# Patient Record
Sex: MALE | Race: WHITE | NOT HISPANIC OR LATINO | Employment: OTHER | URBAN - METROPOLITAN AREA
[De-identification: names, ages, dates, MRNs, and addresses within clinical notes are randomized per-mention and may not be internally consistent; named-entity substitution may affect disease eponyms.]

---

## 2017-01-09 ENCOUNTER — GENERIC CONVERSION - ENCOUNTER (OUTPATIENT)
Dept: OTHER | Facility: OTHER | Age: 77
End: 2017-01-09

## 2017-01-27 ENCOUNTER — APPOINTMENT (OUTPATIENT)
Dept: LAB | Facility: CLINIC | Age: 77
End: 2017-01-27
Payer: MEDICARE

## 2017-01-27 DIAGNOSIS — R73.09 OTHER ABNORMAL GLUCOSE: ICD-10-CM

## 2017-01-27 DIAGNOSIS — E78.5 HYPERLIPIDEMIA: ICD-10-CM

## 2017-01-27 DIAGNOSIS — I10 ESSENTIAL (PRIMARY) HYPERTENSION: ICD-10-CM

## 2017-01-27 DIAGNOSIS — N40.0 ENLARGED PROSTATE WITHOUT LOWER URINARY TRACT SYMPTOMS (LUTS): ICD-10-CM

## 2017-01-27 LAB
ALBUMIN SERPL BCP-MCNC: 3.9 G/DL (ref 3.5–5)
ALP SERPL-CCNC: 47 U/L (ref 46–116)
ALT SERPL W P-5'-P-CCNC: 35 U/L (ref 12–78)
ANION GAP SERPL CALCULATED.3IONS-SCNC: 6 MMOL/L (ref 4–13)
AST SERPL W P-5'-P-CCNC: 22 U/L (ref 5–45)
BILIRUB SERPL-MCNC: 0.57 MG/DL (ref 0.2–1)
BUN SERPL-MCNC: 20 MG/DL (ref 5–25)
CALCIUM SERPL-MCNC: 9.2 MG/DL (ref 8.3–10.1)
CHLORIDE SERPL-SCNC: 104 MMOL/L (ref 100–108)
CHOLEST SERPL-MCNC: 166 MG/DL (ref 50–200)
CO2 SERPL-SCNC: 31 MMOL/L (ref 21–32)
CREAT SERPL-MCNC: 0.88 MG/DL (ref 0.6–1.3)
EST. AVERAGE GLUCOSE BLD GHB EST-MCNC: 114 MG/DL
GFR SERPL CREATININE-BSD FRML MDRD: >60 ML/MIN/1.73SQ M
GLUCOSE SERPL-MCNC: 114 MG/DL (ref 65–140)
HBA1C MFR BLD: 5.6 % (ref 4.2–6.3)
HDLC SERPL-MCNC: 51 MG/DL (ref 40–60)
LDLC SERPL DIRECT ASSAY-MCNC: 100 MG/DL (ref 0–100)
MAGNESIUM SERPL-MCNC: 1.7 MG/DL (ref 1.6–2.6)
POTASSIUM SERPL-SCNC: 4.2 MMOL/L (ref 3.5–5.3)
PROT SERPL-MCNC: 7.3 G/DL (ref 6.4–8.2)
SODIUM SERPL-SCNC: 141 MMOL/L (ref 136–145)
TRIGL SERPL-MCNC: 156 MG/DL
VIT B12 SERPL-MCNC: 406 PG/ML (ref 100–900)

## 2017-01-27 PROCEDURE — 83721 ASSAY OF BLOOD LIPOPROTEIN: CPT

## 2017-01-27 PROCEDURE — 36415 COLL VENOUS BLD VENIPUNCTURE: CPT

## 2017-01-27 PROCEDURE — 80053 COMPREHEN METABOLIC PANEL: CPT

## 2017-01-27 PROCEDURE — 80061 LIPID PANEL: CPT

## 2017-01-27 PROCEDURE — 83735 ASSAY OF MAGNESIUM: CPT

## 2017-01-27 PROCEDURE — 83918 ORGANIC ACIDS TOTAL QUANT: CPT

## 2017-01-27 PROCEDURE — 83036 HEMOGLOBIN GLYCOSYLATED A1C: CPT

## 2017-01-27 PROCEDURE — 82607 VITAMIN B-12: CPT

## 2017-01-31 ENCOUNTER — ALLSCRIPTS OFFICE VISIT (OUTPATIENT)
Dept: OTHER | Facility: OTHER | Age: 77
End: 2017-01-31

## 2017-02-02 LAB — METHYLMALONATE SERPL-SCNC: 203 NMOL/L (ref 0–378)

## 2017-02-08 ENCOUNTER — ALLSCRIPTS OFFICE VISIT (OUTPATIENT)
Dept: OTHER | Facility: OTHER | Age: 77
End: 2017-02-08

## 2017-02-15 ENCOUNTER — HOSPITAL ENCOUNTER (OUTPATIENT)
Facility: HOSPITAL | Age: 77
Setting detail: OUTPATIENT SURGERY
Discharge: HOME | End: 2017-02-15
Attending: INTERNAL MEDICINE | Admitting: INTERNAL MEDICINE
Payer: MEDICARE

## 2017-02-15 ENCOUNTER — ANESTHESIA EVENT (OUTPATIENT)
Dept: GASTROENTEROLOGY | Facility: HOSPITAL | Age: 77
End: 2017-02-15
Payer: MEDICARE

## 2017-02-15 ENCOUNTER — ANESTHESIA (OUTPATIENT)
Dept: GASTROENTEROLOGY | Facility: HOSPITAL | Age: 77
End: 2017-02-15
Payer: MEDICARE

## 2017-02-15 VITALS
HEIGHT: 69 IN | DIASTOLIC BLOOD PRESSURE: 77 MMHG | HEART RATE: 77 BPM | BODY MASS INDEX: 32.44 KG/M2 | RESPIRATION RATE: 16 BRPM | OXYGEN SATURATION: 95 % | SYSTOLIC BLOOD PRESSURE: 122 MMHG | WEIGHT: 219 LBS | TEMPERATURE: 96.5 F

## 2017-02-15 RX ORDER — FAMOTIDINE 20 MG
TABLET ORAL
COMMUNITY

## 2017-02-15 RX ORDER — PROPOFOL 10 MG/ML
INJECTION, EMULSION INTRAVENOUS AS NEEDED
Status: DISCONTINUED | OUTPATIENT
Start: 2017-02-15 | End: 2017-02-15 | Stop reason: SURG

## 2017-02-15 RX ORDER — SODIUM CHLORIDE 9 MG/ML
125 INJECTION, SOLUTION INTRAVENOUS CONTINUOUS
Status: DISCONTINUED | OUTPATIENT
Start: 2017-02-15 | End: 2017-02-15 | Stop reason: HOSPADM

## 2017-02-15 RX ORDER — PROPOFOL 10 MG/ML
INJECTION, EMULSION INTRAVENOUS CONTINUOUS PRN
Status: DISCONTINUED | OUTPATIENT
Start: 2017-02-15 | End: 2017-02-15 | Stop reason: SURG

## 2017-02-15 RX ORDER — ONDANSETRON 2 MG/ML
4 INJECTION INTRAMUSCULAR; INTRAVENOUS ONCE
Status: DISCONTINUED | OUTPATIENT
Start: 2017-02-15 | End: 2017-02-15 | Stop reason: HOSPADM

## 2017-02-15 RX ADMIN — PROPOFOL 30 MG: 10 INJECTION, EMULSION INTRAVENOUS at 09:03

## 2017-02-15 RX ADMIN — PROPOFOL 110 MG: 10 INJECTION, EMULSION INTRAVENOUS at 08:59

## 2017-02-15 RX ADMIN — PROPOFOL 75 MCG/KG/MIN: 10 INJECTION, EMULSION INTRAVENOUS at 08:59

## 2017-02-15 RX ADMIN — SODIUM CHLORIDE 125 ML/HR: 0.9 INJECTION, SOLUTION INTRAVENOUS at 08:51

## 2017-02-27 ENCOUNTER — GENERIC CONVERSION - ENCOUNTER (OUTPATIENT)
Dept: OTHER | Facility: OTHER | Age: 77
End: 2017-02-27

## 2017-03-31 ENCOUNTER — GENERIC CONVERSION - ENCOUNTER (OUTPATIENT)
Dept: OTHER | Facility: OTHER | Age: 77
End: 2017-03-31

## 2017-04-21 ENCOUNTER — APPOINTMENT (OUTPATIENT)
Dept: LAB | Facility: CLINIC | Age: 77
End: 2017-04-21
Payer: MEDICARE

## 2017-04-21 ENCOUNTER — TRANSCRIBE ORDERS (OUTPATIENT)
Dept: LAB | Facility: CLINIC | Age: 77
End: 2017-04-21

## 2017-04-21 DIAGNOSIS — J31.0 CHRONIC RHINITIS: ICD-10-CM

## 2017-04-21 DIAGNOSIS — J31.0 CHRONIC RHINITIS: Primary | ICD-10-CM

## 2017-04-21 PROCEDURE — 82785 ASSAY OF IGE: CPT

## 2017-04-21 PROCEDURE — 36415 COLL VENOUS BLD VENIPUNCTURE: CPT

## 2017-04-21 PROCEDURE — 86003 ALLG SPEC IGE CRUDE XTRC EA: CPT

## 2017-04-25 LAB
A ALTERNATA IGE QN: <0.1 KUA/I
A FUMIGATUS IGE QN: <0.1 KUA/I
ALLERGEN COMMENT: ABNORMAL
BERMUDA GRASS IGE QN: <0.1 KUA/I
BOXELDER IGE QN: <0.1 KUA/I
C HERBARUM IGE QN: <0.1 KUA/I
CAT DANDER IGE QN: <0.1 KUA/I
CMN PIGWEED IGE QN: <0.1 KUA/I
COMMON RAGWEED IGE QN: <0.1 KUA/I
COTTONWOOD IGE QN: <0.1 KUA/I
D FARINAE IGE QN: <0.1 KUA/I
D PTERONYSS IGE QN: <0.1 KUA/I
DOG DANDER IGE QN: <0.1 KUA/I
LONDON PLANE IGE QN: <0.1 KUA/I
MOUSE URINE PROT IGE QN: <0.1 KUA/I
MT JUNIPER IGE QN: <0.1 KUA/I
MUGWORT IGE QN: <0.1 KUA/I
P NOTATUM IGE QN: <0.1 KUA/I
ROACH IGE QN: <0.1 KUA/I
SHEEP SORREL IGE QN: <0.1 KUA/I
SILVER BIRCH IGE QN: 1.71 KUA/I
TIMOTHY IGE QN: 0.14 KUA/I
TOTAL IGE SMQN RAST: 5.06 KU/L (ref 0–113)
WALNUT IGE QN: <0.1 KUA/I
WHITE ASH IGE QN: <0.1 KUA/I
WHITE ELM IGE QN: <0.1 KUA/I
WHITE MULBERRY IGE QN: <0.1 KUA/I
WHITE OAK IGE QN: 0.74 KUA/I

## 2017-05-17 ENCOUNTER — GENERIC CONVERSION - ENCOUNTER (OUTPATIENT)
Dept: OTHER | Facility: OTHER | Age: 77
End: 2017-05-17

## 2017-06-01 DIAGNOSIS — I48.91 ATRIAL FIBRILLATION (HCC): ICD-10-CM

## 2017-06-01 DIAGNOSIS — E78.5 HYPERLIPIDEMIA: ICD-10-CM

## 2017-06-01 DIAGNOSIS — I10 ESSENTIAL (PRIMARY) HYPERTENSION: ICD-10-CM

## 2017-06-01 DIAGNOSIS — R06.89 OTHER ABNORMALITIES OF BREATHING: ICD-10-CM

## 2017-07-07 ENCOUNTER — APPOINTMENT (OUTPATIENT)
Dept: LAB | Facility: CLINIC | Age: 77
End: 2017-07-07
Payer: MEDICARE

## 2017-07-07 DIAGNOSIS — R06.89 OTHER ABNORMALITIES OF BREATHING: ICD-10-CM

## 2017-07-07 DIAGNOSIS — I48.91 ATRIAL FIBRILLATION (HCC): ICD-10-CM

## 2017-07-07 DIAGNOSIS — I10 ESSENTIAL (PRIMARY) HYPERTENSION: ICD-10-CM

## 2017-07-07 DIAGNOSIS — E78.5 HYPERLIPIDEMIA: ICD-10-CM

## 2017-07-07 LAB
ALBUMIN SERPL BCP-MCNC: 3.7 G/DL (ref 3.5–5)
ALP SERPL-CCNC: 44 U/L (ref 46–116)
ALT SERPL W P-5'-P-CCNC: 44 U/L (ref 12–78)
ANION GAP SERPL CALCULATED.3IONS-SCNC: 5 MMOL/L (ref 4–13)
AST SERPL W P-5'-P-CCNC: 31 U/L (ref 5–45)
BASOPHILS # BLD AUTO: 0.01 THOUSANDS/ΜL (ref 0–0.1)
BASOPHILS NFR BLD AUTO: 0 % (ref 0–1)
BILIRUB SERPL-MCNC: 0.45 MG/DL (ref 0.2–1)
BUN SERPL-MCNC: 18 MG/DL (ref 5–25)
CALCIUM SERPL-MCNC: 9.5 MG/DL (ref 8.3–10.1)
CHLORIDE SERPL-SCNC: 102 MMOL/L (ref 100–108)
CHOLEST SERPL-MCNC: 232 MG/DL (ref 50–200)
CO2 SERPL-SCNC: 31 MMOL/L (ref 21–32)
CREAT SERPL-MCNC: 0.88 MG/DL (ref 0.6–1.3)
EOSINOPHIL # BLD AUTO: 0.07 THOUSAND/ΜL (ref 0–0.61)
EOSINOPHIL NFR BLD AUTO: 1 % (ref 0–6)
ERYTHROCYTE [DISTWIDTH] IN BLOOD BY AUTOMATED COUNT: 12.9 % (ref 11.6–15.1)
EST. AVERAGE GLUCOSE BLD GHB EST-MCNC: 134 MG/DL
GFR SERPL CREATININE-BSD FRML MDRD: >60 ML/MIN/1.73SQ M
GLUCOSE P FAST SERPL-MCNC: 105 MG/DL (ref 65–99)
HBA1C MFR BLD: 6.3 % (ref 4.2–6.3)
HCT VFR BLD AUTO: 41.9 % (ref 36.5–49.3)
HDLC SERPL-MCNC: 46 MG/DL (ref 40–60)
HGB BLD-MCNC: 14.6 G/DL (ref 12–17)
LDLC SERPL DIRECT ASSAY-MCNC: 152 MG/DL (ref 0–100)
LYMPHOCYTES # BLD AUTO: 1.35 THOUSANDS/ΜL (ref 0.6–4.47)
LYMPHOCYTES NFR BLD AUTO: 26 % (ref 14–44)
MCH RBC QN AUTO: 33.2 PG (ref 26.8–34.3)
MCHC RBC AUTO-ENTMCNC: 34.8 G/DL (ref 31.4–37.4)
MCV RBC AUTO: 95 FL (ref 82–98)
MONOCYTES # BLD AUTO: 0.67 THOUSAND/ΜL (ref 0.17–1.22)
MONOCYTES NFR BLD AUTO: 13 % (ref 4–12)
NEUTROPHILS # BLD AUTO: 3.04 THOUSANDS/ΜL (ref 1.85–7.62)
NEUTS SEG NFR BLD AUTO: 60 % (ref 43–75)
NRBC BLD AUTO-RTO: 0 /100 WBCS
PLATELET # BLD AUTO: 178 THOUSANDS/UL (ref 149–390)
PMV BLD AUTO: 11.5 FL (ref 8.9–12.7)
POTASSIUM SERPL-SCNC: 4 MMOL/L (ref 3.5–5.3)
PROT SERPL-MCNC: 7.2 G/DL (ref 6.4–8.2)
RBC # BLD AUTO: 4.4 MILLION/UL (ref 3.88–5.62)
SODIUM SERPL-SCNC: 138 MMOL/L (ref 136–145)
TRIGL SERPL-MCNC: 147 MG/DL
WBC # BLD AUTO: 5.17 THOUSAND/UL (ref 4.31–10.16)

## 2017-07-07 PROCEDURE — 80061 LIPID PANEL: CPT

## 2017-07-07 PROCEDURE — 80053 COMPREHEN METABOLIC PANEL: CPT

## 2017-07-07 PROCEDURE — 36415 COLL VENOUS BLD VENIPUNCTURE: CPT

## 2017-07-07 PROCEDURE — 83721 ASSAY OF BLOOD LIPOPROTEIN: CPT

## 2017-07-07 PROCEDURE — 85025 COMPLETE CBC W/AUTO DIFF WBC: CPT

## 2017-07-07 PROCEDURE — 83036 HEMOGLOBIN GLYCOSYLATED A1C: CPT

## 2017-07-11 ENCOUNTER — ALLSCRIPTS OFFICE VISIT (OUTPATIENT)
Dept: OTHER | Facility: OTHER | Age: 77
End: 2017-07-11

## 2017-07-17 ENCOUNTER — GENERIC CONVERSION - ENCOUNTER (OUTPATIENT)
Dept: OTHER | Facility: OTHER | Age: 77
End: 2017-07-17

## 2017-07-28 ENCOUNTER — GENERIC CONVERSION - ENCOUNTER (OUTPATIENT)
Dept: OTHER | Facility: OTHER | Age: 77
End: 2017-07-28

## 2017-08-04 ENCOUNTER — GENERIC CONVERSION - ENCOUNTER (OUTPATIENT)
Dept: OTHER | Facility: OTHER | Age: 77
End: 2017-08-04

## 2017-08-11 ENCOUNTER — GENERIC CONVERSION - ENCOUNTER (OUTPATIENT)
Dept: OTHER | Facility: OTHER | Age: 77
End: 2017-08-11

## 2017-08-14 ENCOUNTER — ALLSCRIPTS OFFICE VISIT (OUTPATIENT)
Dept: OTHER | Facility: OTHER | Age: 77
End: 2017-08-14

## 2017-08-18 ENCOUNTER — GENERIC CONVERSION - ENCOUNTER (OUTPATIENT)
Dept: OTHER | Facility: OTHER | Age: 77
End: 2017-08-18

## 2017-09-01 ENCOUNTER — GENERIC CONVERSION - ENCOUNTER (OUTPATIENT)
Dept: OTHER | Facility: OTHER | Age: 77
End: 2017-09-01

## 2017-09-15 ENCOUNTER — GENERIC CONVERSION - ENCOUNTER (OUTPATIENT)
Dept: OTHER | Facility: OTHER | Age: 77
End: 2017-09-15

## 2017-09-20 ENCOUNTER — GENERIC CONVERSION - ENCOUNTER (OUTPATIENT)
Dept: OTHER | Facility: OTHER | Age: 77
End: 2017-09-20

## 2017-09-20 DIAGNOSIS — Z79.01 LONG TERM CURRENT USE OF ANTICOAGULANT: ICD-10-CM

## 2017-10-06 ENCOUNTER — GENERIC CONVERSION - ENCOUNTER (OUTPATIENT)
Dept: OTHER | Facility: OTHER | Age: 77
End: 2017-10-06

## 2017-11-01 DIAGNOSIS — I48.91 ATRIAL FIBRILLATION (HCC): ICD-10-CM

## 2017-11-01 DIAGNOSIS — E78.5 HYPERLIPIDEMIA: ICD-10-CM

## 2017-11-01 DIAGNOSIS — I10 ESSENTIAL (PRIMARY) HYPERTENSION: ICD-10-CM

## 2017-11-01 DIAGNOSIS — R73.03 PREDIABETES: ICD-10-CM

## 2017-12-15 ENCOUNTER — APPOINTMENT (OUTPATIENT)
Dept: LAB | Facility: CLINIC | Age: 77
End: 2017-12-15
Payer: MEDICARE

## 2017-12-15 DIAGNOSIS — E78.5 HYPERLIPIDEMIA: ICD-10-CM

## 2017-12-15 DIAGNOSIS — I48.91 ATRIAL FIBRILLATION (HCC): ICD-10-CM

## 2017-12-15 DIAGNOSIS — R73.03 PREDIABETES: ICD-10-CM

## 2017-12-15 DIAGNOSIS — I10 ESSENTIAL (PRIMARY) HYPERTENSION: ICD-10-CM

## 2017-12-15 LAB
ALBUMIN SERPL BCP-MCNC: 3.9 G/DL (ref 3.5–5)
ALP SERPL-CCNC: 46 U/L (ref 46–116)
ALT SERPL W P-5'-P-CCNC: 54 U/L (ref 12–78)
ANION GAP SERPL CALCULATED.3IONS-SCNC: 5 MMOL/L (ref 4–13)
AST SERPL W P-5'-P-CCNC: 42 U/L (ref 5–45)
BILIRUB SERPL-MCNC: 0.73 MG/DL (ref 0.2–1)
BUN SERPL-MCNC: 15 MG/DL (ref 5–25)
CALCIUM SERPL-MCNC: 9.2 MG/DL (ref 8.3–10.1)
CHLORIDE SERPL-SCNC: 103 MMOL/L (ref 100–108)
CHOLEST SERPL-MCNC: 166 MG/DL (ref 50–200)
CO2 SERPL-SCNC: 31 MMOL/L (ref 21–32)
CREAT SERPL-MCNC: 0.72 MG/DL (ref 0.6–1.3)
EST. AVERAGE GLUCOSE BLD GHB EST-MCNC: 117 MG/DL
GFR SERPL CREATININE-BSD FRML MDRD: 91 ML/MIN/1.73SQ M
GLUCOSE P FAST SERPL-MCNC: 109 MG/DL (ref 65–99)
HBA1C MFR BLD: 5.7 % (ref 4.2–6.3)
HDLC SERPL-MCNC: 53 MG/DL (ref 40–60)
LDLC SERPL DIRECT ASSAY-MCNC: 107 MG/DL (ref 0–100)
POTASSIUM SERPL-SCNC: 4.1 MMOL/L (ref 3.5–5.3)
PROT SERPL-MCNC: 7.4 G/DL (ref 6.4–8.2)
SODIUM SERPL-SCNC: 139 MMOL/L (ref 136–145)
TRIGL SERPL-MCNC: 92 MG/DL

## 2017-12-15 PROCEDURE — 80053 COMPREHEN METABOLIC PANEL: CPT

## 2017-12-15 PROCEDURE — 36415 COLL VENOUS BLD VENIPUNCTURE: CPT

## 2017-12-15 PROCEDURE — 83721 ASSAY OF BLOOD LIPOPROTEIN: CPT

## 2017-12-15 PROCEDURE — 83036 HEMOGLOBIN GLYCOSYLATED A1C: CPT

## 2017-12-15 PROCEDURE — 80061 LIPID PANEL: CPT

## 2017-12-18 ENCOUNTER — GENERIC CONVERSION - ENCOUNTER (OUTPATIENT)
Dept: OTHER | Facility: OTHER | Age: 77
End: 2017-12-18

## 2018-01-08 ENCOUNTER — GENERIC CONVERSION - ENCOUNTER (OUTPATIENT)
Dept: INTERNAL MEDICINE CLINIC | Facility: CLINIC | Age: 78
End: 2018-01-08

## 2018-01-11 ENCOUNTER — GENERIC CONVERSION - ENCOUNTER (OUTPATIENT)
Dept: INTERNAL MEDICINE CLINIC | Facility: CLINIC | Age: 78
End: 2018-01-11

## 2018-01-12 VITALS
HEIGHT: 69 IN | BODY MASS INDEX: 33.56 KG/M2 | WEIGHT: 226.56 LBS | SYSTOLIC BLOOD PRESSURE: 132 MMHG | DIASTOLIC BLOOD PRESSURE: 76 MMHG | HEART RATE: 77 BPM

## 2018-01-14 VITALS — SYSTOLIC BLOOD PRESSURE: 130 MMHG | HEART RATE: 78 BPM | RESPIRATION RATE: 14 BRPM | DIASTOLIC BLOOD PRESSURE: 78 MMHG

## 2018-01-15 VITALS
BODY MASS INDEX: 33.49 KG/M2 | WEIGHT: 226.13 LBS | HEIGHT: 69 IN | HEART RATE: 78 BPM | DIASTOLIC BLOOD PRESSURE: 80 MMHG | RESPIRATION RATE: 14 BRPM | SYSTOLIC BLOOD PRESSURE: 130 MMHG

## 2018-01-15 VITALS — RESPIRATION RATE: 14 BRPM | HEART RATE: 78 BPM

## 2018-01-17 ENCOUNTER — ALLSCRIPTS OFFICE VISIT (OUTPATIENT)
Dept: OTHER | Facility: OTHER | Age: 78
End: 2018-01-17

## 2018-01-17 NOTE — PROGRESS NOTES
Assessment    1  Anticoagulant long-term use (V58 61) (Z79 01)   2  Esophageal reflux (530 81) (K21 9)   3  Hypertension (401 9) (I10)   4  Hyperlipidemia (272 4) (E78 5)   5  Prediabetes (790 29) (R73 09)   6  Syncope (780 2) (R55)   7  Atrial fibrillation (427 31) (I48 91)     #1 recent episode of syncope this might be in the basis of orthostasis-combination of meds used to treat his cardiac condition aggravated by excess alcohol use  Stable since she was admitted without any further issues  Arrhythmia monitoring in the hospital benign  CAT scan of the brain unenhanced benign  Again recommended he minimize any use of alcohol  #2 alcohol use-as noted he was felt this contributed to his cardiomyopathy  #3 hypertension-adequate control current regimen  #4 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 atrial fibrillation plus hypertension plus alcohol use  Rule out contribution of sleep apnea  Most recent echo shows an EF of 55% with mild dilatation of right and left atrium and some thickening of the muscle  #5 atrial fibrillation-cardiology feels we should continue with rate control and anticoagulation-as noted he recently switched anticoagulants  #6 possible CAD-nuclear stress test showed reduced motion of septal wall  Cardiology felt there was a mild defect consistent with artifact  #7 polyps of the colon-colonoscopy just done shows only mild diverticular disease with repeat recommended in 5 years which would be January 2021  #8 prediabetes-again recommended weight loss and conservative therapy  #9 moderate obstructive sleep apnea-unable tolerate CPAP    He knows he needs to lose weight  #10 previously noted right leg pain-L3 lumbar radiculopathy-Doppler negative for DVT-has not recurred    All other problems as per note of April 2014, August 2012    MEDICAL REGIMEN: Cozaar 50 mg daily, atenolol 50 mg twice a day, amlodipine 2 5 mg daily using one half of a 5 mg tablet, omeprazole 20 mg daily, amitriptyline 10 MGs-half tablet before bed, simvastatin 40 mg daily, fish oil 1200 mg a day, multivitamin,Eliquis-5 mg twice a day    Appointment several months with prior chemistry profile, cholesterol profile, hemoglobin A1c     Plan   Continue current medical regimen  Go for blood work prior to next visit  Amlodipine doses one half of a 5 mg tablet  Call office if noting any chest pain or pressure with activity  Call office if noting any weakness of one arm or leg compared to the other  Call office if noting any numbness of one arm or leg compared to the other  Call office if noting any blurred or double vision  Go for blood work prior to next visit     History of Present Illness  HPI: He has not had any recurrent syncope or near-syncope  Adjust his meds as noted  His most recent echo is clearly improved with an EF of 55%, no regional wall motion abnormalities, wall thickness mild to moderately increased, mild concentric hypertrophy  We again recommended he minimize any alcohol use because of potential effect on cardiomyopathy  Laboratory testing done prior to this visit shows a normal CBC, chemistry profile normal other than a sugar 112, triglycerides 94, LDL 95, HDL over 40, hemoglobin A1c of 5 6  Notes from cardiology reviewed  He agreed with decreasing dose of amlodipine and is doing well in that regard  He has not had any chest pain or pressure  His baseline shortness of breath and has not worsened  He denies orthopnea or paroxysmal nocturnal dyspnea  He states he had a colonoscopy over the last week the was benign he was told does not need another one for 5 years  He had mild diverticular disease  He denies any frequent abdominal pain  He denies any bloody stool    This patient denies any systemic symptoms  Specifically there has been no evidence of fever, night sweats, significant weight loss or significant decrease in appetite   Reviewed his medical regimen is taking it correctly      Review of Systems  Complete-Male:   Constitutional: No fever or chills, feels well, no tiredness, no recent weight gain or weight loss  Eyes: No complaints of eye pain, no red eyes, no discharge from eyes, no itchy eyes  ENT: no complaints of earache, no hearing loss, no nosebleeds, no nasal discharge, no sore throat, no hoarseness  Cardiovascular: No complaints of slow heart rate, no fast heart rate, no chest pain, no palpitations, no leg claudication, no lower extremity  Respiratory: No complaints of shortness of breath, no wheezing, no cough, no SOB on exertion, no orthopnea or PND  Gastrointestinal: No complaints of abdominal pain, no constipation, no nausea or vomiting, no diarrhea or bloody stools  Genitourinary: No complaints of dysuria, no incontinence, no hesitancy, no nocturia, no genital lesion, no testicular pain  Musculoskeletal: limb pain, but no arthralgias, no joint swelling and no limb swelling  Integumentary: No complaints of skin rash or skin lesions, no itching, no skin wound, no dry skin  Neurological: No compliants of headache, no confusion, no convulsions, no numbness or tingling, no dizziness or fainting, no limb weakness, no difficulty walking  Psychiatric: Is not suicidal, no sleep disturbances, no anxiety or depression, no change in personality, no emotional problems  Endocrine: No complaints of proptosis, no hot flashes, no muscle weakness, no erectile dysfunction, no deepening of the voice, no feelings of weakness  Hematologic/Lymphatic: No complaints of swollen glands, no swollen glands in the neck, does not bleed easily, no easy bruising  Active Problems    1  Anticoagulant long-term use (V58 61) (Z79 01)   2  Arthritis (716 90) (M19 90)   3  Atrial fibrillation (427 31) (I48 91)   4  Benign prostatic hypertrophy (600 00) (N40 0)   5  Carotid artery stenosis, asymptomatic (433 10) (I65 29)   6  Cough (786 2) (R05)   7  Difficulty breathing (786 09) (R06 89)   8  Diverticulosis (562 10) (K57 90)   9  Eczema (692 9) (L30 9)   10  Esophageal reflux (530 81) (K21 9)   11  Headache (784 0) (R51)   12  History of colon polyps (V12 72) (Z86 010)   13  Hyperlipidemia (272 4) (E78 5)   14  Hypertension (401 9) (I10)   15  Insomnia (780 52) (G47 00)   16  Obstructive sleep apnea (327 23) (G47 33)   17  Pain in lower limb (729 5) (M79 606)   18  Prediabetes (790 29) (R73 09)   19  Rosacea (695 3) (L71 9)   20  Syncope (780 2) (R55)    Past Medical History    1  History of cardiomyopathy (V12 59) (Z86 79)   2  History of cataract (V12 49) (Z86 69)   3  History of rosacea (V13 3) (Z87 2)   4  History of Long term use of drug (V58 69) (Z79 899)  Active Problems And Past Medical History Reviewed: The active problems and past medical history were reviewed and updated today  Surgical History    1  Denied: History Of Prior Surgery  Surgical History Reviewed: The surgical history was reviewed and updated today  Family History    1  Family history of Coronary Artery Disease (V17 49)    2  Family history of Coronary Artery Disease (V17 49)   3  Family history of Hyperlipidemia   4  Family history of Hypertension (V17 49)   5  Family history of Sudden/Instantaneous Cardiac Death (V17 41)  Family History Reviewed: The family history was reviewed and updated today  Social History    · Being A Social Drinker   · Denied: History of Drug Use   · Former smoker (V15 82) (Y50 929)   · Marital History - Currently   Social History Reviewed: The social history was reviewed and updated today  The social history was reviewed and is unchanged  Current Meds   1  Amitriptyline HCl - 10 MG Oral Tablet; TAKE 1/2 TABLET TO 1 TABLET BY MOUTH AT BEDTIME    Requested for: 28Dec2015; Last Rx:55Toe0997 Ordered   2  AmLODIPine Besylate 2 5 MG Oral Tablet; TAKE 1 TABLET DAILY; Last Rx:16Ehj2936 Ordered   3  Aspirin 81 MG Oral Tablet; TAKE 1 TABLET DAILY;    Therapy: (Recorded:17Mar2014) to Recorded   4  Atenolol 50 MG Oral Tablet; Take 1 tablet by mouth twice daily; Therapy: 96GEI0291 to (Evaluate:13Xju7622); Last Rx:40Nej1421 Ordered   5  Centrum Silver Oral Tablet; TAKE 1 TABLET DAILY; Therapy: (Recorded:16Nov2012) to Recorded   6  Eliquis 5 MG Oral Tablet; take one tablet by mouth twice daily; Therapy: 30Apr2015 to (Evaluate:32Bty8575); Last Rx:70Wdv5456 Ordered   7  Fluocinonide 0 05 % External Cream; APPLY SPARINGLY TO AFFECTED AREA(S) TWICE DAILY; Therapy: 38TOF1937 to (Last Rx:43Nyd4049) Ordered   8  Losartan Potassium 50 MG Oral Tablet; TAKE 1 TABLET DAILY; Last Rx:09Ekc9711 Ordered   9  Multiple Vitamins TABS; Therapy: (Recorded:17Mar2014) to Recorded   10  Omeprazole 20 MG Oral Capsule Delayed Release; TAKE 1 CAPSULE Daily; Therapy: 27BZC3700 to (Last Rx:52Buf7504) Ordered   11  Simvastatin 40 MG Oral Tablet; TAKE 1 TABLET DAILY AS DIRECTED  Requested for: 28Dec2015; Last Rx:28Dec2015 Ordered  Medication List Reviewed: The medication list was reviewed and updated today  Allergies    1  Lisinopril TABS    Vitals  Vital Signs [Data Includes: Current Encounter]    Recorded: [id number] 02:32PM Recorded: 29ZPG0702 01:58PM   Heart Rate 68    Respiration 14    Systolic 933, RUE, Sitting    Diastolic 80, RUE, Sitting    Height  5 ft 9 in   Weight  222 lb    BMI Calculated  32 78   BSA Calculated  2 16     Physical Exam    Constitutional   General appearance: No acute distress, well appearing and well nourished  Head and Face   Head and face: Normal     Palpation of the face and sinuses: No sinus tenderness  Eyes   Conjunctiva and lids: No erythema, swelling or discharge  Pupils and irises: Equal, round, reactive to light  Ears, Nose, Mouth, and Throat   External inspection of ears and nose: Normal     Otoscopic examination: Tympanic membranes translucent with normal light reflex  Canals patent without erythema      Hearing: Normal     Nasal mucosa, septum, and turbinates: Normal without edema or erythema  Lips, teeth, and gums: Normal, good dentition  Oropharynx: Normal with no erythema, edema, exudate or lesions  Neck   Neck: Supple, symmetric, trachea midline, no masses  Thyroid: Normal, no thyromegaly  Pulmonary   Respiratory effort: No increased work of breathing or signs of respiratory distress  Percussion of chest: Normal     Palpation of chest: Normal     Auscultation of lungs: Clear to auscultation  Cardiovascular   Palpation of heart: Abnormal   Cardiomegaly to percussion  Auscultation of heart: Abnormal   Irregular irregular rhythm  Decreased carotid upstroke  Carotid pulses: 2+ bilaterally  Abdominal aorta: Normal     Femoral pulses: 2+ bilaterally  Pedal pulses: 2+ bilaterally  Peripheral vascular exam: Normal     Examination of extremities for edema and/or varicosities: Normal   No edema  Chest   Breasts: Normal, no dimpling or skin changes appreciated  Palpation of breasts and axillae: Normal, no masses palpated  Chest: Normal     Abdomen   Abdomen: Non-tender, no masses  Liver and spleen: No hepatomegaly or splenomegaly  Examination for hernias: No hernias appreciated  Anus, perineum, and rectum: Normal sphincter tone, no masses, no prolapse  Genitourinary   Scrotal contents: Normal testes, no masses  Penis: Normal, no lesions  Lymphatic   Palpation of lymph nodes in neck: No lymphadenopathy  Palpation of lymph nodes in axillae: No lymphadenopathy  Palpation of lymph nodes in groin: No lymphadenopathy  Palpation of lymph nodes in other areas: No lymphadenopathy  Musculoskeletal   Gait and station: Normal     Inspection/palpation of digits and nails: Normal without clubbing or cyanosis  Inspection/palpation of joints, bones, and muscles: Abnormal   Crepitance on range of motion of both knees     Range of motion: Normal     Stability: Normal     Muscle strength/tone: Normal     Skin Skin and subcutaneous tissue: Normal without rashes or lesions  Examination of the skin for lesions: Abnormal   Benign seborrheic keratoses  Palpation of skin and subcutaneous tissue: Normal turgor  Neurologic   Cranial nerves: Cranial nerves 2-12 intact  Cortical function: Normal mental status  Reflexes: 2+ and symmetric  Sensation: No sensory loss  Coordination: Normal finger to nose and heel to shin  Psychiatric   Judgment and insight: Normal     Orientation to person, place and time: Normal     Recent and remote memory: Intact      Mood and affect: Normal        Signatures   Electronically signed by : ROSMERY Fishman ; Jan 19 2016  2:39PM EST                       (Author)

## 2018-01-22 VITALS
BODY MASS INDEX: 32.36 KG/M2 | HEART RATE: 86 BPM | WEIGHT: 218.5 LBS | HEIGHT: 69 IN | SYSTOLIC BLOOD PRESSURE: 142 MMHG | DIASTOLIC BLOOD PRESSURE: 82 MMHG

## 2018-01-23 ENCOUNTER — APPOINTMENT (OUTPATIENT)
Dept: RADIOLOGY | Facility: CLINIC | Age: 78
End: 2018-01-23
Payer: MEDICARE

## 2018-01-23 ENCOUNTER — TRANSCRIBE ORDERS (OUTPATIENT)
Dept: LAB | Facility: CLINIC | Age: 78
End: 2018-01-23

## 2018-01-23 ENCOUNTER — TRANSCRIBE ORDERS (OUTPATIENT)
Dept: RADIOLOGY | Facility: CLINIC | Age: 78
End: 2018-01-23

## 2018-01-23 DIAGNOSIS — N47.1 PHIMOSIS: ICD-10-CM

## 2018-01-23 DIAGNOSIS — N47.1 PHIMOSIS: Primary | ICD-10-CM

## 2018-01-23 PROCEDURE — 71046 X-RAY EXAM CHEST 2 VIEWS: CPT

## 2018-01-23 NOTE — RESULT NOTES
Verified Results  (1) COMPREHENSIVE METABOLIC PANEL 16XMV1488 67:60OM [facility] Order Number: [id number]     Test Name Result Flag Reference   SODIUM 139 mmol/L  136-145   POTASSIUM 4 1 mmol/L  3 5-5 3   CHLORIDE 103 mmol/L  100-108   CARBON DIOXIDE 31 mmol/L  21-32   ANION GAP (CALC) 5 mmol/L  4-13   BLOOD UREA NITROGEN 15 mg/dL  5-25   CREATININE 0 72 mg/dL  0 60-1 30   Standardized to IDMS reference method   CALCIUM 9 2 mg/dL  8 3-10 1   BILI, TOTAL 0 73 mg/dL  0 20-1 00   ALK PHOSPHATAS 46 U/L     ALT (SGPT) 54 U/L  12-78   Specimen collection should occur prior to Sulfasalazine and/or Sulfapyridine administration due to the potential for falsely depressed results  AST(SGOT) 42 U/L  5-45   Specimen collection should occur prior to Sulfasalazine administration due to the potential for falsely depressed results  ALBUMIN 3 9 g/dL  3 5-5 0   TOTAL PROTEIN 7 4 g/dL  6 4-8 2   eGFR 91 ml/min/1 73sq m     National Kidney Disease Education Program recommendations are as follows:  GFR calculation is accurate only with a steady state creatinine  Chronic Kidney disease less than 60 ml/min/1 73 sq  meters  Kidney failure less than 15 ml/min/1 73 sq  meters  GLUCOSE FASTING 109 mg/dL H 65-99   Specimen collection should occur prior to Sulfasalazine administration due to the potential for falsely depressed results  Specimen collection should occur prior to Sulfapyridine administration due to the potential for falsely elevated results  (1) HEMOGLOBIN A1C 76Yjs8144 07:52AM [facility] Order Number: [id number]     Test Name Result Flag Reference   HEMOGLOBIN A1C 5 7 %  4 2-6 3   EST  AVG   GLUCOSE 117 mg/dl       (1) CHOLESTEROL, TOTAL 89Gln8820 07:52AM [facility] Order Number: [id number]     Test Name Result Flag Reference   CHOLESTEROL 166 mg/dL     Cholesterol:         Desirable        <200 mg/dl      Borderline High  200-239 mg/dl High             >239 mg/dl     (1) TRIGLYCERIDE 73ZKK0308 07:52AM [facility]    Order Number: [id number]     Test Name Result Flag Reference   TRIGLYCERIDES 92 mg/dL  <=150   Specimen collection should occur prior to N-Acetylcysteine or Metamizole administration due to the potential for falsely depressed results        Triglyceride:        Normal <150 mg/dl   Borderline High 150-199 mg/dl   High 200-499 mg/dl   Very High >499 mg/dl     (1) LDL,DIRECT 01JOW7048 07:52AM [facility]    Order Number: [id number]     Test Name Result Flag Reference   LDL, DIRECT 107 mg/dl H 0-100   LDL Cholesterol:        Optimal          <100 mg/dl        Near Optimal     100-129 mg/dl        Above Optimal          Borderline High   130-159 mg/dl          High              160-189 mg/dl          Very High        >189 mg/dl

## 2018-01-24 VITALS — DIASTOLIC BLOOD PRESSURE: 78 MMHG | SYSTOLIC BLOOD PRESSURE: 130 MMHG | HEART RATE: 72 BPM | RESPIRATION RATE: 14 BRPM

## 2018-01-24 VITALS — HEIGHT: 69 IN | BODY MASS INDEX: 30.83 KG/M2 | WEIGHT: 208.13 LBS

## 2018-01-25 ENCOUNTER — DOCUMENTATION (OUTPATIENT)
Dept: CARDIOLOGY CLINIC | Facility: CLINIC | Age: 78
End: 2018-01-25

## 2018-01-25 RX ORDER — AMLODIPINE BESYLATE 5 MG/1
5 TABLET ORAL
COMMUNITY
End: 2019-01-04 | Stop reason: SDUPTHER

## 2018-01-25 NOTE — PROGRESS NOTES
[address] [facility]    [phone]    Today's date  Patient    MRN  Surgery  Surgeon  Tel  Fax    Date last seen    Date of surgery    Contraindications    Anticoagulation    Approved for surgery: Y/N    Cardiologist

## 2018-01-25 NOTE — PRE-PROCEDURE INSTRUCTIONS
Pre-Surgery Instructions:   Medication Instructions    amitriptyline (ELAVIL) 10 mg tablet Instructed patient per Anesthesia Guidelines   amLODIPine (NORVASC) 5 mg tablet Instructed patient per Anesthesia Guidelines   apixaban (ELIQUIS) 5 mg Patient was instructed by Physician and understands   aspirin 81 MG tablet Patient was instructed to contact Physician for medication instruction   atenolol (TENORMIN) 50 mg tablet Instructed patient per Anesthesia Guidelines   calcium carbonate-vitamin D (OSCAL-D) 500 mg-200 units per tablet Instructed patient per Anesthesia Guidelines   losartan (COZAAR) 50 mg tablet Patient was instructed by Physician and understands   Multiple Vitamin (MULTIVITAMIN) tablet Instructed patient per Anesthesia Guidelines   omeprazole (PriLOSEC) 20 mg delayed release capsule Instructed patient per Anesthesia Guidelines   simvastatin (ZOCOR) 40 mg tablet Instructed patient per Anesthesia Guidelines   Vitamin D, Cholecalciferol, 1000 UNITS CAPS Instructed patient per Anesthesia Guidelines  Spoke to patient  Medication list reviewed & instructed  As of 1 25 18 pt stopping MV, vit D, calcium  Instructed on tylenol only  Per pt, instructed by MD to hold eliquis 3 days prior to sx  Per pt, instructed by MD to hold losartan day prior and day of sx  On am DOS pt to take omeprazole, atenolol  Pt takes amlodipine at hs  Will confirm with MD if pt ok to continue aspirin  Showering instructions given by office, reviewed at time of call  All instructions verbally understood by patient  No further questions  Callback number given

## 2018-01-25 NOTE — PROGRESS NOTES
[address] [facility]    [phone]    Today's date  Patient    MRN  Surgery  Surgeon  Tel  Fax    Date last seen    Date of surgery    Contraindications    Anticoagulation    Approved for surgery: Y/N    Cardiologist

## 2018-01-29 ENCOUNTER — ANESTHESIA (OUTPATIENT)
Dept: PERIOP | Facility: HOSPITAL | Age: 78
End: 2018-01-29
Payer: MEDICARE

## 2018-01-29 ENCOUNTER — ANESTHESIA EVENT (OUTPATIENT)
Dept: PERIOP | Facility: HOSPITAL | Age: 78
End: 2018-01-29
Payer: MEDICARE

## 2018-01-29 ENCOUNTER — HOSPITAL ENCOUNTER (OUTPATIENT)
Facility: HOSPITAL | Age: 78
Setting detail: OUTPATIENT SURGERY
Discharge: HOME | End: 2018-01-29
Attending: UROLOGY | Admitting: UROLOGY
Payer: MEDICARE

## 2018-01-29 VITALS
TEMPERATURE: 99.2 F | WEIGHT: 214 LBS | RESPIRATION RATE: 16 BRPM | HEIGHT: 69 IN | OXYGEN SATURATION: 95 % | HEART RATE: 72 BPM | DIASTOLIC BLOOD PRESSURE: 75 MMHG | SYSTOLIC BLOOD PRESSURE: 140 MMHG | BODY MASS INDEX: 31.7 KG/M2

## 2018-01-29 DIAGNOSIS — N47.7 OTHER INFLAMMATORY DISEASES OF PREPUCE: ICD-10-CM

## 2018-01-29 DIAGNOSIS — N47.1 PHIMOSIS: ICD-10-CM

## 2018-01-29 PROCEDURE — 88304 TISSUE EXAM BY PATHOLOGIST: CPT | Performed by: PATHOLOGY

## 2018-01-29 PROCEDURE — 88304 TISSUE EXAM BY PATHOLOGIST: CPT | Performed by: UROLOGY

## 2018-01-29 RX ORDER — HYDROCODONE BITARTRATE AND ACETAMINOPHEN 5; 325 MG/1; MG/1
1 TABLET ORAL EVERY 6 HOURS PRN
Status: DISCONTINUED | OUTPATIENT
Start: 2018-01-29 | End: 2018-01-29 | Stop reason: HOSPADM

## 2018-01-29 RX ORDER — METOCLOPRAMIDE HYDROCHLORIDE 5 MG/ML
INJECTION INTRAMUSCULAR; INTRAVENOUS AS NEEDED
Status: DISCONTINUED | OUTPATIENT
Start: 2018-01-29 | End: 2018-01-29 | Stop reason: SURG

## 2018-01-29 RX ORDER — FENTANYL CITRATE/PF 50 MCG/ML
25 SYRINGE (ML) INJECTION
Status: DISCONTINUED | OUTPATIENT
Start: 2018-01-29 | End: 2018-01-29 | Stop reason: HOSPADM

## 2018-01-29 RX ORDER — ONDANSETRON 2 MG/ML
4 INJECTION INTRAMUSCULAR; INTRAVENOUS ONCE AS NEEDED
Status: DISCONTINUED | OUTPATIENT
Start: 2018-01-29 | End: 2018-01-29 | Stop reason: HOSPADM

## 2018-01-29 RX ORDER — KETOROLAC TROMETHAMINE 30 MG/ML
INJECTION, SOLUTION INTRAMUSCULAR; INTRAVENOUS AS NEEDED
Status: DISCONTINUED | OUTPATIENT
Start: 2018-01-29 | End: 2018-01-29 | Stop reason: SURG

## 2018-01-29 RX ORDER — PROPOFOL 10 MG/ML
INJECTION, EMULSION INTRAVENOUS AS NEEDED
Status: DISCONTINUED | OUTPATIENT
Start: 2018-01-29 | End: 2018-01-29 | Stop reason: SURG

## 2018-01-29 RX ORDER — SODIUM CHLORIDE, SODIUM LACTATE, POTASSIUM CHLORIDE, CALCIUM CHLORIDE 600; 310; 30; 20 MG/100ML; MG/100ML; MG/100ML; MG/100ML
20 INJECTION, SOLUTION INTRAVENOUS CONTINUOUS
Status: DISCONTINUED | OUTPATIENT
Start: 2018-01-29 | End: 2018-01-29 | Stop reason: HOSPADM

## 2018-01-29 RX ORDER — BACITRACIN, NEOMYCIN, POLYMYXIN B 400; 3.5; 5 [USP'U]/G; MG/G; [USP'U]/G
OINTMENT TOPICAL AS NEEDED
Status: DISCONTINUED | OUTPATIENT
Start: 2018-01-29 | End: 2018-01-29 | Stop reason: HOSPADM

## 2018-01-29 RX ORDER — LIDOCAINE HYDROCHLORIDE 10 MG/ML
INJECTION, SOLUTION INFILTRATION; PERINEURAL AS NEEDED
Status: DISCONTINUED | OUTPATIENT
Start: 2018-01-29 | End: 2018-01-29 | Stop reason: SURG

## 2018-01-29 RX ORDER — DEXTROSE, SODIUM CHLORIDE, SODIUM LACTATE, POTASSIUM CHLORIDE, AND CALCIUM CHLORIDE 5; .6; .31; .03; .02 G/100ML; G/100ML; G/100ML; G/100ML; G/100ML
125 INJECTION, SOLUTION INTRAVENOUS CONTINUOUS
Status: DISCONTINUED | OUTPATIENT
Start: 2018-01-29 | End: 2018-01-29 | Stop reason: HOSPADM

## 2018-01-29 RX ORDER — SUCCINYLCHOLINE CHLORIDE 20 MG/ML
INJECTION INTRAMUSCULAR; INTRAVENOUS AS NEEDED
Status: DISCONTINUED | OUTPATIENT
Start: 2018-01-29 | End: 2018-01-29 | Stop reason: SURG

## 2018-01-29 RX ORDER — ONDANSETRON 2 MG/ML
INJECTION INTRAMUSCULAR; INTRAVENOUS AS NEEDED
Status: DISCONTINUED | OUTPATIENT
Start: 2018-01-29 | End: 2018-01-29 | Stop reason: SURG

## 2018-01-29 RX ORDER — ONDANSETRON 2 MG/ML
4 INJECTION INTRAMUSCULAR; INTRAVENOUS EVERY 6 HOURS PRN
Status: DISCONTINUED | OUTPATIENT
Start: 2018-01-29 | End: 2018-01-29 | Stop reason: HOSPADM

## 2018-01-29 RX ORDER — MAGNESIUM HYDROXIDE 1200 MG/15ML
LIQUID ORAL AS NEEDED
Status: DISCONTINUED | OUTPATIENT
Start: 2018-01-29 | End: 2018-01-29 | Stop reason: HOSPADM

## 2018-01-29 RX ORDER — FENTANYL CITRATE 50 UG/ML
INJECTION, SOLUTION INTRAMUSCULAR; INTRAVENOUS AS NEEDED
Status: DISCONTINUED | OUTPATIENT
Start: 2018-01-29 | End: 2018-01-29 | Stop reason: SURG

## 2018-01-29 RX ADMIN — LIDOCAINE HYDROCHLORIDE 50 MG: 10 INJECTION, SOLUTION INFILTRATION; PERINEURAL at 12:39

## 2018-01-29 RX ADMIN — FENTANYL CITRATE 25 MCG: 50 INJECTION, SOLUTION INTRAMUSCULAR; INTRAVENOUS at 12:43

## 2018-01-29 RX ADMIN — CEFAZOLIN SODIUM 2000 MG: 2 SOLUTION INTRAVENOUS at 12:40

## 2018-01-29 RX ADMIN — ONDANSETRON 4 MG: 2 INJECTION INTRAMUSCULAR; INTRAVENOUS at 12:56

## 2018-01-29 RX ADMIN — FENTANYL CITRATE 25 MCG: 50 INJECTION, SOLUTION INTRAMUSCULAR; INTRAVENOUS at 12:49

## 2018-01-29 RX ADMIN — SODIUM CHLORIDE, SODIUM LACTATE, POTASSIUM CHLORIDE, AND CALCIUM CHLORIDE 20 ML/HR: .6; .31; .03; .02 INJECTION, SOLUTION INTRAVENOUS at 12:05

## 2018-01-29 RX ADMIN — FENTANYL CITRATE 50 MCG: 50 INJECTION, SOLUTION INTRAMUSCULAR; INTRAVENOUS at 12:39

## 2018-01-29 RX ADMIN — KETOROLAC TROMETHAMINE 15 MG: 30 INJECTION, SOLUTION INTRAMUSCULAR at 13:08

## 2018-01-29 RX ADMIN — SUCCINYLCHOLINE CHLORIDE 100 MG: 20 INJECTION, SOLUTION INTRAMUSCULAR; INTRAVENOUS at 12:40

## 2018-01-29 RX ADMIN — METOCLOPRAMIDE 10 MG: 5 INJECTION, SOLUTION INTRAMUSCULAR; INTRAVENOUS at 12:52

## 2018-01-29 RX ADMIN — PROPOFOL 200 MG: 10 INJECTION, EMULSION INTRAVENOUS at 12:39

## 2018-01-29 NOTE — ANESTHESIA POSTPROCEDURE EVALUATION
Post-Op Assessment Note      CV Status:  Stable    Mental Status:  Alert and awake    Hydration Status:  Euvolemic    PONV Controlled:  Controlled    Airway Patency:  Patent  Airway: intubated    Post Op Vitals Reviewed: Yes          Staff: CRNA           BP      Temp 98 3 °F (36 8 °C) (01/29/18 1325)    Pulse 80 (01/29/18 1325)   Resp 12 (01/29/18 1325)    SpO2 98 % (01/29/18 1325)

## 2018-01-29 NOTE — DISCHARGE INSTRUCTIONS
Adult Male Circumcision   WHAT YOU NEED TO KNOW:   Circumcision is surgery to remove the foreskin of the penis  The foreskin is the fold of skin that covers the tip of the penis  DISCHARGE INSTRUCTIONS:   Medicines:   · Pain medicine: You may need medicine to take away or decrease pain  ¨ Learn how to take your medicine  Ask what medicine and how much you should take  Be sure you know how, when, and how often to take it  ¨ Do not wait until the pain is severe before you take your medicine  Tell caregivers if your pain does not decrease  ¨ Pain medicine can make you dizzy or sleepy  Prevent falls by calling someone when you get out of bed or if you need help  · Antibiotics: This medicine is given to fight or prevent an infection caused by bacteria  Always take your antibiotics exactly as ordered by your healthcare provider  Do not stop taking your medicine unless directed by your healthcare provider  Never save antibiotics or take leftover antibiotics that were given to you for another illness  · Take your medicine as directed  Contact your healthcare provider if you think your medicine is not helping or if you have side effects  Tell him or her if you are allergic to any medicine  Keep a list of the medicines, vitamins, and herbs you take  Include the amounts, and when and why you take them  Bring the list or the pill bottles to follow-up visits  Carry your medicine list with you in case of an emergency  Follow up with your healthcare provider as directed: You may need to return to have your stitches removed  Write down your questions so you remember to ask them during your visits  Sexual activity:  You may need to wait 4 to 6 weeks after the procedure before sexual activity  Ask your healthcare provider before you engage in sexual activity  Rest when you need to while you heal after surgery  Slowly start to do more each day  Return to your daily activities as directed     Self-care: · Bathing and wound care: The bandages may be removed 24 to 48 hours after the procedure  When you are allowed to shower, carefully wash the incision with soap and water every day  Do not take a tub bath or get in a hot tub until your healthcare provider says it is okay  · Clothing:  Do not wear tight-fitting briefs, shorts, or pants  Contact your healthcare provider if:   · You have a fever  · You have pain when you urinate  · You have chills, a cough, or feel weak and achy  · You have questions or concerns about your procedure, condition, or care  Seek care immediately or call 911 if:   · Your urine has blood in it, becomes very cloudy, and smells bad  · You cannot urinate  · You have pain or swelling of the penis that does not go away, even with medicine  · Your incision is red, swollen, painful, and leaking pus  © 2017 [facility] Information is for End User's use only and may not be sold, redistributed or otherwise used for commercial purposes  All illustrations and images included in CareNotes® are the copyrighted property of A D A M , Inc  or Andres Sloan  The above information is an  only  It is not intended as medical advice for individual conditions or treatments  Talk to your doctor, nurse or pharmacist before following any medical regimen to see if it is safe and effective for you

## 2018-01-29 NOTE — OP NOTE
OPERATIVE REPORT  PATIENT NAME: Rao Charles    :  1940  MRN: [id number]  [facility]    SURGERY DATE: 2018    Surgeon(s) and Role:     * Jose Sykes MD - Primary    Preop Diagnosis:  Phimosis [N47 1]  Other inflammatory diseases of prepuce [N47 7]    Post-Op Diagnosis Codes:     * Phimosis [N47 1]     * Other inflammatory diseases of prepuce [N47 7]    Procedure(s) (LRB):  CIRCUMCISION ADULT (N/A)    Specimen(s):  ID Type Source Tests Collected by Time Destination   1 : Foreskin Tissue Foreskin TISSUE EXAM Jose Sykes MD 2018 1257        Estimated Blood Loss:   Minimal    Drains:       Anesthesia Type:   General    Operative Indications:  Phimosis [N47 1]  Other inflammatory diseases of prepuce [N47 7]      Operative Findings:  Phimosis N47 1  Other inflammatory disease of prepuce X76 7     Complications:   None    Procedure and Technique:  The patient was properly identified verbally and visually by myself and anesthesia  Time outs were done appropriately Patient given general endotracheal anesthesia  He was prepped and draped in the supine position  Very tight phimosis was found  Lateral hemostats were applied at 3 and 9 o'clock on the foreskin  Crushing clamps were placed at 12 and 6 o'clock on the foreskin  Incisions were made at 12 and 6  Opening up the foreskin  Reprepping was done at this point  Foreskin was removed subcircumferencially with Metzenbaum scissors  Coagulation of subcutaneous bleeders was accomplished  After hemostasis was used 12 and 6 o'clock figure 8 sutures were placed between the penile skin  The sides were run with 4 0 Vicryl sutures  Blood loss approximately  15cc's  The Vaseline gauze was placed and clean dressing    Patient tolerated procedure    I was present for the entire procedure    Patient Disposition:  PACU     SIGNATURE: Nika Tovar MD  DATE: 2018  TIME: 1:19 PM

## 2018-01-29 NOTE — ANESTHESIA PREPROCEDURE EVALUATION
Review of Systems/Medical History  Patient summary reviewed  Chart reviewed      Cardiovascular  EKG reviewed, Hyperlipidemia, Hypertension , Dysrhythmias (on xarelto, stopped 3 days ago), ,    Pulmonary  Smoker ex-smoker  , Sleep apnea (does not tolerate it) ,        GI/Hepatic  Dysphagia,   GERD (schatzki's ring) ,          Comment: phimosis     Endo/Other  Diabetes ("pre-diabetic",  today) Diet controlled,      GYN  Negative gynecology ROS          Hematology  Negative hematology ROS      Musculoskeletal  Back pain ,        Neurology  Negative neurology ROS      Psychology   Negative psychology ROS              Physical Exam    Airway    Mallampati score: II  TM Distance: >3 FB  Neck ROM: full     Dental   No notable dental hx     Cardiovascular      Pulmonary      Other Findings        Anesthesia Plan  ASA Score- 3     Anesthesia Type- general with ASA Monitors  Additional Monitors:   Airway Plan: LMA  Plan Factors- Instructed to abstain from smoking on day of procedure: exsmoker    Patient smoked on day of surgery: exsmoker       Induction- intravenous  Postoperative Plan- Plan for postoperative opioid use  Planned trial extubation    Informed Consent- Anesthetic plan and risks discussed with patient  I personally reviewed this patient with the CRNA  Discussed and agreed on the Anesthesia Plan with the CRNA  Nazanin Dill

## 2018-04-24 ENCOUNTER — HOSPITAL ENCOUNTER (OUTPATIENT)
Dept: NON INVASIVE DIAGNOSTICS | Facility: CLINIC | Age: 78
Discharge: HOME | End: 2018-04-24
Payer: MEDICARE

## 2018-04-24 DIAGNOSIS — Z79.01 LONG TERM CURRENT USE OF ANTICOAGULANT: ICD-10-CM

## 2018-04-24 PROCEDURE — 93306 TTE W/DOPPLER COMPLETE: CPT | Performed by: INTERNAL MEDICINE

## 2018-04-24 PROCEDURE — 93306 TTE W/DOPPLER COMPLETE: CPT

## 2018-05-01 DIAGNOSIS — R73.03 PREDIABETES: ICD-10-CM

## 2018-05-01 DIAGNOSIS — E78.5 HYPERLIPIDEMIA: ICD-10-CM

## 2018-05-01 DIAGNOSIS — I10 ESSENTIAL (PRIMARY) HYPERTENSION: ICD-10-CM

## 2018-05-03 RX ORDER — HYDROCODONE BITARTRATE AND ACETAMINOPHEN 5; 300 MG/1; MG/1
TABLET ORAL
Refills: 0 | COMMUNITY
Start: 2018-01-29 | End: 2018-05-09 | Stop reason: CLARIF

## 2018-05-09 ENCOUNTER — OFFICE VISIT (OUTPATIENT)
Dept: CARDIOLOGY CLINIC | Facility: CLINIC | Age: 78
End: 2018-05-09
Payer: MEDICARE

## 2018-05-09 VITALS
WEIGHT: 216.2 LBS | BODY MASS INDEX: 32.02 KG/M2 | HEART RATE: 87 BPM | DIASTOLIC BLOOD PRESSURE: 82 MMHG | HEIGHT: 69 IN | SYSTOLIC BLOOD PRESSURE: 152 MMHG

## 2018-05-09 DIAGNOSIS — G47.33 OBSTRUCTIVE SLEEP APNEA: ICD-10-CM

## 2018-05-09 DIAGNOSIS — E78.49 OTHER HYPERLIPIDEMIA: ICD-10-CM

## 2018-05-09 DIAGNOSIS — I10 BENIGN ESSENTIAL HYPERTENSION: ICD-10-CM

## 2018-05-09 DIAGNOSIS — I48.91 ATRIAL FIBRILLATION, UNSPECIFIED TYPE (HCC): Primary | ICD-10-CM

## 2018-05-09 PROCEDURE — 93000 ELECTROCARDIOGRAM COMPLETE: CPT | Performed by: INTERNAL MEDICINE

## 2018-05-09 PROCEDURE — 99214 OFFICE O/P EST MOD 30 MIN: CPT | Performed by: INTERNAL MEDICINE

## 2018-05-09 NOTE — PROGRESS NOTES
Follow-up - Cardiology   Anju Guy 78 y o  male MRN: [id number]        Problems    1  Atrial fibrillation, unspecified type ([address] )  POCT ECG   2  Other hyperlipidemia     3  Obstructive sleep apnea     4  Benign essential hypertension         Impression:     atrial fibrillation -permanent, well rate controlled, continues Eliquis stroke prophylaxis with good tolerance  Creatinine check 12/17 and was 0 72  Hypertension -blood pressure seems to be up in my office always, normal per other recordings  Hyperlipidemia -well controlled on simvastatin  Obstructive sleep apnea -  Intolerant to CPAP    Plan:     No specific testing required from my standpoint  Discuss with your PCP at her next visit whether or not he also feels her blood pressure is elevated and if so consider increasing losartan  6 month follow-up recommended  HPI:   Jad Osman is a 78y o  year old male  With a history of significant alcohol use but abstains, low normal LV function which has been stable, chronic  Rate controlled atrial fibrillation who continues to follow up with me at the [place] office  His hypertension is not ideal, blood pressure 152 today  He takes his losartan at night  His blood pressures have been mostly in the 130s  He is not convinced his blood pressure is uncontrolled would like to discuss it with WAN Carey  He takes apixaban for stroke prophylaxis for atrial fibrillation, tolerates it well  He was happy to hear that the FDA approved the reversal agent  His heart rates are well controlled  He has no symptoms related to atrial fibrillation  He has hyperlipidemia, takes simvastatin, cholesterol is well controlled  He continues to be relatively active, goes to the gym many days of the week, enjoys playing golf couple times a year  Review of Systems   All other systems reviewed and are negative          Past Medical History:   Diagnosis Date    Atrial fibrillation ([facility] [address] )  Cardiomyopathy ([facility] [address] )     LAST ASSESSED [id number]    Cataract     Dysphagia     GERD (gastroesophageal reflux disease)     Hyperlipidemia     Hypertension     Irregular heart beat     afib    Rosacea     Schatzki's ring     Sleep apnea     no cpap     History   Alcohol Use    Yes     Comment: 3-4 x / week  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[employer]      History   Drug Use No     History   Smoking Status    Former Smoker   Smokeless Tobacco    Never Used       Allergies: Allergies   Allergen Reactions    Lisinopril Other (See Comments)     Persistent cough       Medications:     Current Outpatient Prescriptions:     amitriptyline (ELAVIL) 10 mg tablet, Take 5 mg by mouth daily at bedtime  , Disp: , Rfl:     amLODIPine (NORVASC) 5 mg tablet, Take 5 mg by mouth daily at bedtime, Disp: , Rfl:     apixaban (ELIQUIS) 5 mg, Take 5 mg by mouth 2 (two) times a day , Disp: , Rfl:     aspirin 81 MG tablet, Take 81 mg by mouth daily  , Disp: , Rfl:     atenolol (TENORMIN) 50 mg tablet, Take 50 mg by mouth 2 (two) times a day , Disp: , Rfl:     losartan (COZAAR) 50 mg tablet, Take 50 mg by mouth daily  , Disp: , Rfl:     Multiple Vitamin (MULTIVITAMIN) tablet, Take 1 tablet by mouth daily  , Disp: , Rfl:     omeprazole (PriLOSEC) 20 mg delayed release capsule, Take 20 mg by mouth daily  , Disp: , Rfl:     simvastatin (ZOCOR) 40 mg tablet, Take 40 mg by mouth daily at bedtime  , Disp: , Rfl:     Vitamin D, Cholecalciferol, 1000 UNITS CAPS, Take by mouth, Disp: , Rfl:       Vitals:    05/09/18 1448   BP: 152/82   Pulse: 87     Weight (last 2 days)     Date/Time   Weight    05/09/18 1448  98 1 (216 2)            Physical Exam   Constitutional: He is oriented to person, place, and time  No distress  HENT:   Head: Normocephalic and atraumatic  Eyes: Conjunctivae are normal  No scleral icterus  Neck: Normal range of motion  No JVD present  Cardiovascular: Normal rate, normal heart sounds and intact distal pulses  No murmur heard  Irregular   Pulmonary/Chest: Effort normal and breath sounds normal  He has no wheezes  He has no rales  Musculoskeletal: He exhibits no edema  Neurological: He is alert and oriented to person, place, and time  Skin: Skin is warm and dry  He is not diaphoretic  Laboratory Studies:  CMP:    NT-proBNP: No results found for: NTBNP   Coags:    Lipid Profile:   Lab Results   Component Value Date    CHOL 166 12/15/2017     Lab Results   Component Value Date    HDL 53 12/15/2017     Lab Results   Component Value Date    LDLCALC 61 2015     Lab Results   Component Value Date    TRIG 92 12/15/2017       Cardiac testing:   EKG reviewed personally:   Atrial fibrillation, occasional PVCs  Results for orders placed during the hospital encounter of 18   Echo complete with contrast if indicated    [facility] [address]  [address]  [phone]    Transthoracic Echocardiogram  2D, M-mode, Doppler, and Color Doppler    Study date:  2018    Patient: Jacquelyn Gonzalez  MR number: [id number]  Account number: [de-identified]  : 1940  Age: 78 years  Gender: Male  Status: Outpatient  Location: [address] [place] [address] [facility] [place]  Height: 69 in  Weight: 208 lb  BP: 130/ 78 mmHg    Indications: Atrial fibrillation    Diagnoses: I48 0 - Atrial fibrillation    Sonographer:  JOJO Prabhakar  Primary Physician:  Betty Bell MD  Referring Physician:  Moe Barnett MD  [facility]  [facility]  Interpreting Physician:  Shelvy Meckel, MD    SUMMARY    LEFT VENTRICLE:  Systolic function was at the lower limits of normal  Ejection fraction was estimated to be 50 %  There were no regional wall motion abnormalities  Wall thickness was at the upper limits of normal     RIGHT VENTRICLE:  The ventricle was mildly dilated  Systolic function was normal     LEFT ATRIUM:  The atrium was mildly dilated      RIGHT ATRIUM:  The atrium was mildly dilated  MITRAL VALVE:  There was mild annular calcification  There was mild to moderate regurgitation  TRICUSPID VALVE:  There was mild regurgitation  Estimated peak PA pressure was 40 mmHg  HISTORY: PRIOR HISTORY: Hypertension, hyperlipidemia, atrial fibrillation, cardiomyopathy, obstructive sleep apnea, former smoker, prediabetes    PROCEDURE: The study was performed in the [address] [facility] [place]  This was a routine study  The transthoracic approach was used  The study included complete 2D imaging, M-mode, complete spectral Doppler, and color Doppler  Image  quality was adequate  LEFT VENTRICLE: Size was normal  Systolic function was at the lower limits of normal  Ejection fraction was estimated to be 50 %  There were no regional wall motion abnormalities  Wall thickness was at the upper limits of normal  DOPPLER:  Transmitral flow pattern: atrial fibrillation  RIGHT VENTRICLE: The ventricle was mildly dilated  Systolic function was normal     LEFT ATRIUM: The atrium was mildly dilated  RIGHT ATRIUM: The atrium was mildly dilated  MITRAL VALVE: There was mild annular calcification  Valve structure was normal  There was normal leaflet separation  DOPPLER: The transmitral velocity was within the normal range  There was no evidence for stenosis  There was mild to  moderate regurgitation  AORTIC VALVE: The valve was trileaflet  Leaflets exhibited mildly increased thickness and normal cuspal separation  DOPPLER: Transaortic velocity was within the normal range  There was no evidence for stenosis  There was no regurgitation  TRICUSPID VALVE: The valve structure was normal  There was normal leaflet separation  DOPPLER: The transtricuspid velocity was within the normal range  There was no evidence for stenosis  There was mild regurgitation  Pulmonary artery  systolic pressure was mildly increased  Estimated peak PA pressure was 40 mmHg      PULMONIC VALVE: Leaflets exhibited normal thickness, no calcification, and normal cuspal separation  DOPPLER: The transpulmonic velocity was within the normal range  There was mild regurgitation  PERICARDIUM: There was no pericardial effusion  AORTA: The root exhibited normal size  SYSTEMIC VEINS: IVC: The inferior vena cava was normal in size  SYSTEM MEASUREMENT TABLES    2D  %FS: 27 43 %  AV Diam: 3 63 cm  EDV(Teich): 103 95 ml  EF(Cube): 61 78 %  EF(Teich): 53 27 %  ESV(Cube): 40 48 ml  ESV(Teich): 48 57 ml  IVSd: 1 05 cm  LA Area: 22 88 cm2  LA Diam: 3 99 cm  LVEDV MOD A4C: 132 92 ml  LVEF MOD A4C: 44 61 %  LVESV MOD A4C: 73 63 ml  LVIDd: 4 73 cm  LVIDs: 3 43 cm  LVLd A4C: 7 57 cm  LVLs A4C: 6 65 cm  LVPWd: 1 09 cm  RA Area: 27 19 cm2  RV Diam : 4 56 cm  SV MOD A4C: 59 29 ml  SV(Cube): 65 42 ml  SV(Teich): 55 38 ml    CW  TR Vmax: 2 95 m/s  TR maxP 72 mmHg    MM  TAPSE: 1 6 cm    [facility] Accredited [facility]    Prepared and electronically signed by    Effie Duane, MD  Signed 2018 16:49:48               Mitch Estrada MD    Portions of the record may have been created with voice recognition software   Occasional wrong word or "sound a like" substitutions may have occurred due to the inherent limitations of voice recognition software   Read the chart carefully and recognize, using context, where substitutions have occurred

## 2018-05-24 ENCOUNTER — APPOINTMENT (OUTPATIENT)
Dept: LAB | Facility: CLINIC | Age: 78
End: 2018-05-24
Payer: MEDICARE

## 2018-05-24 DIAGNOSIS — E78.5 HYPERLIPIDEMIA: ICD-10-CM

## 2018-05-24 DIAGNOSIS — R73.03 PREDIABETES: ICD-10-CM

## 2018-05-24 DIAGNOSIS — I10 ESSENTIAL (PRIMARY) HYPERTENSION: ICD-10-CM

## 2018-05-24 LAB
ALBUMIN SERPL BCP-MCNC: 3.6 G/DL (ref 3.5–5)
ALP SERPL-CCNC: 52 U/L (ref 46–116)
ALT SERPL W P-5'-P-CCNC: 44 U/L (ref 12–78)
ANION GAP SERPL CALCULATED.3IONS-SCNC: 5 MMOL/L (ref 4–13)
AST SERPL W P-5'-P-CCNC: 38 U/L (ref 5–45)
BILIRUB SERPL-MCNC: 0.58 MG/DL (ref 0.2–1)
BUN SERPL-MCNC: 25 MG/DL (ref 5–25)
CALCIUM SERPL-MCNC: 9.3 MG/DL (ref 8.3–10.1)
CHLORIDE SERPL-SCNC: 105 MMOL/L (ref 100–108)
CHOLEST SERPL-MCNC: 152 MG/DL (ref 50–200)
CO2 SERPL-SCNC: 31 MMOL/L (ref 21–32)
CREAT SERPL-MCNC: 0.95 MG/DL (ref 0.6–1.3)
CREAT UR-MCNC: 129 MG/DL
EST. AVERAGE GLUCOSE BLD GHB EST-MCNC: 120 MG/DL
GFR SERPL CREATININE-BSD FRML MDRD: 78 ML/MIN/1.73SQ M
GLUCOSE P FAST SERPL-MCNC: 109 MG/DL (ref 65–99)
HBA1C MFR BLD: 5.8 % (ref 4.2–6.3)
HDLC SERPL-MCNC: 53 MG/DL (ref 40–60)
LDLC SERPL DIRECT ASSAY-MCNC: 93 MG/DL (ref 0–100)
MICROALBUMIN UR-MCNC: 20.6 MG/L (ref 0–20)
MICROALBUMIN/CREAT 24H UR: 16 MG/G CREATININE (ref 0–30)
POTASSIUM SERPL-SCNC: 4.2 MMOL/L (ref 3.5–5.3)
PROT SERPL-MCNC: 6.9 G/DL (ref 6.4–8.2)
SODIUM SERPL-SCNC: 141 MMOL/L (ref 136–145)
TRIGL SERPL-MCNC: 108 MG/DL

## 2018-05-24 PROCEDURE — 83036 HEMOGLOBIN GLYCOSYLATED A1C: CPT

## 2018-05-24 PROCEDURE — 36415 COLL VENOUS BLD VENIPUNCTURE: CPT

## 2018-05-24 PROCEDURE — 82043 UR ALBUMIN QUANTITATIVE: CPT

## 2018-05-24 PROCEDURE — 80061 LIPID PANEL: CPT

## 2018-05-24 PROCEDURE — 80053 COMPREHEN METABOLIC PANEL: CPT

## 2018-05-24 PROCEDURE — 82570 ASSAY OF URINE CREATININE: CPT

## 2018-05-24 PROCEDURE — 83721 ASSAY OF BLOOD LIPOPROTEIN: CPT

## 2018-05-25 ENCOUNTER — OFFICE VISIT (OUTPATIENT)
Dept: INTERNAL MEDICINE CLINIC | Facility: CLINIC | Age: 78
End: 2018-05-25
Payer: MEDICARE

## 2018-05-25 VITALS
HEART RATE: 78 BPM | HEIGHT: 69 IN | WEIGHT: 215.2 LBS | SYSTOLIC BLOOD PRESSURE: 120 MMHG | RESPIRATION RATE: 14 BRPM | BODY MASS INDEX: 31.87 KG/M2 | DIASTOLIC BLOOD PRESSURE: 78 MMHG

## 2018-05-25 DIAGNOSIS — K21.9 GASTROESOPHAGEAL REFLUX DISEASE, ESOPHAGITIS PRESENCE NOT SPECIFIED: Primary | ICD-10-CM

## 2018-05-25 DIAGNOSIS — N40.0 BENIGN PROSTATIC HYPERPLASIA, UNSPECIFIED WHETHER LOWER URINARY TRACT SYMPTOMS PRESENT: ICD-10-CM

## 2018-05-25 DIAGNOSIS — I50.9 CONGESTIVE HEART FAILURE, UNSPECIFIED HF CHRONICITY, UNSPECIFIED HEART FAILURE TYPE (HCC): ICD-10-CM

## 2018-05-25 DIAGNOSIS — I10 HYPERTENSION, UNSPECIFIED TYPE: ICD-10-CM

## 2018-05-25 DIAGNOSIS — R13.10 DYSPHAGIA, UNSPECIFIED TYPE: Chronic | ICD-10-CM

## 2018-05-25 DIAGNOSIS — I10 BENIGN ESSENTIAL HYPERTENSION: Chronic | ICD-10-CM

## 2018-05-25 DIAGNOSIS — E78.5 HYPERLIPIDEMIA, UNSPECIFIED HYPERLIPIDEMIA TYPE: Primary | ICD-10-CM

## 2018-05-25 DIAGNOSIS — R73.03 PRE-DIABETES: ICD-10-CM

## 2018-05-25 DIAGNOSIS — I48.20 CHRONIC ATRIAL FIBRILLATION (HCC): Chronic | ICD-10-CM

## 2018-05-25 DIAGNOSIS — K21.9 GASTROESOPHAGEAL REFLUX DISEASE, ESOPHAGITIS PRESENCE NOT SPECIFIED: Chronic | ICD-10-CM

## 2018-05-25 PROBLEM — B35.6 TINEA CRURIS: Status: ACTIVE | Noted: 2017-07-11

## 2018-05-25 PROBLEM — F41.9 ANXIETY: Status: ACTIVE | Noted: 2017-08-14

## 2018-05-25 PROCEDURE — 99214 OFFICE O/P EST MOD 30 MIN: CPT | Performed by: INTERNAL MEDICINE

## 2018-05-25 RX ORDER — FAMOTIDINE 20 MG/1
TABLET, FILM COATED ORAL
Qty: 180 TABLET | Refills: 3 | Status: SHIPPED | OUTPATIENT
Start: 2018-05-25 | End: 2019-04-17 | Stop reason: ALTCHOICE

## 2018-05-25 NOTE — PROGRESS NOTES
Assessment/Plan:  1  Health maintenance-given prescription for Shingrix vaccine  2 erectile dysfunction-he will begin Levitra 20 milligrams-half tablet as needed  3  Dysphagia-has a history Schatzki's ring in the past   Has not been an issue in months  Said he had a dilatation over the last 2 years  4  The hypertension-adequate control on current regimen  5  Prior episode of syncope-likely on the basis of orthostasis  Triggered by meds plus excess alcohol use  Stable since then without further issues  Hospitalized without significant arrhythmia  Unenhanced CT scan of the brain benign  6  Alcohol use-again recommended he decrease use with his known history of cardiomyopathy  7  Cardiomyopathy-felt to be from atrial fibrillation plus hypertension plus alcohol use  Rule out contribution of sleep apnea  Most recent echo shows EF of 50% without regional wall motion abnormalities, right ventricle mildly dilated, left atrium mildly dilated, estimated peak PA pressure of 40 millimeters  This was performed in April of 2018  8  Atrial fibrillation-cardiology feels he should continue with rate control on anticoagulation  9  Rule out CAD-prior nuclear stress test showed reduced motion of the septal wall-this was felt to be a mild defect consistent with artifact  10  Polyps of the colon-subsequent colonoscopy due in 5 years  6  Pre diabetes-hemoglobin A1c has improved to current value of 5 8 from prior value of 6 4-continue diet and exercise  12  Moderate obstructive sleep apnea-he is unable to tolerate CPAP  He knows he needs to lose weight  Possible candidate for oral device but he prefers to lose weight  13  Previous noted leg pain-lumbar radiculopathy  Doppler negative-has not recurred  14  Hyperlipidemia-continue current dose of statin  15  Neck pain-chronic myofascial syndrome-with DJD of the neck  He uses amitriptyline  In the past and also use Flexeril  16    Tinea corporis-was seen by Dr  home mold-then seen Dr Nathalie Hairston  17  Softer reflux-talked again about long-term PPI use  He is trying to go to a regimen of PPI in the 1st day H2 blocker on the 2nd day    All other problems as per note of April 2014, August 2012    Medical regimen losartan 50 milligrams daily, atenolol 50 milligrams b i d , amlodipine 5 milligrams b i d , omeprazole a day 1   20 milligrams and Pepcid 20 milligrams b i d  a day 2 , amitriptyline 10 milligrams-5-10 milligrams at bedtime, simvastatin 40 milligrams daily, fish oil 1200 milligrams a day, multivitamin, Eliquis 5 milligrams b i d , prior use of Flexeril 5 milligrams HS, Levitra 20 milligrams-half tablet as needed    Appointment over the next few months with prior chemistry profile, cholesterol profile, hemoglobin A1c, PSA  No problem-specific Assessment & Plan notes found for this encounter  Diagnoses and all orders for this visit:    Hyperlipidemia, unspecified hyperlipidemia type  -     CBC and differential; Future  -     Comprehensive metabolic panel; Future  -     Cholesterol, total; Future  -     HDL cholesterol; Future  -     LDL cholesterol, direct; Future  -     Triglycerides; Future  -     PSA Total, Diagnostic; Future  -     HEMOGLOBIN A1C W/ EAG ESTIMATION; Future    Hypertension, unspecified type  -     CBC and differential; Future  -     Comprehensive metabolic panel; Future  -     Cholesterol, total; Future  -     HDL cholesterol; Future  -     LDL cholesterol, direct; Future  -     Triglycerides; Future  -     PSA Total, Diagnostic; Future  -     HEMOGLOBIN A1C W/ EAG ESTIMATION; Future    Congestive heart failure, unspecified HF chronicity, unspecified heart failure type (HCC)  -     CBC and differential; Future  -     Comprehensive metabolic panel; Future  -     Cholesterol, total; Future  -     HDL cholesterol; Future  -     LDL cholesterol, direct; Future  -     Triglycerides; Future  -     PSA Total, Diagnostic;  Future  -     HEMOGLOBIN A1C W/ EAG ESTIMATION; Future    Benign prostatic hyperplasia, unspecified whether lower urinary tract symptoms present  -     CBC and differential; Future  -     Comprehensive metabolic panel; Future  -     Cholesterol, total; Future  -     HDL cholesterol; Future  -     LDL cholesterol, direct; Future  -     Triglycerides; Future  -     PSA Total, Diagnostic; Future  -     HEMOGLOBIN A1C W/ EAG ESTIMATION; Future    Pre-diabetes  -     CBC and differential; Future  -     Comprehensive metabolic panel; Future  -     Cholesterol, total; Future  -     HDL cholesterol; Future  -     LDL cholesterol, direct; Future  -     Triglycerides; Future  -     PSA Total, Diagnostic; Future  -     HEMOGLOBIN A1C W/ EAG ESTIMATION; Future    Chronic atrial fibrillation (HCC)    Benign essential hypertension    Gastroesophageal reflux disease, esophagitis presence not specified    Dysphagia, unspecified type          Subjective:      Patient ID: Mahendra Gannon is a 78 y o  male  Reviewed several issues today  We went over the new herpes zoster vaccine  Recommended that he is wife obtain this  He understands this is a 2 point vaccine  He understands it has greater efficacy  He understands that there is a higher incidence of vaccine reactions    He is on long-term therapy with a PPI  He says he does not have any heartburn as long she takes omeprazole 20 milligrams daily  We reviewed potential call long-term complications of PPI use including effect on metabolic bone disease, higher incidence of Clostridium difficile colitis as well as pneumonia  We reviewed the question relationship with declining cognition  At this point we will try and switch to an alternating day regimen of omeprazole on the 1st day and Pepcid b i d  in the 2nd day  He also admits to increasing alcohol use  This was an issue in the past   He understands this had potential negative cardiac effect in the past with a history of cardiomyopathy    I strongly recommended he curtail his alcohol use he understands this  BP adequately controlled on current regimen  Avoiding salt and decongestants  Denies hematemesis pounding of his heart sweats and headache  He knows his preferred analgesic agent is acetaminophen    He remains on a statin  He understands the difference between status associated myalgias and arthralgias from degenerative arthritis  Laboratory testing done prior to this visit shows urine for microalbumin minimally elevated at 20 6, LDL 93 HDL 53 triglycerides 108 cholesterol 152 A1c 5 8 fasting sugar 109 creatinine 0 95      This patient denies any systemic symptoms  Specifically there has been no evidence of fever, night sweats, significant weight loss or significant decrease in appetite  He has not had any recent difficulty swallowing  He said this was an issue in the past   Note that he had a successful circumcision  He talked about some erectile dysfunction  He says sexual drive is normal   He was given office samples of Levitra-20 milligrams-she will try half tablet  We reviewed mechanism of action        The following portions of the patient's history were reviewed and updated as appropriate: current medications, past family history, past medical history, past social history, past surgical history and problem list     Review of Systems   Constitutional: Negative  Respiratory: Positive for shortness of breath  Cardiovascular: Negative  Gastrointestinal: Negative  Endocrine: Negative  Genitourinary: Negative  Nocturia x1-erectile dysfunction   Musculoskeletal: Negative  Neurological: Negative  Hematological: Negative  Psychiatric/Behavioral: Negative  Objective:      /78   Pulse 78   Resp 14   Ht 5' 9" (1 753 m)   Wt 97 6 kg (215 lb 3 2 oz)   BMI 31 78 kg/m²          Physical Exam   Constitutional: He is oriented to person, place, and time   He appears well-developed and well-nourished  No distress  HENT:   Head: Normocephalic and atraumatic  Right Ear: External ear normal    Left Ear: External ear normal    Nose: Nose normal    Mouth/Throat: Oropharynx is clear and moist  No oropharyngeal exudate  Eyes: Conjunctivae and EOM are normal  Pupils are equal, round, and reactive to light  Right eye exhibits no discharge  Left eye exhibits no discharge  No scleral icterus  Neck: No JVD present  No tracheal deviation present  No thyromegaly present  Cardiovascular: Normal rate, normal heart sounds and intact distal pulses  Exam reveals no gallop and no friction rub  No murmur heard  Irregular rhythm   Pulmonary/Chest: Effort normal and breath sounds normal  No stridor  No respiratory distress  He has no wheezes  He exhibits no tenderness  Abdominal: Bowel sounds are normal  He exhibits no distension and no mass  There is no tenderness  There is no rebound and no guarding  Genitourinary: Rectal exam shows guaiac negative stool  Genitourinary Comments: Evidence of recent circumcision   Musculoskeletal: Normal range of motion  He exhibits no edema, tenderness or deformity  Lymphadenopathy:     He has no cervical adenopathy  Neurological: He is alert and oriented to person, place, and time  He has normal reflexes  No cranial nerve deficit  He exhibits normal muscle tone  Coordination normal    Skin: Skin is warm and dry  No rash noted  He is not diaphoretic  No erythema  No pallor  Psychiatric: He has a normal mood and affect  His behavior is normal  Judgment and thought content normal    Vitals reviewed

## 2018-11-13 ENCOUNTER — APPOINTMENT (OUTPATIENT)
Dept: LAB | Facility: CLINIC | Age: 78
End: 2018-11-13
Payer: MEDICARE

## 2018-11-13 DIAGNOSIS — N40.0 BENIGN PROSTATIC HYPERPLASIA, UNSPECIFIED WHETHER LOWER URINARY TRACT SYMPTOMS PRESENT: ICD-10-CM

## 2018-11-13 DIAGNOSIS — I10 HYPERTENSION, UNSPECIFIED TYPE: ICD-10-CM

## 2018-11-13 DIAGNOSIS — E78.5 HYPERLIPIDEMIA, UNSPECIFIED HYPERLIPIDEMIA TYPE: ICD-10-CM

## 2018-11-13 DIAGNOSIS — R73.03 PRE-DIABETES: ICD-10-CM

## 2018-11-13 DIAGNOSIS — I50.9 CONGESTIVE HEART FAILURE, UNSPECIFIED HF CHRONICITY, UNSPECIFIED HEART FAILURE TYPE (HCC): ICD-10-CM

## 2018-11-13 LAB
ALBUMIN SERPL BCP-MCNC: 3.9 G/DL (ref 3.5–5)
ALP SERPL-CCNC: 51 U/L (ref 46–116)
ALT SERPL W P-5'-P-CCNC: 36 U/L (ref 12–78)
ANION GAP SERPL CALCULATED.3IONS-SCNC: 4 MMOL/L (ref 4–13)
AST SERPL W P-5'-P-CCNC: 31 U/L (ref 5–45)
BASOPHILS # BLD AUTO: 0.02 THOUSANDS/ΜL (ref 0–0.1)
BASOPHILS NFR BLD AUTO: 0 % (ref 0–1)
BILIRUB SERPL-MCNC: 0.6 MG/DL (ref 0.2–1)
BUN SERPL-MCNC: 16 MG/DL (ref 5–25)
CALCIUM SERPL-MCNC: 9.3 MG/DL (ref 8.3–10.1)
CHLORIDE SERPL-SCNC: 101 MMOL/L (ref 100–108)
CHOLEST SERPL-MCNC: 149 MG/DL (ref 50–200)
CO2 SERPL-SCNC: 31 MMOL/L (ref 21–32)
CREAT SERPL-MCNC: 1.07 MG/DL (ref 0.6–1.3)
EOSINOPHIL # BLD AUTO: 0.14 THOUSAND/ΜL (ref 0–0.61)
EOSINOPHIL NFR BLD AUTO: 3 % (ref 0–6)
ERYTHROCYTE [DISTWIDTH] IN BLOOD BY AUTOMATED COUNT: 12.3 % (ref 11.6–15.1)
EST. AVERAGE GLUCOSE BLD GHB EST-MCNC: 126 MG/DL
GFR SERPL CREATININE-BSD FRML MDRD: 67 ML/MIN/1.73SQ M
GLUCOSE P FAST SERPL-MCNC: 104 MG/DL (ref 65–99)
HBA1C MFR BLD: 6 % (ref 4.2–6.3)
HCT VFR BLD AUTO: 42.3 % (ref 36.5–49.3)
HDLC SERPL-MCNC: 48 MG/DL (ref 40–60)
HGB BLD-MCNC: 14.3 G/DL (ref 12–17)
IMM GRANULOCYTES # BLD AUTO: 0.01 THOUSAND/UL (ref 0–0.2)
IMM GRANULOCYTES NFR BLD AUTO: 0 % (ref 0–2)
LDLC SERPL DIRECT ASSAY-MCNC: 94 MG/DL (ref 0–100)
LYMPHOCYTES # BLD AUTO: 1.34 THOUSANDS/ΜL (ref 0.6–4.47)
LYMPHOCYTES NFR BLD AUTO: 27 % (ref 14–44)
MCH RBC QN AUTO: 33.5 PG (ref 26.8–34.3)
MCHC RBC AUTO-ENTMCNC: 33.8 G/DL (ref 31.4–37.4)
MCV RBC AUTO: 99 FL (ref 82–98)
MONOCYTES # BLD AUTO: 0.66 THOUSAND/ΜL (ref 0.17–1.22)
MONOCYTES NFR BLD AUTO: 13 % (ref 4–12)
NEUTROPHILS # BLD AUTO: 2.77 THOUSANDS/ΜL (ref 1.85–7.62)
NEUTS SEG NFR BLD AUTO: 57 % (ref 43–75)
NRBC BLD AUTO-RTO: 0 /100 WBCS
PLATELET # BLD AUTO: 196 THOUSANDS/UL (ref 149–390)
PMV BLD AUTO: 10.9 FL (ref 8.9–12.7)
POTASSIUM SERPL-SCNC: 4.4 MMOL/L (ref 3.5–5.3)
PROT SERPL-MCNC: 7.2 G/DL (ref 6.4–8.2)
PSA SERPL-MCNC: 2.1 NG/ML (ref 0–4)
RBC # BLD AUTO: 4.27 MILLION/UL (ref 3.88–5.62)
SODIUM SERPL-SCNC: 136 MMOL/L (ref 136–145)
TRIGL SERPL-MCNC: 96 MG/DL
WBC # BLD AUTO: 4.94 THOUSAND/UL (ref 4.31–10.16)

## 2018-11-13 PROCEDURE — 85025 COMPLETE CBC W/AUTO DIFF WBC: CPT

## 2018-11-13 PROCEDURE — 36415 COLL VENOUS BLD VENIPUNCTURE: CPT

## 2018-11-13 PROCEDURE — 80053 COMPREHEN METABOLIC PANEL: CPT

## 2018-11-13 PROCEDURE — 84153 ASSAY OF PSA TOTAL: CPT

## 2018-11-13 PROCEDURE — 83721 ASSAY OF BLOOD LIPOPROTEIN: CPT

## 2018-11-13 PROCEDURE — 83036 HEMOGLOBIN GLYCOSYLATED A1C: CPT

## 2018-11-13 PROCEDURE — 80061 LIPID PANEL: CPT

## 2018-11-14 ENCOUNTER — OFFICE VISIT (OUTPATIENT)
Dept: INTERNAL MEDICINE CLINIC | Facility: CLINIC | Age: 78
End: 2018-11-14
Payer: MEDICARE

## 2018-11-14 ENCOUNTER — OFFICE VISIT (OUTPATIENT)
Dept: CARDIOLOGY CLINIC | Facility: CLINIC | Age: 78
End: 2018-11-14
Payer: MEDICARE

## 2018-11-14 VITALS
DIASTOLIC BLOOD PRESSURE: 80 MMHG | WEIGHT: 218.6 LBS | HEART RATE: 72 BPM | BODY MASS INDEX: 32.38 KG/M2 | SYSTOLIC BLOOD PRESSURE: 128 MMHG | HEIGHT: 69 IN

## 2018-11-14 VITALS
DIASTOLIC BLOOD PRESSURE: 80 MMHG | BODY MASS INDEX: 32.2 KG/M2 | WEIGHT: 217.4 LBS | HEART RATE: 72 BPM | RESPIRATION RATE: 14 BRPM | HEIGHT: 69 IN | SYSTOLIC BLOOD PRESSURE: 130 MMHG

## 2018-11-14 DIAGNOSIS — I10 HYPERTENSION, UNSPECIFIED TYPE: ICD-10-CM

## 2018-11-14 DIAGNOSIS — G47.33 OBSTRUCTIVE SLEEP APNEA: Chronic | ICD-10-CM

## 2018-11-14 DIAGNOSIS — I10 BENIGN ESSENTIAL HYPERTENSION: Chronic | ICD-10-CM

## 2018-11-14 DIAGNOSIS — E78.01 FAMILIAL HYPERCHOLESTEROLEMIA: Primary | ICD-10-CM

## 2018-11-14 DIAGNOSIS — I48.91 ATRIAL FIBRILLATION, UNSPECIFIED TYPE (HCC): Primary | ICD-10-CM

## 2018-11-14 DIAGNOSIS — K21.9 GASTROESOPHAGEAL REFLUX DISEASE, ESOPHAGITIS PRESENCE NOT SPECIFIED: Chronic | ICD-10-CM

## 2018-11-14 DIAGNOSIS — R73.03 PREDIABETES: ICD-10-CM

## 2018-11-14 DIAGNOSIS — E78.49 OTHER HYPERLIPIDEMIA: Chronic | ICD-10-CM

## 2018-11-14 PROCEDURE — 99214 OFFICE O/P EST MOD 30 MIN: CPT | Performed by: INTERNAL MEDICINE

## 2018-11-14 PROCEDURE — G0439 PPPS, SUBSEQ VISIT: HCPCS | Performed by: INTERNAL MEDICINE

## 2018-11-14 PROCEDURE — 99213 OFFICE O/P EST LOW 20 MIN: CPT | Performed by: INTERNAL MEDICINE

## 2018-11-14 PROCEDURE — 93000 ELECTROCARDIOGRAM COMPLETE: CPT | Performed by: INTERNAL MEDICINE

## 2018-11-14 NOTE — PROGRESS NOTES
Assessment/Plan:  1  Health maintenance-has prescription for a Shingrix vaccine  2  Myofascial syndrome-has long-term a use-currently using 5 milligrams before bed-has some DJD the neck as well  Will try discontinue this med and see how he does  3  Sleep apnea-moderate-unable to tolerate CPAP  Again reviewed she should lose weight asleep in a side  4   esophageal reflux-tried to switch to an H2 blocker but had breakthrough symptoms and now back on omeprazole-longstanding  5 erectile dysfunction-using Levitra 20 milligrams-half tablet as needed  6  Dysphagia-has a history Schatzki's ring in the past   Has not been an issue in months  Said he had a dilatation over the last 2 years  7 The hypertension-adequate control on current regimen  8  Prior episode of syncope-likely on the basis of orthostasis  Triggered by meds plus excess alcohol use  Stable since then without further issues  Hospitalized without significant arrhythmia  Unenhanced CT scan of the brain benign  9  Alcohol use-again recommended he decrease use with his known history of cardiomyopathy  10  Cardiomyopathy-felt to be from atrial fibrillation plus hypertension plus alcohol use  Rule out contribution of sleep apnea  Most recent echo shows EF of 50% without regional wall motion abnormalities, right ventricle mildly dilated, left atrium mildly dilated, estimated peak PA pressure of 40 millimeters  This was performed in April of 2018  11  Atrial fibrillation-cardiology feels he should continue with rate control on anticoagulation  12  Rule out CAD-prior nuclear stress test showed reduced motion of the septal wall-this was felt to be a mild defect consistent with artifact  13  Polyps of the colon-subsequent colonoscopy due in the year 2021  14  Pre diabetes-hemoglobin A1c had improved to current value of 5 8 from prior value of 6 4--now up to 6 0 continue diet and exercise  15  Previous noted leg pain-lumbar radiculopathy    Doppler negative-has not recurred  16  Hyperlipidemia-continue current dose of statin  17  Tinea corporis-was seen by Dr karlo gold-then seen Dr Montoya Score other problems as per note of April 2014, August 2012     Medical regimen losartan 50 milligrams daily, atenolol 50 milligrams b i d , amlodipine 5 milligrams b i d , omeprazole 20 milligrams daily  , amitriptyline 10 milligrams-5-10 milligrams at bedtime,-trying to discontinue simvastatin 40 milligrams daily, fish oil 1200 milligrams a day, multivitamin, Eliquis 5 milligrams b i d , prior use of Flexeril 5 milligrams HS, Levitra 20 milligrams-half tablet as needed    Appointment in several months with prior chemistry profile, cholesterol profile, hemoglobin A1c had a a the the     Diagnoses and all orders for this visit:    Familial hypercholesterolemia  -     CBC and differential; Future  -     Comprehensive metabolic panel; Future  -     HEMOGLOBIN A1C W/ EAG ESTIMATION; Future  -     Cholesterol, total; Future  -     Triglycerides; Future  -     HDL cholesterol; Future  -     LDL cholesterol, direct; Future    Hypertension, unspecified type  -     CBC and differential; Future  -     Comprehensive metabolic panel; Future  -     HEMOGLOBIN A1C W/ EAG ESTIMATION; Future  -     Cholesterol, total; Future  -     Triglycerides; Future  -     HDL cholesterol; Future  -     LDL cholesterol, direct; Future    Prediabetes  -     CBC and differential; Future  -     Comprehensive metabolic panel; Future  -     HEMOGLOBIN A1C W/ EAG ESTIMATION; Future  -     Cholesterol, total; Future  -     Triglycerides; Future  -     HDL cholesterol; Future  -     LDL cholesterol, direct; Future    Gastroesophageal reflux disease, esophagitis presence not specified    Obstructive sleep apnea          Subjective:      Patient ID: Audie Rinne is a 70 y o  male  He was here for his Medicare wellness wanted to go over other issues  We reviewed his long-term PPI use    He had attempt to switch to an alternating day regimen of PPI the 1st day an H2 blocker the 2nd day-she did this for about 2 weeks and had a large amount of breakthrough symptoms  He is now back on omeprazole 20 milligrams daily-much of this is aggravated by his large amount of food intake in the evening  I recommended he decrease this for treatment of this as well as his pre diabetes  Labs done prior to this visit show an A1c is 6 0 triglycerides 96 LDL 94 HDL 48 cholesterol 149 chemistry profile normal other than a fasting sugar 104 creatinine 1 07 CBC normal other than borderline MCV of 99  He talked about his sleep apnea  He says he is unable to use the mass  We reviewed potential sleep apnea and is a between untreated sleep apnea dementia  I recommended he always sleeps on his side I recommended he not have alcohol after dinner  He says he will trying follow both these rules  He understands the importance of losing weight as well  This patient denies any systemic symptoms  Specifically there has been no evidence of fever, night sweats, significant weight loss or significant decrease in appetite  BP is adequately controlled we made no adjustments in that regard        The following portions of the patient's history were reviewed and updated as appropriate: allergies, current medications, past family history, past medical history, past social history, past surgical history and problem list     Review of Systems   Constitutional: Negative  Respiratory: Negative  Cardiovascular: Negative  Gastrointestinal: Negative  Dyspepsia   Endocrine: Negative  Genitourinary: Negative  Musculoskeletal: Negative  Neurological: Negative  Hematological: Negative  Psychiatric/Behavioral: Negative  Objective:      Ht 5' 9" (1 753 m)   Wt 98 6 kg (217 lb 6 4 oz)   BMI 32 10 kg/m²          Physical Exam   Constitutional: He is oriented to person, place, and time   He appears well-developed and well-nourished  No distress  HENT:   Head: Normocephalic and atraumatic  Right Ear: External ear normal    Left Ear: External ear normal    Nose: Nose normal    Mouth/Throat: Oropharynx is clear and moist  No oropharyngeal exudate  Eyes: Pupils are equal, round, and reactive to light  Conjunctivae and EOM are normal  Right eye exhibits no discharge  Left eye exhibits no discharge  No scleral icterus  Neck: No JVD present  No tracheal deviation present  No thyromegaly present  Cardiovascular: Normal rate, normal heart sounds and intact distal pulses  Exam reveals no gallop and no friction rub  No murmur heard  Irregular rhythm  Borderline cardiomegaly to percussion   Pulmonary/Chest: Effort normal and breath sounds normal  No stridor  No respiratory distress  He has no wheezes  He exhibits no tenderness  Abdominal: Soft  Bowel sounds are normal  He exhibits no distension and no mass  There is no tenderness  There is no rebound and no guarding  Genitourinary: Rectum normal, prostate normal and penis normal  Rectal exam shows guaiac negative stool  Musculoskeletal: Normal range of motion  He exhibits no edema, tenderness or deformity  Lymphadenopathy:     He has no cervical adenopathy  Neurological: He is alert and oriented to person, place, and time  He has normal reflexes  No cranial nerve deficit  He exhibits normal muscle tone  Coordination normal    Skin: Skin is warm and dry  No rash noted  He is not diaphoretic  No erythema  No pallor  Psychiatric: He has a normal mood and affect   His behavior is normal  Judgment and thought content normal

## 2018-11-14 NOTE — PROGRESS NOTES
[facility] Cardiology  Follow up note  Scott Guy 70 y o  male MRN: [id number]        Problems    1  Atrial fibrillation, unspecified type (HCC)  POCT ECG       Impression:        Plan:          HPI:   Liat Rodríguez is a 70y o  year old male        Review of Systems      Past Medical History:   Diagnosis Date    [facility]     Cardiomyopathy ([facility] )     LAST ASSESSED [id number]    Cataract     Dysphagia     GERD (gastroesophageal reflux disease)     Hyperlipidemia     Hypertension     Irregular heart beat     afib    Rosacea     Schatzki's ring     Sleep apnea     no cpap     History   Alcohol Use    Yes     Comment: 3-4 x / week  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[employer]      History   Drug Use No     History   Smoking Status    Former Smoker   Smokeless Tobacco    Never Used       Allergies: Allergies   Allergen Reactions    Lisinopril Other (See Comments)     Persistent cough       Medications:     Current Outpatient Prescriptions:     amitriptyline (ELAVIL) 10 mg tablet, Take 5 mg by mouth daily at bedtime  , Disp: , Rfl:     amLODIPine (NORVASC) 5 mg tablet, Take 5 mg by mouth daily at bedtime, Disp: , Rfl:     apixaban (ELIQUIS) 5 mg, Take 5 mg by mouth 2 (two) times a day , Disp: , Rfl:     aspirin 81 MG tablet, Take 81 mg by mouth daily  , Disp: , Rfl:     atenolol (TENORMIN) 50 mg tablet, Take 50 mg by mouth 2 (two) times a day , Disp: , Rfl:     losartan (COZAAR) 50 mg tablet, Take 50 mg by mouth daily  , Disp: , Rfl:     Multiple Vitamin (MULTIVITAMIN) tablet, Take 1 tablet by mouth daily  , Disp: , Rfl:     omeprazole (PriLOSEC) 20 mg delayed release capsule, Take 20 mg by mouth daily  , Disp: , Rfl:     simvastatin (ZOCOR) 40 mg tablet, Take 40 mg by mouth daily at bedtime  , Disp: , Rfl:     Vitamin D, Cholecalciferol, 1000 UNITS CAPS, Take by mouth, Disp: , Rfl:     famotidine (PEPCID) 20 mg tablet, 1 PO 1/2 HOUR BEFORE BREAKFAST AND 1/2 HOUR BEFORE EVENING MEAL (Patient not taking: Reported on 11/14/2018 ), Disp: 180 tablet, Rfl: 3      There were no vitals filed for this visit    Weight (last 2 days)     None        Physical Exam      Laboratory Studies:  Lab Results   Component Value Date    HGBA1C 6 0 11/13/2018    HGBA1C 5 8 05/24/2018    HGBA1C 5 7 12/15/2017    HGBA1C 5 7 (H) 09/21/2016    HGBA1C 5 5 05/18/2016    HGBA1C 5 6 01/13/2016     09/21/2016     05/18/2016     01/13/2016    K 4 4 11/13/2018    K 4 2 05/24/2018    K 4 1 12/15/2017    K 5 2 09/21/2016    K 4 0 05/18/2016    K 4 1 01/13/2016     11/13/2018     05/24/2018     12/15/2017    CL 98 09/21/2016     05/18/2016    CL 97 01/13/2016    CO2 31 11/13/2018    CO2 31 05/24/2018    CO2 31 12/15/2017    CO2 28 09/21/2016    CO2 24 05/18/2016    CO2 27 01/13/2016    GLUCOSE 108 (H) 09/21/2016    GLUCOSE 107 (H) 05/18/2016    GLUCOSE 112 (H) 01/13/2016    CREATININE 1 07 11/13/2018    CREATININE 0 95 05/24/2018    CREATININE 0 72 12/15/2017    CREATININE 0 83 09/21/2016    CREATININE 0 84 05/18/2016    CREATININE 0 90 01/13/2016    BUN 16 11/13/2018    BUN 25 05/24/2018    BUN 15 12/15/2017    BUN 13 09/21/2016    BUN 14 05/18/2016    BUN 13 01/13/2016    MG 1 7 01/27/2017     Lab Results   Component Value Date    WBC 4 94 11/13/2018    WBC 5 3 01/13/2016    RBC 4 27 11/13/2018    RBC 4 22 11/04/2015    HGB 14 3 11/13/2018    HGB 14 2 01/13/2016    HCT 42 3 11/13/2018    HCT 42 3 01/13/2016    MCV 99 (H) 11/13/2018    MCV 96 01/13/2016    MCH 33 5 11/13/2018    MCH 32 2 01/13/2016    RDW 12 3 11/13/2018    RDW 13 9 01/13/2016     11/13/2018     01/13/2016     Lipid Profile:   Lab Results   Component Value Date    CHOL 136 11/04/2015     Lab Results   Component Value Date    HDL 48 11/13/2018    HDL 53 05/24/2018    HDL 53 12/15/2017     Lab Results   Component Value Date    LDLCALC 61 11/04/2015     Lab Results   Component Value Date    TRIG 96 11/13/2018 TRIG 108 05/24/2018    TRIG 92 12/15/2017     Lab Results   Component Value Date    CHOL 136 11/04/2015    LDLDIRECT 94 11/13/2018    LDLDIRECT 102 (H) 09/21/2016    HDL 48 11/13/2018    HDL 42 11/04/2015    TRIG 96 11/13/2018    TRIG 98 09/21/2016     Recent Labs      11/13/18   0710   LDLDIRECT  94   HDL  48   TRIG  96       Cardiac testing:     EKG reviewed personally: ***      Johny Hector MD    Portions of the record may have been created with voice recognition software   Occasional wrong word or "sound a like" substitutions may have occurred due to the inherent limitations of voice recognition software   Read the chart carefully and recognize, using context, where substitutions have occurred

## 2018-11-14 NOTE — PROGRESS NOTES
Follow-up - Cardiology   Mike Guy 70 y o  male MRN: [id number]        Problems    1  At[facility] unspecified [facility] ([facility] )  POCT ECG   2  Other hyperlipidemia     3  Benign essential hypertension     4  Obstructive sleep apnea         Impression:     atrial fibrillation  Remains a well rate controlled  Asymptomatic  Chads Vasc risk warrants anticoagulation, he has been on Eliquis  Samples provided today    Hypertension  well controlled    Hyperlipidemia  Well controlled    Obstructive sleep apnea -  Intolerant to CPAP    Nonischemic cardiomyopathy  Possibly due to AFib, alcohol, sleep apnea, but has been stable at 50% EF  No associated heart failure    Plan:    Six-month follow-up otherwise doing very well  HPI:   Johanny He is a 70y o  year old male  With a history of significant alcohol use but abstains, low normal LV function which has been stable, chronic bertin controlled atrial fibrillation who continues to follow up with me at the [place] office  He remains on eliquis  No bleeding  His EF is 50%, previously 45%, syspected nonischemic due to prior alcohol and afib  Afib rates under excellent control  Lipids and BP are both well controlled but he admits to snacking at night  He is slightly overwight and has LUÍS that is not treated due to CPAP intolerance  Review of Systems   Respiratory: Negative  Cardiovascular: Negative  Gastrointestinal: Negative  Genitourinary: Negative  Neurological: Negative            Past Medical History:   Diagnosis Date    Atrial fibrillation ([facility] )     Cardiomyopathy ([facility] )     LAST ASSESSED [id number]    Cataract     Dysphagia     GERD (gastroesophageal reflux disease)     Hyperlipidemia     Hypertension     Irregular heart beat     afib    Rosacea     Schatzki's ring     Sleep apnea     no cpap     History   Alcohol Use    Yes     Comment: 3-4 x / week  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[employer]      History   Drug Use No History   Smoking Status    Former Smoker   Smokeless Tobacco    Never Used       Allergies: Allergies   Allergen Reactions    Lisinopril Other (See Comments)     Persistent cough       Medications:     Current Outpatient Prescriptions:     amitriptyline (ELAVIL) 10 mg tablet, Take 5 mg by mouth daily at bedtime  , Disp: , Rfl:     amLODIPine (NORVASC) 5 mg tablet, Take 5 mg by mouth daily at bedtime, Disp: , Rfl:     apixaban (ELIQUIS) 5 mg, Take 5 mg by mouth 2 (two) times a day , Disp: , Rfl:     aspirin 81 MG tablet, Take 81 mg by mouth daily  , Disp: , Rfl:     atenolol (TENORMIN) 50 mg tablet, Take 50 mg by mouth 2 (two) times a day , Disp: , Rfl:     losartan (COZAAR) 50 mg tablet, Take 50 mg by mouth daily  , Disp: , Rfl:     Multiple Vitamin (MULTIVITAMIN) tablet, Take 1 tablet by mouth daily  , Disp: , Rfl:     omeprazole (PriLOSEC) 20 mg delayed release capsule, Take 20 mg by mouth daily  , Disp: , Rfl:     simvastatin (ZOCOR) 40 mg tablet, Take 40 mg by mouth daily at bedtime  , Disp: , Rfl:     Vitamin D, Cholecalciferol, 1000 UNITS CAPS, Take by mouth, Disp: , Rfl:     famotidine (PEPCID) 20 mg tablet, 1 PO 1/2 HOUR BEFORE BREAKFAST AND 1/2 HOUR BEFORE EVENING MEAL (Patient not taking: Reported on 11/14/2018 ), Disp: 180 tablet, Rfl: 3      Vitals:    11/14/18 1506   BP: 128/80   Pulse: 72     Weight (last 2 days)     Date/Time   Weight    11/14/18 1506  99 2 (218 6)            Physical Exam   Constitutional: He is oriented to person, place, and time  No distress  HENT:   Head: Normocephalic and atraumatic  Eyes: Conjunctivae are normal  No scleral icterus  Neck: Normal range of motion  No JVD present  Cardiovascular: Normal rate, normal heart sounds and intact distal pulses  No murmur heard  irregular   Pulmonary/Chest: Effort normal and breath sounds normal  He has no wheezes  He has no rales  Musculoskeletal: He exhibits no edema     Neurological: He is alert and oriented to person, place, and time  Skin: Skin is warm and dry  He is not diaphoretic  Laboratory Studies:  CMP:    Results from last 7 days  Lab Units 18  0710   POTASSIUM mmol/L 4 4   CHLORIDE mmol/L 101   CO2 mmol/L 31   BUN mg/dL 16   CREATININE mg/dL 1 07   CALCIUM mg/dL 9 3   AST U/L 31   ALT U/L 36   ALK PHOS U/L 51   EGFR ml/min/1 73sq m 67     NT-proBNP: No results found for: NTBNP   Coags:    Lipid Profile:   Lab Results   Component Value Date    CHOL 136 2015     Lab Results   Component Value Date    HDL 48 2018     Lab Results   Component Value Date    LDLCALC 61 2015     Lab Results   Component Value Date    TRIG 96 2018       Cardiac testing:   EKG reviewed personally:   Afib, LAD, HR 90  Results for orders placed during the hospital encounter of 18   Echo complete with contrast if indicated    Narrative [facility] [address]  [address]  [phone]    Transthoracic Echocardiogram  2D, M-mode, Doppler, and Color Doppler    Study date:  2018    Patient: Ashutosh Lee  MR number: [id number]  Account number: [de-identified]  : 1940  Age: 78 years  Gender: Male  Status: Outpatient  Location: [address] [place] [address] [facility] [place]  Height: 69 in  Weight: 208 lb  BP: 130/ 78 mmHg    Indications: Atrial fibrillation    Diagnoses: I48 0 - Atrial fibrillation    Sonographer:  JOJO Santo  Primary Physician:  Minnie Palencia MD  Referring Physician:  Portia White MD  Group:  [facility]  Interpreting Physician:  Jessica Robertson MD    SUMMARY    LEFT VENTRICLE:  Systolic function was at the lower limits of normal  Ejection fraction was estimated to be 50 %  There were no regional wall motion abnormalities  Wall thickness was at the upper limits of normal     RIGHT VENTRICLE:  The ventricle was mildly dilated    Systolic function was normal     LEFT ATRIUM:  The atrium was mildly dilated  RIGHT ATRIUM:  The atrium was mildly dilated  MITRAL VALVE:  There was mild annular calcification  There was mild to moderate regurgitation  TRICUSPID VALVE:  There was mild regurgitation  Estimated peak PA pressure was 40 mmHg  HISTORY: PRIOR HISTORY: Hypertension, hyperlipidemia, atrial fibrillation, cardiomyopathy, obstructive sleep apnea, former smoker, prediabetes    PROCEDURE: The study was performed in the [address] [facility] [place]  This was a routine study  The transthoracic approach was used  The study included complete 2D imaging, M-mode, complete spectral Doppler, and color Doppler  Image  quality was adequate  LEFT VENTRICLE: Size was normal  Systolic function was at the lower limits of normal  Ejection fraction was estimated to be 50 %  There were no regional wall motion abnormalities  Wall thickness was at the upper limits of normal  DOPPLER:  Transmitral flow pattern: atrial fibrillation  RIGHT VENTRICLE: The ventricle was mildly dilated  Systolic function was normal     LEFT ATRIUM: The atrium was mildly dilated  RIGHT ATRIUM: The atrium was mildly dilated  MITRAL VALVE: There was mild annular calcification  Valve structure was normal  There was normal leaflet separation  DOPPLER: The transmitral velocity was within the normal range  There was no evidence for stenosis  There was mild to  moderate regurgitation  AORTIC VALVE: The valve was trileaflet  Leaflets exhibited mildly increased thickness and normal cuspal separation  DOPPLER: Transaortic velocity was within the normal range  There was no evidence for stenosis  There was no regurgitation  TRICUSPID VALVE: The valve structure was normal  There was normal leaflet separation  DOPPLER: The transtricuspid velocity was within the normal range  There was no evidence for stenosis  There was mild regurgitation  Pulmonary artery  systolic pressure was mildly increased   Estimated peak PA pressure was 40 mmHg     PULMONIC VALVE: Leaflets exhibited normal thickness, no calcification, and normal cuspal separation  DOPPLER: The transpulmonic velocity was within the normal range  There was mild regurgitation  PERICARDIUM: There was no pericardial effusion  AORTA: The root exhibited normal size  SYSTEMIC VEINS: IVC: The inferior vena cava was normal in size  SYSTEM MEASUREMENT TABLES    2D  %FS: 27 43 %  AV Diam: 3 63 cm  EDV(Teich): 103 95 ml  EF(Cube): 61 78 %  EF(Teich): 53 27 %  ESV(Cube): 40 48 ml  ESV(Teich): 48 57 ml  IVSd: 1 05 cm  LA Area: 22 88 cm2  LA Diam: 3 99 cm  LVEDV MOD A4C: 132 92 ml  LVEF MOD A4C: 44 61 %  LVESV MOD A4C: 73 63 ml  LVIDd: 4 73 cm  LVIDs: 3 43 cm  LVLd A4C: 7 57 cm  LVLs A4C: 6 65 cm  LVPWd: 1 09 cm  RA Area: 27 19 cm2  RV Diam : 4 56 cm  SV MOD A4C: 59 29 ml  SV(Cube): 65 42 ml  SV(Teich): 55 38 ml    CW  TR Vmax: 2 95 m/s  TR maxP 72 mmHg    MM  TAPSE: 1 6 cm    [facility]    Prepared and electronically signed by    Afshin Medrano MD  Signed 2018 16:49:48               Germania Sinclair MD    Portions of the record may have been created with voice recognition software   Occasional wrong word or "sound a like" substitutions may have occurred due to the inherent limitations of voice recognition software   Read the chart carefully and recognize, using context, where substitutions have occurred

## 2018-11-14 NOTE — PROGRESS NOTES
Assessment and Plan:    Problem List Items Addressed This Visit     None        Health Maintenance Due   Topic Date Due    Depression Screening PHQ  1940    Fall Risk  07/03/2007    Pneumococcal PPSV23/PCV13 65+ Years / Low and Medium Risk (2 of 2 - PPSV23) 05/24/2017    INFLUENZA VACCINE  07/01/2018         HPI:  Balta Valentin is a 70 y o  male here for his Subsequent Wellness Visit  Patient Active Problem List   Diagnosis    Atrial fibrillation ([facility] [address] [facility]    Hyperlipidemia    Benign essential hypertension    Obstructive sleep apnea    Anxiety    Arthritis    Benign prostatic hyperplasia    Diverticulosis    Dysphagia    Gastroesophageal reflux disease    Insomnia    Pre-diabetes    Rosacea    Tinea cruris     Past Medical History:   Diagnosis Date    Atrial fibrillation ([facility] [address] [facility]     Cardiomyopathy ([facility] [address] )     LAST ASSESSED [id number]    Cataract     Dysphagia     GERD (gastroesophageal reflux disease)     Hyperlipidemia     Hypertension     Irregular heart beat     afib    Rosacea     Schatzki's ring     Sleep apnea     no cpap     Past Surgical History:   Procedure Laterality Date    BACK SURGERY      CATARACT EXTRACTION      COLONOSCOPY N/A 1/15/2016    Procedure: COLONOSCOPY;  Surgeon: Zeke Valle MD;  [facility]: [facility]; Service:     ESOPHAGOGASTRODUODENOSCOPY      EYE SURGERY      B/L cataract sx (1540,1652)    OH CIRCUMCISION,OTHR N/A 1/29/2018    Procedure: CIRCUMCISION ADULT;  Surgeon: Juliane Freitas MD;  [facility];  Service: Urology    OH ESOPHAGOGASTRODUODENOSCOPY TRANSORAL DIAGNOSTIC N/A 2/15/2017    Procedure: ESOPHAGOGASTRODUODENOSCOPY (EGD) WITH DILATION;  Surgeon: Tanner Burciaga MD;  [facility];   Service: Gastroenterology     Family History   Problem Relation Age of Onset    Coronary artery disease Father     Coronary artery disease Family     Hyperlipidemia Family     Hypertension Family     Other Family         SUDDEN CARDIAC DEATH      History   Smoking Status    Former Smoker   Smokeless Tobacco    Never Used     History   Alcohol Use    Yes     Comment: 3-4 x / week  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[employer]       History   Drug Use No       Current Outpatient Prescriptions   Medication Sig Dispense Refill    amitriptyline (ELAVIL) 10 mg tablet Take 5 mg by mouth daily at bedtime   amLODIPine (NORVASC) 5 mg tablet Take 5 mg by mouth daily at bedtime      apixaban (ELIQUIS) 5 mg Take 5 mg by mouth 2 (two) times a day   aspirin 81 MG tablet Take 81 mg by mouth daily   atenolol (TENORMIN) 50 mg tablet Take 50 mg by mouth 2 (two) times a day   famotidine (PEPCID) 20 mg tablet 1 PO 1/2 HOUR BEFORE BREAKFAST AND 1/2 HOUR BEFORE EVENING MEAL 180 tablet 3    losartan (COZAAR) 50 mg tablet Take 50 mg by mouth daily   Multiple Vitamin (MULTIVITAMIN) tablet Take 1 tablet by mouth daily   omeprazole (PriLOSEC) 20 mg delayed release capsule Take 20 mg by mouth daily   simvastatin (ZOCOR) 40 mg tablet Take 40 mg by mouth daily at bedtime   Vitamin D, Cholecalciferol, 1000 UNITS CAPS Take by mouth       No current facility-administered medications for this visit  Allergies   Allergen Reactions    Lisinopril Other (See Comments)     Persistent cough     Immunization History   Administered Date(s) Administered    H1N1, All Formulations 01/29/2010    Influenza 09/30/2014, 10/18/2015, 09/28/2016    Influenza Quadrivalent Preservative Free 3 years and older IM 09/30/2014    Influenza Split High Dose Preservative Free IM 10/18/2015, 09/28/2016    Influenza TIV (IM) 10/24/2012    Pneumococcal Conjugate 13-Valent 05/24/2016    Tdap 09/28/2016       Patient Care Team:  Turner Baron MD as PCP - General  Azucena Morris MD as Cardiologist  Turner Baron MD    Medicare Screening Tests and Risk Assessments:  Justus Patel is here for his Subsequent Wellness visit        Health Risk Assessment:  Patient rates overall health as good  Patient feels that their physical health rating is Slightly better  Eyesight was rated as Same  Hearing was rated as Same  Patient feels that their emotional and mental health rating is Slightly better  Pain experienced by patient in the last 7 days has been Some  Patient's pain rating has been 1/10  Patient states that he has experienced no weight loss or gain in last 6 months  Emotional/Mental Health:  Patient has been feeling nervous/anxious  PHQ-9 Depression Screening:    Frequency of the following problems over the past two weeks:      1  Little interest or pleasure in doing things: 0 - not at all      2  Feeling down, depressed, or hopeless: 0 - not at all  PHQ-2 Score: 0          Broken Bones/Falls: Fall Risk Assessment:    In the past year, patient has experienced: No history of falling in past year          Bladder/Bowel:  Patient has not leaked urine accidently in the last six months  Patient reports no loss of bowel control  Immunizations:  Patient has had a flu vaccination within the last year  Patient has received a pneumonia shot  Patient has not received a shingles shot  Patient has received tetanus/diphtheria shot  Date of tetanus/diphtheria shot: 9/28/2016    Home Safety:  Patient does not have trouble with stairs inside or outside of their home  Patient currently reports that there are no safety hazards present in home, working smoke alarms, working carbon monoxide detectors  Preventative Screenings:   prostate cancer screen performed, colon cancer screen completed, cholesterol screen completed, glaucoma eye exam completed,     Nutrition:  Current diet: Regular and Limited junk food with servings of the following:    Medications:  Patient is currently taking over-the-counter supplements  List of OTC medications includes: VITAMIN D  Patient is able to manage medications  Lifestyle Choices:  Patient reports no tobacco use    Patient has smoked or used tobacco in the past   Patient has stopped his tobacco use  Tobacco use quit date: 46 YEARS AGO  Patient reports alcohol use  Alcohol use per week: 3 TIMES WEEKLY  Patient drives a vehicle  Patient wears seat belt  Current level of exercise of physical activity described by patient as: NONE  Activities of Daily Living:  Can get out of bed by his or her self, able to dress self, able to make own meals, able to do own shopping, able to bathe self, can do own laundry/housekeeping, can manage own money, pay bills and track expenses    Previous Hospitalizations:  Hospitalization or ED visit in past 12 months  Number of hospitalizations within the last year: 1-2  Additional Comments: 01/29/18- [facility]:  Patient has decided on a power of   Patient has spoken to designated power of   Patient has not completed advanced directive  Preventative Screening/Counseling:      Cardiovascular:      General: Risks and Benefits Discussed and Screening Current          Diabetes:      General: Risks and Benefits Discussed and Screening Current      Comments: Known pre diabetes and has periodic A1c        Colorectal Cancer:      General: Risks and Benefits Discussed and Screening Current          Prostate Cancer:      General: Risks and Benefits Discussed and Screening Current          Osteoporosis:      General: Risks and Benefits Discussed and Screening Not Indicated          AAA:      General: Risks and Benefits Discussed and Screening Current          Glaucoma:      General: Risks and Benefits Discussed and Screening Current          HIV:      General: Screening Not Indicated and Risks and Benefits Discussed          Hepatitis C:      General: Screening Not Indicated        Advanced Directives:   Patient has living will for healthcare, has durable POA for healthcare, patient has an advanced directive   Information on ACP and/or AD not provided  No 5 wishes given  End of life assessment reviewed with patient  Provider agrees with end of life decisions        Immunizations:      Influenza: Risks & Benefits Discussed and Influenza UTD This Year      Pneumococcal: Risks & Benefits Discussed and Lifetime Vaccine Completed      Shingrix: Risks & Benefits Discussed and Shingrix Vaccine Needed Today      Zostavax: Risks & Benefits Discussed and Zostavax Vaccine UTD      TD: Risks & Benefits Discussed and Td Vaccine UTD      TDAP: Risks & Benefits Discussed and Tdap Vaccine UTD      Other Preventative Counseling (Non-Medicare):  Alcohol Use

## 2019-01-02 DIAGNOSIS — K21.9 GASTROESOPHAGEAL REFLUX DISEASE, ESOPHAGITIS PRESENCE NOT SPECIFIED: ICD-10-CM

## 2019-01-02 DIAGNOSIS — I10 ESSENTIAL HYPERTENSION: Primary | ICD-10-CM

## 2019-01-02 DIAGNOSIS — E78.2 MIXED HYPERLIPIDEMIA: ICD-10-CM

## 2019-01-02 RX ORDER — LOSARTAN POTASSIUM 50 MG/1
50 TABLET ORAL DAILY
Qty: 90 TABLET | Refills: 3 | Status: SHIPPED | OUTPATIENT
Start: 2019-01-02 | End: 2020-01-03

## 2019-01-02 RX ORDER — ATENOLOL 50 MG/1
50 TABLET ORAL 2 TIMES DAILY
Qty: 180 TABLET | Refills: 3 | Status: SHIPPED | OUTPATIENT
Start: 2019-01-02 | End: 2020-01-03

## 2019-01-02 RX ORDER — SIMVASTATIN 40 MG
40 TABLET ORAL
Qty: 90 TABLET | Refills: 3 | Status: SHIPPED | OUTPATIENT
Start: 2019-01-02 | End: 2020-01-03

## 2019-01-02 RX ORDER — OMEPRAZOLE 20 MG/1
20 CAPSULE, DELAYED RELEASE ORAL DAILY
Qty: 90 CAPSULE | Refills: 3 | Status: SHIPPED | OUTPATIENT
Start: 2019-01-02 | End: 2020-01-03

## 2019-01-04 DIAGNOSIS — I48.91 ATRIAL FIBRILLATION, UNSPECIFIED TYPE (HCC): Primary | ICD-10-CM

## 2019-01-04 RX ORDER — AMLODIPINE BESYLATE 5 MG/1
5 TABLET ORAL
Qty: 180 TABLET | Refills: 2 | Status: SHIPPED | OUTPATIENT
Start: 2019-01-04 | End: 2020-01-03

## 2019-01-11 DIAGNOSIS — I48.91 ATRIAL FIBRILLATION, UNSPECIFIED TYPE (HCC): ICD-10-CM

## 2019-04-08 ENCOUNTER — TELEPHONE (OUTPATIENT)
Dept: INTERNAL MEDICINE CLINIC | Facility: CLINIC | Age: 79
End: 2019-04-08

## 2019-04-16 ENCOUNTER — APPOINTMENT (OUTPATIENT)
Dept: LAB | Facility: CLINIC | Age: 79
End: 2019-04-16
Payer: MEDICARE

## 2019-04-16 DIAGNOSIS — E78.01 FAMILIAL HYPERCHOLESTEROLEMIA: ICD-10-CM

## 2019-04-16 DIAGNOSIS — R73.03 PREDIABETES: ICD-10-CM

## 2019-04-16 DIAGNOSIS — I10 HYPERTENSION, UNSPECIFIED TYPE: ICD-10-CM

## 2019-04-16 LAB
ALBUMIN SERPL BCP-MCNC: 3.9 G/DL (ref 3.5–5)
ALP SERPL-CCNC: 50 U/L (ref 46–116)
ALT SERPL W P-5'-P-CCNC: 33 U/L (ref 12–78)
ANION GAP SERPL CALCULATED.3IONS-SCNC: 6 MMOL/L (ref 4–13)
AST SERPL W P-5'-P-CCNC: 28 U/L (ref 5–45)
BASOPHILS # BLD AUTO: 0.02 THOUSANDS/ΜL (ref 0–0.1)
BASOPHILS NFR BLD AUTO: 0 % (ref 0–1)
BILIRUB SERPL-MCNC: 0.54 MG/DL (ref 0.2–1)
BUN SERPL-MCNC: 15 MG/DL (ref 5–25)
CALCIUM SERPL-MCNC: 8.7 MG/DL (ref 8.3–10.1)
CHLORIDE SERPL-SCNC: 108 MMOL/L (ref 100–108)
CHOLEST SERPL-MCNC: 138 MG/DL (ref 50–200)
CO2 SERPL-SCNC: 27 MMOL/L (ref 21–32)
CREAT SERPL-MCNC: 0.92 MG/DL (ref 0.6–1.3)
EOSINOPHIL # BLD AUTO: 0.09 THOUSAND/ΜL (ref 0–0.61)
EOSINOPHIL NFR BLD AUTO: 2 % (ref 0–6)
ERYTHROCYTE [DISTWIDTH] IN BLOOD BY AUTOMATED COUNT: 13.2 % (ref 11.6–15.1)
EST. AVERAGE GLUCOSE BLD GHB EST-MCNC: 117 MG/DL
GFR SERPL CREATININE-BSD FRML MDRD: 81 ML/MIN/1.73SQ M
GLUCOSE SERPL-MCNC: 99 MG/DL (ref 65–140)
HBA1C MFR BLD: 5.7 % (ref 4.2–6.3)
HCT VFR BLD AUTO: 40.4 % (ref 36.5–49.3)
HDLC SERPL-MCNC: 44 MG/DL (ref 40–60)
HGB BLD-MCNC: 13.8 G/DL (ref 12–17)
IMM GRANULOCYTES # BLD AUTO: 0.01 THOUSAND/UL (ref 0–0.2)
IMM GRANULOCYTES NFR BLD AUTO: 0 % (ref 0–2)
LDLC SERPL DIRECT ASSAY-MCNC: 76 MG/DL (ref 0–100)
LYMPHOCYTES # BLD AUTO: 1.29 THOUSANDS/ΜL (ref 0.6–4.47)
LYMPHOCYTES NFR BLD AUTO: 27 % (ref 14–44)
MCH RBC QN AUTO: 34.2 PG (ref 26.8–34.3)
MCHC RBC AUTO-ENTMCNC: 34.2 G/DL (ref 31.4–37.4)
MCV RBC AUTO: 100 FL (ref 82–98)
MONOCYTES # BLD AUTO: 0.68 THOUSAND/ΜL (ref 0.17–1.22)
MONOCYTES NFR BLD AUTO: 14 % (ref 4–12)
NEUTROPHILS # BLD AUTO: 2.66 THOUSANDS/ΜL (ref 1.85–7.62)
NEUTS SEG NFR BLD AUTO: 57 % (ref 43–75)
NRBC BLD AUTO-RTO: 0 /100 WBCS
PLATELET # BLD AUTO: 181 THOUSANDS/UL (ref 149–390)
PMV BLD AUTO: 11.7 FL (ref 8.9–12.7)
POTASSIUM SERPL-SCNC: 3.9 MMOL/L (ref 3.5–5.3)
PROT SERPL-MCNC: 6.9 G/DL (ref 6.4–8.2)
RBC # BLD AUTO: 4.04 MILLION/UL (ref 3.88–5.62)
SODIUM SERPL-SCNC: 141 MMOL/L (ref 136–145)
TRIGL SERPL-MCNC: 104 MG/DL
WBC # BLD AUTO: 4.75 THOUSAND/UL (ref 4.31–10.16)

## 2019-04-16 PROCEDURE — 80061 LIPID PANEL: CPT

## 2019-04-16 PROCEDURE — 83036 HEMOGLOBIN GLYCOSYLATED A1C: CPT

## 2019-04-16 PROCEDURE — 80053 COMPREHEN METABOLIC PANEL: CPT

## 2019-04-16 PROCEDURE — 36415 COLL VENOUS BLD VENIPUNCTURE: CPT

## 2019-04-16 PROCEDURE — 83721 ASSAY OF BLOOD LIPOPROTEIN: CPT

## 2019-04-16 PROCEDURE — 85025 COMPLETE CBC W/AUTO DIFF WBC: CPT

## 2019-04-17 ENCOUNTER — OFFICE VISIT (OUTPATIENT)
Dept: INTERNAL MEDICINE CLINIC | Facility: CLINIC | Age: 79
End: 2019-04-17
Payer: MEDICARE

## 2019-04-17 VITALS
DIASTOLIC BLOOD PRESSURE: 80 MMHG | HEIGHT: 69 IN | HEART RATE: 78 BPM | RESPIRATION RATE: 14 BRPM | WEIGHT: 227 LBS | SYSTOLIC BLOOD PRESSURE: 130 MMHG | BODY MASS INDEX: 33.62 KG/M2

## 2019-04-17 DIAGNOSIS — K21.9 GASTROESOPHAGEAL REFLUX DISEASE, ESOPHAGITIS PRESENCE NOT SPECIFIED: Chronic | ICD-10-CM

## 2019-04-17 DIAGNOSIS — E78.2 MIXED HYPERLIPIDEMIA: Primary | Chronic | ICD-10-CM

## 2019-04-17 DIAGNOSIS — I48.20 CHRONIC ATRIAL FIBRILLATION (HCC): Chronic | ICD-10-CM

## 2019-04-17 DIAGNOSIS — R73.03 PRE-DIABETES: Chronic | ICD-10-CM

## 2019-04-17 PROCEDURE — 99214 OFFICE O/P EST MOD 30 MIN: CPT | Performed by: INTERNAL MEDICINE

## 2019-09-16 ENCOUNTER — APPOINTMENT (OUTPATIENT)
Dept: LAB | Facility: CLINIC | Age: 79
End: 2019-09-16
Payer: MEDICARE

## 2019-09-16 DIAGNOSIS — E78.2 MIXED HYPERLIPIDEMIA: Chronic | ICD-10-CM

## 2019-09-16 DIAGNOSIS — R73.03 PRE-DIABETES: Chronic | ICD-10-CM

## 2019-09-16 LAB
ALBUMIN SERPL BCP-MCNC: 3.6 G/DL (ref 3.5–5)
ALP SERPL-CCNC: 50 U/L (ref 46–116)
ALT SERPL W P-5'-P-CCNC: 32 U/L (ref 12–78)
ANION GAP SERPL CALCULATED.3IONS-SCNC: 2 MMOL/L (ref 4–13)
AST SERPL W P-5'-P-CCNC: 29 U/L (ref 5–45)
BILIRUB SERPL-MCNC: 0.58 MG/DL (ref 0.2–1)
BUN SERPL-MCNC: 18 MG/DL (ref 5–25)
CALCIUM SERPL-MCNC: 8.8 MG/DL (ref 8.3–10.1)
CHLORIDE SERPL-SCNC: 106 MMOL/L (ref 100–108)
CHOLEST SERPL-MCNC: 159 MG/DL (ref 50–200)
CO2 SERPL-SCNC: 30 MMOL/L (ref 21–32)
CREAT SERPL-MCNC: 0.97 MG/DL (ref 0.6–1.3)
EST. AVERAGE GLUCOSE BLD GHB EST-MCNC: 108 MG/DL
GFR SERPL CREATININE-BSD FRML MDRD: 75 ML/MIN/1.73SQ M
GLUCOSE P FAST SERPL-MCNC: 105 MG/DL (ref 65–99)
HBA1C MFR BLD: 5.4 % (ref 4.2–6.3)
HDLC SERPL-MCNC: 43 MG/DL (ref 40–60)
LDLC SERPL DIRECT ASSAY-MCNC: 97 MG/DL (ref 0–100)
POTASSIUM SERPL-SCNC: 3.9 MMOL/L (ref 3.5–5.3)
PROT SERPL-MCNC: 7.2 G/DL (ref 6.4–8.2)
SODIUM SERPL-SCNC: 138 MMOL/L (ref 136–145)
TRIGL SERPL-MCNC: 131 MG/DL

## 2019-09-16 PROCEDURE — 80061 LIPID PANEL: CPT

## 2019-09-16 PROCEDURE — 36415 COLL VENOUS BLD VENIPUNCTURE: CPT

## 2019-09-16 PROCEDURE — 83721 ASSAY OF BLOOD LIPOPROTEIN: CPT

## 2019-09-16 PROCEDURE — 83036 HEMOGLOBIN GLYCOSYLATED A1C: CPT

## 2019-09-16 PROCEDURE — 80053 COMPREHEN METABOLIC PANEL: CPT

## 2019-09-17 ENCOUNTER — OFFICE VISIT (OUTPATIENT)
Dept: INTERNAL MEDICINE CLINIC | Facility: CLINIC | Age: 79
End: 2019-09-17
Payer: MEDICARE

## 2019-09-17 VITALS
BODY MASS INDEX: 34.69 KG/M2 | WEIGHT: 234.2 LBS | DIASTOLIC BLOOD PRESSURE: 84 MMHG | RESPIRATION RATE: 14 BRPM | HEIGHT: 69 IN | SYSTOLIC BLOOD PRESSURE: 130 MMHG | HEART RATE: 78 BPM

## 2019-09-17 DIAGNOSIS — K21.9 GASTROESOPHAGEAL REFLUX DISEASE, ESOPHAGITIS PRESENCE NOT SPECIFIED: Chronic | ICD-10-CM

## 2019-09-17 DIAGNOSIS — I48.20 CHRONIC ATRIAL FIBRILLATION (HCC): Chronic | ICD-10-CM

## 2019-09-17 DIAGNOSIS — I10 BENIGN ESSENTIAL HYPERTENSION: Chronic | ICD-10-CM

## 2019-09-17 DIAGNOSIS — E78.49 OTHER HYPERLIPIDEMIA: Primary | Chronic | ICD-10-CM

## 2019-09-17 DIAGNOSIS — Z23 NEED FOR IMMUNIZATION AGAINST INFLUENZA: ICD-10-CM

## 2019-09-17 DIAGNOSIS — R73.03 PRE-DIABETES: Chronic | ICD-10-CM

## 2019-09-17 DIAGNOSIS — I10 ESSENTIAL HYPERTENSION: ICD-10-CM

## 2019-09-17 PROCEDURE — 99214 OFFICE O/P EST MOD 30 MIN: CPT | Performed by: INTERNAL MEDICINE

## 2019-09-17 PROCEDURE — 90662 IIV NO PRSV INCREASED AG IM: CPT

## 2019-09-17 PROCEDURE — G0008 ADMIN INFLUENZA VIRUS VAC: HCPCS

## 2019-09-17 NOTE — PROGRESS NOTES
Assessment/Plan:   1  Health maintenance - given influenza vaccine today  2  Folliculitis of the nose-previously treated by dermatology-intermittently uses intranasal Bactroban  3  Sleep apnea -moderate -unable to tolerate CPAP  He knows sleep but decided not have alcohol for 4 hours before bed- REVIEW NEXT VISIT  4  Pre diabetes -A1c is  Now normal 0 5 ostium blood sugar remains slightly elevated - prior value of 6 4 for his A1c -continue conservative therapy is trying decrease current dose of carbohydrates  5  Atrial fibrillation -continue with rate control and anticoagulation -had a follow-up visit with Cardiology in stable  6  Hypertension -adequate control on current regimen  Shingrix vaccine  7   Myofascial syndrome-has long-term a use-currently using 5 milligrams before bed-has some DJD the neck as well   Will try discontinue this med and see how he does the a   8   esophageal reflux-tried to switch to an H2 blocker but had breakthrough symptoms and now back on omeprazole-longstanding vitamin B12 level ordered prior to next visit  9 erectile dysfunction-using Levitra 20 milligrams-half tablet as needed  10   Dysphagia-has a history Schatzki's ring in the past   Has not been an issue in months   Said he had a dilatation over the last 2 years  11  Prior episode of syncope-likely on the basis of orthostasis   Triggered by meds plus excess alcohol use   Stable since then without further issues   Hospitalized without significant arrhythmia   Unenhanced CT scan of the brain benign  12  Alcohol use-again recommended he decrease use with his known history of cardiomyopathy  13   Cardiomyopathy-felt to be from atrial fibrillation plus hypertension plus alcohol use   Rule out contribution of sleep apnea   Most recent echo shows EF of 50% without regional wall motion abnormalities, right ventricle mildly dilated, left atrium mildly dilated, estimated peak PA pressure of 40 millimeters   This was performed in April of 2018  14   Rule out CAD-prior nuclear stress test showed reduced motion of the septal wall-this was felt to be a mild defect consistent with artifact  15   Polyps of the colon-subsequent colonoscopy due in the year 2021  15   Previous noted leg pain-lumbar radiculopathy   Doppler negative-has not recurred  16   Hyperlipidemia-continue current dose of statin  17   Tinea corporis-was seen by Dr karlo gold-then seen Dr Manohar Shukla other problems as per note of April 2014, August 2012     Medical regimen losartan 50 milligrams daily, atenolol 50 milligrams b i d , amlodipine 5 milligrams, omeprazole 20 milligrams daily  , amitriptyline 10 milligrams-5-10 milligrams at bedtime,, simvastatin 40 milligrams daily, , multivitamin, Eliquis 5 milligrams b i d , prior use of Flexeril 5 milligrams HS, Levitra 20 milligrams-half tablet as needed, hydrocortisone cream 2 5% b i d  P r n  Appointment over the next several months with prior chemistry profile cholesterol profile hemoglobin A1c and vitamin B12 level    No problem-specific Assessment & Plan notes found for this encounter  Diagnoses and all orders for this visit:    Other hyperlipidemia  -     Comprehensive metabolic panel; Future  -     HEMOGLOBIN A1C W/ EAG ESTIMATION; Future  -     Cholesterol, total; Future  -     Triglycerides; Future  -     HDL cholesterol; Future  -     LDL cholesterol, direct; Future    Pre-diabetes  -     Comprehensive metabolic panel; Future  -     HEMOGLOBIN A1C W/ EAG ESTIMATION; Future  -     Cholesterol, total; Future  -     Triglycerides; Future  -     HDL cholesterol; Future  -     LDL cholesterol, direct; Future    Essential hypertension  -     Comprehensive metabolic panel; Future  -     HEMOGLOBIN A1C W/ EAG ESTIMATION; Future  -     Cholesterol, total; Future  -     Triglycerides; Future  -     HDL cholesterol; Future  -     LDL cholesterol, direct;  Future    Need for immunization against influenza  -     influenza vaccine, 5734-3427, high-dose, PF 0 5 mL (FLUZONE HIGH-DOSE)    Benign essential hypertension    Chronic atrial fibrillation (HCC)    Gastroesophageal reflux disease, esophagitis presence not specified          Subjective:      Patient ID: Leandro Piña is a 70 y o  male  Remains in chronic atrial fibrillation with adequate rate control  He is anticoagulated with Eliquis  He has not had any bleeding complications from the anticoagulant  Labs done prior to this visit show an LDL of 97 HDL 43 triglycerides 131 cholesterol 159 A1c is 5 4 with a fasting sugar 105 BUN 18 with creatinine of 0 97     Immunization history reviewed  He has had both types of pneumococcal vaccine  He recently completed the new zoster vaccine  He was given influenza vaccine today and has had prior Adacel vaccine  He has known hypertension  BP adequately controlled current regimen  He is avoiding salt and decongestants  Unfortunately intermittently consumes excess alcohol we reviewed that again today  This patient wanted to know their preferred analgesic agent  Because of their various comorbidities I recommended that this be acetaminophen  This patient has no history of chronic liver disease that would put them at greater risk for use of acetaminophen  This patient may use up to 500-650 mg of acetaminophen at a time and no more than 3 g a day total  Nonsteroidal anti-inflammatory agents have the potential to exacerbate hypertension, hypercoagulability, chronic renal failure, congestive heart failure, and various allergic tendencies  Because of his upper tract symptoms as well as long-term anticoagulant use we wanted to use acetaminophen rather than nonsteroidals  We reviewed that again today     He has a habit of consuming large amounts of food 9  We reviewed about cutting back on carbohydrates  We reviewed that alcohol aggravated sleep apnea      For treatment of her sleep apnea which she defers on therapy he knows not have alcohol for 4 hours before bed and that he should sleep on his side rather than his back  He remains on his PPI  He denies frequent reflux symptoms  He denies any difficulty swallowing  Vitamin B12 level ordered in the future with his long-term PPI use     I encouraged him to be more physically active  He is aware about potential contribution of alcohol to cardiomyopathy      The following portions of the patient's history were reviewed and updated as appropriate: allergies, current medications, past family history, past medical history, past social history, past surgical history and problem list     Review of Systems   Constitutional: Negative  Respiratory: Negative  Cardiovascular: Negative  Gastrointestinal: Negative  Endocrine: Negative  Genitourinary: Negative  Nocturia x1   Musculoskeletal: Negative  Neurological: Negative  Hematological: Negative  Psychiatric/Behavioral: Negative  Objective:      Ht 5' 9" (1 753 m)   Wt 106 kg (234 lb 3 2 oz)   BMI 34 59 kg/m²          Physical Exam   Constitutional: He is oriented to person, place, and time  He appears well-developed and well-nourished  No distress  HENT:   Head: Normocephalic and atraumatic  Right Ear: External ear normal    Left Ear: External ear normal    Nose: Nose normal    Mouth/Throat: Oropharynx is clear and moist  No oropharyngeal exudate  Eyes: Pupils are equal, round, and reactive to light  Conjunctivae and EOM are normal  Right eye exhibits no discharge  Left eye exhibits no discharge  No scleral icterus  Neck: No JVD present  No tracheal deviation present  No thyromegaly present  Cardiovascular: Normal rate, normal heart sounds and intact distal pulses  Exam reveals no gallop and no friction rub  No murmur heard  Irregular rhythm  Cardiomegaly to percussion  Adequate upstroke  No increase in P2   Pulmonary/Chest: Effort normal and breath sounds normal  No stridor   No respiratory distress  He has no wheezes  He exhibits no tenderness  Abdominal: Bowel sounds are normal  He exhibits no distension and no mass  There is no tenderness  There is no rebound and no guarding  Genitourinary: Rectal exam shows guaiac negative stool  Musculoskeletal: Normal range of motion  He exhibits no edema, tenderness or deformity  Lymphadenopathy:     He has no cervical adenopathy  Neurological: He is alert and oriented to person, place, and time  He has normal reflexes  He displays normal reflexes  No cranial nerve deficit  He exhibits normal muscle tone  Coordination normal    Skin: Skin is warm and dry  No rash noted  He is not diaphoretic  No erythema  No pallor  Psychiatric: He has a normal mood and affect  His behavior is normal  Judgment and thought content normal    Vitals reviewed

## 2020-01-03 DIAGNOSIS — E78.2 MIXED HYPERLIPIDEMIA: ICD-10-CM

## 2020-01-03 DIAGNOSIS — K21.9 GASTROESOPHAGEAL REFLUX DISEASE, ESOPHAGITIS PRESENCE NOT SPECIFIED: ICD-10-CM

## 2020-01-03 DIAGNOSIS — I48.91 ATRIAL FIBRILLATION, UNSPECIFIED TYPE (HCC): ICD-10-CM

## 2020-01-03 DIAGNOSIS — I10 ESSENTIAL HYPERTENSION: ICD-10-CM

## 2020-01-03 RX ORDER — SIMVASTATIN 40 MG
TABLET ORAL
Qty: 90 TABLET | Refills: 0 | Status: SHIPPED | OUTPATIENT
Start: 2020-01-03 | End: 2020-01-07 | Stop reason: SDUPTHER

## 2020-01-03 RX ORDER — AMLODIPINE BESYLATE 5 MG/1
TABLET ORAL
Qty: 90 TABLET | Refills: 0 | Status: SHIPPED | OUTPATIENT
Start: 2020-01-03 | End: 2020-01-07 | Stop reason: SDUPTHER

## 2020-01-03 RX ORDER — OMEPRAZOLE 20 MG/1
CAPSULE, DELAYED RELEASE ORAL
Qty: 90 CAPSULE | Refills: 0 | Status: SHIPPED | OUTPATIENT
Start: 2020-01-03 | End: 2020-01-07 | Stop reason: SDUPTHER

## 2020-01-03 RX ORDER — ATENOLOL 50 MG/1
TABLET ORAL
Qty: 180 TABLET | Refills: 0 | Status: SHIPPED | OUTPATIENT
Start: 2020-01-03 | End: 2020-01-07 | Stop reason: SDUPTHER

## 2020-01-03 RX ORDER — LOSARTAN POTASSIUM 50 MG/1
TABLET ORAL
Qty: 90 TABLET | Refills: 0 | Status: SHIPPED | OUTPATIENT
Start: 2020-01-03 | End: 2020-01-07 | Stop reason: SDUPTHER

## 2020-01-07 ENCOUNTER — TELEPHONE (OUTPATIENT)
Dept: INTERNAL MEDICINE CLINIC | Facility: CLINIC | Age: 80
End: 2020-01-07

## 2020-01-07 DIAGNOSIS — E78.2 MIXED HYPERLIPIDEMIA: ICD-10-CM

## 2020-01-07 DIAGNOSIS — I48.91 ATRIAL FIBRILLATION, UNSPECIFIED TYPE (HCC): ICD-10-CM

## 2020-01-07 DIAGNOSIS — I10 ESSENTIAL HYPERTENSION: ICD-10-CM

## 2020-01-07 DIAGNOSIS — K21.9 GASTROESOPHAGEAL REFLUX DISEASE, ESOPHAGITIS PRESENCE NOT SPECIFIED: ICD-10-CM

## 2020-01-07 RX ORDER — ATENOLOL 50 MG/1
50 TABLET ORAL 2 TIMES DAILY
Qty: 180 TABLET | Refills: 3 | Status: SHIPPED | OUTPATIENT
Start: 2020-01-07 | End: 2020-12-22 | Stop reason: SDUPTHER

## 2020-01-07 RX ORDER — AMLODIPINE BESYLATE 5 MG/1
5 TABLET ORAL
Qty: 90 TABLET | Refills: 3 | Status: SHIPPED | OUTPATIENT
Start: 2020-01-07 | End: 2020-02-20 | Stop reason: SDUPTHER

## 2020-01-07 RX ORDER — SIMVASTATIN 40 MG
40 TABLET ORAL
Qty: 90 TABLET | Refills: 3 | Status: SHIPPED | OUTPATIENT
Start: 2020-01-07 | End: 2020-12-22 | Stop reason: SDUPTHER

## 2020-01-07 RX ORDER — LOSARTAN POTASSIUM 50 MG/1
50 TABLET ORAL DAILY
Qty: 90 TABLET | Refills: 3 | Status: SHIPPED | OUTPATIENT
Start: 2020-01-07 | End: 2020-12-22 | Stop reason: SDUPTHER

## 2020-01-07 RX ORDER — OMEPRAZOLE 20 MG/1
20 CAPSULE, DELAYED RELEASE ORAL DAILY
Qty: 90 CAPSULE | Refills: 3 | Status: SHIPPED | OUTPATIENT
Start: 2020-01-07 | End: 2020-12-22 | Stop reason: SDUPTHER

## 2020-01-07 NOTE — TELEPHONE ENCOUNTER
Pt also asked that you write out a hard copy script for his hydrocortisone cream because he wants to get it through [facility]  Please advise

## 2020-01-07 NOTE — TELEPHONE ENCOUNTER
Find out what per cent hydrocortisone cream he is using and then put this note on my desk with that information as well

## 2020-02-14 ENCOUNTER — APPOINTMENT (OUTPATIENT)
Dept: LAB | Facility: CLINIC | Age: 80
End: 2020-02-14
Payer: MEDICARE

## 2020-02-14 DIAGNOSIS — E78.49 OTHER HYPERLIPIDEMIA: Chronic | ICD-10-CM

## 2020-02-14 DIAGNOSIS — I10 ESSENTIAL HYPERTENSION: ICD-10-CM

## 2020-02-14 DIAGNOSIS — R73.03 PRE-DIABETES: Chronic | ICD-10-CM

## 2020-02-14 LAB
ALBUMIN SERPL BCP-MCNC: 3.8 G/DL (ref 3.5–5)
ALP SERPL-CCNC: 49 U/L (ref 46–116)
ALT SERPL W P-5'-P-CCNC: 32 U/L (ref 12–78)
ANION GAP SERPL CALCULATED.3IONS-SCNC: 5 MMOL/L (ref 4–13)
AST SERPL W P-5'-P-CCNC: 27 U/L (ref 5–45)
BILIRUB SERPL-MCNC: 0.55 MG/DL (ref 0.2–1)
BUN SERPL-MCNC: 13 MG/DL (ref 5–25)
CALCIUM SERPL-MCNC: 9.3 MG/DL (ref 8.3–10.1)
CHLORIDE SERPL-SCNC: 106 MMOL/L (ref 100–108)
CHOLEST SERPL-MCNC: 176 MG/DL (ref 50–200)
CO2 SERPL-SCNC: 29 MMOL/L (ref 21–32)
CREAT SERPL-MCNC: 0.91 MG/DL (ref 0.6–1.3)
EST. AVERAGE GLUCOSE BLD GHB EST-MCNC: 117 MG/DL
GFR SERPL CREATININE-BSD FRML MDRD: 81 ML/MIN/1.73SQ M
GLUCOSE P FAST SERPL-MCNC: 108 MG/DL (ref 65–99)
HBA1C MFR BLD: 5.7 %
HDLC SERPL-MCNC: 45 MG/DL
LDLC SERPL DIRECT ASSAY-MCNC: 113 MG/DL (ref 0–100)
POTASSIUM SERPL-SCNC: 4.1 MMOL/L (ref 3.5–5.3)
PROT SERPL-MCNC: 7.3 G/DL (ref 6.4–8.2)
SODIUM SERPL-SCNC: 140 MMOL/L (ref 136–145)
TRIGL SERPL-MCNC: 131 MG/DL

## 2020-02-14 PROCEDURE — 83721 ASSAY OF BLOOD LIPOPROTEIN: CPT

## 2020-02-14 PROCEDURE — 83036 HEMOGLOBIN GLYCOSYLATED A1C: CPT

## 2020-02-14 PROCEDURE — 36415 COLL VENOUS BLD VENIPUNCTURE: CPT

## 2020-02-14 PROCEDURE — 80053 COMPREHEN METABOLIC PANEL: CPT

## 2020-02-14 PROCEDURE — 80061 LIPID PANEL: CPT

## 2020-02-19 ENCOUNTER — OFFICE VISIT (OUTPATIENT)
Dept: INTERNAL MEDICINE CLINIC | Facility: CLINIC | Age: 80
End: 2020-02-19
Payer: MEDICARE

## 2020-02-19 VITALS
HEART RATE: 68 BPM | BODY MASS INDEX: 34.27 KG/M2 | SYSTOLIC BLOOD PRESSURE: 130 MMHG | RESPIRATION RATE: 14 BRPM | WEIGHT: 231.4 LBS | DIASTOLIC BLOOD PRESSURE: 80 MMHG | HEIGHT: 69 IN

## 2020-02-19 DIAGNOSIS — G47.33 OBSTRUCTIVE SLEEP APNEA: Chronic | ICD-10-CM

## 2020-02-19 DIAGNOSIS — E78.49 OTHER HYPERLIPIDEMIA: Primary | Chronic | ICD-10-CM

## 2020-02-19 DIAGNOSIS — I10 BENIGN ESSENTIAL HYPERTENSION: Chronic | ICD-10-CM

## 2020-02-19 DIAGNOSIS — R21 SKIN RASH: ICD-10-CM

## 2020-02-19 DIAGNOSIS — R73.03 PRE-DIABETES: Chronic | ICD-10-CM

## 2020-02-19 PROCEDURE — 3008F BODY MASS INDEX DOCD: CPT | Performed by: INTERNAL MEDICINE

## 2020-02-19 PROCEDURE — 1160F RVW MEDS BY RX/DR IN RCRD: CPT | Performed by: INTERNAL MEDICINE

## 2020-02-19 PROCEDURE — 1036F TOBACCO NON-USER: CPT | Performed by: INTERNAL MEDICINE

## 2020-02-19 PROCEDURE — 3075F SYST BP GE 130 - 139MM HG: CPT | Performed by: INTERNAL MEDICINE

## 2020-02-19 PROCEDURE — 1170F FXNL STATUS ASSESSED: CPT | Performed by: INTERNAL MEDICINE

## 2020-02-19 PROCEDURE — 4040F PNEUMOC VAC/ADMIN/RCVD: CPT | Performed by: INTERNAL MEDICINE

## 2020-02-19 PROCEDURE — 3079F DIAST BP 80-89 MM HG: CPT | Performed by: INTERNAL MEDICINE

## 2020-02-19 PROCEDURE — 99213 OFFICE O/P EST LOW 20 MIN: CPT | Performed by: INTERNAL MEDICINE

## 2020-02-19 PROCEDURE — G0439 PPPS, SUBSEQ VISIT: HCPCS | Performed by: INTERNAL MEDICINE

## 2020-02-19 PROCEDURE — 1125F AMNT PAIN NOTED PAIN PRSNT: CPT | Performed by: INTERNAL MEDICINE

## 2020-02-19 NOTE — PROGRESS NOTES
Assessment and Plan:     Problem List Items Addressed This Visit     None           Preventive health issues were discussed with patient, and age appropriate screening tests were ordered as noted in patient's After Visit Summary  Personalized health advice and appropriate referrals for health education or preventive services given if needed, as noted in patient's After Visit Summary  History of Present Illness:     Patient presents for Medicare Annual Wellness visit    Patient Care Team:  Luann Dowd MD as PCP - Karen Guillaume MD as Cardiologist  Goldie Diesel, MD Scarlette Gilford, MD as Endoscopist     Problem List:     Patient Active Problem List   Diagnosis    Atrial fibrillation ([facility] )    Hyperlipidemia    Benign essential hypertension    Obstructive sleep apnea    Anxiety    Arthritis    Benign prostatic hyperplasia    Diverticulosis    Dysphagia    Gastroesophageal reflux disease    Insomnia    Pre-diabetes    Rosacea    Tinea cruris      Past Medical and Surgical History:     Past Medical History:   Diagnosis Date    Atrial fibrillation ([facility] )     [facility]     LAST ASSESSED [id number]    Cataract     Dysphagia     GERD (gastroesophageal reflux disease)     Hyperlipidemia     Hypertension     Irregular heart beat     afib    Rosacea     Schatzki's ring     Sleep apnea     no cpap     Past Surgical History:   Procedure Laterality Date    BACK SURGERY      CATARACT EXTRACTION      COLONOSCOPY N/A 1/15/2016    Procedure: COLONOSCOPY;  Surgeon: Scarlette Gilford, MD;  [facility]: [facility];   Service:     ESOPHAGOGASTRODUODENOSCOPY      EYE SURGERY      B/L cataract sx ([id number])    MD CIRCUMCISION,OTHR N/A 1/29/2018    Procedure: CIRCUMCISION ADULT;  Surgeon: Marciano Larose MD;  [facility];  Service: Urology    MD ESOPHAGOGASTRODUODENOSCOPY TRANSORAL DIAGNOSTIC N/A 2/15/2017    Procedure: ESOPHAGOGASTRODUODENOSCOPY (EGD) WITH DILATION; Surgeon: Teresa Knight MD;  Location: [facility]; Service: Gastroenterology      Family History:     Family History   Problem Relation Age of Onset    Coronary artery disease Father     Coronary artery disease Family     Hyperlipidemia Family     Hypertension Family     Other Family         SUDDEN CARDIAC DEATH       Social History:        Social History     Socioeconomic History    Marital status: /Civil Union     Spouse name: Not on file    Number of children: Not on file    Years of education: Not on file    Highest education level: Not on file   Occupational History    Not on file   Social Needs    Financial resource strain: Not on file    Food insecurity:     Worry: Not on file     Inability: Not on file    Transportation needs:     Medical: Not on file     Non-medical: Not on file   Tobacco Use    Smoking status: Former Smoker    Smokeless tobacco: Never Used   Substance and Sexual Activity    Alcohol use: Yes     Comment: 3-4 x / week  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[employer]     Drug use: No    Sexual activity: Not on file   Lifestyle    Physical activity:     Days per week: Not on file     Minutes per session: Not on file    Stress: Not on file   Relationships    Social connections:     Talks on phone: Not on file     Gets together: Not on file     Attends [employer] service: Not on file     Active member of club or organization: Not on file     Attends meetings of clubs or organizations: Not on file     Relationship status: Not on file    Intimate partner violence:     Fear of current or ex partner: Not on file     Emotionally abused: Not on file     Physically abused: Not on file     Forced sexual activity: Not on file   Other Topics Concern    Not on file   Social History Narrative    Not on file      Medications and Allergies:     Current Outpatient Medications   Medication Sig Dispense Refill    amitriptyline (ELAVIL) 10 mg tablet Take 5 mg by mouth daily at bedtime          amLODIPine (NORVASC) 5 mg tablet Take 1 tablet (5 mg total) by mouth daily at bedtime 90 tablet 3    apixaban (ELIQUIS) 5 mg Take 1 tablet (5 mg total) by mouth 2 (two) times a day 180 tablet 3    atenolol (TENORMIN) 50 mg tablet Take 1 tablet (50 mg total) by mouth 2 (two) times a day 180 tablet 3    hydrocortisone 2 5 % cream       losartan (COZAAR) 50 mg tablet Take 1 tablet (50 mg total) by mouth daily 90 tablet 3    Multiple Vitamin (MULTIVITAMIN) tablet Take 1 tablet by mouth daily   omeprazole (PriLOSEC) 20 mg delayed release capsule Take 1 capsule (20 mg total) by mouth daily 90 capsule 3    simvastatin (ZOCOR) 40 mg tablet Take 1 tablet (40 mg total) by mouth daily at bedtime 90 tablet 3    Vitamin D, Cholecalciferol, 1000 UNITS CAPS Take by mouth       No current facility-administered medications for this visit  Allergies   Allergen Reactions    Lisinopril Other (See Comments)     Persistent cough      Immunizations:     Immunization History   Administered Date(s) Administered    H1N1, All Formulations 01/29/2010    INFLUENZA 09/30/2014, 10/18/2015, 09/28/2016, 10/14/2018    Influenza Quadrivalent Preservative Free 3 years and older IM 09/30/2014    Influenza Split High Dose Preservative Free IM 10/18/2015, 09/28/2016    Influenza TIV (IM) 10/24/2012    Influenza, high dose seasonal 0 5 mL 09/17/2019    Pneumococcal Conjugate 13-Valent 05/24/2016    Tdap 09/28/2016    Zoster Vaccine Recombinant 06/18/2019, 08/19/2019      Health Maintenance:         Topic Date Due    CRC Screening: Colonoscopy  01/15/2021         Topic Date Due    Pneumococcal Vaccine: 65+ Years (2 of 2 - PPSV23) 05/24/2017      Medicare Health Risk Assessment:     Ht 5' 9" (1 753 m)   Wt 105 kg (231 lb 6 4 oz)   BMI 34 17 kg/m²      Meghanmorales Wesley is here for his Subsequent Wellness visit  Last Medicare Wellness visit information reviewed, patient interviewed and updates made to the record today        Health Risk Assessment:   Patient rates overall health as very good  Patient feels that their physical health rating is slightly better  Eyesight was rated as same  Hearing was rated as slightly worse  Patient feels that their emotional and mental health rating is same  Pain experienced in the last 7 days has been none  Patient states that he has experienced no weight loss or gain in last 6 months  Depression Screening:   PHQ-2 Score: 0      Fall Risk Screening: In the past year, patient has experienced: no history of falling in past year      Home Safety:  Patient does not have trouble with stairs inside or outside of their home  Patient has working smoke alarms and has no working carbon monoxide detector  Home safety hazards include: none  Nutrition:   Current diet is Regular  Medications:   Patient is currently taking over-the-counter supplements  OTC medications include: see medication list  Patient is able to manage medications  Activities of Daily Living (ADLs)/Instrumental Activities of Daily Living (IADLs):   Walk and transfer into and out of bed and chair?: Yes  Dress and groom yourself?: Yes    Bathe or shower yourself?: Yes    Feed yourself?  Yes  Do your laundry/housekeeping?: Yes  Manage your money, pay your bills and track your expenses?: Yes  Make your own meals?: Yes    Do your own shopping?: Yes    Previous Hospitalizations:   Any hospitalizations or ED visits within the last 12 months?: No      Advance Care Planning:   Living will: No    Durable POA for healthcare: No    Advanced directive: No      Cognitive Screening:   Provider or family/friend/caregiver concerned regarding cognition?: No    PREVENTIVE SCREENINGS      Cardiovascular Screening:    General: History Lipid Disorder, Risks and Benefits Discussed and Screening Current      Diabetes Screening:     General: Screening Current and Risks and Benefits Discussed      Colorectal Cancer Screening:     General: Screening Current Prostate Cancer Screening:    General: Risks and Benefits Discussed and Screening Current      Osteoporosis Screening:    General: Screening Not Indicated      Abdominal Aortic Aneurysm (AAA) Screening:    Risk factors include: tobacco use        General: Screening Current      Lung Cancer Screening:     General: Screening Not Indicated      Hepatitis C Screening:    General: Screening Not Indicated      Jenna Villatoro MD

## 2020-02-19 NOTE — PATIENT INSTRUCTIONS

## 2020-02-19 NOTE — PROGRESS NOTES
Assessment/Plan:   1  Health maintenance -given pneumococcal 23 vaccine today  2  Intertrigo of the right groin -SPECT multi organism -was previously applying hydrocortisone cream 2 5%-I recommended he discontinue that and be re-evaluated by dermatology  He says he wants to proceed with finding the dermatologist  3  Sleep apnea -moderate -previously unable to tolerate CPAP  Although last study was over 8 years ago  I recommended repeat evaluation by the sleep physician -likely he will be restudied  4  Pre diabetes -A1c is  Now 5 7 blood sugar remains slightly elevated - prior value of 6 4 for his A1c -continue conservative therapy is trying decrease current dose of carbohydrates  5  Atrial fibrillation -continue with rate control and anticoagulation -had a follow-up visit with Cardiology in stable  6  Hypertension -adequate control on current regimen  Shingrix vaccine  7   Myofascial syndrome-has long-term a use-currently using 5 milligrams before bed-has some DJD the neck as well   Will try discontinue this med and see how he does the a   8   esophageal reflux-tried to switch to an H2 blocker but had breakthrough symptoms and now back on omeprazole-longstanding vitamin B12 level ordered prior to next visit  9 erectile dysfunction-using Levitra 20 milligrams-half tablet as needed  10   Dysphagia-has a history Schatzki's ring in the past   Has not been an issue in months   Said he had a dilatation over the last 2 years  11  Prior episode of syncope-likely on the basis of orthostasis   Triggered by meds plus excess alcohol use   Stable since then without further issues   Hospitalized without significant arrhythmia   Unenhanced CT scan of the brain benign  12  Alcohol use-again recommended he decrease use with his known history of cardiomyopathy  13   Cardiomyopathy-felt to be from atrial fibrillation plus hypertension plus alcohol use   Rule out contribution of sleep apnea   Most recent echo shows EF of 50% without regional wall motion abnormalities, right ventricle mildly dilated, left atrium mildly dilated, estimated peak PA pressure of 40 millimeters   This was performed in April of 2018  14   Rule out CAD-prior nuclear stress test showed reduced motion of the septal wall-this was felt to be a mild defect consistent with artifact  15   Polyps of the colon-subsequent colonoscopy due in the year 2021  15   Previous noted leg pain-lumbar radiculopathy   Doppler negative-has not recurred  16   Hyperlipidemia-continue current dose of statin  17   Tinea corporis-was seen by Dr karlo gold-then seen Dr Shaji Babb other problems as per note of April 2014, August 2012     Medical regimen losartan 50 milligrams daily, atenolol 50 milligrams b i d , amlodipine 5 milligrams, omeprazole 20 milligrams daily  , amitriptyline 10 milligrams-5-10 milligrams at bedtime,, simvastatin 40 milligrams daily, , multivitamin, Eliquis 5 milligrams b i d , prior use of Flexeril 5 milligrams HS, Levitra 20 milligrams-half tablet as needed, hydrocortisone cream 2 5% b i d  P r n  Use previously but recommended he hold this until re-evaluated by dermatology    Appointment several months with prior chemistry profile, cholesterol profile, A1c, urine for microalbumin    BMI Counseling: Body mass index is 34 17 kg/m²  The BMI is above normal  Nutrition recommendations include reducing portion sizes  Exercise recommendations include moderate aerobic physical activity for 150 minutes/week  No problem-specific Assessment & Plan notes found for this encounter  Diagnoses and all orders for this visit:    Other hyperlipidemia  -     CBC and differential; Future  -     Comprehensive metabolic panel; Future  -     Cholesterol, total; Future  -     HDL cholesterol; Future  -     LDL cholesterol, direct; Future  -     Triglycerides; Future  -     HEMOGLOBIN A1C W/ EAG ESTIMATION;  Future    Benign essential hypertension  -     CBC and differential; Future  - Comprehensive metabolic panel; Future  -     Cholesterol, total; Future  -     HDL cholesterol; Future  -     LDL cholesterol, direct; Future  -     Triglycerides; Future  -     HEMOGLOBIN A1C W/ EAG ESTIMATION; Future    Pre-diabetes  -     CBC and differential; Future  -     Comprehensive metabolic panel; Future  -     Cholesterol, total; Future  -     HDL cholesterol; Future  -     LDL cholesterol, direct; Future  -     Triglycerides; Future  -     HEMOGLOBIN A1C W/ EAG ESTIMATION; Future    Obstructive sleep apnea  -     Ambulatory referral to Sleep Medicine; Future    Skin rash          Subjective:      Patient ID: Alden Villegas is a 70 y o  male  He was here for his Medicare wellness wanted to go over couple of issues  We redressed his issue of structure sleep apnea  He has several comorbidities that would benefit from treating it  He has atrial fibrillation, hypertension, cardiomyopathy etcetera  In the past was unable to tolerate CPAP but he had only use the full mask  Sleep study was over 8 years ago  The previous reviewed that he should sleep on his side and not use alcohol for 4 hours before bed  He finally consented to repeat evaluation by the Sleep group today  We made arrangements for that  - she understands a value of treating this     He also asked me to evaluate skin lesion in the groin  He had been seeing a dermatologist who is now retired  He has been applying hydrocortisone cream 2 5% in this appears to represent intertrigo  We reviewed that this not uncommonly can have several contributing factors including bacterial as well as fungal component  We reviewed the long-term steroid use will atrophied the skin  I recommended repeat Dermatology about -she will need culture etcetera  He was agreeable to this and hopefully will proceed    We reviewed the literature regarding pneumococcal vaccine  He was given a dose in reference to that today  That is Pneumovax 23    BP is adequately controlled on current regimen  He was fearful this was exacerbated by his lifestyle  He is avoiding salt and decongestants  He does not use nonsteroidals with his known use of oral anticoagulant      Results for orders placed or performed in visit on 02/14/20  -Comprehensive metabolic panel       Result                      Value             Ref Range           Sodium                      140               136 - 145 mm*       Potassium                   4 1               3 5 - 5 3 mm*       Chloride                    106               100 - 108 mm*       CO2                         29                21 - 32 mmol*       ANION GAP                   5                 4 - 13 mmol/L       BUN                         13                5 - 25 mg/dL        Creatinine                  0 91              0 60 - 1 30 *       Glucose, Fasting            108 (H)           65 - 99 mg/dL       Calcium                     9 3               8 3 - 10 1 m*       AST                         27                5 - 45 U/L          ALT                         32                12 - 78 U/L         Alkaline Phosphatase        49                46 - 116 U/L        Total Protein               7 3               6 4 - 8 2 g/*       Albumin                     3 8               3 5 - 5 0 g/*       Total Bilirubin             0 55              0 20 - 1 00 *       eGFR                        81                ml/min/1 73s*  -HEMOGLOBIN A1C W/ EAG ESTIMATION       Result                      Value             Ref Range           Hemoglobin A1C              5 7 (H)           Normal 3 8-5*       EAG                         117               mg/dl          -Cholesterol, total       Result                      Value             Ref Range           Cholesterol                 176               50 - 200 mg/*  -Triglycerides       Result                      Value             Ref Range           Triglycerides               131 <=150 mg/dL    -HDL cholesterol       Result                      Value             Ref Range           HDL, Direct                 45                >=40 mg/dL     -LDL cholesterol, direct       Result                      Value             Ref Range           LDL Direct                  113 (H)           0 - 100 mg/dl    Insert systemic symptoms      The following portions of the patient's history were reviewed and updated as appropriate: allergies, current medications, past family history, past medical history, past social history, past surgical history and problem list     Review of Systems   Constitutional: Positive for fatigue  Respiratory: Negative  Cardiovascular: Negative  Gastrointestinal: Negative  Endocrine: Negative  Genitourinary: Negative  Musculoskeletal: Negative  Skin: Positive for rash  Neurological: Negative  Hematological: Negative  Psychiatric/Behavioral: Negative  Objective:      Ht 5' 9" (1 753 m)   Wt 105 kg (231 lb 6 4 oz)   BMI 34 17 kg/m²          Physical Exam   Constitutional: He is oriented to person, place, and time  He appears well-developed and well-nourished  No distress  HENT:   Head: Normocephalic and atraumatic  Right Ear: External ear normal    Left Ear: External ear normal    Nose: Nose normal    Mouth/Throat: Oropharynx is clear and moist  No oropharyngeal exudate  Eyes: Pupils are equal, round, and reactive to light  Conjunctivae and EOM are normal  Right eye exhibits no discharge  Left eye exhibits no discharge  No scleral icterus  Neck: No JVD present  No tracheal deviation present  No thyromegaly present  Cardiovascular: Normal rate, normal heart sounds and intact distal pulses  Exam reveals no gallop and no friction rub  No murmur heard  Irregular rhythm  Cardiomegaly to percussion  Adequate upstroke  No increase in P2   Pulmonary/Chest: Effort normal and breath sounds normal  No stridor  No respiratory distress   He has no wheezes  He exhibits no tenderness  Abdominal: Bowel sounds are normal  He exhibits no distension and no mass  There is no tenderness  There is no rebound and no guarding  Genitourinary: Rectum normal, prostate normal and penis normal  Rectal exam shows guaiac negative stool  Musculoskeletal: Normal range of motion  He exhibits no edema, tenderness or deformity  Lymphadenopathy:     He has no cervical adenopathy  Neurological: He is alert and oriented to person, place, and time  He has normal reflexes  He displays normal reflexes  No cranial nerve deficit  He exhibits normal muscle tone  Coordination normal    Skin: Skin is warm and dry  No rash noted  He is not diaphoretic  No erythema  No pallor  Erythema the right groin   Psychiatric: He has a normal mood and affect  His behavior is normal  Judgment and thought content normal    Vitals reviewed

## 2020-02-20 DIAGNOSIS — I48.91 ATRIAL FIBRILLATION, UNSPECIFIED TYPE (HCC): ICD-10-CM

## 2020-02-21 RX ORDER — AMLODIPINE BESYLATE 5 MG/1
5 TABLET ORAL
Qty: 90 TABLET | Refills: 3 | Status: SHIPPED | OUTPATIENT
Start: 2020-02-21 | End: 2020-12-22 | Stop reason: SDUPTHER

## 2020-06-12 ENCOUNTER — TELEPHONE (OUTPATIENT)
Dept: INTERNAL MEDICINE CLINIC | Facility: CLINIC | Age: 80
End: 2020-06-12

## 2020-07-13 ENCOUNTER — APPOINTMENT (OUTPATIENT)
Dept: LAB | Facility: CLINIC | Age: 80
End: 2020-07-13
Payer: MEDICARE

## 2020-07-13 DIAGNOSIS — I10 BENIGN ESSENTIAL HYPERTENSION: Chronic | ICD-10-CM

## 2020-07-13 DIAGNOSIS — R73.03 PRE-DIABETES: Chronic | ICD-10-CM

## 2020-07-13 DIAGNOSIS — E78.49 OTHER HYPERLIPIDEMIA: Chronic | ICD-10-CM

## 2020-07-13 LAB
ALBUMIN SERPL BCP-MCNC: 4 G/DL (ref 3.5–5)
ALP SERPL-CCNC: 49 U/L (ref 46–116)
ALT SERPL W P-5'-P-CCNC: 31 U/L (ref 12–78)
ANION GAP SERPL CALCULATED.3IONS-SCNC: 7 MMOL/L (ref 4–13)
AST SERPL W P-5'-P-CCNC: 28 U/L (ref 5–45)
BASOPHILS # BLD AUTO: 0.03 THOUSANDS/ΜL (ref 0–0.1)
BASOPHILS NFR BLD AUTO: 1 % (ref 0–1)
BILIRUB SERPL-MCNC: 0.68 MG/DL (ref 0.2–1)
BUN SERPL-MCNC: 18 MG/DL (ref 5–25)
CALCIUM SERPL-MCNC: 9.4 MG/DL (ref 8.3–10.1)
CHLORIDE SERPL-SCNC: 100 MMOL/L (ref 100–108)
CHOLEST SERPL-MCNC: 173 MG/DL (ref 50–200)
CO2 SERPL-SCNC: 32 MMOL/L (ref 21–32)
CREAT SERPL-MCNC: 0.93 MG/DL (ref 0.6–1.3)
EOSINOPHIL # BLD AUTO: 0.09 THOUSAND/ΜL (ref 0–0.61)
EOSINOPHIL NFR BLD AUTO: 2 % (ref 0–6)
ERYTHROCYTE [DISTWIDTH] IN BLOOD BY AUTOMATED COUNT: 12.4 % (ref 11.6–15.1)
EST. AVERAGE GLUCOSE BLD GHB EST-MCNC: 117 MG/DL
GFR SERPL CREATININE-BSD FRML MDRD: 78 ML/MIN/1.73SQ M
GLUCOSE P FAST SERPL-MCNC: 116 MG/DL (ref 65–99)
HBA1C MFR BLD: 5.7 %
HCT VFR BLD AUTO: 41.8 % (ref 36.5–49.3)
HDLC SERPL-MCNC: 42 MG/DL
HGB BLD-MCNC: 14.3 G/DL (ref 12–17)
IMM GRANULOCYTES # BLD AUTO: 0.02 THOUSAND/UL (ref 0–0.2)
IMM GRANULOCYTES NFR BLD AUTO: 0 % (ref 0–2)
LDLC SERPL DIRECT ASSAY-MCNC: 116 MG/DL (ref 0–100)
LYMPHOCYTES # BLD AUTO: 1.66 THOUSANDS/ΜL (ref 0.6–4.47)
LYMPHOCYTES NFR BLD AUTO: 33 % (ref 14–44)
MCH RBC QN AUTO: 33.1 PG (ref 26.8–34.3)
MCHC RBC AUTO-ENTMCNC: 34.2 G/DL (ref 31.4–37.4)
MCV RBC AUTO: 97 FL (ref 82–98)
MONOCYTES # BLD AUTO: 0.65 THOUSAND/ΜL (ref 0.17–1.22)
MONOCYTES NFR BLD AUTO: 13 % (ref 4–12)
NEUTROPHILS # BLD AUTO: 2.55 THOUSANDS/ΜL (ref 1.85–7.62)
NEUTS SEG NFR BLD AUTO: 51 % (ref 43–75)
NRBC BLD AUTO-RTO: 0 /100 WBCS
PLATELET # BLD AUTO: 197 THOUSANDS/UL (ref 149–390)
PMV BLD AUTO: 11.2 FL (ref 8.9–12.7)
POTASSIUM SERPL-SCNC: 4.2 MMOL/L (ref 3.5–5.3)
PROT SERPL-MCNC: 7.6 G/DL (ref 6.4–8.2)
RBC # BLD AUTO: 4.32 MILLION/UL (ref 3.88–5.62)
SODIUM SERPL-SCNC: 139 MMOL/L (ref 136–145)
TRIGL SERPL-MCNC: 125 MG/DL
WBC # BLD AUTO: 5 THOUSAND/UL (ref 4.31–10.16)

## 2020-07-13 PROCEDURE — 80053 COMPREHEN METABOLIC PANEL: CPT

## 2020-07-13 PROCEDURE — 83721 ASSAY OF BLOOD LIPOPROTEIN: CPT

## 2020-07-13 PROCEDURE — 83036 HEMOGLOBIN GLYCOSYLATED A1C: CPT

## 2020-07-13 PROCEDURE — 80061 LIPID PANEL: CPT

## 2020-07-13 PROCEDURE — 85025 COMPLETE CBC W/AUTO DIFF WBC: CPT

## 2020-07-13 PROCEDURE — 36415 COLL VENOUS BLD VENIPUNCTURE: CPT

## 2020-07-17 ENCOUNTER — OFFICE VISIT (OUTPATIENT)
Dept: INTERNAL MEDICINE CLINIC | Facility: CLINIC | Age: 80
End: 2020-07-17
Payer: MEDICARE

## 2020-07-17 VITALS
SYSTOLIC BLOOD PRESSURE: 130 MMHG | DIASTOLIC BLOOD PRESSURE: 80 MMHG | WEIGHT: 231 LBS | RESPIRATION RATE: 14 BRPM | BODY MASS INDEX: 34.21 KG/M2 | HEIGHT: 69 IN | HEART RATE: 72 BPM

## 2020-07-17 DIAGNOSIS — N40.0 BENIGN PROSTATIC HYPERPLASIA, UNSPECIFIED WHETHER LOWER URINARY TRACT SYMPTOMS PRESENT: Chronic | ICD-10-CM

## 2020-07-17 DIAGNOSIS — E78.49 OTHER HYPERLIPIDEMIA: Chronic | ICD-10-CM

## 2020-07-17 DIAGNOSIS — Z23 NEED FOR 23-POLYVALENT PNEUMOCOCCAL POLYSACCHARIDE VACCINE: ICD-10-CM

## 2020-07-17 DIAGNOSIS — J31.0 RHINITIS, UNSPECIFIED TYPE: Primary | ICD-10-CM

## 2020-07-17 DIAGNOSIS — R73.03 PRE-DIABETES: Chronic | ICD-10-CM

## 2020-07-17 DIAGNOSIS — I10 BENIGN ESSENTIAL HYPERTENSION: Primary | Chronic | ICD-10-CM

## 2020-07-17 DIAGNOSIS — I48.21 PERMANENT ATRIAL FIBRILLATION (HCC): Chronic | ICD-10-CM

## 2020-07-17 DIAGNOSIS — G47.33 OBSTRUCTIVE SLEEP APNEA: Chronic | ICD-10-CM

## 2020-07-17 PROCEDURE — 90732 PPSV23 VACC 2 YRS+ SUBQ/IM: CPT

## 2020-07-17 PROCEDURE — 1036F TOBACCO NON-USER: CPT | Performed by: INTERNAL MEDICINE

## 2020-07-17 PROCEDURE — 3008F BODY MASS INDEX DOCD: CPT | Performed by: INTERNAL MEDICINE

## 2020-07-17 PROCEDURE — 3075F SYST BP GE 130 - 139MM HG: CPT | Performed by: INTERNAL MEDICINE

## 2020-07-17 PROCEDURE — G0009 ADMIN PNEUMOCOCCAL VACCINE: HCPCS

## 2020-07-17 PROCEDURE — 99214 OFFICE O/P EST MOD 30 MIN: CPT | Performed by: INTERNAL MEDICINE

## 2020-07-17 PROCEDURE — 1160F RVW MEDS BY RX/DR IN RCRD: CPT | Performed by: INTERNAL MEDICINE

## 2020-07-17 PROCEDURE — 4040F PNEUMOC VAC/ADMIN/RCVD: CPT | Performed by: INTERNAL MEDICINE

## 2020-07-17 PROCEDURE — 3079F DIAST BP 80-89 MM HG: CPT | Performed by: INTERNAL MEDICINE

## 2020-07-17 RX ORDER — FLUTICASONE PROPIONATE 50 MCG
SPRAY, SUSPENSION (ML) NASAL
Qty: 16 G | Refills: 3 | Status: SHIPPED | OUTPATIENT
Start: 2020-07-17 | End: 2020-12-22 | Stop reason: SDUPTHER

## 2020-07-17 NOTE — PROGRESS NOTES
Assessment/Plan:   1  Health maintenance -given pneumococcal 23 vaccine today   2  Intertrigo of the right groin -SPECT multi organism -was previously applying hydrocortisone cream 2 5%-I recommended he discontinue that and be re-evaluated by dermatology  He says he wants to proceed with finding the dermatologist  3  Sleep apnea -moderate -previously unable to tolerate CPAP  Although last study was over 8 years ago  I recommended repeat evaluation by the sleep physician  And we had made arrangements for that-however he has sought out a dentist wants to try an oral device -she is having testing done via their office which I approved  4  Pre diabetes -A1c is  Now 5 7 blood sugar remains slightly elevated - prior value of 6 4 for his A1c -continue conservative therapy is trying decrease current dose of carbohydrates  5  Atrial fibrillation -continue with rate control and anticoagulation -had a follow-up visit with Cardiology in stable  6  Hypertension -adequate control on current regimen  Shingrix vaccine  7   Myofascial syndrome-has long-term a use-currently using 5 milligrams before bed-has some DJD the neck as well   Will try discontinue this med and see how he does the a   8   esophageal reflux-tried to switch to an H2 blocker but had breakthrough symptoms and now back on omeprazole-longstanding vitamin B12 level ordered prior to next visit  9 erectile dysfunction-using Levitra 20 milligrams-half tablet as needed  10   Dysphagia-has a history Schatzki's ring in the past   Has not been an issue in months   Said he had a dilatation over the last 2 years  11  Prior episode of syncope-likely on the basis of orthostasis   Triggered by meds plus excess alcohol use   Stable since then without further issues   Hospitalized without significant arrhythmia   Unenhanced CT scan of the brain benign  12  Alcohol use-again recommended he decrease use with his known history of cardiomyopathy  13   Cardiomyopathy-felt to be from atrial fibrillation plus hypertension plus alcohol use   Rule out contribution of sleep apnea   Most recent echo shows EF of 50% without regional wall motion abnormalities, right ventricle mildly dilated, left atrium mildly dilated, estimated peak PA pressure of 40 millimeters   This was performed in April of 2018  14   Rule out CAD-prior nuclear stress test showed reduced motion of the septal wall-this was felt to be a mild defect consistent with artifact  15   Polyps of the colon-subsequent colonoscopy due in the year 2021  15   Previous noted leg pain-lumbar radiculopathy   Doppler negative-has not recurred  16   Hyperlipidemia-continue current dose of statin  17   Tinea corporis-was seen by Dr karlo gold-then seen Dr Jadiel England  18  Nasal congestion -tends to be seasonal -noted some improvement with Flonase and Claritin will use those during the season and lesser dose of Flonase during other seasons        All other problems as per note of April 2014, August 2012     Medical regimen losartan 50 milligrams daily, atenolol 50 milligrams b i d , amlodipine 5 milligrams, omeprazole 20 milligrams daily  , amitriptyline 10 milligrams-5-10 milligrams at bedtime,, simvastatin 40 milligrams daily, , multivitamin, Eliquis 5 milligrams b i d , prior use of Flexeril 5 milligrams HS, Levitra 20 milligrams-half tablet as needed, hydrocortisone cream 2 5% b i d  P r n   Use previously but recommended he hold this until re-evaluated by dermatology fluticasone nasal spray as 2 squirts in each nostril once daily during peak season with decreasing dose during other times    Scheduled over the next several months with prior chemistry profile cholesterol profile hemoglobin A1c     Addendum- home sleep study read as severe obstructive sleep apnea with indications of central sleep apnea -Cheyne-Dunn respiration- patient will be brought into the office to review -she will clearly need referral to sleep specialist        No problem-specific Assessment & Plan notes found for this encounter  Diagnoses and all orders for this visit:    Benign essential hypertension  -     Comprehensive metabolic panel; Future  -     Cholesterol, total; Future  -     HDL cholesterol; Future  -     LDL cholesterol, direct; Future  -     Triglycerides; Future  -     PSA Total, Diagnostic; Future  -     HEMOGLOBIN A1C W/ EAG ESTIMATION; Future    Benign prostatic hyperplasia, unspecified whether lower urinary tract symptoms present  -     Comprehensive metabolic panel; Future  -     Cholesterol, total; Future  -     HDL cholesterol; Future  -     LDL cholesterol, direct; Future  -     Triglycerides; Future  -     PSA Total, Diagnostic; Future  -     HEMOGLOBIN A1C W/ EAG ESTIMATION; Future    Other hyperlipidemia  -     Comprehensive metabolic panel; Future  -     Cholesterol, total; Future  -     HDL cholesterol; Future  -     LDL cholesterol, direct; Future  -     Triglycerides; Future  -     PSA Total, Diagnostic; Future  -     HEMOGLOBIN A1C W/ EAG ESTIMATION; Future    Pre-diabetes  -     Comprehensive metabolic panel; Future  -     Cholesterol, total; Future  -     HDL cholesterol; Future  -     LDL cholesterol, direct; Future  -     Triglycerides; Future  -     PSA Total, Diagnostic; Future  -     HEMOGLOBIN A1C W/ EAG ESTIMATION; Future    Need for 23-polyvalent pneumococcal polysaccharide vaccine  -     PNEUMOCOCCAL POLYSACCHARIDE VACCINE 23-VALENT =>1YO SQ IM    Obstructive sleep apnea    Permanent atrial fibrillation (HCC)          Subjective:      Patient ID: Rao Charles is a 68 y o  male  We reviewed several issues today  1 of his family members works at a dental office he wants to trying consider an oral device to treat his sleep apnea  He has a known history of moderate sleep apnea was completely intolerant of CPAP  I referred him back to the sleep clinic but he prefers to try this option    He is aware of the importance of treating his sleep apnea with his underlying cardiopulmonary disease  He is aware the effect of alcohol on his sleep pattern  It is recommended he not have any alcohol for 4 hours before bed and that he sleep on his side  He is aware these recommendations  He has known hypertension  BP adequately controlled on current regimen  Avoiding salt and decongestants  He does not use frequent nonsteroidals  He knows these are contraindicated as with his use of anticoagulants  Preferred BP under 130/80 we are at that on his current regimen    Paperwork filled out in reference to the dental request as noted above        he has been under some stress  His wife recently had total joint replacement and he has been assisting her in her recovery  We talked about this in detail  This patient denies any systemic symptoms  Specifically there has been no evidence of fever, night sweats, significant weight loss or significant decrease in appetite      Results for orders placed or performed in visit on 07/13/20  -CBC and differential       Result                      Value             Ref Range           WBC                         5 00              4 31 - 10 16*       RBC                         4 32              3 88 - 5 62 *       Hemoglobin                  14 3              12 0 - 17 0 *       Hematocrit                  41 8              36 5 - 49 3 %       MCV                         97                82 - 98 fL          MCH                         33 1              26 8 - 34 3 *       MCHC                        34 2              31 4 - 37 4 *       RDW                         12 4              11 6 - 15 1 %       MPV                         11 2              8 9 - 12 7 fL       Platelets                   197               149 - 390 Th*       nRBC                        0                 /100 WBCs           Neutrophils Relative        51                43 - 75 %           Immat GRANS %               0 0 - 2 %             Lymphocytes Relative        33                14 - 44 %           Monocytes Relative          13 (H)            4 - 12 %            Eosinophils Relative        2                 0 - 6 %             Basophils Relative          1                 0 - 1 %             Neutrophils Absolute        2 55              1 85 - 7 62 *       Immature Grans Absolute     0 02              0 00 - 0 20 *       Lymphocytes Absolute        1 66              0 60 - 4 47 *       Monocytes Absolute          0 65              0 17 - 1 22 *       Eosinophils Absolute        0 09              0 00 - 0 61 *       Basophils Absolute          0 03              0 00 - 0 10 *  -Comprehensive metabolic panel       Result                      Value             Ref Range           Sodium                      139               136 - 145 mm*       Potassium                   4 2               3 5 - 5 3 mm*       Chloride                    100               100 - 108 mm*       CO2                         32                21 - 32 mmol*       ANION GAP                   7                 4 - 13 mmol/L       BUN                         18                5 - 25 mg/dL        Creatinine                  0 93              0 60 - 1 30 *       Glucose, Fasting            116 (H)           65 - 99 mg/dL       Calcium                     9 4               8 3 - 10 1 m*       AST                         28                5 - 45 U/L          ALT                         31                12 - 78 U/L         Alkaline Phosphatase        49                46 - 116 U/L        Total Protein               7 6               6 4 - 8 2 g/*       Albumin                     4 0               3 5 - 5 0 g/*       Total Bilirubin             0 68              0 20 - 1 00 *       eGFR                        78                ml/min/1 73s*  -Cholesterol, total       Result                      Value             Ref Range           Cholesterol 173               50 - 200 mg/*  -HDL cholesterol       Result                      Value             Ref Range           HDL, Direct                 42                >=40 mg/dL     -LDL cholesterol, direct       Result                      Value             Ref Range           LDL Direct                  116 (H)           0 - 100 mg/dl  -Triglycerides       Result                      Value             Ref Range           Triglycerides               125               <=150 mg/dL    -HEMOGLOBIN A1C W/ EAG ESTIMATION       Result                      Value             Ref Range           Hemoglobin A1C              5 7 (H)           Normal 3 8-5*       EAG                         117               mg/dl            He has known prediabetes  He says his dietary habits have been very poor  He is having alcohol every night  He told me that "he has been a very bad boy" he tends to be a heavy eater after his evening meal   He is having 2 glasses of wine plus per day and I recommended he try and decrease that  He has not had any the previous described dysphagia  In the past he has Schatzki's ring and required dilatation  He remains on a statin  He understands the difference between status associated side effects and arthralgias from degenerative arthritis            The following portions of the patient's history were reviewed and updated as appropriate: allergies, current medications, past family history, past medical history, past social history, past surgical history and problem list     Review of Systems   Constitutional: Negative  Respiratory: Negative  Cardiovascular: Negative  Gastrointestinal: Negative  Endocrine: Negative  Genitourinary: Negative  Nocturia x1   Musculoskeletal: Negative  Neurological: Negative  Hematological: Negative  Psychiatric/Behavioral: Negative            Objective:      Ht 5' 9" (1 753 m)   Wt 105 kg (231 lb)   BMI 34 11 kg/m² Physical Exam   Constitutional: He is oriented to person, place, and time  He appears well-developed and well-nourished  No distress  HENT:   Head: Normocephalic and atraumatic  Right Ear: External ear normal    Left Ear: External ear normal    Nose: Nose normal    Mouth/Throat: Oropharynx is clear and moist  No oropharyngeal exudate  Eyes: Pupils are equal, round, and reactive to light  Conjunctivae and EOM are normal  Right eye exhibits no discharge  Left eye exhibits no discharge  No scleral icterus  Neck: No JVD present  No tracheal deviation present  No thyromegaly present  Cardiovascular: Normal rate and intact distal pulses  Exam reveals no gallop and no friction rub  No murmur heard  Cardiomegaly to percussion  Regular rhythm  Adequate upstroke  No increase in P2    Pulmonary/Chest: Effort normal and breath sounds normal  No stridor  No respiratory distress  He has no wheezes  He exhibits no tenderness  Abdominal: Soft  Bowel sounds are normal  He exhibits no distension and no mass  There is no tenderness  There is no rebound and no guarding  Genitourinary: Rectal exam shows guaiac negative stool  Musculoskeletal: Normal range of motion  He exhibits no edema, tenderness or deformity  Slight crepitance on range of motion of the knees   Lymphadenopathy:     He has no cervical adenopathy  Neurological: He is alert and oriented to person, place, and time  He has normal reflexes  He displays normal reflexes  No cranial nerve deficit  He exhibits normal muscle tone  Coordination normal    Skin: Skin is warm and dry  No rash noted  He is not diaphoretic  No erythema  No pallor  Benign keratoses   Psychiatric: He has a normal mood and affect  His behavior is normal  Judgment and thought content normal    Vitals reviewed

## 2020-09-18 DIAGNOSIS — G47.00 INSOMNIA, UNSPECIFIED TYPE: Primary | ICD-10-CM

## 2020-09-18 RX ORDER — AMITRIPTYLINE HYDROCHLORIDE 10 MG/1
TABLET, FILM COATED ORAL
Qty: 90 TABLET | Refills: 0 | Status: SHIPPED | OUTPATIENT
Start: 2020-09-18 | End: 2021-01-13 | Stop reason: SDUPTHER

## 2020-09-25 ENCOUNTER — TELEPHONE (OUTPATIENT)
Dept: INTERNAL MEDICINE CLINIC | Facility: CLINIC | Age: 80
End: 2020-09-25

## 2020-09-25 NOTE — TELEPHONE ENCOUNTER
1st I should not prescribed that medicine without seeing him and checking his blood pressure both sitting and standing -2nd it is very likely that his increasing nocturia is related to his severe sleep apnea and that treating his sleep apnea will get rid of that  a

## 2020-09-25 NOTE — TELEPHONE ENCOUNTER
Pt called wants to know if you would prescribe him a medication to help reduce urination at night before he comes in to see you  He is urinating 3-4 times in a night and he already had a sleep study in which he says the results didn't come out so well  He wants to know if you can prescribe him the medication for today until 10/05 to [address] that is listed in his chart  Please advise   Thanks

## 2020-10-05 ENCOUNTER — OFFICE VISIT (OUTPATIENT)
Dept: INTERNAL MEDICINE CLINIC | Facility: CLINIC | Age: 80
End: 2020-10-05
Payer: MEDICARE

## 2020-10-05 DIAGNOSIS — Z23 ENCOUNTER FOR IMMUNIZATION: ICD-10-CM

## 2020-10-05 DIAGNOSIS — R35.1 NOCTURIA: ICD-10-CM

## 2020-10-05 DIAGNOSIS — I48.21 PERMANENT ATRIAL FIBRILLATION (HCC): Chronic | ICD-10-CM

## 2020-10-05 DIAGNOSIS — G47.30 OBSERVED SLEEP APNEA: Primary | ICD-10-CM

## 2020-10-05 DIAGNOSIS — I10 BENIGN ESSENTIAL HYPERTENSION: Chronic | ICD-10-CM

## 2020-10-05 DIAGNOSIS — G47.33 OBSTRUCTIVE SLEEP APNEA: Chronic | ICD-10-CM

## 2020-10-05 PROCEDURE — G0008 ADMIN INFLUENZA VIRUS VAC: HCPCS

## 2020-10-05 PROCEDURE — 99215 OFFICE O/P EST HI 40 MIN: CPT | Performed by: INTERNAL MEDICINE

## 2020-10-05 PROCEDURE — 90662 IIV NO PRSV INCREASED AG IM: CPT

## 2020-10-06 ENCOUNTER — OFFICE VISIT (OUTPATIENT)
Dept: SLEEP CENTER | Facility: CLINIC | Age: 80
End: 2020-10-06
Payer: MEDICARE

## 2020-10-06 VITALS
SYSTOLIC BLOOD PRESSURE: 130 MMHG | HEART RATE: 78 BPM | RESPIRATION RATE: 14 BRPM | TEMPERATURE: 98 F | WEIGHT: 237 LBS | DIASTOLIC BLOOD PRESSURE: 80 MMHG | HEIGHT: 69 IN | BODY MASS INDEX: 35.1 KG/M2

## 2020-10-06 VITALS
HEIGHT: 69 IN | DIASTOLIC BLOOD PRESSURE: 88 MMHG | TEMPERATURE: 97.5 F | SYSTOLIC BLOOD PRESSURE: 148 MMHG | WEIGHT: 240 LBS | BODY MASS INDEX: 35.55 KG/M2

## 2020-10-06 DIAGNOSIS — G47.30 OBSERVED SLEEP APNEA: ICD-10-CM

## 2020-10-06 DIAGNOSIS — G47.33 OSA (OBSTRUCTIVE SLEEP APNEA): Primary | ICD-10-CM

## 2020-10-06 PROCEDURE — 99204 OFFICE O/P NEW MOD 45 MIN: CPT | Performed by: INTERNAL MEDICINE

## 2020-11-24 ENCOUNTER — OFFICE VISIT (OUTPATIENT)
Dept: SLEEP CENTER | Facility: CLINIC | Age: 80
End: 2020-11-24
Payer: MEDICARE

## 2020-11-24 VITALS
WEIGHT: 242 LBS | SYSTOLIC BLOOD PRESSURE: 110 MMHG | HEIGHT: 69 IN | HEART RATE: 73 BPM | DIASTOLIC BLOOD PRESSURE: 70 MMHG | BODY MASS INDEX: 35.84 KG/M2

## 2020-11-24 DIAGNOSIS — G47.33 OSA (OBSTRUCTIVE SLEEP APNEA): Primary | ICD-10-CM

## 2020-11-24 PROCEDURE — 99214 OFFICE O/P EST MOD 30 MIN: CPT | Performed by: INTERNAL MEDICINE

## 2020-11-25 ENCOUNTER — TELEPHONE (OUTPATIENT)
Dept: SLEEP CENTER | Facility: CLINIC | Age: 80
End: 2020-11-25

## 2020-12-16 ENCOUNTER — TRANSCRIBE ORDERS (OUTPATIENT)
Dept: LAB | Facility: CLINIC | Age: 80
End: 2020-12-16

## 2020-12-16 ENCOUNTER — LAB (OUTPATIENT)
Dept: LAB | Facility: CLINIC | Age: 80
End: 2020-12-16
Payer: MEDICARE

## 2020-12-16 DIAGNOSIS — N40.0 BENIGN PROSTATIC HYPERPLASIA, UNSPECIFIED WHETHER LOWER URINARY TRACT SYMPTOMS PRESENT: Chronic | ICD-10-CM

## 2020-12-16 DIAGNOSIS — E78.49 OTHER HYPERLIPIDEMIA: Chronic | ICD-10-CM

## 2020-12-16 DIAGNOSIS — I10 BENIGN ESSENTIAL HYPERTENSION: Chronic | ICD-10-CM

## 2020-12-16 DIAGNOSIS — R73.03 PRE-DIABETES: Chronic | ICD-10-CM

## 2020-12-16 LAB
ALBUMIN SERPL BCP-MCNC: 3.9 G/DL (ref 3.5–5)
ALP SERPL-CCNC: 51 U/L (ref 46–116)
ALT SERPL W P-5'-P-CCNC: 37 U/L (ref 12–78)
ANION GAP SERPL CALCULATED.3IONS-SCNC: 9 MMOL/L (ref 4–13)
AST SERPL W P-5'-P-CCNC: 35 U/L (ref 5–45)
BILIRUB SERPL-MCNC: 0.62 MG/DL (ref 0.2–1)
BUN SERPL-MCNC: 21 MG/DL (ref 5–25)
CALCIUM SERPL-MCNC: 9.5 MG/DL (ref 8.3–10.1)
CHLORIDE SERPL-SCNC: 102 MMOL/L (ref 100–108)
CHOLEST SERPL-MCNC: 163 MG/DL (ref 50–200)
CO2 SERPL-SCNC: 29 MMOL/L (ref 21–32)
CREAT SERPL-MCNC: 1.07 MG/DL (ref 0.6–1.3)
EST. AVERAGE GLUCOSE BLD GHB EST-MCNC: 111 MG/DL
GFR SERPL CREATININE-BSD FRML MDRD: 66 ML/MIN/1.73SQ M
GLUCOSE P FAST SERPL-MCNC: 114 MG/DL (ref 65–99)
HBA1C MFR BLD: 5.5 %
HDLC SERPL-MCNC: 42 MG/DL
LDLC SERPL DIRECT ASSAY-MCNC: 103 MG/DL (ref 0–100)
POTASSIUM SERPL-SCNC: 5 MMOL/L (ref 3.5–5.3)
PROT SERPL-MCNC: 7.3 G/DL (ref 6.4–8.2)
PSA SERPL-MCNC: 2.3 NG/ML (ref 0–4)
SODIUM SERPL-SCNC: 140 MMOL/L (ref 136–145)
TRIGL SERPL-MCNC: 130 MG/DL

## 2020-12-16 PROCEDURE — 80053 COMPREHEN METABOLIC PANEL: CPT

## 2020-12-16 PROCEDURE — 36415 COLL VENOUS BLD VENIPUNCTURE: CPT

## 2020-12-16 PROCEDURE — 83721 ASSAY OF BLOOD LIPOPROTEIN: CPT

## 2020-12-16 PROCEDURE — 83036 HEMOGLOBIN GLYCOSYLATED A1C: CPT

## 2020-12-16 PROCEDURE — 80061 LIPID PANEL: CPT

## 2020-12-16 PROCEDURE — 84153 ASSAY OF PSA TOTAL: CPT

## 2020-12-21 ENCOUNTER — OFFICE VISIT (OUTPATIENT)
Dept: INTERNAL MEDICINE CLINIC | Facility: CLINIC | Age: 80
End: 2020-12-21
Payer: MEDICARE

## 2020-12-21 VITALS
RESPIRATION RATE: 14 BRPM | HEIGHT: 69 IN | TEMPERATURE: 98 F | HEART RATE: 78 BPM | DIASTOLIC BLOOD PRESSURE: 80 MMHG | WEIGHT: 238.4 LBS | BODY MASS INDEX: 35.31 KG/M2 | SYSTOLIC BLOOD PRESSURE: 136 MMHG

## 2020-12-21 DIAGNOSIS — G47.33 OBSTRUCTIVE SLEEP APNEA: Chronic | ICD-10-CM

## 2020-12-21 DIAGNOSIS — K21.9 GASTROESOPHAGEAL REFLUX DISEASE, UNSPECIFIED WHETHER ESOPHAGITIS PRESENT: Chronic | ICD-10-CM

## 2020-12-21 DIAGNOSIS — E78.49 OTHER HYPERLIPIDEMIA: Chronic | ICD-10-CM

## 2020-12-21 DIAGNOSIS — I10 BENIGN ESSENTIAL HYPERTENSION: Chronic | ICD-10-CM

## 2020-12-21 DIAGNOSIS — I48.21 PERMANENT ATRIAL FIBRILLATION (HCC): Chronic | ICD-10-CM

## 2020-12-21 DIAGNOSIS — R73.03 PRE-DIABETES: Primary | Chronic | ICD-10-CM

## 2020-12-21 PROCEDURE — 99214 OFFICE O/P EST MOD 30 MIN: CPT | Performed by: INTERNAL MEDICINE

## 2020-12-22 DIAGNOSIS — I10 ESSENTIAL HYPERTENSION: ICD-10-CM

## 2020-12-22 DIAGNOSIS — K21.9 GASTROESOPHAGEAL REFLUX DISEASE: ICD-10-CM

## 2020-12-22 DIAGNOSIS — R21 SKIN RASH: Primary | ICD-10-CM

## 2020-12-22 DIAGNOSIS — I48.91 ATRIAL FIBRILLATION, UNSPECIFIED TYPE (HCC): ICD-10-CM

## 2020-12-22 DIAGNOSIS — E78.2 MIXED HYPERLIPIDEMIA: ICD-10-CM

## 2020-12-22 DIAGNOSIS — J31.0 RHINITIS, UNSPECIFIED TYPE: ICD-10-CM

## 2020-12-22 RX ORDER — SIMVASTATIN 40 MG
40 TABLET ORAL
Qty: 90 TABLET | Refills: 3 | Status: SHIPPED | OUTPATIENT
Start: 2020-12-22 | End: 2021-01-13 | Stop reason: SDUPTHER

## 2020-12-22 RX ORDER — FLUTICASONE PROPIONATE 50 MCG
SPRAY, SUSPENSION (ML) NASAL
Qty: 16 G | Refills: 3 | Status: SHIPPED | OUTPATIENT
Start: 2020-12-22 | End: 2021-01-13 | Stop reason: SDUPTHER

## 2020-12-22 RX ORDER — ATENOLOL 50 MG/1
50 TABLET ORAL 2 TIMES DAILY
Qty: 180 TABLET | Refills: 3 | Status: SHIPPED | OUTPATIENT
Start: 2020-12-22 | End: 2021-01-13 | Stop reason: SDUPTHER

## 2020-12-22 RX ORDER — LOSARTAN POTASSIUM 50 MG/1
50 TABLET ORAL DAILY
Qty: 90 TABLET | Refills: 3 | Status: SHIPPED | OUTPATIENT
Start: 2020-12-22 | End: 2021-01-13 | Stop reason: SDUPTHER

## 2020-12-22 RX ORDER — OMEPRAZOLE 20 MG/1
20 CAPSULE, DELAYED RELEASE ORAL DAILY
Qty: 90 CAPSULE | Refills: 3 | Status: SHIPPED | OUTPATIENT
Start: 2020-12-22 | End: 2021-01-13 | Stop reason: SDUPTHER

## 2020-12-22 RX ORDER — AMLODIPINE BESYLATE 5 MG/1
5 TABLET ORAL
Qty: 90 TABLET | Refills: 3 | Status: SHIPPED | OUTPATIENT
Start: 2020-12-22 | End: 2021-01-13 | Stop reason: SDUPTHER

## 2021-01-08 DIAGNOSIS — L98.9 SKIN LESION: Primary | ICD-10-CM

## 2021-01-08 NOTE — PROGRESS NOTES
PT'S WIFE ASKED THAT A REFERRAL BE PUT IN SO THAT WE CAN SCHEDULE HIM AN APPT FOR SKIN LESIONS ON HIS INNER THIGHS

## 2021-01-12 ENCOUNTER — IMMUNIZATIONS (OUTPATIENT)
Dept: FAMILY MEDICINE CLINIC | Facility: HOSPITAL | Age: 81
End: 2021-01-12

## 2021-01-12 DIAGNOSIS — Z23 ENCOUNTER FOR IMMUNIZATION: ICD-10-CM

## 2021-01-12 PROCEDURE — 91300 SARS-COV-2 / COVID-19 MRNA VACCINE (PFIZER-BIONTECH) 30 MCG: CPT

## 2021-01-12 PROCEDURE — 0001A SARS-COV-2 / COVID-19 MRNA VACCINE (PFIZER-BIONTECH) 30 MCG: CPT

## 2021-01-13 DIAGNOSIS — J31.0 RHINITIS, UNSPECIFIED TYPE: ICD-10-CM

## 2021-01-13 DIAGNOSIS — R21 SKIN RASH: ICD-10-CM

## 2021-01-13 DIAGNOSIS — I48.91 ATRIAL FIBRILLATION, UNSPECIFIED TYPE (HCC): ICD-10-CM

## 2021-01-13 DIAGNOSIS — G47.00 INSOMNIA, UNSPECIFIED TYPE: ICD-10-CM

## 2021-01-13 DIAGNOSIS — K21.9 GASTROESOPHAGEAL REFLUX DISEASE: ICD-10-CM

## 2021-01-13 DIAGNOSIS — I10 ESSENTIAL HYPERTENSION: ICD-10-CM

## 2021-01-13 DIAGNOSIS — E78.2 MIXED HYPERLIPIDEMIA: ICD-10-CM

## 2021-01-13 RX ORDER — FLUTICASONE PROPIONATE 50 MCG
SPRAY, SUSPENSION (ML) NASAL
Qty: 16 G | Refills: 3 | Status: SHIPPED | OUTPATIENT
Start: 2021-01-13

## 2021-01-13 RX ORDER — OMEPRAZOLE 20 MG/1
20 CAPSULE, DELAYED RELEASE ORAL DAILY
Qty: 90 CAPSULE | Refills: 3 | Status: SHIPPED | OUTPATIENT
Start: 2021-01-13 | End: 2022-02-01 | Stop reason: SDUPTHER

## 2021-01-13 RX ORDER — LOSARTAN POTASSIUM 50 MG/1
50 TABLET ORAL DAILY
Qty: 90 TABLET | Refills: 3 | Status: SHIPPED | OUTPATIENT
Start: 2021-01-13 | End: 2022-02-01 | Stop reason: SDUPTHER

## 2021-01-13 RX ORDER — ATENOLOL 50 MG/1
50 TABLET ORAL 2 TIMES DAILY
Qty: 180 TABLET | Refills: 3 | Status: SHIPPED | OUTPATIENT
Start: 2021-01-13 | End: 2022-02-01 | Stop reason: SDUPTHER

## 2021-01-13 RX ORDER — SIMVASTATIN 40 MG
40 TABLET ORAL
Qty: 90 TABLET | Refills: 3 | Status: SHIPPED | OUTPATIENT
Start: 2021-01-13 | End: 2022-02-01 | Stop reason: SDUPTHER

## 2021-01-13 RX ORDER — AMLODIPINE BESYLATE 5 MG/1
5 TABLET ORAL
Qty: 90 TABLET | Refills: 3 | Status: SHIPPED | OUTPATIENT
Start: 2021-01-13 | End: 2022-02-01 | Stop reason: SDUPTHER

## 2021-01-13 RX ORDER — AMITRIPTYLINE HYDROCHLORIDE 10 MG/1
10 TABLET, FILM COATED ORAL DAILY
Qty: 90 TABLET | Refills: 3 | Status: SHIPPED | OUTPATIENT
Start: 2021-01-13 | End: 2022-02-22 | Stop reason: SDUPTHER

## 2021-01-28 ENCOUNTER — OFFICE VISIT (OUTPATIENT)
Dept: DERMATOLOGY | Facility: CLINIC | Age: 81
End: 2021-01-28
Payer: MEDICARE

## 2021-01-28 VITALS — HEIGHT: 69 IN | WEIGHT: 242 LBS | TEMPERATURE: 97.3 F | BODY MASS INDEX: 35.84 KG/M2

## 2021-01-28 DIAGNOSIS — D23.9 DERMATOFIBROMA: ICD-10-CM

## 2021-01-28 DIAGNOSIS — L57.0 KERATOSIS, ACTINIC: ICD-10-CM

## 2021-01-28 DIAGNOSIS — I87.303 STASIS EDEMA OF BOTH LOWER EXTREMITIES: ICD-10-CM

## 2021-01-28 DIAGNOSIS — L30.4 INTERTRIGO: Primary | ICD-10-CM

## 2021-01-28 PROCEDURE — 17000 DESTRUCT PREMALG LESION: CPT | Performed by: DERMATOLOGY

## 2021-01-28 PROCEDURE — 17003 DESTRUCT PREMALG LES 2-14: CPT | Performed by: DERMATOLOGY

## 2021-01-28 PROCEDURE — 99204 OFFICE O/P NEW MOD 45 MIN: CPT | Performed by: DERMATOLOGY

## 2021-01-28 NOTE — PATIENT INSTRUCTIONS
ACTINIC KERATOSIS      Assessment and Plan:  Based on a thorough discussion of this condition and the management approach to it (including a comprehensive discussion of the known risks, side effects and potential benefits of treatment), the patient (family) agrees to implement the following specific plan:     Cryotherapy treatment    Consent signed    Apply Vaseline     Actinic keratoses are very common on sites repeatedly exposed to the sun, especially the backs of the hands and the face, most often affecting the ears, nose, cheeks, upper lip, vermilion of the lower lip, temples, forehead and balding scalp  In severely chronically sun-damaged individuals, they may also be found on the upper trunk, upper and lower limbs, and dorsum of feet  We discussed the theoretical premalignant (pre-cancerous) nature and etiology of these growths  We discussed the prevailing notion that actinic keratoses are a reflection of abnormal skin cell development due to DNA damage by short wavelength UVB  They are more likely to appear if the immune function is poor, due to aging, recent sun exposure, predisposing disease or certain drugs  We discussed that the main concern is that actinic keratoses may predispose to squamous cell carcinoma  It is rare for a solitary actinic keratosis to evolve to squamous cell carcinoma (SCC), but the risk of SCC occurring at some stage in a patient with more than 10 actinic keratoses is thought to be about 10 to 15%  A tender, thickened, ulcerated or enlarging actinic keratosis is suspicious of SCC  Actinic keratoses may be prevented by strict sun protection  If already present, keratoses may improve with a very high sun protection factor (50+) broad-spectrum sunscreen applied at least daily to affected areas, year-round    We recommend that UPF-rated clothing and hats and sunglasses be worn whenever possible and that a sunscreen-moisturizer combination product such as Neutrogena Daily Defense be applied at least three times a day  We performed a thorough discussion of treatment options and specific risk/benefits/alternatives including but not limited to medical field treatment with medications such as the following:     Topical field area medications such as 5-fluorouracil or Aldara (specifically, the trouble with long-term compliance, blistering and local skin reaction versus the convenience of at-home therapy and that field therapy gets what is not yet seen)   Cryotherapy (specifically, local pain, scarring, dyspigmentation, blistering, possible superinfection, and treats only what we see versus directed treatment today)   Photodynamic therapy (specifically, local pain, scarring, dyspigmentation, blistering, possible superinfection, need to schedule for a later date, and time spent in the office versus field therapy that gets what is not yet seen)  PROCEDURE:  DESTRUCTION OF PRE-MALIGNANT LESIONS  After a thorough discussion of treatment options and risk/benefits/alternatives (including but not limited to local pain, scarring, dyspigmentation, blistering, and possible superinfection), verbal and written consent were obtained and the aforementioned lesions were treated on with cryotherapy using liquid nitrogen x 1 cycle for 5-10 seconds  DERMATOFIBROMA      Assessment and Plan:  Based on a thorough discussion of this condition and the management approach to it (including a comprehensive discussion of the known risks, side effects and potential benefits of treatment), the patient (family) agrees to implement the following specific plan:   Reassure benign     Assessment and Plan:  A dermatofibroma is a common benign fibrous nodule that most often arises on the skin of the lower legs  A dermatofibroma is also called a "cutaneous fibrous histiocytoma "  Dermatofibromas occur at all ages and in people of every ethnicity   They are more common in women than in men     It is not clear if dermatofibroma is a reactive process or if it is a neoplasm  The lesions are made up of proliferating fibroblasts  Histiocytes may also be involved  They are sometimes attributed to an insect bite or ingrownhair or local trauma, but not consistently  They may be more numerous in patients with altered immunity  Dermatofibromas most often occur on the legs and arms, but may also arise on the trunk or any site of the body  Typical clinical features include the following:   People may have 1 or up to 15 lesions   Size varies from 0 5-1 5 cm diameter; most lesions are 7-10 mm diameter   They are firm nodules tethered to the skin surface and mobile over subcutaneous tissue   The skin "dimples" on pinching the lesion   Color may be pink to light brown in white skin, and dark brown to black in dark skin; some appear paler in the center   They do not usually cause symptoms, but they are sometimes painful or itchy   Because they are often raised lesions, they may be traumatized, for example by a razor   Occasionally dozens may erupt within a few months, usually in the setting of immunosuppression (for example autoimmune disease, cancer or certain medications)   Dermatofibroma does not give rise to cancer  However, occasionally, it may be mistaken for dermatofibrosarcoma or desmoplastic melanoma  A dermatofibroma is harmless and seldom causes any symptoms  Usually, only reassurance is needed  If it is nuisance or causing concern, the lesion can be removed surgically, resulting in a scar that is, by definition, usually longer in diameter than the widest portion of the dermatofibroma  Cryotherapy, shave biopsy and laser surgery are rarely completely successful  Skin punch biopsy or incisional biopsy may be undertaken if there is an atypical feature such as recent enlargement, ulceration, or asymmetrical structures and colours on dermatoscopy        STASIS     Assessment and Plan:  Based on a thorough discussion of this condition and the management approach to it (including a comprehensive discussion of the known risks, side effects and potential benefits of treatment), the patient (family) agrees to implement the following specific plan:   Apply Vaseline 2-3 times a day    Recommended to use moderate support stockinets      INTERTRIGO    Assessment and Plan:  Based on a thorough discussion of this condition and the management approach to it (including a comprehensive discussion of the known risks, side effects and potential benefits of treatment), the patient (family) agrees to implement the following specific plan:   Recommended to use prescribed Hydrocortisone ( patient has it) and over the counter Lotrimin together when the area is red and itchy   When the area is good/better use the Zeasorb AF powder ( Antifungal treatment), dry your the groin area very well after your showers  May use a hair dryer  Advised to keep the area dry as much as possible   Start Triamcinolone 0 1% ointment once daily for 2 weeks (ORDERED)  Discussed the Triamcinolone can thin your skin  Assessment and Plan  Intertrigo describes a rash in the flexures or body folds, such as behind the ears, in the folds of the neck, under the arms (axillae), under a protruding abdomen, in the groin, between the buttocks, in the finger webs or toe spaces  Although intertrigo may affect one skin fold, it is common for it to involve multiple sites  Intertrigo can affect males and females of any age  It is particularly common in people that are overweight or obese (see metabolic syndrome)  Other contributing factors are:   Genetic tendency to skin disease   Hyperhidrosis (excessive sweating)    Intertrigo can be acute (recent onset), relapsing (recurrent), or chronic (present for more than 6 weeks)  The exact appearance and behaviour depends on the underlying cause or causes    The skin affected by intertrigo is inflamed, ie reddened and uncomfortable  It may become moist and macerated, leading to fissuring (cracks) and peeling  Intertrigo is due to genetic and environmental factors   Flexural skin has relatively high surface temperature   Moisture from insensible water loss and sweating cannot evaporate due to occlusion   Friction from movement of adjacent skin results in chafing  The microorganisms that are normally resident on flexural skin, the microbiome, include corynebacteria, other bacteria and yeasts  These multiply in warm moist environments and may cause disease  We can classify intertrigo into infectious and inflammatory origin but there is often overlap   Infections tend to be unilateral and asymmetrical    Inflammatory disorders tend to be symmetrical affecting armpits, groins, under the breasts and the abdominal folds, except atopic dermatitis, which more often arises on the neck, and in elbow and knee creases  Investigations may be necessary to determine the cause of intertrigo   A swab for microscopy and culture of bacteria (microbiology)   A scraping for microscopy and culture of fungi (mycology)   A skin biopsy may be performed for histopathology if the skin condition is unusual or fails to respond to treatment  What is the treatment for intertrigo? Treatment depends on the underlying cause, if identified, and on which micro-organisms are present in the rash  Combinations are common   Sweating may be reduced with a gentle antiperspirant   Physical exertion should be followed by a bath and completely drying the skin folds using a hair dryer on cool setting, soft towel and/or corn starch powder   Triple paste contains petrolatum, zinc oxide, and aluminum acetate solution to reduce friction, irritation and sweating     Bacteria may be treated with topical antibiotics such as fusidic acid cream, mupirocin ointment, or oral antibiotics such as flucloxacillin and erythromycin   Yeasts and fungi may be treated with topical antifungals such as clotrimazole and terbinafine cream or oral antifungal agents such as itraconazole or terbinafine   Inflammatory skin diseases are often treated with low potency topical steroid creams such as hydrocortisone  More potent steroids are usually avoided in the flexures because they may cause skin thinning resulting in stretch marks (striae) and even ulcers  Calcineurin inhibitors such as tacrolimus ointment or pimecrolimus cream may also prove effective

## 2021-01-28 NOTE — PROGRESS NOTES
[facility] Note     Patient Name: Katie Starkey  Encounter Date: 01/28/2021     Have you been cared for by a [facility] Dermatologist in the last 3 years and, if so, which one? No    · Have you traveled outside of the [address] [place] [address] in the past 3 months or outside of the [facility] area in the last 2 weeks? No     May we call your Preferred Phone number to discuss your specific medical information? Yes     May we leave a detailed message that includes your specific medical information? Yes      Today's Chief Concerns:   Concern #1:  Rash in groin area and ankles   Concern #2:  Pimples on face   Full body skin check    Past Medical History:  Have you personally ever had or currently have any of the following? · Skin cancer (such as Melanoma, Basal Cell Carcinoma, Squamous Cell Carcinoma? (If Yes, please provide more detail)- No  · Eczema: No  · Psoriasis: No  · HIV/AIDS: No  · Hepatitis B or C: No  · Tuberculosis: No  · Systemic Immunosuppression such as Diabetes, Biologic or Immunotherapy, Chemotherapy, Organ Transplantation, Bone Marrow Transplantation (If YES, please provide more detail): No  · Radiation Treatment (If YES, please provide more detail): No  · Any other major medical conditions/concerns? (If Yes, which types)-yes, history of rosacea     Social History:     What is/was your primary occupation? Work for [employer] for [employer] What are your hobbies/past-times? none    Family History:  Have any of your "first degree relatives" (parent, brother, sister, or child) had any of the following       · Skin cancer such as Melanoma or Merkel Cell Carcinoma or Pancreatic Cancer? No  · Eczema, Asthma, Hay Fever or Seasonal Allergies: No  · Psoriasis or Psoriatic Arthritis: No  · Do any other medical conditions seem to run in your family? If Yes, what condition and which relatives?   Marquis Stewart and brother have heart problems    Current Medications:         Current Outpatient Medications:     amitriptyline (ELAVIL) 10 mg tablet, Take 1 tablet (10 mg total) by mouth daily, Disp: 90 tablet, Rfl: 3    amLODIPine (NORVASC) 5 mg tablet, Take 1 tablet (5 mg total) by mouth daily at bedtime, Disp: 90 tablet, Rfl: 3    apixaban (Eliquis) 5 mg, Take 1 tablet (5 mg total) by mouth 2 (two) times a day, Disp: 180 tablet, Rfl: 3    atenolol (TENORMIN) 50 mg tablet, Take 1 tablet (50 mg total) by mouth 2 (two) times a day, Disp: 180 tablet, Rfl: 3    fluticasone (FLONASE) 50 mcg/act nasal spray, Spray 2 squirts in each nostril once daily, Disp: 16 g, Rfl: 3    hydrocortisone 2 5 % cream, Apply topically 2 (two) times a day, Disp: 30 g, Rfl: 2    losartan (COZAAR) 50 mg tablet, Take 1 tablet (50 mg total) by mouth daily, Disp: 90 tablet, Rfl: 3    Multiple Vitamins-Minerals (PRESERVISION AREDS PO), Take 1 tablet by mouth 2 (two) times a day, Disp: , Rfl:     omeprazole (PriLOSEC) 20 mg delayed release capsule, Take 1 capsule (20 mg total) by mouth daily, Disp: 90 capsule, Rfl: 3    simvastatin (ZOCOR) 40 mg tablet, Take 1 tablet (40 mg total) by mouth daily at bedtime, Disp: 90 tablet, Rfl: 3    Vitamin D, Cholecalciferol, 1000 UNITS CAPS, Take by mouth, Disp: , Rfl:       Review of Systems:  Have you recently had or currently have any of the following? If YES, what are you doing for the problem? · Fever, chills or unintended weight loss: No  · Sudden loss or change in your vision: No  · Nausea, vomiting or blood in your stool: No  · Painful or swollen joints: No  · Wheezing or cough: No  · Changing mole or non-healing wound: No  · Nosebleeds: No  · Excessive sweating: No  · Easy or prolonged bleeding? No  · Over the last 2 weeks, how often have you been bothered by the following problems?   · Taking little interest or pleasure in doing things: 1 - Not at All  · Feeling down, depressed, or hopeless: 1 - Not at All  · Rapid heartbeat with epinephrine:  No    · FEMALES ONLY:    ·     · Are you preant or planning to become pregnant? N/A  · Are you currently or planning to be nursing or breast feeding? N/AAny known allergies? Allergies   Allergen Reactions    Lisinopril Other (See Comments)     Persistent cough         Physical Exam:     Was a chaperone (Derm Clinical Assistant) present throughout the entire Physical Exam? NO     Did the Dermatology Team specifically  the patient on the importance of a Full Skin Exam to be sure that nothing is missed clinically?  Yes}  o Did the patient ultimately request or accept a Full Skin Exam?  Yes  o Did the patient specifically refuse to have the areas "under-the-bra" examined by the Dermatologist? No  o Did the patient specifically refuse to have the areas "under-the-underwear" examined by the Dermatologist? No    CONSTITUTIONAL:   Vitals:    01/28/21 1005   Temp: (!) 97 3 °F (36 3 °C)   Weight: 110 kg (242 lb)   Height: 5' 9" (1 753 m)       PSYCH: Normal mood and affect  EYES: Normal conjunctiva  ENT: Normal lips and oral mucosa  CARDIOVASCULAR: No edema  RESPIRATORY: Normal respirations  HEME/LYMPH/IMMUNO:  No regional lymphadenopathy except as noted below in "ASSESSMENT AND PLAN BY DIAGNOSIS"    SKIN:  FULL ORGAN SYSTEM EXAM  Hair, Scalp, Ears, Face Normal except as noted below in Assessment   Neck, Cervical Chain Nodes Normal except as noted below in Assessment   Right Arm/Hand/Fingers Normal except as noted below in Assessment   Left Arm/Hand/Fingers Normal except as noted below in Assessment   Chest/Breasts/Axillae Viewed areas Normal except as noted below in Assessment   Abdomen, Umbilicus Normal except as noted below in Assessment   Back/Spine Normal except as noted below in Assessment   Groin/Genitalia/Buttocks NOT EXAMINED   Right Leg, Foot, Toes Normal except as noted below in Assessment   Left Leg, Foot, Toes Normal except as noted below in Assessment        Assessment and Plan by Diagnosis:    History of Present Condition:     Duration:  How long has this been an issue for you?    o  4-5 years or more   Location Affected:  Where on the body is this affecting you?    o  groin area, ankles   Quality:  Is there any bleeding, pain, itch, burning/irritation, or redness associated with the skin lesion? o  redness   Severity:  Describe any bleeding, pain, itch, burning/irritation, or redness on a scale of 1 to 10 (with 10 being the worst)  o  4 to 7   Timing:  Does this condition seem to be there pretty constantly or do you notice it more at specific times throughout the day? o constant   Context:  Have you ever noticed that this condition seems to be associated with specific activities you do?    o  denies   Modifying Factors:    o Anything that seems to make the condition worse?    -  heat makes it worse  o What have you tried to do to make the condition better?    -  hydrocortizone, over the counter feet powder   Associated Signs and Symptoms:  Does this skin lesion seem to be associated with any of the following:  o  SL AMB DERM SIGNS AND SYMPTOMS: Redness       1  ACTINIC KERATOSIS    Physical Exam:   Anatomic Location Affected: Face ( Bilateral cheeks)   Morphological Description:  Pink scaly papules       Assessment and Plan:  Based on a thorough discussion of this condition and the management approach to it (including a comprehensive discussion of the known risks, side effects and potential benefits of treatment), the patient (family) agrees to implement the following specific plan:     Cryotherapy treatment    Consent signed    Apply Vaseline     Actinic keratoses are very common on sites repeatedly exposed to the sun, especially the backs of the hands and the face, most often affecting the ears, nose, cheeks, upper lip, vermilion of the lower lip, temples, forehead and balding scalp   In severely chronically sun-damaged individuals, they may also be found on the upper trunk, upper and lower limbs, and dorsum of feet  We discussed the theoretical premalignant (pre-cancerous) nature and etiology of these growths  We discussed the prevailing notion that actinic keratoses are a reflection of abnormal skin cell development due to DNA damage by short wavelength UVB  They are more likely to appear if the immune function is poor, due to aging, recent sun exposure, predisposing disease or certain drugs  We discussed that the main concern is that actinic keratoses may predispose to squamous cell carcinoma  It is rare for a solitary actinic keratosis to evolve to squamous cell carcinoma (SCC), but the risk of SCC occurring at some stage in a patient with more than 10 actinic keratoses is thought to be about 10 to 15%  A tender, thickened, ulcerated or enlarging actinic keratosis is suspicious of SCC  Actinic keratoses may be prevented by strict sun protection  If already present, keratoses may improve with a very high sun protection factor (50+) broad-spectrum sunscreen applied at least daily to affected areas, year-round  We recommend that UPF-rated clothing and hats and sunglasses be worn whenever possible and that a sunscreen-moisturizer combination product such as Neutrogena Daily Defense be applied at least three times a day  We performed a thorough discussion of treatment options and specific risk/benefits/alternatives including but not limited to medical field treatment with medications such as the following:     Topical field area medications such as 5-fluorouracil or Aldara (specifically, the trouble with long-term compliance, blistering and local skin reaction versus the convenience of at-home therapy and that field therapy gets what is not yet seen)   Cryotherapy (specifically, local pain, scarring, dyspigmentation, blistering, possible superinfection, and treats only what we see versus directed treatment today)       Photodynamic therapy (specifically, local pain, scarring, dyspigmentation, blistering, possible superinfection, need to schedule for a later date, and time spent in the office versus field therapy that gets what is not yet seen)  PROCEDURE:  DESTRUCTION OF PRE-MALIGNANT LESIONS  After a thorough discussion of treatment options and risk/benefits/alternatives (including but not limited to local pain, scarring, dyspigmentation, blistering, and possible superinfection), verbal and written consent were obtained and the aforementioned lesions were treated on with cryotherapy using liquid nitrogen x 1 cycle for 5-10 seconds   TOTAL NUMBER of 2 pre-malignant lesions were treated today on the ANATOMIC LOCATION: Face (Bilateral cheeks)  The patient tolerated the procedure well, and after-care instructions were provided  2  DERMATOFIBROMA    Physical Exam:   Anatomic Location Affected:  Left upper thigh    Morphological Description:  Firm dermal nodule   Pertinent Positives:   Pertinent Negatives:    Assessment and Plan:  Based on a thorough discussion of this condition and the management approach to it (including a comprehensive discussion of the known risks, side effects and potential benefits of treatment), the patient (family) agrees to implement the following specific plan:   Reassure benign     Assessment and Plan:  A dermatofibroma is a common benign fibrous nodule that most often arises on the skin of the lower legs  A dermatofibroma is also called a "cutaneous fibrous histiocytoma "  Dermatofibromas occur at all ages and in people of every ethnicity  They are more common in women than in men  It is not clear if dermatofibroma is a reactive process or if it is a neoplasm  The lesions are made up of proliferating fibroblasts  Histiocytes may also be involved  They are sometimes attributed to an insect bite or ingrownhair or local trauma, but not consistently   They may be more numerous in patients with altered immunity  Dermatofibromas most often occur on the legs and arms, but may also arise on the trunk or any site of the body  Typical clinical features include the following:   People may have 1 or up to 15 lesions   Size varies from 0 5-1 5 cm diameter; most lesions are 7-10 mm diameter   They are firm nodules tethered to the skin surface and mobile over subcutaneous tissue   The skin "dimples" on pinching the lesion   Color may be pink to light brown in white skin, and dark brown to black in dark skin; some appear paler in the center   They do not usually cause symptoms, but they are sometimes painful or itchy   Because they are often raised lesions, they may be traumatized, for example by a razor   Occasionally dozens may erupt within a few months, usually in the setting of immunosuppression (for example autoimmune disease, cancer or certain medications)   Dermatofibroma does not give rise to cancer  However, occasionally, it may be mistaken for dermatofibrosarcoma or desmoplastic melanoma  A dermatofibroma is harmless and seldom causes any symptoms  Usually, only reassurance is needed  If it is nuisance or causing concern, the lesion can be removed surgically, resulting in a scar that is, by definition, usually longer in diameter than the widest portion of the dermatofibroma  Cryotherapy, shave biopsy and laser surgery are rarely completely successful  Skin punch biopsy or incisional biopsy may be undertaken if there is an atypical feature such as recent enlargement, ulceration, or asymmetrical structures and colours on dermatoscopy  STASIS     Physical Exam:   Anatomic Location Affected:  Bilateral lower legs    Morphological Description:  Indurated hyperpigmentation and sclerosus anterior shin right greater than left   Pertinent Positives: Pulses are normal    Pertinent Negatives: Additional History of Present Condition:  Does not wear support      Assessment and Plan:  Based on a thorough discussion of this condition and the management approach to it (including a comprehensive discussion of the known risks, side effects and potential benefits of treatment), the patient (family) agrees to implement the following specific plan:   Apply Vaseline 2-3 times a day    Recommended to use moderate support stockinets   I discussed that support stockings are inmportant to prevent ulceration and progression of stasis changes  INTERTRIGO    Physical Exam:   Anatomic Location Affected:  Groin   Morphological Description:  Quite now   Pertinent Positives:   Pertinent Negatives: Additional History of Present Condition:  He applies hydrocortisone cream when it gets worse  Assessment and Plan:  Based on a thorough discussion of this condition and the management approach to it (including a comprehensive discussion of the known risks, side effects and potential benefits of treatment), the patient (family) agrees to implement the following specific plan:   Recommended to use prescribed Hydrocortisone ( patient has it) and over the counter Lotrimin together when the area is red and itchy   When the area is good/better use the Zeasorb AF powder ( Antifungal treatment), dry your the groin area very well after your showers  May use a hair dryer  Advised to keep the area dry as much as possible   Start Triamcinolone 0 1% ointment once daily for 2 weeks (ORDERED)  Discussed the Triamcinolone can thin your skin  Assessment and Plan  Intertrigo describes a rash in the flexures or body folds, such as behind the ears, in the folds of the neck, under the arms (axillae), under a protruding abdomen, in the groin, between the buttocks, in the finger webs or toe spaces  Although intertrigo may affect one skin fold, it is common for it to involve multiple sites  Intertrigo can affect males and females of any age   It is particularly common in people that are overweight or obese (see metabolic syndrome)  Other contributing factors are:   Genetic tendency to skin disease   Hyperhidrosis (excessive sweating)    Intertrigo can be acute (recent onset), relapsing (recurrent), or chronic (present for more than 6 weeks)  The exact appearance and behaviour depends on the underlying cause or causes  The skin affected by intertrigo is inflamed, ie reddened and uncomfortable  It may become moist and macerated, leading to fissuring (cracks) and peeling  Intertrigo is due to genetic and environmental factors   Flexural skin has relatively high surface temperature   Moisture from insensible water loss and sweating cannot evaporate due to occlusion   Friction from movement of adjacent skin results in chafing  The microorganisms that are normally resident on flexural skin, the microbiome, include corynebacteria, other bacteria and yeasts  These multiply in warm moist environments and may cause disease  We can classify intertrigo into infectious and inflammatory origin but there is often overlap   Infections tend to be unilateral and asymmetrical    Inflammatory disorders tend to be symmetrical affecting armpits, groins, under the breasts and the abdominal folds, except atopic dermatitis, which more often arises on the neck, and in elbow and knee creases  Investigations may be necessary to determine the cause of intertrigo   A swab for microscopy and culture of bacteria (microbiology)   A scraping for microscopy and culture of fungi (mycology)   A skin biopsy may be performed for histopathology if the skin condition is unusual or fails to respond to treatment  What is the treatment for intertrigo? Treatment depends on the underlying cause, if identified, and on which micro-organisms are present in the rash  Combinations are common   Sweating may be reduced with a gentle antiperspirant     Physical exertion should be followed by a bath and completely drying the skin folds using a hair dryer on cool setting, soft towel and/or corn starch powder   Triple paste contains petrolatum, zinc oxide, and aluminum acetate solution to reduce friction, irritation and sweating   Bacteria may be treated with topical antibiotics such as fusidic acid cream, mupirocin ointment, or oral antibiotics such as flucloxacillin and erythromycin   Yeasts and fungi may be treated with topical antifungals such as clotrimazole and terbinafine cream or oral antifungal agents such as itraconazole or terbinafine   Inflammatory skin diseases are often treated with low potency topical steroid creams such as hydrocortisone  More potent steroids are usually avoided in the flexures because they may cause skin thinning resulting in stretch marks (striae) and even ulcers  Calcineurin inhibitors such as tacrolimus ointment or pimecrolimus cream may also prove effective          Scribe Attestation    I,:  Simon Webber am acting as a scribe while in the presence of the attending physician :       I,:  Wenceslao Aiken MD personally performed the services described in this documentation    as scribed in my presence :

## 2021-02-05 ENCOUNTER — IMMUNIZATIONS (OUTPATIENT)
Dept: FAMILY MEDICINE CLINIC | Facility: HOSPITAL | Age: 81
End: 2021-02-05

## 2021-02-05 DIAGNOSIS — Z23 ENCOUNTER FOR IMMUNIZATION: Primary | ICD-10-CM

## 2021-02-05 PROCEDURE — 91300 SARS-COV-2 / COVID-19 MRNA VACCINE (PFIZER-BIONTECH) 30 MCG: CPT

## 2021-02-05 PROCEDURE — 0002A SARS-COV-2 / COVID-19 MRNA VACCINE (PFIZER-BIONTECH) 30 MCG: CPT

## 2021-05-17 DIAGNOSIS — L30.4 INTERTRIGO: ICD-10-CM

## 2021-05-19 ENCOUNTER — TRANSCRIBE ORDERS (OUTPATIENT)
Dept: LAB | Facility: CLINIC | Age: 81
End: 2021-05-19

## 2021-05-19 ENCOUNTER — APPOINTMENT (OUTPATIENT)
Dept: LAB | Facility: CLINIC | Age: 81
End: 2021-05-19
Payer: MEDICARE

## 2021-05-19 DIAGNOSIS — I48.21 PERMANENT ATRIAL FIBRILLATION (HCC): Chronic | ICD-10-CM

## 2021-05-19 DIAGNOSIS — R73.03 PRE-DIABETES: Chronic | ICD-10-CM

## 2021-05-19 DIAGNOSIS — E78.49 OTHER HYPERLIPIDEMIA: Chronic | ICD-10-CM

## 2021-05-19 DIAGNOSIS — I10 BENIGN ESSENTIAL HYPERTENSION: Chronic | ICD-10-CM

## 2021-05-19 LAB
ALBUMIN SERPL BCP-MCNC: 4 G/DL (ref 3.5–5)
ALP SERPL-CCNC: 57 U/L (ref 46–116)
ALT SERPL W P-5'-P-CCNC: 40 U/L (ref 12–78)
ANION GAP SERPL CALCULATED.3IONS-SCNC: 10 MMOL/L (ref 4–13)
AST SERPL W P-5'-P-CCNC: 29 U/L (ref 5–45)
BILIRUB SERPL-MCNC: 0.45 MG/DL (ref 0.2–1)
BUN SERPL-MCNC: 16 MG/DL (ref 5–25)
CALCIUM SERPL-MCNC: 9.1 MG/DL (ref 8.3–10.1)
CHLORIDE SERPL-SCNC: 103 MMOL/L (ref 100–108)
CHOLEST SERPL-MCNC: 169 MG/DL (ref 50–200)
CO2 SERPL-SCNC: 29 MMOL/L (ref 21–32)
CREAT SERPL-MCNC: 1.07 MG/DL (ref 0.6–1.3)
EST. AVERAGE GLUCOSE BLD GHB EST-MCNC: 117 MG/DL
GFR SERPL CREATININE-BSD FRML MDRD: 66 ML/MIN/1.73SQ M
GLUCOSE P FAST SERPL-MCNC: 110 MG/DL (ref 65–99)
HBA1C MFR BLD: 5.7 %
HDLC SERPL-MCNC: 44 MG/DL
LDLC SERPL DIRECT ASSAY-MCNC: 108 MG/DL (ref 0–100)
POTASSIUM SERPL-SCNC: 4.4 MMOL/L (ref 3.5–5.3)
PROT SERPL-MCNC: 7.1 G/DL (ref 6.4–8.2)
SODIUM SERPL-SCNC: 142 MMOL/L (ref 136–145)
TRIGL SERPL-MCNC: 144 MG/DL

## 2021-05-19 PROCEDURE — 83036 HEMOGLOBIN GLYCOSYLATED A1C: CPT

## 2021-05-19 PROCEDURE — 36415 COLL VENOUS BLD VENIPUNCTURE: CPT

## 2021-05-19 PROCEDURE — 83721 ASSAY OF BLOOD LIPOPROTEIN: CPT

## 2021-05-19 PROCEDURE — 80053 COMPREHEN METABOLIC PANEL: CPT

## 2021-05-19 PROCEDURE — 80061 LIPID PANEL: CPT

## 2021-05-26 ENCOUNTER — OFFICE VISIT (OUTPATIENT)
Dept: INTERNAL MEDICINE CLINIC | Facility: CLINIC | Age: 81
End: 2021-05-26
Payer: MEDICARE

## 2021-05-26 VITALS
TEMPERATURE: 97.4 F | BODY MASS INDEX: 35.4 KG/M2 | RESPIRATION RATE: 14 BRPM | SYSTOLIC BLOOD PRESSURE: 130 MMHG | WEIGHT: 239 LBS | DIASTOLIC BLOOD PRESSURE: 80 MMHG | HEART RATE: 78 BPM | HEIGHT: 69 IN

## 2021-05-26 DIAGNOSIS — R73.03 PRE-DIABETES: Chronic | ICD-10-CM

## 2021-05-26 DIAGNOSIS — I48.21 PERMANENT ATRIAL FIBRILLATION (HCC): Chronic | ICD-10-CM

## 2021-05-26 DIAGNOSIS — E78.49 OTHER HYPERLIPIDEMIA: Primary | Chronic | ICD-10-CM

## 2021-05-26 PROCEDURE — G0439 PPPS, SUBSEQ VISIT: HCPCS | Performed by: INTERNAL MEDICINE

## 2021-05-26 PROCEDURE — 1123F ACP DISCUSS/DSCN MKR DOCD: CPT | Performed by: INTERNAL MEDICINE

## 2021-05-26 PROCEDURE — 99213 OFFICE O/P EST LOW 20 MIN: CPT | Performed by: INTERNAL MEDICINE

## 2021-05-26 NOTE — PATIENT INSTRUCTIONS

## 2021-05-26 NOTE — PROGRESS NOTES
Assessment/Plan:   1  Health maintenance -patient completed COVID vaccine  2  Actinic keratoses -status post recent cryotherapy via Dermatology  3  Intertrigo- he is using both hydrocortisone and Lotrimin cream and when improved the uses disease or AF powder  4  Polyps of the colon -subsequent colonoscopy due in he says he will contact the colorectal physician  5  Sleep apnea- in the past list is moderate but repeat evaluation done as a home study read as severe with some indications of central sleep apnea-Cheyne-Stokes respiration  Now on APAP with range of 4-20 centimeters and feeling better overall  6  Nocturia- related to BPH and has sleep apnea-much improved with treatment of his sleep apnea  7  Pre diabetes-A1c now 5 7 -for repeat prior to next visit - she understands importance of weight loss  8  Atrial fibrillation -continue with rate control and anticoagulation -had a follow-up visit with Cardiology in stable  9  Hypertension -adequate control on current regimen -preferred BP under 130/80  10   Myofascial syndrome-has  Intermittently use Flexeril in the past   11   esophageal reflux-tried to switch to an H2 blocker but had breakthrough symptoms and now back on omeprazole-  12 erectile dysfunction-using Levitra 20 milligrams-half tablet as needed  13   Dysphagia-has a history Schatzki's ring in the past   Has not been an issue in months   Said he had a dilatation over the last 2 years  14  Prior episode of syncope-likely on the basis of orthostasis   Triggered by meds plus excess alcohol use   Stable since then without further issues   Hospitalized without significant arrhythmia   Unenhanced CT scan of the brain benign  15  Alcohol use-again recommended he decrease use with his known history of cardiomyopathy  16   Cardiomyopathy-felt to be from atrial fibrillation plus hypertension plus alcohol use   Rule out contribution of sleep apnea   Most recent echo shows EF of 50% without regional wall motion abnormalities, right ventricle mildly dilated, left atrium mildly dilated, estimated peak PA pressure of 40 millimeters   This was performed in April of 2018  17   Rule out CAD-prior nuclear stress test showed reduced motion of the septal wall-this was felt to be a mild defect consistent with artifact  18   Previous noted leg pain-lumbar radiculopathy   Doppler negative-has not recurred  19 Hyperlipidemia-continue current dose of statin  1820  Nasal congestion -tends to be seasonal -noted some improvement with Flonase and Claritin will use those during the season and lesser dose of Flonase during other seasons        All other problems as per note of April 2014, August 2012     Medical regimen losartan 50 milligrams daily, atenolol 50 milligrams b i d , amlodipine 5 milligrams, omeprazole 20 milligrams daily  , amitriptyline 10 milligrams-5-10 milligrams at bedtime,, simvastatin 40 milligrams daily, , multivitamin, Eliquis 5 milligrams b i d , prior use of Flexeril 5 milligrams HS, Levitra 20 milligrams-half tablet as needed, hydrocortisone cream 2 5% b i d  P r n  Use previously but recommended he hold this until re-evaluated by dermatology fluticasone nasal spray as 2 squirts in each nostril once daily during peak season with decreasing dose during other times APAP at 4-20 centimeters       Appointment several months with prior chemistry profile cholesterol profile A1c    BMI Counseling: Body mass index is 35 29 kg/m²  The BMI is above normal  Nutrition recommendations include reducing portion sizes  Exercise recommendations include moderate aerobic physical activity for 150 minutes/week  No problem-specific Assessment & Plan notes found for this encounter  Diagnoses and all orders for this visit:    Other hyperlipidemia  -     CBC and differential; Future  -     Comprehensive metabolic panel; Future  -     Cholesterol, total; Future  -     HDL cholesterol; Future  -     LDL cholesterol, direct;  Future  - Triglycerides; Future  -     Hemoglobin A1C; Future    Permanent atrial fibrillation (HCC)  -     CBC and differential; Future  -     Comprehensive metabolic panel; Future  -     Cholesterol, total; Future  -     HDL cholesterol; Future  -     LDL cholesterol, direct; Future  -     Triglycerides; Future  -     Hemoglobin A1C; Future    Pre-diabetes  -     CBC and differential; Future  -     Comprehensive metabolic panel; Future  -     Cholesterol, total; Future  -     HDL cholesterol; Future  -     LDL cholesterol, direct; Future  -     Triglycerides; Future  -     Hemoglobin A1C; Future          Subjective:      Patient ID: Octavio Chaney is a 68 y o  male  He was here for his Medicare wellness we addressed other issues  He is due for follow-up colonoscopy  His last was 5 years ago  He will be contacting the colorectal group  He was seen by Dermatology  He had treatment for actinic keratoses with cryotherapy  They noted a dramatic fibroma of the left upper thigh  For treatment of intertrigo they recommended hydrocortisone and Lotrimin a and when improved they recommended Zeasorb  AF powder  He is wearing his sleep device a minimum of 4 hours per night  He understands the importance of doing this  He has known severe sleep apnea        Results for orders placed or performed in visit on 05/19/21  -Comprehensive metabolic panel       Result                      Value             Ref Range           Sodium                      142               136 - 145 mm*       Potassium                   4 4               3 5 - 5 3 mm*       Chloride                    103               100 - 108 mm*       CO2                         29                21 - 32 mmol*       ANION GAP                   10                4 - 13 mmol/L       BUN                         16                5 - 25 mg/dL        Creatinine                  1 07              0 60 - 1 30 *       Glucose, Fasting            110 (H) 65 - 99 mg/dL       Calcium                     9 1               8 3 - 10 1 m*       AST                         29                5 - 45 U/L          ALT                         40                12 - 78 U/L         Alkaline Phosphatase        57                46 - 116 U/L        Total Protein               7 1               6 4 - 8 2 g/*       Albumin                     4 0               3 5 - 5 0 g/*       Total Bilirubin             0 45              0 20 - 1 00 *       eGFR                        66                ml/min/1 73s*  -Cholesterol, total       Result                      Value             Ref Range           Cholesterol                 169               50 - 200 mg/*  -HDL cholesterol       Result                      Value             Ref Range           HDL, Direct                 44                >=40 mg/dL     -LDL cholesterol, direct       Result                      Value             Ref Range           LDL Direct                  108 (H)           0 - 100 mg/dl  -Triglycerides       Result                      Value             Ref Range           Triglycerides               144               <=150 mg/dL    -HEMOGLOBIN A1C W/ EAG ESTIMATION       Result                      Value             Ref Range           Hemoglobin A1C              5 7 (H)           Normal 3 8-5*       EAG                         117               mg/dl        Rate control with his atrial fibrillation remains adequate  He remains on his anticoagulant  He has known prediabetes  A1c is 5 7  He has a habit of drinking couple glasses of wine per night an eating large amount of food in the evening  He is trying to reverse had  He understands the importance of weight loss  Goal LDL is  and goal triglycerides are 100  I did not increase his meds but feel that he can reach that by proceeding with additional weight loss  He has known esophageal reflux  He remains on his PPI    He denies solid liquid food dysphagia  This patient wanted to know their preferred analgesic agent  Because of their various comorbidities I recommended that this be acetaminophen  This patient has no history of chronic liver disease that would put them at greater risk for use of acetaminophen  This patient may use up to 500-650 mg of acetaminophen at a time and no more than 3 g a day total  Nonsteroidal anti-inflammatory agents have the potential to exacerbate hypertension, hypercoagulability, chronic renal failure, congestive heart failure, and various allergic tendencies  Because of his comorbidities anticoagulant use we wanted use acetaminophen rather than nonsteroidals he has had significant improvement  With this nocturia since he initiated therapy  For his sleep apnea      The following portions of the patient's history were reviewed and updated as appropriate: allergies, current medications, past family history, past medical history, past social history, past surgical history and problem list     Review of Systems   Constitutional: Negative  HENT: Negative  Respiratory: Negative  Cardiovascular: Negative  Gastrointestinal: Negative  Endocrine: Negative  Genitourinary: Negative  Nocturia times 1-2   Musculoskeletal: Negative  Skin: Negative  Neurological: Negative  Hematological: Negative  Psychiatric/Behavioral: Negative  Objective:      Temp (!) 97 4 °F (36 3 °C) (Tympanic)   Ht 5' 9" (1 753 m)   Wt 108 kg (239 lb)   BMI 35 29 kg/m²          Physical Exam  Vitals signs reviewed  Constitutional:       General: He is not in acute distress  Appearance: Normal appearance  He is obese  He is not ill-appearing, toxic-appearing or diaphoretic  HENT:      Head: Normocephalic and atraumatic        Right Ear: Tympanic membrane, ear canal and external ear normal       Left Ear: Tympanic membrane, ear canal and external ear normal       Nose: Nose normal  No congestion or rhinorrhea  Mouth/Throat:      Mouth: Mucous membranes are moist       Pharynx: Oropharynx is clear  No oropharyngeal exudate or posterior oropharyngeal erythema  Eyes:      General: No scleral icterus  Right eye: No discharge  Left eye: No discharge  Extraocular Movements: Extraocular movements intact  Pupils: Pupils are equal, round, and reactive to light  Neck:      Musculoskeletal: Normal range of motion and neck supple  No neck rigidity or muscular tenderness  Vascular: No carotid bruit  Cardiovascular:      Rate and Rhythm: Normal rate  Rhythm irregular  Pulses: Normal pulses  Heart sounds: Normal heart sounds  No murmur  No friction rub  No gallop  Comments: Irregular rhythm  Cardiomegaly to percussion  Adequate upstroke  No increase in P2  Pulmonary:      Effort: Pulmonary effort is normal  No respiratory distress  Breath sounds: Normal breath sounds  No stridor  No wheezing, rhonchi or rales  Chest:      Chest wall: No tenderness  Abdominal:      General: Abdomen is flat  Bowel sounds are normal  There is no distension  Palpations: Abdomen is soft  There is no mass  Tenderness: There is no abdominal tenderness  There is no right CVA tenderness, left CVA tenderness, guarding or rebound  Hernia: No hernia is present  Musculoskeletal: Normal range of motion  General: No swelling, tenderness, deformity or signs of injury  Right lower leg: No edema  Left lower leg: No edema  Lymphadenopathy:      Cervical: No cervical adenopathy  Skin:     General: Skin is warm and dry  Coloration: Skin is not jaundiced or pale  Findings: No bruising, erythema, lesion or rash  Neurological:      General: No focal deficit present  Mental Status: He is alert and oriented to person, place, and time  Mental status is at baseline  Cranial Nerves: No cranial nerve deficit  Sensory: No sensory deficit  Motor: No weakness  Coordination: Coordination normal       Gait: Gait normal       Deep Tendon Reflexes: Reflexes normal    Psychiatric:         Mood and Affect: Mood normal          Behavior: Behavior normal          Thought Content:  Thought content normal          Judgment: Judgment normal

## 2021-05-26 NOTE — PROGRESS NOTES
Assessment and Plan:     Problem List Items Addressed This Visit     None           Preventive health issues were discussed with patient, and age appropriate screening tests were ordered as noted in patient's After Visit Summary  Personalized health advice and appropriate referrals for health education or preventive services given if needed, as noted in patient's After Visit Summary  History of Present Illness:     Patient presents for Medicare Annual Wellness visit    Patient Care Team:  Balaji Muniz MD as PCP - Ayanna Aguirre MD as Cardiologist  MD Yvonne Álvarez MD as Endoscopist     Problem List:     Patient Active Problem List   Diagnosis    [facility] fibrillation ([facility] )    Hyperlipidemia    Benign essential hypertension    Obstructive sleep apnea    Anxiety    Arthritis    Benign prostatic hyperplasia    Diverticulosis    Dysphagia    Gastroesophageal reflux disease    Insomnia    Pre-diabetes    Rosacea    Tinea cruris      Past Medical and Surgical History:     Past Medical History:   Diagnosis Date    [facility]     Cardiomyopathy ([facility] )     LAST ASSESSED [id number]    Cataract     Dysphagia     GERD (gastroesophageal reflux disease)     Hyperlipidemia     Hypertension     Irregular heart beat     afib    Rosacea     Schatzki's ring     Sleep apnea     no cpap     Past Surgical History:   Procedure Laterality Date    BACK SURGERY      CATARACT EXTRACTION      COLONOSCOPY N/A 1/15/2016    Procedure: COLONOSCOPY;  Surgeon: Yvonne Cevallos MD;  [facility];   Service:     ESOPHAGOGASTRODUODENOSCOPY      EYE SURGERY      B/L cataract sx ([id number],7361)    MO CIRCUMCISION,OTHR N/A 1/29/2018    Procedure: CIRCUMCISION ADULT;  Surgeon: Lavonne Carranza MD;  [facility];  Service: Urology    MO ESOPHAGOGASTRODUODENOSCOPY TRANSORAL DIAGNOSTIC N/A 2/15/2017    Procedure: ESOPHAGOGASTRODUODENOSCOPY (EGD) WITH DILATION; Surgeon: Dai Carrasco MD;  Location[facility];   Service: Gastroenterology      Family History:     Family History   Problem Relation Age of Onset    Coronary artery disease Father     Coronary artery disease Family     Hyperlipidemia Family     Hypertension Family     Other Family         SUDDEN CARDIAC DEATH       Social History:     E-Cigarette/Vaping    E-Cigarette Use Never User      E-Cigarette/Vaping Substances    Nicotine No     THC No     CBD No     Flavoring No     Other No     Unknown No      Social History     Socioeconomic History    Marital status: /Civil Union     Spouse name: Not on file    Number of children: Not on file    Years of education: Not on file    Highest education level: Not on file   Occupational History    Not on file   Social Needs    Financial resource strain: Not on file    Food insecurity     Worry: Not on file     Inability: Not on file    Transportation needs     Medical: Not on file     Non-medical: Not on file   Tobacco Use    Smoking status: Former Smoker    Smokeless tobacco: Never Used    Tobacco comment: about 55 years ago   Substance and Sexual Activity    Alcohol use: Yes     Frequency: 4 or more times a week     Comment: 1-2 glasses of wine daily    Drug use: No    Sexual activity: Not Currently   Lifestyle    Physical activity     Days per week: Not on file     Minutes per session: Not on file    Stress: Not on file   Relationships    Social connections     Talks on phone: Not on file     Gets together: Not on file     Attends Judaism service: Not on file     Active member of club or organization: Not on file     Attends meetings of clubs or organizations: Not on file     Relationship status: Not on file    Intimate partner violence     Fear of current or ex partner: Not on file     Emotionally abused: Not on file     Physically abused: Not on file     Forced sexual activity: Not on file   Other Topics Concern    Not on file Social History Narrative    Not on file      Medications and Allergies:     Current Outpatient Medications   Medication Sig Dispense Refill    amitriptyline (ELAVIL) 10 mg tablet Take 1 tablet (10 mg total) by mouth daily 90 tablet 3    amLODIPine (NORVASC) 5 mg tablet Take 1 tablet (5 mg total) by mouth daily at bedtime 90 tablet 3    apixaban (Eliquis) 5 mg Take 1 tablet (5 mg total) by mouth 2 (two) times a day 180 tablet 3    atenolol (TENORMIN) 50 mg tablet Take 1 tablet (50 mg total) by mouth 2 (two) times a day 180 tablet 3    fluticasone (FLONASE) 50 mcg/act nasal spray Spray 2 squirts in each nostril once daily 16 g 3    hydrocortisone 2 5 % cream Apply topically 2 (two) times a day 30 g 2    losartan (COZAAR) 50 mg tablet Take 1 tablet (50 mg total) by mouth daily 90 tablet 3    Multiple Vitamins-Minerals (PRESERVISION AREDS PO) Take 1 tablet by mouth 2 (two) times a day      omeprazole (PriLOSEC) 20 mg delayed release capsule Take 1 capsule (20 mg total) by mouth daily 90 capsule 3    simvastatin (ZOCOR) 40 mg tablet Take 1 tablet (40 mg total) by mouth daily at bedtime 90 tablet 3    triamcinolone (KENALOG) 0 1 % ointment APPLY TOPICALLY DAILY FOR 2 WEEKS 454 g 0    Vitamin D, Cholecalciferol, 1000 UNITS CAPS Take by mouth       No current facility-administered medications for this visit        Allergies   Allergen Reactions    Lisinopril Other (See Comments)     Persistent cough      Immunizations:     Immunization History   Administered Date(s) Administered    H1N1, All Formulations 01/29/2010    INFLUENZA 09/30/2014, 10/18/2015, 09/28/2016, 10/14/2018    Influenza Quadrivalent Preservative Free 3 years and older IM 09/30/2014    Influenza Split High Dose Preservative Free IM 10/18/2015, 09/28/2016    Influenza, high dose seasonal 0 7 mL 09/17/2019, 10/05/2020    Influenza, seasonal, injectable 10/24/2012    Pneumococcal Conjugate 13-Valent 05/24/2016    Pneumococcal Polysaccharide PPV23 07/17/2020    SARS-CoV-2 / COVID-19 mRNA IM (Pfizer-BioNTech) 01/12/2021, 02/05/2021    Tdap 09/28/2016    Zoster Vaccine Recombinant 06/18/2019, 08/19/2019      Health Maintenance:         Topic Date Due    Colonoscopy Surveillance  01/15/2021     There are no preventive care reminders to display for this patient  Medicare Health Risk Assessment:     Temp (!) 97 4 °F (36 3 °C) (Tympanic)   Ht 5' 9" (1 753 m)   Wt 108 kg (239 lb)   BMI 35 29 kg/m²      Humphrey Berger is here for his Subsequent Wellness visit  Last Medicare Wellness visit information reviewed, patient interviewed and updates made to the record today  Health Risk Assessment:   Patient rates overall health as very good  Patient feels that their physical health rating is slightly better  Patient is satisfied with their life  Eyesight was rated as same  Hearing was rated as slightly worse  Patient feels that their emotional and mental health rating is slightly better  Patients states they are sometimes angry  Patient states they are never, rarely unusually tired/fatigued  Pain experienced in the last 7 days has been some  Patient's pain rating has been 2/10  Patient states that he has experienced no weight loss or gain in last 6 months  Depression Screening:   PHQ-2 Score: 0      Fall Risk Screening: In the past year, patient has experienced: no history of falling in past year      Home Safety:  Patient does not have trouble with stairs inside or outside of their home  Patient has working smoke alarms and has no working carbon monoxide detector  Home safety hazards include: none  Nutrition:   Current diet is Regular  Medications:   Patient is currently taking over-the-counter supplements  OTC medications include: see medication list  Patient is able to manage medications       Activities of Daily Living (ADLs)/Instrumental Activities of Daily Living (IADLs):   Walk and transfer into and out of bed and chair?: Yes  Dress and groom yourself?: Yes    Bathe or shower yourself?: Yes    Feed yourself? Yes  Do your laundry/housekeeping?: Yes  Manage your money, pay your bills and track your expenses?: Yes  Make your own meals?: Yes    Do your own shopping?: Yes    Previous Hospitalizations:   Any hospitalizations or ED visits within the last 12 months?: No      Advance Care Planning:   Living will: No    Durable POA for healthcare: No    Advanced directive: No      Cognitive Screening:   Provider or family/friend/caregiver concerned regarding cognition?: No    PREVENTIVE SCREENINGS      Cardiovascular Screening:    General: History Lipid Disorder and Screening Current      Diabetes Screening:     General: Screening Current      Colorectal Cancer Screening:     General: Screening Current      Prostate Cancer Screening:    General: Screening Current      Osteoporosis Screening:    General: Screening Not Indicated      Abdominal Aortic Aneurysm (AAA) Screening:    Risk factors include: tobacco use        General: Screening Current      Lung Cancer Screening:     General: Screening Not Indicated      Hepatitis C Screening:    General: Screening Not Indicated    Screening, Brief Intervention, and Referral to Treatment (SBIRT)    Screening  Typical number of drinks in a day: 0  Typical number of drinks in a week: 0  Interpretation: Low risk drinking behavior      AUDIT-C Screenin) How often did you have a drink containing alcohol in the past year? 4 or more times a week    Single Item Drug Screening:  How often have you used an illegal drug (including marijuana) or a prescription medication for non-medical reasons in the past year? never    Single Item Drug Screen Score: 0  Interpretation: Negative screen for possible drug use disorder      Andie Rutledge MD

## 2021-08-03 ENCOUNTER — TELEPHONE (OUTPATIENT)
Dept: SLEEP CENTER | Facility: CLINIC | Age: 81
End: 2021-08-03

## 2021-08-03 DIAGNOSIS — G47.33 OSA (OBSTRUCTIVE SLEEP APNEA): Primary | ICD-10-CM

## 2021-10-11 ENCOUNTER — APPOINTMENT (OUTPATIENT)
Dept: LAB | Facility: CLINIC | Age: 81
End: 2021-10-11
Payer: MEDICARE

## 2021-10-11 DIAGNOSIS — I48.21 PERMANENT ATRIAL FIBRILLATION (HCC): Chronic | ICD-10-CM

## 2021-10-11 DIAGNOSIS — E78.49 OTHER HYPERLIPIDEMIA: Chronic | ICD-10-CM

## 2021-10-11 DIAGNOSIS — R73.03 PRE-DIABETES: Chronic | ICD-10-CM

## 2021-10-11 LAB
ALBUMIN SERPL BCP-MCNC: 3.8 G/DL (ref 3.5–5)
ALP SERPL-CCNC: 49 U/L (ref 46–116)
ALT SERPL W P-5'-P-CCNC: 44 U/L (ref 12–78)
ANION GAP SERPL CALCULATED.3IONS-SCNC: 7 MMOL/L (ref 4–13)
AST SERPL W P-5'-P-CCNC: 35 U/L (ref 5–45)
BASOPHILS # BLD AUTO: 0.02 THOUSANDS/ΜL (ref 0–0.1)
BASOPHILS NFR BLD AUTO: 0 % (ref 0–1)
BILIRUB SERPL-MCNC: 0.71 MG/DL (ref 0.2–1)
BUN SERPL-MCNC: 16 MG/DL (ref 5–25)
CALCIUM SERPL-MCNC: 9.2 MG/DL (ref 8.3–10.1)
CHLORIDE SERPL-SCNC: 100 MMOL/L (ref 100–108)
CHOLEST SERPL-MCNC: 181 MG/DL (ref 50–200)
CO2 SERPL-SCNC: 29 MMOL/L (ref 21–32)
CREAT SERPL-MCNC: 1.04 MG/DL (ref 0.6–1.3)
EOSINOPHIL # BLD AUTO: 0.09 THOUSAND/ΜL (ref 0–0.61)
EOSINOPHIL NFR BLD AUTO: 2 % (ref 0–6)
ERYTHROCYTE [DISTWIDTH] IN BLOOD BY AUTOMATED COUNT: 12.3 % (ref 11.6–15.1)
EST. AVERAGE GLUCOSE BLD GHB EST-MCNC: 105 MG/DL
GFR SERPL CREATININE-BSD FRML MDRD: 68 ML/MIN/1.73SQ M
GLUCOSE P FAST SERPL-MCNC: 114 MG/DL (ref 65–99)
HBA1C MFR BLD: 5.3 %
HCT VFR BLD AUTO: 41.3 % (ref 36.5–49.3)
HDLC SERPL-MCNC: 46 MG/DL
HGB BLD-MCNC: 13.8 G/DL (ref 12–17)
IMM GRANULOCYTES # BLD AUTO: 0.02 THOUSAND/UL (ref 0–0.2)
IMM GRANULOCYTES NFR BLD AUTO: 0 % (ref 0–2)
LDLC SERPL DIRECT ASSAY-MCNC: 103 MG/DL (ref 0–100)
LYMPHOCYTES # BLD AUTO: 1.61 THOUSANDS/ΜL (ref 0.6–4.47)
LYMPHOCYTES NFR BLD AUTO: 33 % (ref 14–44)
MCH RBC QN AUTO: 33.2 PG (ref 26.8–34.3)
MCHC RBC AUTO-ENTMCNC: 33.4 G/DL (ref 31.4–37.4)
MCV RBC AUTO: 99 FL (ref 82–98)
MONOCYTES # BLD AUTO: 0.68 THOUSAND/ΜL (ref 0.17–1.22)
MONOCYTES NFR BLD AUTO: 14 % (ref 4–12)
NEUTROPHILS # BLD AUTO: 2.49 THOUSANDS/ΜL (ref 1.85–7.62)
NEUTS SEG NFR BLD AUTO: 51 % (ref 43–75)
NRBC BLD AUTO-RTO: 0 /100 WBCS
PLATELET # BLD AUTO: 175 THOUSANDS/UL (ref 149–390)
PMV BLD AUTO: 10.8 FL (ref 8.9–12.7)
POTASSIUM SERPL-SCNC: 4 MMOL/L (ref 3.5–5.3)
PROT SERPL-MCNC: 7.1 G/DL (ref 6.4–8.2)
RBC # BLD AUTO: 4.16 MILLION/UL (ref 3.88–5.62)
SODIUM SERPL-SCNC: 136 MMOL/L (ref 136–145)
TRIGL SERPL-MCNC: 201 MG/DL
WBC # BLD AUTO: 4.91 THOUSAND/UL (ref 4.31–10.16)

## 2021-10-11 PROCEDURE — 85025 COMPLETE CBC W/AUTO DIFF WBC: CPT

## 2021-10-11 PROCEDURE — 80061 LIPID PANEL: CPT

## 2021-10-11 PROCEDURE — 83721 ASSAY OF BLOOD LIPOPROTEIN: CPT

## 2021-10-11 PROCEDURE — 36415 COLL VENOUS BLD VENIPUNCTURE: CPT

## 2021-10-11 PROCEDURE — 83036 HEMOGLOBIN GLYCOSYLATED A1C: CPT

## 2021-10-11 PROCEDURE — 80053 COMPREHEN METABOLIC PANEL: CPT

## 2021-10-13 ENCOUNTER — OFFICE VISIT (OUTPATIENT)
Dept: INTERNAL MEDICINE CLINIC | Facility: CLINIC | Age: 81
End: 2021-10-13
Payer: MEDICARE

## 2021-10-13 VITALS
WEIGHT: 235 LBS | HEIGHT: 69 IN | HEART RATE: 78 BPM | RESPIRATION RATE: 12 BRPM | BODY MASS INDEX: 34.8 KG/M2 | SYSTOLIC BLOOD PRESSURE: 130 MMHG | DIASTOLIC BLOOD PRESSURE: 80 MMHG

## 2021-10-13 DIAGNOSIS — K21.9 GASTROESOPHAGEAL REFLUX DISEASE, UNSPECIFIED WHETHER ESOPHAGITIS PRESENT: Chronic | ICD-10-CM

## 2021-10-13 DIAGNOSIS — R73.03 PRE-DIABETES: Chronic | ICD-10-CM

## 2021-10-13 DIAGNOSIS — I10 BENIGN ESSENTIAL HYPERTENSION: Chronic | ICD-10-CM

## 2021-10-13 DIAGNOSIS — G47.33 OBSTRUCTIVE SLEEP APNEA: Chronic | ICD-10-CM

## 2021-10-13 DIAGNOSIS — Z12.11 ENCOUNTER FOR SCREENING COLONOSCOPY: ICD-10-CM

## 2021-10-13 DIAGNOSIS — N40.0 BENIGN PROSTATIC HYPERPLASIA, UNSPECIFIED WHETHER LOWER URINARY TRACT SYMPTOMS PRESENT: ICD-10-CM

## 2021-10-13 DIAGNOSIS — I48.21 PERMANENT ATRIAL FIBRILLATION (HCC): Primary | Chronic | ICD-10-CM

## 2021-10-13 DIAGNOSIS — E78.49 OTHER HYPERLIPIDEMIA: Chronic | ICD-10-CM

## 2021-10-13 PROCEDURE — 99214 OFFICE O/P EST MOD 30 MIN: CPT | Performed by: INTERNAL MEDICINE

## 2021-11-10 LAB — COLOGUARD RESULT REPORTABLE: NEGATIVE

## 2021-11-11 ENCOUNTER — TELEPHONE (OUTPATIENT)
Dept: INTERNAL MEDICINE CLINIC | Facility: CLINIC | Age: 81
End: 2021-11-11

## 2021-11-15 ENCOUNTER — TELEPHONE (OUTPATIENT)
Dept: INTERNAL MEDICINE CLINIC | Facility: CLINIC | Age: 81
End: 2021-11-15

## 2021-11-20 ENCOUNTER — IMMUNIZATIONS (OUTPATIENT)
Dept: FAMILY MEDICINE CLINIC | Facility: HOSPITAL | Age: 81
End: 2021-11-20

## 2021-11-20 DIAGNOSIS — Z23 ENCOUNTER FOR IMMUNIZATION: Primary | ICD-10-CM

## 2021-11-20 PROCEDURE — 0001A COVID-19 PFIZER VACC 0.3 ML: CPT

## 2021-11-20 PROCEDURE — 91300 COVID-19 PFIZER VACC 0.3 ML: CPT

## 2021-12-01 ENCOUNTER — OFFICE VISIT (OUTPATIENT)
Dept: SLEEP CENTER | Facility: CLINIC | Age: 81
End: 2021-12-01
Payer: MEDICARE

## 2021-12-01 VITALS
BODY MASS INDEX: 35.52 KG/M2 | HEIGHT: 69 IN | SYSTOLIC BLOOD PRESSURE: 148 MMHG | DIASTOLIC BLOOD PRESSURE: 60 MMHG | WEIGHT: 239.8 LBS

## 2021-12-01 DIAGNOSIS — G47.33 OSA (OBSTRUCTIVE SLEEP APNEA): Primary | ICD-10-CM

## 2021-12-01 PROCEDURE — 99213 OFFICE O/P EST LOW 20 MIN: CPT | Performed by: INTERNAL MEDICINE

## 2021-12-01 RX ORDER — ATENOLOL 50 MG/1
50 TABLET ORAL
COMMUNITY
End: 2021-12-01

## 2021-12-02 ENCOUNTER — TELEPHONE (OUTPATIENT)
Dept: SLEEP CENTER | Facility: CLINIC | Age: 81
End: 2021-12-02

## 2022-02-01 DIAGNOSIS — E78.2 MIXED HYPERLIPIDEMIA: ICD-10-CM

## 2022-02-01 DIAGNOSIS — I48.91 ATRIAL FIBRILLATION, UNSPECIFIED TYPE (HCC): ICD-10-CM

## 2022-02-01 DIAGNOSIS — K21.9 GASTROESOPHAGEAL REFLUX DISEASE: ICD-10-CM

## 2022-02-01 DIAGNOSIS — I10 ESSENTIAL HYPERTENSION: ICD-10-CM

## 2022-02-01 RX ORDER — ATENOLOL 50 MG/1
50 TABLET ORAL 2 TIMES DAILY
Qty: 180 TABLET | Refills: 3 | Status: SHIPPED | OUTPATIENT
Start: 2022-02-01

## 2022-02-01 RX ORDER — LOSARTAN POTASSIUM 50 MG/1
50 TABLET ORAL DAILY
Qty: 90 TABLET | Refills: 3 | Status: SHIPPED | OUTPATIENT
Start: 2022-02-01

## 2022-02-01 RX ORDER — AMLODIPINE BESYLATE 5 MG/1
5 TABLET ORAL
Qty: 90 TABLET | Refills: 3 | Status: SHIPPED | OUTPATIENT
Start: 2022-02-01

## 2022-02-01 RX ORDER — SIMVASTATIN 40 MG
40 TABLET ORAL
Qty: 90 TABLET | Refills: 3 | Status: SHIPPED | OUTPATIENT
Start: 2022-02-01

## 2022-02-01 RX ORDER — OMEPRAZOLE 20 MG/1
20 CAPSULE, DELAYED RELEASE ORAL DAILY
Qty: 90 CAPSULE | Refills: 3 | Status: SHIPPED | OUTPATIENT
Start: 2022-02-01

## 2022-02-05 DIAGNOSIS — R21 SKIN RASH: ICD-10-CM

## 2022-02-15 ENCOUNTER — TELEPHONE (OUTPATIENT)
Dept: INTERNAL MEDICINE CLINIC | Facility: CLINIC | Age: 82
End: 2022-02-15

## 2022-02-15 DIAGNOSIS — R05.9 COUGH: Primary | ICD-10-CM

## 2022-02-15 RX ORDER — HYDROCODONE POLISTIREX AND CHLORPHENIRAMINE POLISTIREX 10; 8 MG/5ML; MG/5ML
SUSPENSION, EXTENDED RELEASE ORAL
Qty: 70 ML | Refills: 0 | Status: SHIPPED | OUTPATIENT
Start: 2022-02-15 | End: 2022-02-22

## 2022-02-15 NOTE — TELEPHONE ENCOUNTER
PT CALLED, SAID THAT HE'S HAD A COUGH AT NIGHT FOR THE PAST 3 DAYS, SAID AS SOON AS HE LAYS DOWN HE STARTS COUGHING    PT SAID NO OTHER SYMPTOMS EXCEPT FOR A LITTLE SNEEZING    PT IS ASKING FOR TUSSINEX     PLEASE ADVISE

## 2022-02-15 NOTE — TELEPHONE ENCOUNTER
He needs COVID testing- this should be done today - okay also for him to use Tussionex as 1 teaspoon full p o  HS p r n  for severe cough dispense  70 milliliters with no refill- remind him he is not to have alcohol and also use Tussionex - please put this on my desk when done

## 2022-02-15 NOTE — TELEPHONE ENCOUNTER
SPOKE WITH PT, GAVE MESSAGE AND ORDERED COUGH MEDICINE    PT IS ON THE SCHEDULE FOR TOMORROW FOR HIS COVID SWAB, SAID HE DID NOT WANT TO COME IN TODAY BECAUSE IT'S TOO COLD OUTSIDE    PLEASE SIGN OFF ON THE COUGH MEDICINE

## 2022-02-16 ENCOUNTER — CLINICAL SUPPORT (OUTPATIENT)
Dept: INTERNAL MEDICINE CLINIC | Facility: CLINIC | Age: 82
End: 2022-02-16

## 2022-02-16 DIAGNOSIS — R05.9 COUGHING: Primary | ICD-10-CM

## 2022-02-16 PROCEDURE — U0005 INFEC AGEN DETEC AMPLI PROBE: HCPCS | Performed by: INTERNAL MEDICINE

## 2022-02-16 PROCEDURE — U0003 INFECTIOUS AGENT DETECTION BY NUCLEIC ACID (DNA OR RNA); SEVERE ACUTE RESPIRATORY SYNDROME CORONAVIRUS 2 (SARS-COV-2) (CORONAVIRUS DISEASE [COVID-19]), AMPLIFIED PROBE TECHNIQUE, MAKING USE OF HIGH THROUGHPUT TECHNOLOGIES AS DESCRIBED BY CMS-2020-01-R: HCPCS | Performed by: INTERNAL MEDICINE

## 2022-02-16 NOTE — TELEPHONE ENCOUNTER
Patient came in today for his covid swab  He wanted to know how long should he be using the medication ?   He took it last night and it really helped him      Please advise

## 2022-02-17 ENCOUNTER — TELEPHONE (OUTPATIENT)
Dept: INTERNAL MEDICINE CLINIC | Facility: CLINIC | Age: 82
End: 2022-02-17

## 2022-02-17 LAB — SARS-COV-2 RNA RESP QL NAA+PROBE: NEGATIVE

## 2022-02-17 NOTE — TELEPHONE ENCOUNTER
----- Message from Levi Meraz MD sent at 2/17/2022 12:37 PM EST -----  Please call the patient regarding his covid test-normal

## 2022-02-18 ENCOUNTER — APPOINTMENT (OUTPATIENT)
Dept: LAB | Facility: CLINIC | Age: 82
End: 2022-02-18
Payer: MEDICARE

## 2022-02-18 DIAGNOSIS — I48.21 PERMANENT ATRIAL FIBRILLATION (HCC): Chronic | ICD-10-CM

## 2022-02-18 DIAGNOSIS — I10 BENIGN ESSENTIAL HYPERTENSION: Chronic | ICD-10-CM

## 2022-02-18 DIAGNOSIS — R73.03 PRE-DIABETES: Chronic | ICD-10-CM

## 2022-02-18 DIAGNOSIS — N40.0 BENIGN PROSTATIC HYPERPLASIA, UNSPECIFIED WHETHER LOWER URINARY TRACT SYMPTOMS PRESENT: ICD-10-CM

## 2022-02-18 DIAGNOSIS — E78.49 OTHER HYPERLIPIDEMIA: Chronic | ICD-10-CM

## 2022-02-18 LAB
ALBUMIN SERPL BCP-MCNC: 3.6 G/DL (ref 3.5–5)
ALP SERPL-CCNC: 49 U/L (ref 46–116)
ALT SERPL W P-5'-P-CCNC: 44 U/L (ref 12–78)
ANION GAP SERPL CALCULATED.3IONS-SCNC: 5 MMOL/L (ref 4–13)
AST SERPL W P-5'-P-CCNC: 34 U/L (ref 5–45)
BASOPHILS # BLD AUTO: 0.02 THOUSANDS/ΜL (ref 0–0.1)
BASOPHILS NFR BLD AUTO: 0 % (ref 0–1)
BILIRUB SERPL-MCNC: 0.55 MG/DL (ref 0.2–1)
BUN SERPL-MCNC: 22 MG/DL (ref 5–25)
CALCIUM SERPL-MCNC: 9.5 MG/DL (ref 8.3–10.1)
CHLORIDE SERPL-SCNC: 103 MMOL/L (ref 100–108)
CHOLEST SERPL-MCNC: 132 MG/DL
CO2 SERPL-SCNC: 30 MMOL/L (ref 21–32)
CREAT SERPL-MCNC: 1.01 MG/DL (ref 0.6–1.3)
CREAT UR-MCNC: 190 MG/DL
EOSINOPHIL # BLD AUTO: 0.18 THOUSAND/ΜL (ref 0–0.61)
EOSINOPHIL NFR BLD AUTO: 3 % (ref 0–6)
ERYTHROCYTE [DISTWIDTH] IN BLOOD BY AUTOMATED COUNT: 12.2 % (ref 11.6–15.1)
EST. AVERAGE GLUCOSE BLD GHB EST-MCNC: 117 MG/DL
GFR SERPL CREATININE-BSD FRML MDRD: 70 ML/MIN/1.73SQ M
GLUCOSE P FAST SERPL-MCNC: 104 MG/DL (ref 65–99)
HBA1C MFR BLD: 5.7 %
HCT VFR BLD AUTO: 41.1 % (ref 36.5–49.3)
HDLC SERPL-MCNC: 34 MG/DL
HGB BLD-MCNC: 14.2 G/DL (ref 12–17)
IMM GRANULOCYTES # BLD AUTO: 0.01 THOUSAND/UL (ref 0–0.2)
IMM GRANULOCYTES NFR BLD AUTO: 0 % (ref 0–2)
LDLC SERPL DIRECT ASSAY-MCNC: 89 MG/DL (ref 0–100)
LYMPHOCYTES # BLD AUTO: 1.89 THOUSANDS/ΜL (ref 0.6–4.47)
LYMPHOCYTES NFR BLD AUTO: 35 % (ref 14–44)
MCH RBC QN AUTO: 34 PG (ref 26.8–34.3)
MCHC RBC AUTO-ENTMCNC: 34.5 G/DL (ref 31.4–37.4)
MCV RBC AUTO: 98 FL (ref 82–98)
MICROALBUMIN UR-MCNC: 30.6 MG/L (ref 0–20)
MICROALBUMIN/CREAT 24H UR: 16 MG/G CREATININE (ref 0–30)
MONOCYTES # BLD AUTO: 0.81 THOUSAND/ΜL (ref 0.17–1.22)
MONOCYTES NFR BLD AUTO: 15 % (ref 4–12)
NEUTROPHILS # BLD AUTO: 2.57 THOUSANDS/ΜL (ref 1.85–7.62)
NEUTS SEG NFR BLD AUTO: 47 % (ref 43–75)
NRBC BLD AUTO-RTO: 0 /100 WBCS
PLATELET # BLD AUTO: 187 THOUSANDS/UL (ref 149–390)
PMV BLD AUTO: 11.1 FL (ref 8.9–12.7)
POTASSIUM SERPL-SCNC: 4.2 MMOL/L (ref 3.5–5.3)
PROT SERPL-MCNC: 7.2 G/DL (ref 6.4–8.2)
PSA SERPL-MCNC: 2.6 NG/ML (ref 0–4)
RBC # BLD AUTO: 4.18 MILLION/UL (ref 3.88–5.62)
SODIUM SERPL-SCNC: 138 MMOL/L (ref 136–145)
TRIGL SERPL-MCNC: 107 MG/DL
WBC # BLD AUTO: 5.48 THOUSAND/UL (ref 4.31–10.16)

## 2022-02-18 PROCEDURE — 80053 COMPREHEN METABOLIC PANEL: CPT

## 2022-02-18 PROCEDURE — 82570 ASSAY OF URINE CREATININE: CPT

## 2022-02-18 PROCEDURE — 83721 ASSAY OF BLOOD LIPOPROTEIN: CPT

## 2022-02-18 PROCEDURE — 85025 COMPLETE CBC W/AUTO DIFF WBC: CPT

## 2022-02-18 PROCEDURE — 83036 HEMOGLOBIN GLYCOSYLATED A1C: CPT

## 2022-02-18 PROCEDURE — 80061 LIPID PANEL: CPT

## 2022-02-18 PROCEDURE — 82043 UR ALBUMIN QUANTITATIVE: CPT

## 2022-02-18 PROCEDURE — 84153 ASSAY OF PSA TOTAL: CPT

## 2022-02-18 PROCEDURE — 36415 COLL VENOUS BLD VENIPUNCTURE: CPT

## 2022-02-22 ENCOUNTER — OFFICE VISIT (OUTPATIENT)
Dept: INTERNAL MEDICINE CLINIC | Facility: CLINIC | Age: 82
End: 2022-02-22
Payer: MEDICARE

## 2022-02-22 VITALS
HEART RATE: 67 BPM | WEIGHT: 232.6 LBS | BODY MASS INDEX: 34.45 KG/M2 | SYSTOLIC BLOOD PRESSURE: 132 MMHG | HEIGHT: 69 IN | RESPIRATION RATE: 16 BRPM | OXYGEN SATURATION: 98 % | TEMPERATURE: 98.5 F | DIASTOLIC BLOOD PRESSURE: 78 MMHG

## 2022-02-22 DIAGNOSIS — I10 BENIGN ESSENTIAL HYPERTENSION: Chronic | ICD-10-CM

## 2022-02-22 DIAGNOSIS — G47.33 OBSTRUCTIVE SLEEP APNEA: Primary | Chronic | ICD-10-CM

## 2022-02-22 DIAGNOSIS — I48.21 PERMANENT ATRIAL FIBRILLATION (HCC): ICD-10-CM

## 2022-02-22 DIAGNOSIS — K21.9 GASTROESOPHAGEAL REFLUX DISEASE, UNSPECIFIED WHETHER ESOPHAGITIS PRESENT: Chronic | ICD-10-CM

## 2022-02-22 DIAGNOSIS — E55.9 VITAMIN D DEFICIENCY: ICD-10-CM

## 2022-02-22 DIAGNOSIS — E78.49 OTHER HYPERLIPIDEMIA: Chronic | ICD-10-CM

## 2022-02-22 DIAGNOSIS — G47.00 INSOMNIA, UNSPECIFIED TYPE: ICD-10-CM

## 2022-02-22 PROCEDURE — 99215 OFFICE O/P EST HI 40 MIN: CPT | Performed by: INTERNAL MEDICINE

## 2022-02-22 RX ORDER — AMITRIPTYLINE HYDROCHLORIDE 10 MG/1
10 TABLET, FILM COATED ORAL DAILY
Qty: 30 TABLET | Refills: 0 | Status: SHIPPED | OUTPATIENT
Start: 2022-02-22 | End: 2022-07-14

## 2022-02-22 NOTE — PROGRESS NOTES
Assessment/Plan:    #URI  -has wheezing with chest congestion and nasal congestion  -covid test negative  -is improving  -was on tussionex but cough stopped and he stopped taking it  -will recommend mucinex for further relief    #Neuralgia  -over right ear  -remains on amitryptylline for many years  -was on 5mg daily and will cut down to every other day dosing then stop if doing well after 1 week    #Hearing Loss  -worsening  -encouraged to obtain hearing aid evaluation  -retired from [employer] and was not exposed to loud noises but mother had hearing loss    #LUÍS  -on APAP 4-20cm    #Erectile Dysfunction  -remains on levetra 20mg 1/2 tablet as needed    #Worsening vision  -seeing retina specialist and will see ophthalmology soon for consideration of new glasses  -had cataract surgery 8-9 years ago    #HTN  -BP stable on amlodipine and losartan, atenolol    #HLD  -LDL 89 HDL 34  -continue simvastatin    #Prediabetes  -a1c stable at 5 7  -is going to gym 5 days per week    #Sciatica  -over right thigh posterior  -seeing chiropractor  -has pinching sensation 30 minutes of driving    #Atrial Fibrillation  -remains on eliquis and atenolol    #GERD  -remains on omeprazole    #Cardiomyopathy  -2018 ECHO with EF 50%  -continue atenolol and losartan    Health Maintenance  -routine labs and followup 6 months  -verenice completed 2021    No problem-specific Assessment & Plan notes found for this encounter  Diagnoses and all orders for this visit:    Obstructive sleep apnea  -     CBC and differential; Future  -     Comprehensive metabolic panel; Future    Insomnia, unspecified type  -     amitriptyline (ELAVIL) 10 mg tablet; Take 1 tablet (10 mg total) by mouth daily  -     CBC and differential; Future  -     Comprehensive metabolic panel; Future    Permanent atrial fibrillation (HCC)  -     CBC and differential; Future  -     Comprehensive metabolic panel;  Future    Gastroesophageal reflux disease, unspecified whether esophagitis present  -     CBC and differential; Future  -     Comprehensive metabolic panel; Future    Benign essential hypertension  -     CBC and differential; Future  -     Comprehensive metabolic panel; Future    Other hyperlipidemia  -     CBC and differential; Future  -     Comprehensive metabolic panel; Future  -     Lipid Panel With Direct LDL; Future    Vitamin D deficiency  -     Vitamin D 25 hydroxy; Future            Current Outpatient Medications:     amitriptyline (ELAVIL) 10 mg tablet, Take 1 tablet (10 mg total) by mouth daily, Disp: 30 tablet, Rfl: 0    amLODIPine (NORVASC) 5 mg tablet, Take 1 tablet (5 mg total) by mouth daily at bedtime, Disp: 90 tablet, Rfl: 3    apixaban (Eliquis) 5 mg, Take 1 tablet (5 mg total) by mouth 2 (two) times a day, Disp: 180 tablet, Rfl: 3    atenolol (TENORMIN) 50 mg tablet, Take 1 tablet (50 mg total) by mouth 2 (two) times a day, Disp: 180 tablet, Rfl: 3    fluticasone (FLONASE) 50 mcg/act nasal spray, Spray 2 squirts in each nostril once daily, Disp: 16 g, Rfl: 3    hydrocortisone 2 5 % cream, APPLY  CREAM TO AFFECTED AREA TWICE DAILY, Disp: 28 g, Rfl: 1    losartan (COZAAR) 50 mg tablet, Take 1 tablet (50 mg total) by mouth daily, Disp: 90 tablet, Rfl: 3    Multiple Vitamins-Minerals (PRESERVISION AREDS PO), Take 1 tablet by mouth 2 (two) times a day, Disp: , Rfl:     omeprazole (PriLOSEC) 20 mg delayed release capsule, Take 1 capsule (20 mg total) by mouth daily, Disp: 90 capsule, Rfl: 3    simvastatin (ZOCOR) 40 mg tablet, Take 1 tablet (40 mg total) by mouth daily at bedtime, Disp: 90 tablet, Rfl: 3    triamcinolone (KENALOG) 0 1 % ointment, APPLY TOPICALLY DAILY FOR 2 WEEKS, Disp: 454 g, Rfl: 0    Vitamin D, Cholecalciferol, 1000 UNITS CAPS, Take by mouth, Disp: , Rfl:     Subjective:      Patient ID: Clarence Zheng is a 80 y o  male  HPI    Patient presents for routine checkup  Denies any recent hospitalizations or surgeries  He has obstructive sleep apnea and remains on his APAP 4-20 cm  He followed up with his ophthalmologist and his retina specialist   He reports that he has worsening vision  We encouraged him to follow-up with them for a vision exam and to consider getting glasses  Patient has chronic hearing loss and is currently worsening  We encouraged him to consider seeing [facility] for hearing aids  He is retired from [employer]  He denies any exposure to any loud noises  He states that his mother also had chronic hearing loss  Patient reports that he has been having upper respiratory symptoms with chest congestion since last week  His COVID test was negative  He was prescribed Tussionex which she had been taking but stopped because his symptoms were improving  He has has some mild wheezing on examination today  We recommended that he start taking Mucinex over-the-counter  Patient has right lower extremity sciatica and is currently seeing a chiropractor  He is doing stretching exercises  He continues to go to the gym 5 days a week  He will continue with this  He has been taking amitriptyline for neuralgic pain over his right ear  He has been doing this for many years and is interested in trying to see if he can go off the medication  He is currently taking 5 mg amitriptyline every night and we encouraged him to trial doing every other night for the next week and then stop the medication if his symptoms continue to improve or remains stable  Patient has atrial fibrillation and remains on Eliquis  His blood pressure is under control with amlodipine and losartan  He has hyperlipidemia and remains on simvastatin  His LDL was 89 and HDL 34 currently well controlled  His A1c was 5 7  Patient will return to care in 6 months with labs      The following portions of the patient's history were reviewed and updated as appropriate: allergies, current medications, past family history, past medical history, past social history, past surgical history and problem list     Review of Systems   Constitutional: Negative for activity change, appetite change, fatigue and fever  HENT: Positive for congestion and hearing loss  Negative for ear pain, rhinorrhea, sinus pressure, sinus pain, sore throat and tinnitus  Eyes: Negative for photophobia, pain and visual disturbance  Respiratory: Positive for wheezing  Negative for cough and shortness of breath  Cardiovascular: Negative for chest pain, palpitations and leg swelling  Gastrointestinal: Negative for abdominal distention, abdominal pain, constipation, diarrhea, nausea and vomiting  Genitourinary: Negative for difficulty urinating, frequency and hematuria  Musculoskeletal: Negative for arthralgias, back pain, gait problem, joint swelling, myalgias, neck pain and neck stiffness  Skin: Negative for color change, pallor, rash and wound  Neurological: Negative for dizziness, tremors, numbness and headaches  Hematological: Does not bruise/bleed easily  Objective:      /78   Pulse 67   Temp 98 5 °F (36 9 °C)   Resp 16   Ht 5' 9" (1 753 m)   Wt 106 kg (232 lb 9 6 oz)   SpO2 98%   BMI 34 35 kg/m²          Physical Exam  Vitals reviewed  Constitutional:       Appearance: Normal appearance  He is well-developed  He is obese  HENT:      Head: Normocephalic and atraumatic  Right Ear: Tympanic membrane, ear canal and external ear normal  There is no impacted cerumen  Left Ear: Tympanic membrane, ear canal and external ear normal  There is no impacted cerumen  Nose: Congestion present  Eyes:      Conjunctiva/sclera: Conjunctivae normal       Pupils: Pupils are equal, round, and reactive to light  Neck:      Thyroid: No thyromegaly  Vascular: No JVD  Cardiovascular:      Rate and Rhythm: Normal rate and regular rhythm  Heart sounds: Normal heart sounds  No murmur heard        Pulmonary:      Effort: Pulmonary effort is normal  No respiratory distress  Breath sounds: No stridor  Wheezing present  No rhonchi or rales  Abdominal:      General: Bowel sounds are normal  There is no distension  Palpations: Abdomen is soft  There is no mass  Tenderness: There is no abdominal tenderness  There is no guarding or rebound  Musculoskeletal:         General: Tenderness (right thigh) present  No deformity  Normal range of motion  Cervical back: Normal range of motion and neck supple  Right lower leg: No edema  Left lower leg: No edema  Lymphadenopathy:      Cervical: No cervical adenopathy  Skin:     General: Skin is warm and dry  Findings: No erythema or rash  Neurological:      Mental Status: He is alert and oriented to person, place, and time  Mental status is at baseline  Deep Tendon Reflexes: Reflexes are normal and symmetric  This note was completed in part utilizing [employer] fluency direct voice recognition software  Grammatical errors, random word insertion, spelling mistakes, and incomplete sentences may be an occasional consequence of the system secondary to software limitations, ambient noise and hardware issues  At the time of dictation, efforts were made to edit, clarify and /or correct errors  Please read the chart carefully and recognize, using context, where substitutions have occurred  If you have any questions or concerns about the context, text or information contained within the body of this dictation, please contact myself, the provider, for further clarification

## 2022-06-14 ENCOUNTER — TELEPHONE (OUTPATIENT)
Dept: INTERNAL MEDICINE CLINIC | Facility: CLINIC | Age: 82
End: 2022-06-14

## 2022-06-14 DIAGNOSIS — F40.240 CLAUSTROPHOBIA: Primary | ICD-10-CM

## 2022-06-14 RX ORDER — LORAZEPAM 0.5 MG/1
0.5 TABLET ORAL EVERY 8 HOURS PRN
Qty: 10 TABLET | Refills: 0 | Status: SHIPPED | OUTPATIENT
Start: 2022-06-14 | End: 2022-06-14

## 2022-06-14 RX ORDER — LORAZEPAM 0.5 MG/1
0.5 TABLET ORAL EVERY 8 HOURS PRN
Qty: 10 TABLET | Refills: 0 | Status: SHIPPED | OUTPATIENT
Start: 2022-06-14 | End: 2023-02-28

## 2022-06-14 NOTE — TELEPHONE ENCOUNTER
PT CALLED, HAS AN MRI THIS AFTERNOON AND IS ASKING FOR MEDICATION FOR HIS ANXIETY    SAID THAT HE THOUGHT IT WAS GOING TO BE AN OPEN MACHINE BUT IT'S NOT    IF OK, CAN BE SENT TO THE [facility]

## 2022-07-13 DIAGNOSIS — R21 SKIN RASH: ICD-10-CM

## 2022-07-13 DIAGNOSIS — G47.00 INSOMNIA, UNSPECIFIED TYPE: ICD-10-CM

## 2022-07-14 RX ORDER — AMITRIPTYLINE HYDROCHLORIDE 10 MG/1
TABLET, FILM COATED ORAL
Qty: 30 TABLET | Refills: 0 | Status: SHIPPED | OUTPATIENT
Start: 2022-07-14 | End: 2022-08-23

## 2022-08-17 ENCOUNTER — RA CDI HCC (OUTPATIENT)
Dept: OTHER | Facility: HOSPITAL | Age: 82
End: 2022-08-17

## 2022-08-17 NOTE — PROGRESS NOTES
[address]  coding opportunities       Chart reviewed, no opportunity found: CHART REVIEWED, NO OPPORTUNITY FOUND        Patients Insurance     Medicare Insurance: Medicare

## 2022-08-19 ENCOUNTER — APPOINTMENT (OUTPATIENT)
Dept: LAB | Facility: CLINIC | Age: 82
End: 2022-08-19
Payer: MEDICARE

## 2022-08-19 DIAGNOSIS — K21.9 GASTROESOPHAGEAL REFLUX DISEASE, UNSPECIFIED WHETHER ESOPHAGITIS PRESENT: Chronic | ICD-10-CM

## 2022-08-19 DIAGNOSIS — I48.21 PERMANENT ATRIAL FIBRILLATION (HCC): ICD-10-CM

## 2022-08-19 DIAGNOSIS — E78.49 OTHER HYPERLIPIDEMIA: Chronic | ICD-10-CM

## 2022-08-19 DIAGNOSIS — I10 BENIGN ESSENTIAL HYPERTENSION: Chronic | ICD-10-CM

## 2022-08-19 DIAGNOSIS — G47.33 OBSTRUCTIVE SLEEP APNEA: Chronic | ICD-10-CM

## 2022-08-19 DIAGNOSIS — E55.9 VITAMIN D DEFICIENCY: ICD-10-CM

## 2022-08-19 DIAGNOSIS — G47.00 INSOMNIA, UNSPECIFIED TYPE: ICD-10-CM

## 2022-08-19 LAB
25(OH)D3 SERPL-MCNC: 43.7 NG/ML (ref 30–100)
ALBUMIN SERPL BCP-MCNC: 4.2 G/DL (ref 3.5–5)
ALP SERPL-CCNC: 45 U/L (ref 34–104)
ALT SERPL W P-5'-P-CCNC: 27 U/L (ref 7–52)
ANION GAP SERPL CALCULATED.3IONS-SCNC: 7 MMOL/L (ref 4–13)
AST SERPL W P-5'-P-CCNC: 30 U/L (ref 13–39)
BASOPHILS # BLD AUTO: 0.03 THOUSANDS/ΜL (ref 0–0.1)
BASOPHILS NFR BLD AUTO: 1 % (ref 0–1)
BILIRUB SERPL-MCNC: 0.65 MG/DL (ref 0.2–1)
BUN SERPL-MCNC: 15 MG/DL (ref 5–25)
CALCIUM SERPL-MCNC: 9.5 MG/DL (ref 8.4–10.2)
CHLORIDE SERPL-SCNC: 101 MMOL/L (ref 96–108)
CHOLEST SERPL-MCNC: 157 MG/DL
CO2 SERPL-SCNC: 31 MMOL/L (ref 21–32)
CREAT SERPL-MCNC: 0.87 MG/DL (ref 0.6–1.3)
EOSINOPHIL # BLD AUTO: 0.11 THOUSAND/ΜL (ref 0–0.61)
EOSINOPHIL NFR BLD AUTO: 2 % (ref 0–6)
ERYTHROCYTE [DISTWIDTH] IN BLOOD BY AUTOMATED COUNT: 12.3 % (ref 11.6–15.1)
GFR SERPL CREATININE-BSD FRML MDRD: 81 ML/MIN/1.73SQ M
GLUCOSE P FAST SERPL-MCNC: 109 MG/DL (ref 65–99)
HCT VFR BLD AUTO: 39.2 % (ref 36.5–49.3)
HDLC SERPL-MCNC: 40 MG/DL
HGB BLD-MCNC: 13.6 G/DL (ref 12–17)
IMM GRANULOCYTES # BLD AUTO: 0.02 THOUSAND/UL (ref 0–0.2)
IMM GRANULOCYTES NFR BLD AUTO: 0 % (ref 0–2)
LDLC SERPL CALC-MCNC: 80 MG/DL (ref 0–100)
LDLC SERPL DIRECT ASSAY-MCNC: 100 MG/DL (ref 0–100)
LYMPHOCYTES # BLD AUTO: 1.77 THOUSANDS/ΜL (ref 0.6–4.47)
LYMPHOCYTES NFR BLD AUTO: 34 % (ref 14–44)
MCH RBC QN AUTO: 34.3 PG (ref 26.8–34.3)
MCHC RBC AUTO-ENTMCNC: 34.7 G/DL (ref 31.4–37.4)
MCV RBC AUTO: 99 FL (ref 82–98)
MONOCYTES # BLD AUTO: 0.71 THOUSAND/ΜL (ref 0.17–1.22)
MONOCYTES NFR BLD AUTO: 14 % (ref 4–12)
NEUTROPHILS # BLD AUTO: 2.6 THOUSANDS/ΜL (ref 1.85–7.62)
NEUTS SEG NFR BLD AUTO: 49 % (ref 43–75)
NONHDLC SERPL-MCNC: 117 MG/DL
NRBC BLD AUTO-RTO: 0 /100 WBCS
PLATELET # BLD AUTO: 185 THOUSANDS/UL (ref 149–390)
PMV BLD AUTO: 10.7 FL (ref 8.9–12.7)
POTASSIUM SERPL-SCNC: 4.1 MMOL/L (ref 3.5–5.3)
PROT SERPL-MCNC: 7 G/DL (ref 6.4–8.4)
RBC # BLD AUTO: 3.97 MILLION/UL (ref 3.88–5.62)
SODIUM SERPL-SCNC: 139 MMOL/L (ref 135–147)
TRIGL SERPL-MCNC: 186 MG/DL
WBC # BLD AUTO: 5.24 THOUSAND/UL (ref 4.31–10.16)

## 2022-08-19 PROCEDURE — 83721 ASSAY OF BLOOD LIPOPROTEIN: CPT

## 2022-08-19 PROCEDURE — 80053 COMPREHEN METABOLIC PANEL: CPT

## 2022-08-19 PROCEDURE — 36415 COLL VENOUS BLD VENIPUNCTURE: CPT

## 2022-08-19 PROCEDURE — 80061 LIPID PANEL: CPT

## 2022-08-19 PROCEDURE — 82306 VITAMIN D 25 HYDROXY: CPT

## 2022-08-19 PROCEDURE — 85025 COMPLETE CBC W/AUTO DIFF WBC: CPT

## 2022-08-23 ENCOUNTER — OFFICE VISIT (OUTPATIENT)
Dept: INTERNAL MEDICINE CLINIC | Facility: CLINIC | Age: 82
End: 2022-08-23
Payer: MEDICARE

## 2022-08-23 VITALS
DIASTOLIC BLOOD PRESSURE: 80 MMHG | WEIGHT: 239 LBS | SYSTOLIC BLOOD PRESSURE: 126 MMHG | OXYGEN SATURATION: 97 % | RESPIRATION RATE: 16 BRPM | BODY MASS INDEX: 35.4 KG/M2 | HEIGHT: 69 IN | HEART RATE: 74 BPM

## 2022-08-23 DIAGNOSIS — E66.01 OBESITY, MORBID (HCC): ICD-10-CM

## 2022-08-23 DIAGNOSIS — K21.9 GASTROESOPHAGEAL REFLUX DISEASE, UNSPECIFIED WHETHER ESOPHAGITIS PRESENT: ICD-10-CM

## 2022-08-23 DIAGNOSIS — Z00.00 MEDICARE ANNUAL WELLNESS VISIT, SUBSEQUENT: ICD-10-CM

## 2022-08-23 DIAGNOSIS — E55.9 VITAMIN D DEFICIENCY: ICD-10-CM

## 2022-08-23 DIAGNOSIS — R73.03 PREDIABETES: ICD-10-CM

## 2022-08-23 DIAGNOSIS — N40.0 BENIGN PROSTATIC HYPERPLASIA, UNSPECIFIED WHETHER LOWER URINARY TRACT SYMPTOMS PRESENT: ICD-10-CM

## 2022-08-23 DIAGNOSIS — G47.00 INSOMNIA, UNSPECIFIED TYPE: Primary | ICD-10-CM

## 2022-08-23 DIAGNOSIS — I48.21 PERMANENT ATRIAL FIBRILLATION (HCC): ICD-10-CM

## 2022-08-23 DIAGNOSIS — I10 BENIGN ESSENTIAL HYPERTENSION: ICD-10-CM

## 2022-08-23 DIAGNOSIS — G47.33 OBSTRUCTIVE SLEEP APNEA: ICD-10-CM

## 2022-08-23 DIAGNOSIS — Z13.29 SCREENING FOR THYROID DISORDER: ICD-10-CM

## 2022-08-23 DIAGNOSIS — E78.49 OTHER HYPERLIPIDEMIA: ICD-10-CM

## 2022-08-23 DIAGNOSIS — R21 SKIN RASH: ICD-10-CM

## 2022-08-23 PROCEDURE — G0439 PPPS, SUBSEQ VISIT: HCPCS | Performed by: INTERNAL MEDICINE

## 2022-08-23 PROCEDURE — 99214 OFFICE O/P EST MOD 30 MIN: CPT | Performed by: INTERNAL MEDICINE

## 2022-08-23 RX ORDER — AMITRIPTYLINE HYDROCHLORIDE 10 MG/1
5 TABLET, FILM COATED ORAL DAILY
Qty: 90 TABLET | Refills: 0
Start: 2022-08-23

## 2022-08-23 NOTE — PATIENT INSTRUCTIONS
Medicare Preventive Visit Patient Instructions  Thank you for completing your Welcome to Medicare Visit or Medicare Annual Wellness Visit today  Your next wellness visit will be due in one year (8/24/2023)  The screening/preventive services that you may require over the next 5-10 years are detailed below  Some tests may not apply to you based off risk factors and/or age  Screening tests ordered at today's visit but not completed yet may show as past due  Also, please note that scanned in results may not display below  Preventive Screenings:  Service Recommendations Previous Testing/Comments   Colorectal Cancer Screening  · Colonoscopy    · Fecal Occult Blood Test (FOBT)/Fecal Immunochemical Test (FIT)  · Fecal DNA/Cologuard Test  · Flexible Sigmoidoscopy Age: 39-70 years old   Colonoscopy: every 10 years (May be performed more frequently if at higher risk)  OR  FOBT/FIT: every 1 year  OR  Cologuard: every 3 years  OR  Sigmoidoscopy: every 5 years  Screening may be recommended earlier than age 39 if at higher risk for colorectal cancer  Also, an individualized decision between you and your healthcare provider will decide whether screening between the ages of 74-80 would be appropriate   Colonoscopy: 11/02/2021  FOBT/FIT: Not on file  Cologuard: 11/02/2021  Sigmoidoscopy: Not on file          Prostate Cancer Screening Individualized decision between patient and health care provider in men between ages of 53-78   Medicare will cover every 12 months beginning on the day after your 50th birthday PSA: 2 6 ng/mL     Screening Not Indicated     Hepatitis C Screening Once for adults born between 1945 and 1965  More frequently in patients at high risk for Hepatitis C Hep C Antibody: Not on file        Diabetes Screening 1-2 times per year if you're at risk for diabetes or have pre-diabetes Fasting glucose: 109 mg/dL (8/19/2022)  A1C: 5 7 % (2/18/2022)  Screening Current   Cholesterol Screening Once every 5 years if you don't have a lipid disorder  May order more often based on risk factors  Lipid panel: 08/19/2022  Screening Not Indicated  History Lipid Disorder      Other Preventive Screenings Covered by Medicare:  1  Abdominal Aortic Aneurysm (AAA) Screening: covered once if your at risk  You're considered to be at risk if you have a family history of AAA or a male between the age of 73-68 who smoking at least 100 cigarettes in your lifetime  2  Lung Cancer Screening: covers low dose CT scan once per year if you meet all of the following conditions: (1) Age 50-69; (2) No signs or symptoms of lung cancer; (3) Current smoker or have quit smoking within the last 15 years; (4) You have a tobacco smoking history of at least 20 pack years (packs per day x number of years you smoked); (5) You get a written order from a healthcare provider  3  Glaucoma Screening: covered annually if you're considered high risk: (1) You have diabetes OR (2) Family history of glaucoma OR (3)  aged 48 and older OR (3)  American aged 72 and older  3  Osteoporosis Screening: covered every 2 years if you meet one of the following conditions: (1) Have a vertebral abnormality; (2) On glucocorticoid therapy for more than 3 months; (3) Have primary hyperparathyroidism; (4) On osteoporosis medications and need to assess response to drug therapy  5  HIV Screening: covered annually if you're between the age of 12-76  Also covered annually if you are younger than 13 and older than 72 with risk factors for HIV infection  For pregnant patients, it is covered up to 3 times per pregnancy      Immunizations:  Immunization Recommendations   Influenza Vaccine Annual influenza vaccination during flu season is recommended for all persons aged >= 6 months who do not have contraindications   Pneumococcal Vaccine   * Pneumococcal conjugate vaccine = PCV13 (Prevnar 13), PCV15 (Vaxneuvance), PCV20 (Prevnar 20)  * Pneumococcal polysaccharide vaccine = PPSV23 (Pneumovax) Adults 2364 years old: 1-3 doses may be recommended based on certain risk factors  Adults 72 years old: 1-2 doses may be recommended based off what pneumonia vaccine you previously received   Hepatitis B Vaccine 3 dose series if at intermediate or high risk (ex: diabetes, end stage renal disease, liver disease)   Tetanus (Td) Vaccine - COST NOT COVERED BY MEDICARE PART B Following completion of primary series, a booster dose should be given every 10 years to maintain immunity against tetanus  Td may also be given as tetanus wound prophylaxis  Tdap Vaccine - COST NOT COVERED BY MEDICARE PART B Recommended at least once for all adults  For pregnant patients, recommended with each pregnancy  Shingles Vaccine (Shingrix) - COST NOT COVERED BY MEDICARE PART B  2 shot series recommended in those aged 48 and above     Health Maintenance Due:      Topic Date Due    Colorectal Cancer Screening  11/03/2021     Immunizations Due:      Topic Date Due    COVID-19 Vaccine (4 - Booster for Pfizer series) 03/20/2022    Influenza Vaccine (1) 09/01/2022     Advance Directives   What are advance directives? Advance directives are legal documents that state your wishes and plans for medical care  These plans are made ahead of time in case you lose your ability to make decisions for yourself  Advance directives can apply to any medical decision, such as the treatments you want, and if you want to donate organs  What are the types of advance directives? There are many types of advance directives, and each state has rules about how to use them  You may choose a combination of any of the following:  · Living will: This is a written record of the treatment you want  You can also choose which treatments you do not want, which to limit, and which to stop at a certain time  This includes surgery, medicine, IV fluid, and tube feedings  · Durable power of  for healthcare [facility]):   This is a written record that states who you want to make healthcare choices for you when you are unable to make them for yourself  This person, called a proxy, is usually a family member or a friend  You may choose more than 1 proxy  · Do not resuscitate (DNR) order:  A DNR order is used in case your heart stops beating or you stop breathing  It is a request not to have certain forms of treatment, such as CPR  A DNR order may be included in other types of advance directives  · Medical directive: This covers the care that you want if you are in a coma, near death, or unable to make decisions for yourself  You can list the treatments you want for each condition  Treatment may include pain medicine, surgery, blood transfusions, dialysis, IV or tube feedings, and a ventilator (breathing machine)  · Values history: This document has questions about your views, beliefs, and how you feel and think about life  This information can help others choose the care that you would choose  Why are advance directives important? An advance directive helps you control your care  Although spoken wishes may be used, it is better to have your wishes written down  Spoken wishes can be misunderstood, or not followed  Treatments may be given even if you do not want them  An advance directive may make it easier for your family to make difficult choices about your care  Weight Management   Why it is important to manage your weight:  Being overweight increases your risk of health conditions such as heart disease, high blood pressure, type 2 diabetes, and certain types of cancer  It can also increase your risk for osteoarthritis, sleep apnea, and other respiratory problems  Aim for a slow, steady weight loss  Even a small amount of weight loss can lower your risk of health problems  How to lose weight safely:  A safe and healthy way to lose weight is to eat fewer calories and get regular exercise   You can lose up about 1 pound a week by decreasing the number of calories you eat by 500 calories each day  Healthy meal plan for weight management:  A healthy meal plan includes a variety of foods, contains fewer calories, and helps you stay healthy  A healthy meal plan includes the following:  · Eat whole-grain foods more often  A healthy meal plan should contain fiber  Fiber is the part of grains, fruits, and vegetables that is not broken down by your body  Whole-grain foods are healthy and provide extra fiber in your diet  Some examples of whole-grain foods are whole-wheat breads and pastas, oatmeal, brown rice, and bulgur  · Eat a variety of vegetables every day  Include dark, leafy greens such as spinach, kale, lavelle greens, and mustard greens  Eat yellow and orange vegetables such as carrots, sweet potatoes, and winter squash  · Eat a variety of fruits every day  Choose fresh or canned fruit (canned in its own juice or light syrup) instead of juice  Fruit juice has very little or no fiber  · Eat low-fat dairy foods  Drink fat-free (skim) milk or 1% milk  Eat fat-free yogurt and low-fat cottage cheese  Try low-fat cheeses such as mozzarella and other reduced-fat cheeses  · Choose meat and other protein foods that are low in fat  Choose beans or other legumes such as split peas or lentils  Choose fish, skinless poultry (chicken or turkey), or lean cuts of red meat (beef or pork)  Before you cook meat or poultry, cut off any visible fat  · Use less fat and oil  Try baking foods instead of frying them  Add less fat, such as margarine, sour cream, regular salad dressing and mayonnaise to foods  Eat fewer high-fat foods  Some examples of high-fat foods include french fries, doughnuts, ice cream, and cakes  · Eat fewer sweets  Limit foods and drinks that are high in sugar  This includes candy, cookies, regular soda, and sweetened drinks  Exercise:  Exercise at least 30 minutes per day on most days of the week   Some examples of exercise include walking, biking, dancing, and swimming  You can also fit in more physical activity by taking the stairs instead of the elevator or parking farther away from stores  Ask your healthcare provider about the best exercise plan for you  © Copyright [employer] 2018 Information is for End User's use only and may not be sold, redistributed or otherwise used for commercial purposes   All illustrations and images included in CareNotes® are the copyrighted property of A D A M , [employer]  or [address] [place] [address]

## 2022-08-23 NOTE — PROGRESS NOTES
Assessment and Plan:     Problem List Items Addressed This Visit        Digestive    Gastroesophageal reflux disease (Chronic)    Relevant Orders    CBC and differential    Comprehensive metabolic panel       Respiratory    Obstructive sleep apnea (Chronic)    Relevant Orders    CBC and differential    Comprehensive metabolic panel       Cardiovascular and Mediastinum    Atrial fibrillation (HCC) (Chronic)    Relevant Orders    CBC and differential    Comprehensive metabolic panel    Benign essential hypertension (Chronic)    Relevant Orders    CBC and differential    Comprehensive metabolic panel       Genitourinary    Benign prostatic hyperplasia (Chronic)    Relevant Orders    PSA, total and free       Other    Hyperlipidemia (Chronic)    Relevant Orders    CBC and differential    Comprehensive metabolic panel    Lipid Panel with Direct LDL reflex    Insomnia - Primary (Chronic)    Relevant Medications    amitriptyline (ELAVIL) 10 mg tablet    Other Relevant Orders    CBC and differential    Comprehensive metabolic panel    Obesity, morbid (HCC)      Other Visit Diagnoses     Skin rash        Relevant Medications    hydrocortisone 2 5 % cream    Other Relevant Orders    CBC and differential    Comprehensive metabolic panel    Screening for thyroid disorder        Relevant Orders    TSH, 3rd generation with Free T4 reflex    Prediabetes        Relevant Orders    Hemoglobin A1C    Vitamin D deficiency        Relevant Orders    Vitamin D 25 hydroxy    Medicare annual wellness visit, subsequent               Preventive health issues were discussed with patient, and age appropriate screening tests were ordered as noted in patient's After Visit Summary  Personalized health advice and appropriate referrals for health education or preventive services given if needed, as noted in patient's After Visit Summary       History of Present Illness:     Patient presents for a Medicare Wellness Visit    HPI   Patient Care Team:  Chan Bertrand DO as PCP - General (Internal Medicine)  Mary Carmen Do MD as Cardiologist  MD Shad Crisostomo MD as Endoscopist     Review of Systems:     Review of Systems     Problem List:     Patient Active Problem List   Diagnosis    Atrial fibrillation ([facility] )    Hyperlipidemia    Benign essential hypertension    Obstructive sleep apnea    Anxiety    Arthritis    Benign prostatic hyperplasia    Diverticulosis    Dysphagia    Gastroesophageal reflux disease    Insomnia    Pre-diabetes    Rosacea    Tinea cruris    Obesity, morbid ([place] [address] )      Past Medical and Surgical History:     Past Medical History:   Diagnosis Date    Atrial fibrillation ([facility] )     Cardiomyopathy ([facility] )     LAST ASSESSED [id number]    Cataract     Dysphagia     GERD (gastroesophageal reflux disease)     Hyperlipidemia     Hypertension     Irregular heart beat     afib    Rosacea     Schatzki's ring     Sleep apnea     no cpap     Past Surgical History:   Procedure Laterality Date    BACK SURGERY      CATARACT EXTRACTION      COLONOSCOPY N/A 1/15/2016    Procedure: COLONOSCOPY;  Surgeon: Shad Cody MD;  [facility]; Service:     ESOPHAGOGASTRODUODENOSCOPY      EYE SURGERY      B/L cataract sx ([id number])    NC CIRCUMCISION,OTHR N/A 1/29/2018    Procedure: CIRCUMCISION ADULT;  Surgeon: Rubia Conklin MD;  [facility];  Service: Urology    NC ESOPHAGOGASTRODUODENOSCOPY TRANSORAL DIAGNOSTIC N/A 2/15/2017    Procedure: ESOPHAGOGASTRODUODENOSCOPY (EGD) WITH DILATION;  Surgeon: Violet Velasco MD;  [facility];   Service: Gastroenterology      Family History:     Family History   Problem Relation Age of Onset    Coronary artery disease Father     Coronary artery disease Family     Hyperlipidemia Family     Hypertension Family     Other Family         SUDDEN CARDIAC DEATH       Social History:     Social History     Socioeconomic History    Marital status: /Civil [employer]     Spouse name: Not on file    Number of children: Not on file    Years of education: Not on file    Highest education level: Not on file   Occupational History    Not on file   Tobacco Use    Smoking status: Former Smoker    Smokeless tobacco: Never Used    Tobacco comment: about 54 years ago   Vaping Use    Vaping Use: Never used   Substance and Sexual Activity    Alcohol use: Yes     Comment: 1-2 glasses of wine daily    Drug use: No    Sexual activity: Not Currently   Other Topics Concern    Not on file   Social History Narrative    Not on file     Social Determinants of Health     Financial Resource Strain: Not on file   Food Insecurity: Not on file   Transportation Needs: Not on file   Physical Activity: Not on file   Stress: Not on file   Social Connections: Not on file   Intimate Partner Violence: Not on file   Housing Stability: Not on file      Medications and Allergies:     Current Outpatient Medications   Medication Sig Dispense Refill    amitriptyline (ELAVIL) 10 mg tablet Take 0 5 tablets (5 mg total) by mouth daily 90 tablet 0    hydrocortisone 2 5 % cream Apply topically in the morning 56 g 3    LORazepam (Ativan) 0 5 mg tablet Take 1 tablet (0 5 mg total) by mouth every 8 (eight) hours as needed (take 1 hour prior to MRI) 10 tablet 0    amLODIPine (NORVASC) 5 mg tablet Take 1 tablet (5 mg total) by mouth daily at bedtime 90 tablet 3    apixaban (Eliquis) 5 mg Take 1 tablet (5 mg total) by mouth 2 (two) times a day 180 tablet 3    atenolol (TENORMIN) 50 mg tablet Take 1 tablet (50 mg total) by mouth 2 (two) times a day 180 tablet 3    fluticasone (FLONASE) 50 mcg/act nasal spray Spray 2 squirts in each nostril once daily 16 g 3    losartan (COZAAR) 50 mg tablet Take 1 tablet (50 mg total) by mouth daily 90 tablet 3    Multiple Vitamins-Minerals (PRESERVISION AREDS PO) Take 1 tablet by mouth 2 (two) times a day      omeprazole (PriLOSEC) 20 mg delayed release capsule Take 1 capsule (20 mg total) by mouth daily 90 capsule 3    simvastatin (ZOCOR) 40 mg tablet Take 1 tablet (40 mg total) by mouth daily at bedtime 90 tablet 3    triamcinolone (KENALOG) 0 1 % ointment APPLY TOPICALLY DAILY FOR 2 WEEKS 454 g 0    Vitamin D, Cholecalciferol, 1000 UNITS CAPS Take by mouth       No current facility-administered medications for this visit  Allergies   Allergen Reactions    Lisinopril Other (See Comments)     Persistent cough      Immunizations:     Immunization History   Administered Date(s) Administered    COVID-19 PFIZER VACCINE 0 3 ML IM 01/12/2021, 02/05/2021, 11/20/2021    H1N1, All Formulations 01/29/2010    INFLUENZA 09/30/2014, 10/18/2015, 09/28/2016, 10/14/2018    Influenza Quadrivalent Preservative Free 3 years and older IM 09/30/2014    Influenza Split High Dose Preservative Free IM 10/18/2015, 09/28/2016    Influenza, high dose seasonal 0 7 mL 09/17/2019, 10/05/2020, 11/15/2021    Influenza, seasonal, injectable 10/24/2012    Pneumococcal Conjugate 13-Valent 05/24/2016    Pneumococcal Polysaccharide PPV23 07/17/2020    Tdap 09/28/2016    Zoster Vaccine Recombinant 06/18/2019, 08/19/2019      Health Maintenance:         Topic Date Due    Colorectal Cancer Screening  11/03/2021         Topic Date Due    COVID-19 Vaccine (4 - Booster for Mirimus series) 03/20/2022    Influenza Vaccine (1) 09/01/2022      Medicare Screening Tests and Risk Assessments:     Quita Nageotte is here for his Subsequent Wellness visit  Last Medicare Wellness visit information reviewed, patient interviewed and updates made to the record today  Health Risk Assessment:   Patient rates overall health as very good  Patient feels that their physical health rating is slightly better  Patient is very satisfied with their life  Eyesight was rated as same  Hearing was rated as same  Patient feels that their emotional and mental health rating is slightly better   Patients states they are never, rarely angry  Patient states they are never, rarely unusually tired/fatigued  Pain experienced in the last 7 days has been some  Patient's pain rating has been 3/10  Patient states that he has experienced no weight loss or gain in last 6 months  Fall Risk Screening: In the past year, patient has experienced: no history of falling in past year      Home Safety:  Patient does not have trouble with stairs inside or outside of their home  Patient has working smoke alarms and has working carbon monoxide detector  Home safety hazards include: none  Nutrition:   Current diet is Regular and Limited junk food  Medications:   Patient is currently taking over-the-counter supplements  OTC medications include: see medication list  Patient is able to manage medications  Activities of Daily Living (ADLs)/Instrumental Activities of Daily Living (IADLs):   Walk and transfer into and out of bed and chair?: Yes  Dress and groom yourself?: Yes    Bathe or shower yourself?: Yes    Feed yourself? Yes  Do your laundry/housekeeping?: Yes  Manage your money, pay your bills and track your expenses?: Yes  Make your own meals?: Yes    Do your own shopping?: Yes    Previous Hospitalizations:   Any hospitalizations or ED visits within the last 12 months?: No      Advance Care Planning:   Living will: Yes    Durable POA for healthcare:  Yes    Advanced directive: Yes    Five wishes given: Yes      PREVENTIVE SCREENINGS      Cardiovascular Screening:    General: Screening Not Indicated and History Lipid Disorder      Diabetes Screening:     General: Screening Current      Prostate Cancer Screening:    General: Screening Not Indicated      Abdominal Aortic Aneurysm (AAA) Screening:    Risk factors include: tobacco use        Lung Cancer Screening:     General: Screening Not Indicated    Screening, Brief Intervention, and Referral to Treatment (SBIRT)    Screening  Typical number of drinks in a day: 2  Typical number of drinks in a week: 10  Interpretation: Low risk drinking behavior      Single Item Drug Screening:  How often have you used an illegal drug (including marijuana) or a prescription medication for non-medical reasons in the past year? never    Single Item Drug Screen Score: 0  Interpretation: Negative screen for possible drug use disorder    No exam data present     Physical Exam:     /80   Pulse 74   Resp 16   Ht 5' 9" (1 753 m)   Wt 108 kg (239 lb)   SpO2 97%   BMI 35 29 kg/m²     Physical Exam     Jennie Orf, DO

## 2022-08-23 NOTE — PROGRESS NOTES
Assessment/Plan:    #Neuralgia   -over right posterior ear  -remains on amitryptylline for many years   -was on 5mg daily and to consider stopping in the future but patient defers for now    #Hearing Loss   -worsening   -encouraged to obtain hearing aid evaluation but he would like to wait for OTC hearing aids  -retired from [employer] and was not exposed to loud noises but mother had hearing loss     #LUÍS  -on APAP 4-20cm and wearing every night  -reports that it helped with sleeping and nocturia    #Erectile Dysfunction   -remains on levetra 20mg 1/2 tablet as needed     #Worsening vision   -seeing retina specialist  -had cataract surgery 8-9 years ago     #HTN   -BP stable on amlodipine and losartan, atenolol  And will continue    #HLD   - HDL 40  -weight up 7lbs due to lack diet control and he will start monitoring  -continue simvastatin     #Alcohol  -drinking 3 glasses wine a day and encouraged to cut back    #Prediabetes    -is going to gym 5 days per week at [facility]    #Sciatica   -over right thigh posterior leg  -MRI with L4-5 moderate disc osteophyte with complex ecentric to right with superimposed right L5 nerve compression  -neurosurgery suggested laminectomy but patinet holding off since symptoms are mild unless he is driving  -seeing PT  -has pinching sensation after 30 minutes of driving and has to stretch  -previous epidural without relief    #Atrial Fibrillation   -remains on eliquis and atenolol   -chronic afib    #GERD  -remains on omeprazole and will try to take every other day to reduce risk of dementia  -defers alternating with pepcid for now    #Cardiomyopathy   -2018 ECHO with EF 50%   -continue atenolol and losartan     Health Maintenance   -routine labs and followup 6 months   -verenice completed 2021 and does not rquir further screening due to age      No problem-specific Assessment & Plan notes found for this encounter         Diagnoses and all orders for this visit:    Insomnia, unspecified type  -     CBC and differential; Future  -     Comprehensive metabolic panel; Future  -     amitriptyline (ELAVIL) 10 mg tablet; Take 0 5 tablets (5 mg total) by mouth daily    Skin rash  -     hydrocortisone 2 5 % cream; Apply topically in the morning  -     CBC and differential; Future  -     Comprehensive metabolic panel; Future    Permanent atrial fibrillation (HCC)  -     CBC and differential; Future  -     Comprehensive metabolic panel; Future    Obstructive sleep apnea  -     CBC and differential; Future  -     Comprehensive metabolic panel; Future    Gastroesophageal reflux disease, unspecified whether esophagitis present  -     CBC and differential; Future  -     Comprehensive metabolic panel; Future    Benign essential hypertension  -     CBC and differential; Future  -     Comprehensive metabolic panel; Future    Other hyperlipidemia  -     CBC and differential; Future  -     Comprehensive metabolic panel; Future  -     Lipid Panel with Direct LDL reflex; Future    Screening for thyroid disorder  -     TSH, 3rd generation with Free T4 reflex; Future    Prediabetes  -     Hemoglobin A1C; Future    Vitamin D deficiency  -     Vitamin D 25 hydroxy; Future    Benign prostatic hyperplasia, unspecified whether lower urinary tract symptoms present  -     PSA, total and free;  Future    Obesity, morbid ([facility] )    Medicare annual wellness visit, subsequent            Current Outpatient Medications:     amitriptyline (ELAVIL) 10 mg tablet, Take 0 5 tablets (5 mg total) by mouth daily, Disp: 90 tablet, Rfl: 0    hydrocortisone 2 5 % cream, Apply topically in the morning, Disp: 56 g, Rfl: 3    LORazepam (Ativan) 0 5 mg tablet, Take 1 tablet (0 5 mg total) by mouth every 8 (eight) hours as needed (take 1 hour prior to MRI), Disp: 10 tablet, Rfl: 0    amLODIPine (NORVASC) 5 mg tablet, Take 1 tablet (5 mg total) by mouth daily at bedtime, Disp: 90 tablet, Rfl: 3    apixaban (Eliquis) 5 mg, Take 1 tablet (5 mg total) by mouth 2 (two) times a day, Disp: 180 tablet, Rfl: 3    atenolol (TENORMIN) 50 mg tablet, Take 1 tablet (50 mg total) by mouth 2 (two) times a day, Disp: 180 tablet, Rfl: 3    fluticasone (FLONASE) 50 mcg/act nasal spray, Spray 2 squirts in each nostril once daily, Disp: 16 g, Rfl: 3    losartan (COZAAR) 50 mg tablet, Take 1 tablet (50 mg total) by mouth daily, Disp: 90 tablet, Rfl: 3    Multiple Vitamins-Minerals (PRESERVISION AREDS PO), Take 1 tablet by mouth 2 (two) times a day, Disp: , Rfl:     omeprazole (PriLOSEC) 20 mg delayed release capsule, Take 1 capsule (20 mg total) by mouth daily, Disp: 90 capsule, Rfl: 3    simvastatin (ZOCOR) 40 mg tablet, Take 1 tablet (40 mg total) by mouth daily at bedtime, Disp: 90 tablet, Rfl: 3    triamcinolone (KENALOG) 0 1 % ointment, APPLY TOPICALLY DAILY FOR 2 WEEKS, Disp: 454 g, Rfl: 0    Vitamin D, Cholecalciferol, 1000 UNITS CAPS, Take by mouth, Disp: , Rfl:     Subjective:      Patient ID: Lea Born is a [de-identified] y o  male  HPI     Patient presents for routine checkup  Denies any recent hospitalizations or surgeries  He has returned sleep apnea and remains on APAP and has been wearing his without any difficulty  He has neuralgia over his right ear over his posterior side  States that he is currently on amitriptyline  Previously we had suggested that he could wean off of this  He remains on 5 mg a day and states that he would like to continue it for now  He has worsening hearing loss however he defers hearing aid evaluation for now  He is waiting for over-the-counter hearing aids  He continues to exercising go to the [facility] 5 days a week  His LDL was 100 and HDL 40 currently on simvastatin and we will continue  Encouraged him to diet and exercise  He has gained approximately 7 lb since his last visit  Blood pressure is stable today on amlodipine losartan and atenolol    Continues to have lower lumbar back pain on occasion  MRI revealed L4-L5 moderate disc osteophyte complex with transverse compression of the nerve root at L5  He underwent an epidural in the past but it did not  Number surgery recommended laminectomy however patient would like to hold off on this for now  He is currently seeing physical therapy  Remains on omeprazole for acid reflux  He will try to cut down to every other day dosing to see if this will continue to help  His atrial fibrillation and is chronically unit  He remains on Eliquis and atenolol  He will return to care in 6 months  The following portions of the patient's history were reviewed and updated as appropriate: allergies, current medications, past family history, past medical history, past social history, past surgical history and problem list     Review of Systems   Constitutional: Negative for activity change, appetite change, fatigue and fever  HENT: Positive for hearing loss  Negative for congestion, ear pain, sore throat and tinnitus  Eyes: Negative for photophobia, pain and visual disturbance  Respiratory: Negative for cough, shortness of breath and wheezing  Cardiovascular: Negative for chest pain and leg swelling  Gastrointestinal: Negative for abdominal distention, abdominal pain, nausea and vomiting  Genitourinary: Negative for difficulty urinating, frequency and hematuria  Musculoskeletal: Positive for back pain (lower back)  Negative for arthralgias, joint swelling, neck pain and neck stiffness  Skin: Negative for color change, pallor, rash and wound  Neurological: Positive for numbness (right leg)  Negative for dizziness, tremors and headaches  Hematological: Does not bruise/bleed easily  Objective:      /80   Pulse 74   Resp 16   Ht 5' 9" (1 753 m)   Wt 108 kg (239 lb)   SpO2 97%   BMI 35 29 kg/m²          Physical Exam  Vitals reviewed  Constitutional:       Appearance: He is well-developed     HENT:      Head: Normocephalic and atraumatic  Right Ear: External ear normal       Left Ear: External ear normal       Nose: Nose normal       Mouth/Throat:      Pharynx: Oropharynx is clear  No oropharyngeal exudate or posterior oropharyngeal erythema  Eyes:      Conjunctiva/sclera: Conjunctivae normal       Pupils: Pupils are equal, round, and reactive to light  Neck:      Thyroid: No thyromegaly  Vascular: No JVD  Cardiovascular:      Rate and Rhythm: Normal rate  Rhythm irregular  Heart sounds: Normal heart sounds  No murmur heard  Pulmonary:      Effort: Pulmonary effort is normal  No respiratory distress  Breath sounds: Normal breath sounds  No stridor  No wheezing, rhonchi or rales  Abdominal:      General: Bowel sounds are normal  There is no distension  Palpations: Abdomen is soft  Tenderness: There is no abdominal tenderness  There is no guarding or rebound  Musculoskeletal:         General: Tenderness (right lumbar) present  No deformity  Normal range of motion  Cervical back: Normal range of motion and neck supple  Right lower leg: No edema  Left lower leg: No edema  Lymphadenopathy:      Cervical: No cervical adenopathy  Skin:     General: Skin is warm and dry  Findings: Rash (venous stasis changes on legs) present  No erythema or lesion  Neurological:      Mental Status: He is alert and oriented to person, place, and time  Mental status is at baseline  Cranial Nerves: No cranial nerve deficit  Sensory: No sensory deficit  Motor: No weakness  Coordination: Coordination normal       Gait: Gait normal       Deep Tendon Reflexes: Reflexes are normal and symmetric  Reflexes normal            This note was completed in part utilizing [place] direct voice recognition software     Grammatical errors, random word insertion, spelling mistakes, and incomplete sentences may be an occasional consequence of the system secondary to software limitations, ambient noise and hardware issues  At the time of dictation, efforts were made to edit, clarify and /or correct errors  Please read the chart carefully and recognize, using context, where substitutions have occurred  If you have any questions or concerns about the context, text or information contained within the body of this dictation, please contact myself, the provider, for further clarification

## 2022-10-20 ENCOUNTER — CLINICAL SUPPORT (OUTPATIENT)
Dept: INTERNAL MEDICINE CLINIC | Facility: CLINIC | Age: 82
End: 2022-10-20
Payer: MEDICARE

## 2022-10-20 DIAGNOSIS — Z23 ENCOUNTER FOR IMMUNIZATION: Primary | ICD-10-CM

## 2022-10-20 PROCEDURE — 90662 IIV NO PRSV INCREASED AG IM: CPT | Performed by: INTERNAL MEDICINE

## 2022-10-20 PROCEDURE — G0008 ADMIN INFLUENZA VIRUS VAC: HCPCS | Performed by: INTERNAL MEDICINE

## 2022-12-01 ENCOUNTER — OFFICE VISIT (OUTPATIENT)
Dept: SLEEP CENTER | Facility: CLINIC | Age: 82
End: 2022-12-01

## 2022-12-01 VITALS
SYSTOLIC BLOOD PRESSURE: 146 MMHG | DIASTOLIC BLOOD PRESSURE: 90 MMHG | WEIGHT: 244 LBS | BODY MASS INDEX: 36.14 KG/M2 | HEIGHT: 69 IN

## 2022-12-01 DIAGNOSIS — G47.33 OSA (OBSTRUCTIVE SLEEP APNEA): Primary | ICD-10-CM

## 2022-12-01 NOTE — PROGRESS NOTES
Progress Note - Sleep Center   Sherry Ratliff PWY:[birth year] MRN: [id number]      Reason for Visit:  [de-identified] y o male here for annual follow-up    Assessment:  Doing well on current therapy of APAP 6-20 cm for severe LUÍS (QUINCY= 53 6)  Plan:  Continue same    Follow up: One year    History of Present Illness:  History of LUÍS on PAP therapy  Fully compliant and deriving benefit  Review of Systems      Genitourinary none   Cardiology none   Gastrointestinal frequent heartburn/acid reflux   Neurology none   Constitutional claustrophobia and weight change   Integumentary none   Psychiatry none   Musculoskeletal back pain   Pulmonary snoring   ENT none   Endocrine none   Hematological none         I have reviewed and updated the review of systems as necessary      Historical Information    Past Medical History:   Past Medical History:   Diagnosis Date   • Atrial fibrillation ([facility] )    • Cardiomyopathy ([facility] [address] )     LAST ASSESSED [id number]   • Cataract    • Dysphagia    • GERD (gastroesophageal reflux disease)    • Hyperlipidemia    • Hypertension    • Irregular heart beat     afib   • Rosacea    • Schatzki's ring    • Sleep apnea     no cpap         Past Surgical History:   Past Surgical History:   Procedure Laterality Date   • BACK SURGERY     • CATARACT EXTRACTION     • COLONOSCOPY N/A 1/15/2016    Procedure: COLONOSCOPY;  Surgeon: Mony Slater MD;  Location: [facility]; Service:    • ESOPHAGOGASTRODUODENOSCOPY     • EYE SURGERY      B/L cataract sx ([id number])   • WI CIRCUMCISION,OTHR N/A 1/29/2018    Procedure: CIRCUMCISION ADULT;  Surgeon: Jacquelyn Morgan MD;  Location: [facility];  Service: Urology   • WI ESOPHAGOGASTRODUODENOSCOPY TRANSORAL DIAGNOSTIC N/A 2/15/2017    Procedure: ESOPHAGOGASTRODUODENOSCOPY (EGD) WITH DILATION;  Surgeon: Lupis Roblero MD;  Location: [facility];   Service: Gastroenterology       Social History:   Social History     Socioeconomic History   • Marital status: /Civil Union Spouse name: Not on file   • Number of children: Not on file   • Years of education: Not on file   • Highest education level: Not on file   Occupational History   • Not on file   Tobacco Use   • Smoking status: Former   • Smokeless tobacco: Never   • Tobacco comments:     about 54 years ago   Vaping Use   • Vaping Use: Never used   Substance and Sexual Activity   • Alcohol use: Yes     Comment: 1-2 glasses of wine daily   • Drug use: No   • Sexual activity: Not Currently   Other Topics Concern   • Not on file   Social History Narrative   • Not on file     Social Determinants of Health     Financial Resource Strain: Not on file   Food Insecurity: Not on file   Transportation Needs: Not on file   Physical Activity: Not on file   Stress: Not on file   Social Connections: Not on file   Intimate Partner Violence: Not on file   Housing Stability: Not on file       Family History:   Family History   Problem Relation Age of Onset   • Coronary artery disease Father    • Coronary artery disease Family    • Hyperlipidemia Family    • Hypertension Family    • Other Family         SUDDEN CARDIAC DEATH        Medications/Allergies:      Current Outpatient Medications:   •  amLODIPine (NORVASC) 5 mg tablet, Take 1 tablet (5 mg total) by mouth daily at bedtime, Disp: 90 tablet, Rfl: 3  •  apixaban (Eliquis) 5 mg, Take 1 tablet (5 mg total) by mouth 2 (two) times a day, Disp: 180 tablet, Rfl: 3  •  atenolol (TENORMIN) 50 mg tablet, Take 1 tablet (50 mg total) by mouth 2 (two) times a day, Disp: 180 tablet, Rfl: 3  •  fluticasone (FLONASE) 50 mcg/act nasal spray, Spray 2 squirts in each nostril once daily, Disp: 16 g, Rfl: 3  •  hydrocortisone 2 5 % cream, Apply topically in the morning, Disp: 56 g, Rfl: 3  •  LORazepam (Ativan) 0 5 mg tablet, Take 1 tablet (0 5 mg total) by mouth every 8 (eight) hours as needed (take 1 hour prior to MRI) (Patient not taking: Reported on 12/1/2022), Disp: 10 tablet, Rfl: 0  •  losartan (COZAAR) 50 mg tablet, Take 1 tablet (50 mg total) by mouth daily, Disp: 90 tablet, Rfl: 3  •  Multiple Vitamins-Minerals (PRESERVISION AREDS PO), Take 1 tablet by mouth 2 (two) times a day, Disp: , Rfl:   •  omeprazole (PriLOSEC) 20 mg delayed release capsule, Take 1 capsule (20 mg total) by mouth daily, Disp: 90 capsule, Rfl: 3  •  simvastatin (ZOCOR) 40 mg tablet, Take 1 tablet (40 mg total) by mouth daily at bedtime, Disp: 90 tablet, Rfl: 3  •  triamcinolone (KENALOG) 0 1 % ointment, APPLY TOPICALLY DAILY FOR 2 WEEKS, Disp: 454 g, Rfl: 0  •  Vitamin D, Cholecalciferol, 1000 UNITS CAPS, Take by mouth, Disp: , Rfl:   •  amitriptyline (ELAVIL) 10 mg tablet, Take 0 5 tablets (5 mg total) by mouth daily (Patient not taking: Reported on 12/1/2022), Disp: 90 tablet, Rfl: 0          Objective      Vital Signs:   Vitals:    12/01/22 1421   BP: 146/90     Neal Sleepiness Scale:          Physical Exam:    General: Alert, appropriate, cooperative, overweight    Head: NC/AT    Skin: Warm, dry    Neuro: No motor abnormalities, cranial nerves appear intact    Extremity: No clubbing, cyanosis      DME Provider: ROBERT        Counseling / Coordination of Care   I have spent 15 minutes with the patient today in which greater than 50% of this time was spent in counseling/coordination of care regarding: equipment and compliance  Board Certified Sleep Specialist    Portions of the record may have been created with voice recognition software  Occasional wrong word or "sound a like" substitutions may have occurred due to the inherent limitations of voice recognition software  Read the chart carefully and recognize, using context, where substitutions have occurred

## 2023-01-11 DIAGNOSIS — R21 SKIN RASH: ICD-10-CM

## 2023-01-11 DIAGNOSIS — I48.91 ATRIAL FIBRILLATION, UNSPECIFIED TYPE (HCC): ICD-10-CM

## 2023-01-11 DIAGNOSIS — K21.9 GASTROESOPHAGEAL REFLUX DISEASE: ICD-10-CM

## 2023-01-11 DIAGNOSIS — I10 ESSENTIAL HYPERTENSION: ICD-10-CM

## 2023-01-11 DIAGNOSIS — E78.2 MIXED HYPERLIPIDEMIA: ICD-10-CM

## 2023-01-11 RX ORDER — OMEPRAZOLE 20 MG/1
20 CAPSULE, DELAYED RELEASE ORAL DAILY
Qty: 90 CAPSULE | Refills: 3 | Status: SHIPPED | OUTPATIENT
Start: 2023-01-11

## 2023-01-11 RX ORDER — SIMVASTATIN 40 MG
40 TABLET ORAL
Qty: 90 TABLET | Refills: 3 | Status: SHIPPED | OUTPATIENT
Start: 2023-01-11

## 2023-01-11 RX ORDER — LOSARTAN POTASSIUM 50 MG/1
50 TABLET ORAL DAILY
Qty: 90 TABLET | Refills: 3 | Status: SHIPPED | OUTPATIENT
Start: 2023-01-11

## 2023-01-11 RX ORDER — ATENOLOL 50 MG/1
50 TABLET ORAL 2 TIMES DAILY
Qty: 180 TABLET | Refills: 3 | Status: SHIPPED | OUTPATIENT
Start: 2023-01-11

## 2023-01-11 RX ORDER — AMLODIPINE BESYLATE 5 MG/1
5 TABLET ORAL
Qty: 90 TABLET | Refills: 3 | Status: SHIPPED | OUTPATIENT
Start: 2023-01-11

## 2023-02-15 ENCOUNTER — OFFICE VISIT (OUTPATIENT)
Dept: DERMATOLOGY | Facility: CLINIC | Age: 83
End: 2023-02-15

## 2023-02-15 VITALS — WEIGHT: 241 LBS | HEIGHT: 69 IN | BODY MASS INDEX: 35.7 KG/M2

## 2023-02-15 DIAGNOSIS — I87.2 STASIS DERMATITIS OF BOTH LEGS: ICD-10-CM

## 2023-02-15 DIAGNOSIS — D48.5 NEOPLASM OF UNCERTAIN BEHAVIOR OF SKIN: ICD-10-CM

## 2023-02-15 DIAGNOSIS — L30.4 INTERTRIGO: Primary | ICD-10-CM

## 2023-02-15 NOTE — PROGRESS NOTES
[facility]     Patient Name: Jonathan Pedroza  Encounter Date: 02/15/2023     Have you been cared for by a [place] [address] Dermatologist in the last 3 years and, if so, which description applies to you? Yes  I have been here within the last 3 years, and my medical history has NOT changed since that time  I am MALE/not capable of bearing children  REVIEW OF SYSTEMS:  Have you recently had or currently have any of the following? · No changes in my recent health  PAST MEDICAL HISTORY:  Have you personally ever had or currently have any of the following? If "YES," then please provide more detail  · No changes in my medical history  FAMILY HISTORY:  Any "first degree relatives" (parent, brother, sister, or child) with the following? • No changes in my family's known health  PATIENT EXPERIENCE:    • Do you want the Dermatologist to perform a COMPLETE skin exam today including a clinical examination under the "bra and underwear" areas? NO  • If necessary, do we have your permission to call and leave a detailed message on your Preferred Phone number that includes your specific medical information?   Yes      Allergies   Allergen Reactions   • Lisinopril Other (See Comments)     Persistent cough      Current Outpatient Medications:   •  amLODIPine (NORVASC) 5 mg tablet, Take 1 tablet (5 mg total) by mouth daily at bedtime, Disp: 90 tablet, Rfl: 3  •  apixaban (Eliquis) 5 mg, Take 1 tablet (5 mg total) by mouth 2 (two) times a day, Disp: 180 tablet, Rfl: 3  •  atenolol (TENORMIN) 50 mg tablet, Take 1 tablet (50 mg total) by mouth 2 (two) times a day, Disp: 180 tablet, Rfl: 3  •  hydrocortisone 2 5 % cream, Apply topically in the morning, Disp: 56 g, Rfl: 3  •  LORazepam (Ativan) 0 5 mg tablet, Take 1 tablet (0 5 mg total) by mouth every 8 (eight) hours as needed (take 1 hour prior to MRI) (Patient not taking: Reported on 12/1/2022), Disp: 10 tablet, Rfl: 0  •  losartan (COZAAR) 50 mg tablet, Take 1 tablet (50 mg total) by mouth daily, Disp: 90 tablet, Rfl: 3  •  omeprazole (PriLOSEC) 20 mg delayed release capsule, Take 1 capsule (20 mg total) by mouth daily, Disp: 90 capsule, Rfl: 3  •  simvastatin (ZOCOR) 40 mg tablet, Take 1 tablet (40 mg total) by mouth daily at bedtime, Disp: 90 tablet, Rfl: 3  •  amitriptyline (ELAVIL) 10 mg tablet, Take 0 5 tablets (5 mg total) by mouth daily (Patient not taking: Reported on 12/1/2022), Disp: 90 tablet, Rfl: 0  •  fluticasone (FLONASE) 50 mcg/act nasal spray, Spray 2 squirts in each nostril once daily, Disp: 16 g, Rfl: 3  •  Multiple Vitamins-Minerals (PRESERVISION AREDS PO), Take 1 tablet by mouth 2 (two) times a day, Disp: , Rfl:   •  triamcinolone (KENALOG) 0 1 % ointment, APPLY TOPICALLY DAILY FOR 2 WEEKS, Disp: 454 g, Rfl: 0  •  Vitamin D, Cholecalciferol, 1000 UNITS CAPS, Take by mouth, Disp: , Rfl:           • Whom besides the patient is providing clinical information about today's encounter?   o NO ADDITIONAL HISTORIAN (patient alone provided history)    Physical Exam and Assessment/Plan by Diagnosis:    STASIS      Physical Exam:  • Anatomic Location Affected:  Bilateral lower legs   • Morphological Description:  Indurated hyperpigmentation and sclerosus anterior shin right greater than left  • Pertinent Positives: Pulses are normal   • Pertinent Negatives:     Additional History of Present Condition:  Does not wear support      Assessment and Plan:  Based on a thorough discussion of this condition and the management approach to it (including a comprehensive discussion of the known risks, side effects and potential benefits of treatment), the patient (family) agrees to implement the following specific plan:  • Apply Vaseline 2-3 times a day   • Recommended to use moderate support stockinets  • I discussed that support stockings are inmportant to prevent ulceration and progression of stasis changes    ·   ConitnueTriamcinolone 0 1% ointment once daily for 2 weeks for flares  Discussed the Triamcinolone can thin your skin       INTERTRIGO     Physical Exam:  • Anatomic Location Affected:  Groin  • Morphological Description:  Quite now  • Pertinent Positives:  • Pertinent Negatives:     Additional History of Present Condition:  He applies hydrocortisone cream when it gets worse       Assessment and Plan:  Based on a thorough discussion of this condition and the management approach to it (including a comprehensive discussion of the known risks, side effects and potential benefits of treatment), the patient (family) agrees to implement the following specific plan:  • Recommended to use prescribed Hydrocortisone ( patient has it) and over the counter Lotrimin together when the area is red and itchy  • When the area is good/better use the Zeasorb AF powder ( Antifungal treatment), dry your the groin area very well after your showers  May use a hair dryer  Advised to keep the area dry as much as possible  •      Assessment and Plan  Intertrigo describes a rash in the flexures or body folds, such as behind the ears, in the folds of the neck, under the arms (axillae), under a protruding abdomen, in the groin, between the buttocks, in the finger webs or toe spaces  Although intertrigo may affect one skin fold, it is common for it to involve multiple sites      Intertrigo can affect males and females of any age  It is particularly common in people that are overweight or obese (see metabolic syndrome)  Other contributing factors are:  • Genetic tendency to skin disease  • Hyperhidrosis (excessive sweating)     Intertrigo can be acute (recent onset), relapsing (recurrent), or chronic (present for more than 6 weeks)  The exact appearance and behaviour depends on the underlying cause or causes  The skin affected by intertrigo is inflamed, ie reddened and uncomfortable  It may become moist and macerated, leading to fissuring (cracks) and peeling    Intertrigo is due to genetic and environmental factors  • Flexural skin has relatively high surface temperature  • Moisture from insensible water loss and sweating cannot evaporate due to occlusion  • Friction from movement of adjacent skin results in chafing      The microorganisms that are normally resident on flexural skin, the microbiome, include corynebacteria, other bacteria and yeasts  These multiply in warm moist environments and may cause disease      We can classify intertrigo into infectious and inflammatory origin but there is often overlap  • Infections tend to be unilateral and asymmetrical   • Inflammatory disorders tend to be symmetrical affecting armpits, groins, under the breasts and the abdominal folds, except atopic dermatitis, which more often arises on the neck, and in elbow and knee creases      Investigations may be necessary to determine the cause of intertrigo  • A swab for microscopy and culture of bacteria (microbiology)  • A scraping for microscopy and culture of fungi (mycology)  • A skin biopsy may be performed for histopathology if the skin condition is unusual or fails to respond to treatment      What is the treatment for intertrigo? Treatment depends on the underlying cause, if identified, and on which micro-organisms are present in the rash  Combinations are common  • Sweating may be reduced with a gentle antiperspirant  • Physical exertion should be followed by a bath and completely drying the skin folds using a hair dryer on cool setting, soft towel and/or corn starch powder  • Triple paste contains petrolatum, zinc oxide, and aluminum acetate solution to reduce friction, irritation and sweating  • Bacteria may be treated with topical antibiotics such as fusidic acid cream, mupirocin ointment, or oral antibiotics such as flucloxacillin and erythromycin    • Yeasts and fungi may be treated with topical antifungals such as clotrimazole and terbinafine cream or oral antifungal agents such as itraconazole or terbinafine  • Inflammatory skin diseases are often treated with low potency topical steroid creams such as hydrocortisone  More potent steroids are usually avoided in the flexures because they may cause skin thinning resulting in stretch marks (striae) and even ulcers  Calcineurin inhibitors such as tacrolimus ointment or pimecrolimus cream may also prove effective  NEOPLASM OF UNCERTAIN BEHAVIOR OF SKIN    Physical Exam:  • (Anatomic Location); (Size and Morphological Description); (Differential Diagnosis):  o Right wrist; 0 3 cm verrucous papule;probable verrucae vs inflamed SCC  • Pertinent Positives:  • Pertinent Negatives: Additional History of Present Condition:  Present for months    Assessment and Plan:  • I have discussed with the patient that a sample of skin via a "skin biopsy” would be potentially helpful to further make a specific diagnosis under the microscope  • Based on a thorough discussion of this condition and the management approach to it (including a comprehensive discussion of the known risks, side effects and potential benefits of treatment), the patient (family) agrees to implement the following specific plan:    o Procedure:  Skin Biopsy  After a thorough discussion of treatment options and risk/benefits/alternatives (including but not limited to local pain, scarring, dyspigmentation, blistering, possible superinfection, and inability to confirm a diagnosis via histopathology), verbal and written consent were obtained and portion of the rash was biopsied for tissue sample  See below for consent that was obtained from patient and subsequent Procedure Note      PROCEDURE TANGENTIAL (SHAVE) BIOPSY NOTE:    • Performing Physician: Dr Richardson  • Anatomic Location; Clinical Description with size (cm); Pre-Op Diagnosis:     Right wrist; 0 3 cm verrucous papule;probable verrucae vs inflamed SCC  • Post-op diagnosis: Same     • Local anesthesia: 1% xylocaine with epi • Topical anesthesia: None    • Hemostasis: Aluminum chloride       After obtaining informed consent  at which time there was a discussion about the purpose of biopsy  and low risks of infection and bleeding  The area was prepped and draped in the usual fashion  Anesthesia was obtained with 1% lidocaine with epinephrine  A shave biopsy to an appropriate sampling depth was obtained by Shave (Dermablade or 15 blade) The resulting wound was covered with surgical ointment and bandaged appropriately  The patient tolerated the procedure well without complications and was without signs of functional compromise  Specimen has been sent for review by Dermatopathology  Standard post-procedure care has been explained and has been included in written form within the patient's copy of Informed Consent  INFORMED CONSENT DISCUSSION AND POST-OPERATIVE INSTRUCTIONS FOR PATIENT    I   RATIONALE FOR PROCEDURE  I understand that a skin biopsy allows the Dermatologist to test a lesion or rash under the microscope to obtain a diagnosis  It usually involves numbing the area with numbing medication and removing a small piece of skin; sometimes the area will be closed with sutures  In this specific procedure, sutures are not usually needed  If any sutures are placed, then they are usually need to be removed in 2 weeks or less  I understand that my Dermatologist recommends that a skin "shave" biopsy be performed today  A local anesthetic, similar to the kind that a dentist uses when filling a cavity, will be injected with a very small needle into the skin area to be sampled  The injected skin and tissue underneath "will go to sleep” and become numb so no pain should be felt afterwards  An instrument shaped like a tiny "razor blade" (shave biopsy instrument) will be used to cut a small piece of tissue and skin from the area so that a sample of tissue can be taken and examined more closely under the microscope    A slight amount of bleeding will occur, but it will be stopped with direct pressure and a pressure bandage and any other appropriate methods  I understands that a scar will form where the wound was created  Surgical ointment will be applied to help protect the wound  Sutures are not usually needed  II   RISKS AND POTENTIAL COMPLICATIONS   I understand the risks and potential complications of a skin biopsy include but are not limited to the following:  • Bleeding  • Infection  • Pain  • Scar/keloid  • Skin discoloration  • Incomplete Removal  • Recurrence  • Nerve Damage/Numbness/Loss of Function  • Allergic Reaction to Anesthesia  • Biopsies are diagnostic procedures and based on findings additional treatment or evaluation may be required  • Loss or destruction of specimen resulting in no additional findings    My Dermatologist has explained to me the nature of the condition, the nature of the procedure, and the benefits to be reasonably expected compared with alternative approaches  My Dermatologist has discussed the likelihood of major risks or complications of this procedure including the specific risks listed above, such as bleeding, infection, and scarring/keloid  I understand that a scar is expected after this procedure  I understand that my physician cannot predict if the scar will form a "keloid," which extends beyond the borders of the wound that is created  A keloid is a thick, painful, and bumpy scar  A keloid can be difficult to treat, as it does not always respond well to therapy, which includes injecting cortisone directly into the keloid every few weeks  While this usually reduces the pain and size of the scar, it does not eliminate it  I understand that photographs may be taken before and after the procedure  These will be maintained as part of the medical providers confidential records and may not be made available to me    I further authorize the medical provider to use the photographs for teaching purposes or to illustrate scientific papers, books, or lectures if in his/her judgment, medical research, education, or science may benefit from its use  I have had an opportunity to fully inquire about the risks and benefits of this procedure and its alternatives  I have been given ample time and opportunity to ask questions and to seek a second opinion if I wished to do so  I acknowledge that there have specifically been no guarantees as to the cosmetic results from the procedure  I am aware that with any procedure there is always the possibility of an unexpected complication  III  POST-PROCEDURAL CARE (WHAT YOU WILL NEED TO DO "AFTER THE BIOPSY" TO OPTIMIZE HEALING)    • Keep the area clean and dry  Try NOT to remove the bandage or get it wet for the first 24 hours  • Gently clean the area and apply surgical ointment (such as Vaseline petrolatum ointment, which is available "over the counter" and not a prescription) to the biopsy site for up to 2 weeks straight  This acts to protect the wound from the outside world  • Sutures are not usually placed in this procedure  If any sutures were placed, return for suture removal as instructed (generally 1 week for the face, 2 weeks for the body)  • Take Acetaminophen (Tylenol) for discomfort, if no contraindications  Ibuprofen or aspirin could make bleeding worse  • Call our office immediately for signs of infection: fever, chills, increased redness, warmth, tenderness, discomfort/pain, or pus or foul smell coming from the wound  WHAT TO DO IF THERE IS ANY BLEEDING? If a small amount of bleeding is noticed, place a clean cloth over the area and apply firm pressure for ten minutes  Check the wound after 10 minutes of direct pressure  If bleeding persists, try one more time for an additional 10 minutes of direct pressure on the area    If the bleeding becomes heavier or does not stop after the second attempt, or if you have any other questions about this procedure, then please call your [facility]'s Dermatologist by calling [phone] (SKIN)  I hereby acknowledge that I have reviewed and verified the site with my Dermatologist and have requested and authorized my Dermatologist to proceed with the procedure            Scribe Attestation    I,:  Kirsty Kim am acting as a scribe while in the presence of the attending physician :       I,:  Kandis Tan MD personally performed the services described in this documentation    as scribed in my presence :

## 2023-02-15 NOTE — PATIENT INSTRUCTIONS
STASIS        Assessment and Plan:  Based on a thorough discussion of this condition and the management approach to it (including a comprehensive discussion of the known risks, side effects and potential benefits of treatment), the patient (family) agrees to implement the following specific plan:  Apply Vaseline 2-3 times a day   Recommended to use moderate support stockinets  I discussed that support stockings are inmportant to prevent ulceration and progression of stasis changes  ConitnueTriamcinolone 0 1% ointment once daily for 2 weeks for flares  Discussed the Triamcinolone can thin your skin  INTERTRIGO        Assessment and Plan:  Based on a thorough discussion of this condition and the management approach to it (including a comprehensive discussion of the known risks, side effects and potential benefits of treatment), the patient (family) agrees to implement the following specific plan:  Recommended to use prescribed Hydrocortisone ( patient has it) and over the counter Lotrimin together when the area is red and itchy  When the area is good/better use the Zeasorb AF powder ( Antifungal treatment), dry your the groin area very well after your showers  May use a hair dryer  Advised to keep the area dry as much as possible       Assessment and Plan  Intertrigo describes a rash in the flexures or body folds, such as behind the ears, in the folds of the neck, under the arms (axillae), under a protruding abdomen, in the groin, between the buttocks, in the finger webs or toe spaces  Although intertrigo may affect one skin fold, it is common for it to involve multiple sites  Intertrigo can affect males and females of any age  It is particularly common in people that are overweight or obese (see metabolic syndrome)   Other contributing factors are:  Genetic tendency to skin disease  Hyperhidrosis (excessive sweating)     Intertrigo can be acute (recent onset), relapsing (recurrent), or chronic (present for more than 6 weeks)  The exact appearance and behaviour depends on the underlying cause or causes  The skin affected by intertrigo is inflamed, ie reddened and uncomfortable  It may become moist and macerated, leading to fissuring (cracks) and peeling  Intertrigo is due to genetic and environmental factors  Flexural skin has relatively high surface temperature  Moisture from insensible water loss and sweating cannot evaporate due to occlusion  Friction from movement of adjacent skin results in chafing  The microorganisms that are normally resident on flexural skin, the microbiome, include corynebacteria, other bacteria and yeasts  These multiply in warm moist environments and may cause disease  We can classify intertrigo into infectious and inflammatory origin but there is often overlap  Infections tend to be unilateral and asymmetrical   Inflammatory disorders tend to be symmetrical affecting armpits, groins, under the breasts and the abdominal folds, except atopic dermatitis, which more often arises on the neck, and in elbow and knee creases  Investigations may be necessary to determine the cause of intertrigo  A swab for microscopy and culture of bacteria (microbiology)  A scraping for microscopy and culture of fungi (mycology)  A skin biopsy may be performed for histopathology if the skin condition is unusual or fails to respond to treatment  What is the treatment for intertrigo? Treatment depends on the underlying cause, if identified, and on which micro-organisms are present in the rash  Combinations are common  Sweating may be reduced with a gentle antiperspirant  Physical exertion should be followed by a bath and completely drying the skin folds using a hair dryer on cool setting, soft towel and/or corn starch powder  Triple paste contains petrolatum, zinc oxide, and aluminum acetate solution to reduce friction, irritation and sweating    Bacteria may be treated with topical antibiotics such as fusidic acid cream, mupirocin ointment, or oral antibiotics such as flucloxacillin and erythromycin  Yeasts and fungi may be treated with topical antifungals such as clotrimazole and terbinafine cream or oral antifungal agents such as itraconazole or terbinafine  Inflammatory skin diseases are often treated with low potency topical steroid creams such as hydrocortisone  More potent steroids are usually avoided in the flexures because they may cause skin thinning resulting in stretch marks (striae) and even ulcers  Calcineurin inhibitors such as tacrolimus ointment or pimecrolimus cream may also prove effective  NEOPLASM OF UNCERTAIN BEHAVIOR OF SKIN      Assessment and Plan:  I have discussed with the patient that a sample of skin via a "skin biopsy” would be potentially helpful to further make a specific diagnosis under the microscope  Based on a thorough discussion of this condition and the management approach to it (including a comprehensive discussion of the known risks, side effects and potential benefits of treatment), the patient (family) agrees to implement the following specific plan:    Procedure:  Skin Biopsy  After a thorough discussion of treatment options and risk/benefits/alternatives (including but not limited to local pain, scarring, dyspigmentation, blistering, possible superinfection, and inability to confirm a diagnosis via histopathology), verbal and written consent were obtained and portion of the rash was biopsied for tissue sample  See below for consent that was obtained from patient and subsequent Procedure Note  INFORMED CONSENT DISCUSSION AND POST-OPERATIVE INSTRUCTIONS FOR PATIENT    I   RATIONALE FOR PROCEDURE  I understand that a skin biopsy allows the Dermatologist to test a lesion or rash under the microscope to obtain a diagnosis    It usually involves numbing the area with numbing medication and removing a small piece of skin; sometimes the area will be closed with sutures  In this specific procedure, sutures are not usually needed  If any sutures are placed, then they are usually need to be removed in 2 weeks or less  I understand that my Dermatologist recommends that a skin "shave" biopsy be performed today  A local anesthetic, similar to the kind that a dentist uses when filling a cavity, will be injected with a very small needle into the skin area to be sampled  The injected skin and tissue underneath "will go to sleep” and become numb so no pain should be felt afterwards  An instrument shaped like a tiny "razor blade" (shave biopsy instrument) will be used to cut a small piece of tissue and skin from the area so that a sample of tissue can be taken and examined more closely under the microscope  A slight amount of bleeding will occur, but it will be stopped with direct pressure and a pressure bandage and any other appropriate methods  I understands that a scar will form where the wound was created  Surgical ointment will be applied to help protect the wound  Sutures are not usually needed  II   RISKS AND POTENTIAL COMPLICATIONS   I understand the risks and potential complications of a skin biopsy include but are not limited to the following:  Bleeding  Infection  Pain  Scar/keloid  Skin discoloration  Incomplete Removal  Recurrence  Nerve Damage/Numbness/Loss of Function  Allergic Reaction to Anesthesia  Biopsies are diagnostic procedures and based on findings additional treatment or evaluation may be required  Loss or destruction of specimen resulting in no additional findings    My Dermatologist has explained to me the nature of the condition, the nature of the procedure, and the benefits to be reasonably expected compared with alternative approaches    My Dermatologist has discussed the likelihood of major risks or complications of this procedure including the specific risks listed above, such as bleeding, infection, and scarring/keloid  I understand that a scar is expected after this procedure  I understand that my physician cannot predict if the scar will form a "keloid," which extends beyond the borders of the wound that is created  A keloid is a thick, painful, and bumpy scar  A keloid can be difficult to treat, as it does not always respond well to therapy, which includes injecting cortisone directly into the keloid every few weeks  While this usually reduces the pain and size of the scar, it does not eliminate it  I understand that photographs may be taken before and after the procedure  These will be maintained as part of the medical providers confidential records and may not be made available to me  I further authorize the medical provider to use the photographs for teaching purposes or to illustrate scientific papers, books, or lectures if in his/her judgment, medical research, education, or science may benefit from its use  I have had an opportunity to fully inquire about the risks and benefits of this procedure and its alternatives  I have been given ample time and opportunity to ask questions and to seek a second opinion if I wished to do so  I acknowledge that there have specifically been no guarantees as to the cosmetic results from the procedure  I am aware that with any procedure there is always the possibility of an unexpected complication  III  POST-PROCEDURAL CARE (WHAT YOU WILL NEED TO DO "AFTER THE BIOPSY" TO OPTIMIZE HEALING)    Keep the area clean and dry  Try NOT to remove the bandage or get it wet for the first 24 hours  Gently clean the area and apply surgical ointment (such as Vaseline petrolatum ointment, which is available "over the counter" and not a prescription) to the biopsy site for up to 2 weeks straight  This acts to protect the wound from the outside world  Sutures are not usually placed in this procedure    If any sutures were placed, return for suture removal as instructed (generally 1 week for the face, 2 weeks for the body)  Take Acetaminophen (Tylenol) for discomfort, if no contraindications  Ibuprofen or aspirin could make bleeding worse  Call our office immediately for signs of infection: fever, chills, increased redness, warmth, tenderness, discomfort/pain, or pus or foul smell coming from the wound  WHAT TO DO IF THERE IS ANY BLEEDING? If a small amount of bleeding is noticed, place a clean cloth over the area and apply firm pressure for ten minutes  Check the wound after 10 minutes of direct pressure  If bleeding persists, try one more time for an additional 10 minutes of direct pressure on the area  If the bleeding becomes heavier or does not stop after the second attempt, or if you have any other questions about this procedure, then please call your [facility] Dermatologist by calling [phone] (SKIN)  I hereby acknowledge that I have reviewed and verified the site with my Dermatologist and have requested and authorized my Dermatologist to proceed with the procedure

## 2023-02-21 NOTE — RESULT ENCOUNTER NOTE
DERMATOPATHOLOGY RESULT NOTE    Results reviewed by ordering physician  Called patient to personally discuss results  No answer, left voicemail with result  Instructions for Clinical Derm Team:   (remember to route Result Note to appropriate staff):    None    Result & Plan by Specimen:    Specimen A: benign  Plan: monitor  Final Diagnosis  A  Skin, right wrist, shave biopsy:     Consistent with VERRUCA VULGARIS, inflamed (see note)      Note: If the lesion were to progress/persist, additional sampling should be sought     Electronically

## 2023-02-24 ENCOUNTER — APPOINTMENT (OUTPATIENT)
Dept: LAB | Facility: CLINIC | Age: 83
End: 2023-02-24

## 2023-02-24 DIAGNOSIS — G47.33 OBSTRUCTIVE SLEEP APNEA: ICD-10-CM

## 2023-02-24 DIAGNOSIS — N40.0 BENIGN PROSTATIC HYPERPLASIA, UNSPECIFIED WHETHER LOWER URINARY TRACT SYMPTOMS PRESENT: ICD-10-CM

## 2023-02-24 DIAGNOSIS — I10 BENIGN ESSENTIAL HYPERTENSION: ICD-10-CM

## 2023-02-24 DIAGNOSIS — R21 SKIN RASH: ICD-10-CM

## 2023-02-24 DIAGNOSIS — I48.21 PERMANENT ATRIAL FIBRILLATION (HCC): ICD-10-CM

## 2023-02-24 DIAGNOSIS — E55.9 VITAMIN D DEFICIENCY: ICD-10-CM

## 2023-02-24 DIAGNOSIS — G47.00 INSOMNIA, UNSPECIFIED TYPE: ICD-10-CM

## 2023-02-24 DIAGNOSIS — Z13.29 SCREENING FOR THYROID DISORDER: ICD-10-CM

## 2023-02-24 DIAGNOSIS — K21.9 GASTROESOPHAGEAL REFLUX DISEASE, UNSPECIFIED WHETHER ESOPHAGITIS PRESENT: ICD-10-CM

## 2023-02-24 DIAGNOSIS — E78.49 OTHER HYPERLIPIDEMIA: ICD-10-CM

## 2023-02-24 DIAGNOSIS — R73.03 PREDIABETES: ICD-10-CM

## 2023-02-24 LAB
25(OH)D3 SERPL-MCNC: 37.8 NG/ML (ref 30–100)
ALBUMIN SERPL BCP-MCNC: 4.2 G/DL (ref 3.5–5)
ALP SERPL-CCNC: 46 U/L (ref 34–104)
ALT SERPL W P-5'-P-CCNC: 24 U/L (ref 7–52)
ANION GAP SERPL CALCULATED.3IONS-SCNC: 6 MMOL/L (ref 4–13)
AST SERPL W P-5'-P-CCNC: 25 U/L (ref 13–39)
BASOPHILS # BLD AUTO: 0.03 THOUSANDS/ÂΜL (ref 0–0.1)
BASOPHILS NFR BLD AUTO: 1 % (ref 0–1)
BILIRUB SERPL-MCNC: 0.51 MG/DL (ref 0.2–1)
BUN SERPL-MCNC: 14 MG/DL (ref 5–25)
CALCIUM SERPL-MCNC: 9.6 MG/DL (ref 8.4–10.2)
CHLORIDE SERPL-SCNC: 103 MMOL/L (ref 96–108)
CHOLEST SERPL-MCNC: 119 MG/DL
CO2 SERPL-SCNC: 30 MMOL/L (ref 21–32)
CREAT SERPL-MCNC: 0.9 MG/DL (ref 0.6–1.3)
EOSINOPHIL # BLD AUTO: 0.12 THOUSAND/ÂΜL (ref 0–0.61)
EOSINOPHIL NFR BLD AUTO: 3 % (ref 0–6)
ERYTHROCYTE [DISTWIDTH] IN BLOOD BY AUTOMATED COUNT: 12.1 % (ref 11.6–15.1)
EST. AVERAGE GLUCOSE BLD GHB EST-MCNC: 111 MG/DL
GFR SERPL CREATININE-BSD FRML MDRD: 80 ML/MIN/1.73SQ M
GLUCOSE P FAST SERPL-MCNC: 115 MG/DL (ref 65–99)
HBA1C MFR BLD: 5.5 %
HCT VFR BLD AUTO: 39.4 % (ref 36.5–49.3)
HDLC SERPL-MCNC: 30 MG/DL
HGB BLD-MCNC: 13.6 G/DL (ref 12–17)
IMM GRANULOCYTES # BLD AUTO: 0.01 THOUSAND/UL (ref 0–0.2)
IMM GRANULOCYTES NFR BLD AUTO: 0 % (ref 0–2)
LDLC SERPL CALC-MCNC: 67 MG/DL (ref 0–100)
LYMPHOCYTES # BLD AUTO: 1.52 THOUSANDS/ÂΜL (ref 0.6–4.47)
LYMPHOCYTES NFR BLD AUTO: 32 % (ref 14–44)
MCH RBC QN AUTO: 34 PG (ref 26.8–34.3)
MCHC RBC AUTO-ENTMCNC: 34.5 G/DL (ref 31.4–37.4)
MCV RBC AUTO: 99 FL (ref 82–98)
MONOCYTES # BLD AUTO: 0.72 THOUSAND/ÂΜL (ref 0.17–1.22)
MONOCYTES NFR BLD AUTO: 15 % (ref 4–12)
NEUTROPHILS # BLD AUTO: 2.39 THOUSANDS/ÂΜL (ref 1.85–7.62)
NEUTS SEG NFR BLD AUTO: 49 % (ref 43–75)
NRBC BLD AUTO-RTO: 0 /100 WBCS
PLATELET # BLD AUTO: 185 THOUSANDS/UL (ref 149–390)
PMV BLD AUTO: 10.4 FL (ref 8.9–12.7)
POTASSIUM SERPL-SCNC: 4 MMOL/L (ref 3.5–5.3)
PROT SERPL-MCNC: 6.8 G/DL (ref 6.4–8.4)
RBC # BLD AUTO: 4 MILLION/UL (ref 3.88–5.62)
SODIUM SERPL-SCNC: 139 MMOL/L (ref 135–147)
TRIGL SERPL-MCNC: 112 MG/DL
TSH SERPL DL<=0.05 MIU/L-ACNC: 3.37 UIU/ML (ref 0.45–4.5)
WBC # BLD AUTO: 4.79 THOUSAND/UL (ref 4.31–10.16)

## 2023-02-28 ENCOUNTER — OFFICE VISIT (OUTPATIENT)
Dept: INTERNAL MEDICINE CLINIC | Facility: CLINIC | Age: 83
End: 2023-02-28

## 2023-02-28 VITALS
HEART RATE: 76 BPM | SYSTOLIC BLOOD PRESSURE: 130 MMHG | HEIGHT: 69 IN | RESPIRATION RATE: 16 BRPM | DIASTOLIC BLOOD PRESSURE: 82 MMHG | BODY MASS INDEX: 34.96 KG/M2 | OXYGEN SATURATION: 97 % | WEIGHT: 236 LBS

## 2023-02-28 DIAGNOSIS — E66.01 OBESITY, MORBID (HCC): ICD-10-CM

## 2023-02-28 DIAGNOSIS — M53.86 SCIATICA ASSOCIATED WITH DISORDER OF LUMBAR SPINE: ICD-10-CM

## 2023-02-28 DIAGNOSIS — I10 ESSENTIAL HYPERTENSION: ICD-10-CM

## 2023-02-28 DIAGNOSIS — I48.91 ATRIAL FIBRILLATION, UNSPECIFIED TYPE (HCC): Primary | ICD-10-CM

## 2023-02-28 DIAGNOSIS — E78.2 MIXED HYPERLIPIDEMIA: ICD-10-CM

## 2023-02-28 DIAGNOSIS — G47.33 OBSTRUCTIVE SLEEP APNEA: ICD-10-CM

## 2023-02-28 LAB
PSA FREE MFR SERPL: 27 %
PSA FREE SERPL-MCNC: 0.62 NG/ML
PSA SERPL-MCNC: 2.3 NG/ML (ref 0–4)

## 2023-02-28 NOTE — PROGRESS NOTES
Assessment/Plan:    #Neuralgia   -over right posterior ear previously  -was on amitryptylline for many years   -now off it and doing well    #Ingrown Toenail  -noted on right foot but denies pain  -will monitor  -history of trimming with podiatry in the past    #Venous Stasis Changes  -noted in lower legs  -is using vasoline    #Verruca  -noted on right wrist and saw dermatology for removal    #Hearing Loss   -worsening  -encouraged to obtain hearing aid evaluation but he would like to wait for OTC hearing aids  -retired from [employer] [employer] and was not exposed to loud noises but mother had hearing loss     #LUÍS  -on APAP 6-20cm and wearing every night     #Erectile Dysfunction   -remains on levetra 20mg 1/2 tablet as needed     #Worsening vision   -seeing retina specialist  -had cataract surgery 8-9 years ago     #HTN   -BP stable on amlodipine and losartan, atenolol    #HLD   -LDL 67 HDL 30  -continue simvastatin  -exercising at the [facility] with friends 6-7 times per week    #Alcohol  -drinking 3 glasses wine a day and encouraged to cut back    #Prediabetes    -a1c at 7 5 and stable without problems    #Sciatica   -over right thigh posterior leg  -MRI with L4-5 moderate disc osteophyte with complex ecentric to right with superimposed right L5 nerve compression  -neurosurgery suggested laminectomy but patinet holding off since symptoms are mild unless he is driving  -seeing PT  -has pinching sensation after 30 minutes of driving and has to stretch  -previous epidural without relief  -seeing chiropractor and referral given    #Atrial Fibrillation   -remains on eliquis and atenolol   -chronic afib  -repeat ECHO     #GERD  -remains on omeprazole and will try to take every other day to reduce risk of dementia  -defers alternating with pepcid for now  -is eating late at night    #Cardiomyopathy   -2018 ECHO with EF 50%   -continue atenolol and losartan  -repeat ECHO      Health Maintenance   -routine labs and followup 6 months   -cologuard completed 2021 and does not require further screening due to age  -retired from [address]  -covid bivalent up to date 2022  -flu vaccine up to date 2022    No problem-specific Assessment & Plan notes found for this encounter  Diagnoses and all orders for this visit:    Atrial fibrillation, unspecified type ([address] )  -     Echo complete w/ contrast if indicated; Future  -     CBC and differential; Future  -     Comprehensive metabolic panel; Future  -     VAS screening; Future    Essential hypertension  -     CBC and differential; Future  -     Comprehensive metabolic panel; Future  -     VAS screening; Future    Mixed hyperlipidemia  -     CBC and differential; Future  -     Comprehensive metabolic panel; Future  -     Lipid Panel with Direct LDL reflex; Future  -     VAS screening; Future    Obstructive sleep apnea  -     CBC and differential; Future  -     Comprehensive metabolic panel; Future  -     VAS screening; Future    Obesity, morbid (HCC)  -     CBC and differential; Future  -     Comprehensive metabolic panel; Future    Sciatica associated with disorder of lumbar spine  -     Ambulatory Referral to Chiropractic; Future  -     CBC and differential; Future  -     Comprehensive metabolic panel;  Future            Current Outpatient Medications:   •  amLODIPine (NORVASC) 5 mg tablet, Take 1 tablet (5 mg total) by mouth daily at bedtime, Disp: 90 tablet, Rfl: 3  •  apixaban (Eliquis) 5 mg, Take 1 tablet (5 mg total) by mouth 2 (two) times a day, Disp: 180 tablet, Rfl: 3  •  atenolol (TENORMIN) 50 mg tablet, Take 1 tablet (50 mg total) by mouth 2 (two) times a day, Disp: 180 tablet, Rfl: 3  •  fluticasone (FLONASE) 50 mcg/act nasal spray, Spray 2 squirts in each nostril once daily, Disp: 16 g, Rfl: 3  •  hydrocortisone 2 5 % cream, Apply topically in the morning, Disp: 56 g, Rfl: 3  •  losartan (COZAAR) 50 mg tablet, Take 1 tablet (50 mg total) by mouth daily, Disp: 90 tablet, Rfl: 3  •  Multiple Vitamins-Minerals (PRESERVISION AREDS PO), Take 1 tablet by mouth 2 (two) times a day, Disp: , Rfl:   •  omeprazole (PriLOSEC) 20 mg delayed release capsule, Take 1 capsule (20 mg total) by mouth daily, Disp: 90 capsule, Rfl: 3  •  simvastatin (ZOCOR) 40 mg tablet, Take 1 tablet (40 mg total) by mouth daily at bedtime, Disp: 90 tablet, Rfl: 3  •  triamcinolone (KENALOG) 0 1 % ointment, APPLY TOPICALLY DAILY FOR 2 WEEKS, Disp: 454 g, Rfl: 0  •  Vitamin D, Cholecalciferol, 1000 UNITS CAPS, Take by mouth, Disp: , Rfl:     Subjective:      Patient ID: Todd Lou is a [de-identified] y o  male  Patient presents for routine checkup  Denies any recent hospitalizations or surgeries  His vitamin D is stable at 37 8  PSA was 2 3 and stable  He denies any symptoms of BPH  His A1c is 5 5  LDL was 67 and HDL 30 currently on simvastatin  He has been going to the gym 6 to 7 days a week  He will continue with this  His weight is down about 5 pounds  He will continue to lose weight  His TSH is 3 368 and currently stable  He has a history of atrial fibrillation  We will repeat an echo due to his borderline ejection fraction  He will also undergo vascular screening  He has been seeing a chiropractor for right innominate sacral L5 pain  We will provide him with a referral   His blood pressure is stable today on amlodipine and losartan and atenolol and he will continue with this  He has been wearing his APAP 6 to 20 cm at bedtime every night  He does have some trace edema in his bilateral lower extremities with venous stasis changes  Encouraged him to elevate his legs and consider wearing compression stocking  He will return to care in 6 months      The following portions of the patient's history were reviewed and updated as appropriate: allergies, current medications, past family history, past medical history, past social history, past surgical history and problem list     Review of Systems Constitutional: Negative for activity change, appetite change, fatigue and fever  HENT: Positive for hearing loss  Negative for congestion, ear pain, sore throat and tinnitus  Eyes: Negative for photophobia, pain and visual disturbance  Respiratory: Negative for cough, shortness of breath and wheezing  Cardiovascular: Negative for chest pain, palpitations and leg swelling  Gastrointestinal: Negative for abdominal distention, abdominal pain, constipation, diarrhea, nausea and vomiting  Genitourinary: Negative for difficulty urinating, frequency and hematuria  Musculoskeletal: Negative for arthralgias, back pain, gait problem, joint swelling, myalgias, neck pain and neck stiffness  Skin: Negative for color change, pallor, rash and wound  Neurological: Negative for dizziness, tremors, numbness and headaches  Hematological: Does not bruise/bleed easily  Objective:      /82   Pulse 76   Resp 16   Ht 5' 9" (1 753 m)   Wt 107 kg (236 lb)   SpO2 97%   BMI 34 85 kg/m²          Physical Exam  Vitals reviewed  Constitutional:       Appearance: He is well-developed  HENT:      Head: Normocephalic and atraumatic  Right Ear: External ear normal       Left Ear: External ear normal       Nose: Nose normal    Eyes:      Conjunctiva/sclera: Conjunctivae normal       Pupils: Pupils are equal, round, and reactive to light  Neck:      Thyroid: No thyromegaly  Vascular: No JVD  Cardiovascular:      Rate and Rhythm: Normal rate and regular rhythm  Heart sounds: Normal heart sounds  No murmur heard  Pulmonary:      Effort: Pulmonary effort is normal  No respiratory distress  Breath sounds: Normal breath sounds  No wheezing  Abdominal:      General: Bowel sounds are normal  There is no distension  Palpations: Abdomen is soft  Tenderness: There is no abdominal tenderness  There is no guarding or rebound     Musculoskeletal:         General: No tenderness or deformity  Normal range of motion  Cervical back: Normal range of motion and neck supple  Right lower leg: Edema (trace) present  Left lower leg: Edema (trace) present  Lymphadenopathy:      Cervical: No cervical adenopathy  Skin:     General: Skin is warm and dry  Findings: Rash (venous stasis changes in lower legs) present  No erythema  Comments: Ingrown toenail right foot   Neurological:      Mental Status: He is alert and oriented to person, place, and time  Deep Tendon Reflexes: Reflexes are normal and symmetric  This note was completed in part utilizing [employer] fluency direct voice recognition software  Grammatical errors, random word insertion, spelling mistakes, and incomplete sentences may be an occasional consequence of the system secondary to software limitations, ambient noise and hardware issues  At the time of dictation, efforts were made to edit, clarify and /or correct errors  Please read the chart carefully and recognize, using context, where substitutions have occurred  If you have any questions or concerns about the context, text or information contained within the body of this dictation, please contact myself, the provider, for further clarification

## 2023-03-01 ENCOUNTER — OFFICE VISIT (OUTPATIENT)
Dept: CARDIOLOGY CLINIC | Facility: CLINIC | Age: 83
End: 2023-03-01

## 2023-03-01 ENCOUNTER — TELEPHONE (OUTPATIENT)
Dept: CARDIOLOGY CLINIC | Facility: CLINIC | Age: 83
End: 2023-03-01

## 2023-03-01 VITALS
SYSTOLIC BLOOD PRESSURE: 142 MMHG | HEART RATE: 63 BPM | BODY MASS INDEX: 34.96 KG/M2 | DIASTOLIC BLOOD PRESSURE: 76 MMHG | HEIGHT: 69 IN | WEIGHT: 236 LBS

## 2023-03-01 DIAGNOSIS — R60.0 BILATERAL EDEMA OF LOWER EXTREMITY: ICD-10-CM

## 2023-03-01 DIAGNOSIS — E78.49 OTHER HYPERLIPIDEMIA: Chronic | ICD-10-CM

## 2023-03-01 DIAGNOSIS — I48.21 PERMANENT ATRIAL FIBRILLATION (HCC): Primary | Chronic | ICD-10-CM

## 2023-03-01 DIAGNOSIS — I10 BENIGN ESSENTIAL HYPERTENSION: Chronic | ICD-10-CM

## 2023-03-01 RX ORDER — LOSARTAN POTASSIUM AND HYDROCHLOROTHIAZIDE 12.5; 5 MG/1; MG/1
1 TABLET ORAL DAILY
Qty: 90 TABLET | Refills: 3 | Status: SHIPPED | OUTPATIENT
Start: 2023-03-01

## 2023-03-01 RX ORDER — LOSARTAN POTASSIUM AND HYDROCHLOROTHIAZIDE 12.5; 5 MG/1; MG/1
1 TABLET ORAL DAILY
Qty: 90 TABLET | Refills: 3 | Status: SHIPPED | OUTPATIENT
Start: 2023-03-01 | End: 2023-03-01

## 2023-03-01 NOTE — TELEPHONE ENCOUNTER
Per Dr Ruchi Moon pt received Eliquis 5 mg samples:    Lot#: [id number]  Exp: 3/25  Count dispensed: 4

## 2023-03-01 NOTE — PROGRESS NOTES
Follow-up - Cardiology   Sharyle Hose Dartouzos [de-identified] y o  male MRN: [id number]        Problems    1  Permanent atrial fibrillation (HCC)  POCT ECG      2  Other hyperlipidemia        3  Benign essential hypertension        4  Bilateral edema of lower extremity  NT-BNP PRO-[facility] only              losartan-hydrochlorothiazide (HYZAAR) 50-12 5 mg per tablet    DISCONTINUED: losartan-hydrochlorothiazide (HYZAAR) 50-12 5 mg per tablet          Impression:    Atrial fibrillation  Permanent, rate controlled  Continues on Eliquis chronically  No prior stroke    Hypertension  Not well controlled, likely driven by slight volume overload    Hyperlipidemia  Well-controlled    Obstructive sleep apnea -  Intolerant to CPAP    Nonischemic cardiomyopathy/edema  LVEF 50% chronically, last assessed in 2018  In the past he had significant alcohol abuse, thankfully abstains  Updated echo ordered by his PCP  Slight edema and slight hepatojugular reflex, no karmen CHF symptoms however  He has a high sodium diet      Plan:    I have ordered an NT proBNP for him to have done in 6 months with his PCPs blood work  I have asked him to return in 6 months for closer follow-up  His edema is likely driven by weight, high sodium diet, possibly a small component of Norvasc/amlodipine, but also in the past he has had a low normal ejection fraction of 50%, although physically and from a cardiac perspective he does not manifest any CHF symptoms  Echo was ordered by his PCP yesterday  Stop losartan, and start Losartan/HCTZ  HPI:   Gurinder Bethea is a [de-identified]y o  year old male with hypertension, hyperlipidemia, permanent atrial fibrillation, chronic anticoagulation, history of alcohol abuse, has abstained for many years, comes to see me after a long overdue time away from the office  He has edema, he denies orthopnea, he denies CHF symptoms, he goes to the gym 6 days a week still  His A-fib is well rate controlled, he continues on Eliquis  His blood pressure is elevated  He saw his PCP yesterday who advised an echocardiogram and vascular screening  Review of Systems   Constitutional: Negative for appetite change, diaphoresis, fatigue and fever  Respiratory: Negative for chest tightness, shortness of breath and wheezing  Cardiovascular: Negative for chest pain, palpitations and leg swelling  Gastrointestinal: Negative for abdominal pain and blood in stool  Musculoskeletal: Negative for arthralgias and joint swelling  Skin: Negative for rash  Neurological: Negative for dizziness, syncope and light-headedness  Past Medical History:   Diagnosis Date   • Atrial fibrillation ([facility] [address] )    • Cardiomyopathy [facility] )     LAST ASSESSED [id number]   • Cataract    • Dysphagia    • GERD (gastroesophageal reflux disease)    • Hyperlipidemia    • Hypertension    • Irregular heart beat     afib   • Rosacea    • Schatzki's ring    • Sleep apnea     no cpap     Social History     Substance and Sexual Activity   Alcohol Use Yes    Comment: 1-2 glasses of wine daily     Social History     Substance and Sexual Activity   Drug Use No     Social History     Tobacco Use   Smoking Status Former   Smokeless Tobacco Never   Tobacco Comments    about 55 years ago       Allergies:   Allergies   Allergen Reactions   • Lisinopril Other (See Comments)     Persistent cough       Medications:     Current Outpatient Medications:   •  amLODIPine (NORVASC) 5 mg tablet, Take 1 tablet (5 mg total) by mouth daily at bedtime, Disp: 90 tablet, Rfl: 3  •  apixaban (Eliquis) 5 mg, Take 1 tablet (5 mg total) by mouth 2 (two) times a day, Disp: 180 tablet, Rfl: 3  •  atenolol (TENORMIN) 50 mg tablet, Take 1 tablet (50 mg total) by mouth 2 (two) times a day, Disp: 180 tablet, Rfl: 3  •  fluticasone (FLONASE) 50 mcg/act nasal spray, Spray 2 squirts in each nostril once daily, Disp: 16 g, Rfl: 3  •  hydrocortisone 2 5 % cream, Apply topically in the morning, Disp: 56 g, Rfl: 3  • losartan-hydrochlorothiazide (HYZAAR) 50-12 5 mg per tablet, Take 1 tablet by mouth daily, Disp: 90 tablet, Rfl: 3  •  Multiple Vitamins-Minerals (PRESERVISION AREDS PO), Take 1 tablet by mouth 2 (two) times a day, Disp: , Rfl:   •  omeprazole (PriLOSEC) 20 mg delayed release capsule, Take 1 capsule (20 mg total) by mouth daily, Disp: 90 capsule, Rfl: 3  •  simvastatin (ZOCOR) 40 mg tablet, Take 1 tablet (40 mg total) by mouth daily at bedtime, Disp: 90 tablet, Rfl: 3  •  triamcinolone (KENALOG) 0 1 % ointment, APPLY TOPICALLY DAILY FOR 2 WEEKS, Disp: 891 g, Rfl: 0  •  Vitamin D, Cholecalciferol, 1000 UNITS CAPS, Take by mouth, Disp: , Rfl:       Vitals:    03/01/23 1402   BP: 142/76   Pulse: 63     Weight (last 2 days)     Date/Time Weight    03/01/23 1402 107 (236)        Physical Exam  Constitutional:       General: He is not in acute distress  Appearance: He is not diaphoretic  HENT:      Head: Normocephalic and atraumatic  Eyes:      General: No scleral icterus  Conjunctiva/sclera: Conjunctivae normal    Neck:      Vascular: Hepatojugular reflux present  No JVD  Cardiovascular:      Rate and Rhythm: Normal rate  Rhythm irregular  Heart sounds: Normal heart sounds  No murmur heard  Pulmonary:      Effort: Pulmonary effort is normal       Breath sounds: Normal breath sounds  No wheezing or rales  Musculoskeletal:      Cervical back: Normal range of motion  Skin:     General: Skin is warm and dry  Neurological:      Mental Status: He is alert and oriented to person, place, and time             Laboratory Studies:  CMP:  Results from last 7 days   Lab Units 02/24/23  0647   POTASSIUM mmol/L 4 0   CHLORIDE mmol/L 103   CO2 mmol/L 30   BUN mg/dL 14   CREATININE mg/dL 0 90   CALCIUM mg/dL 9 6   AST U/L 25   ALT U/L 24   ALK PHOS U/L 46   EGFR ml/min/1 73sq m 80       NT-proBNP: No results found for: NTBNP   Coags:    Lipid Profile:   Lab Results   Component Value Date    CHOL 136 11/04/2015 Lab Results   Component Value Date    HDL 30 (L) 02/24/2023     Lab Results   Component Value Date    LDLCALC 67 02/24/2023     Lab Results   Component Value Date    TRIG 112 02/24/2023       Cardiac testing:   EKG reviewed personally:    Results for orders placed or performed in visit on 03/01/23   POCT ECG    Impression    Afib, Nonspec IVCD     Echocardiogram  2018-EF 50%          Gustavo Abreu MD    Portions of the record may have been created with voice recognition software   Occasional wrong word or "sound a like" substitutions may have occurred due to the inherent limitations of voice recognition software   Read the chart carefully and recognize, using context, where substitutions have occurred

## 2023-04-07 ENCOUNTER — HOSPITAL ENCOUNTER (OUTPATIENT)
Dept: NON INVASIVE DIAGNOSTICS | Facility: CLINIC | Age: 83
Discharge: HOME | End: 2023-04-07

## 2023-04-07 VITALS
BODY MASS INDEX: 34.96 KG/M2 | WEIGHT: 236 LBS | DIASTOLIC BLOOD PRESSURE: 76 MMHG | HEART RATE: 63 BPM | SYSTOLIC BLOOD PRESSURE: 142 MMHG | HEIGHT: 69 IN

## 2023-04-07 DIAGNOSIS — I48.91 ATRIAL FIBRILLATION, UNSPECIFIED TYPE (HCC): ICD-10-CM

## 2023-04-07 LAB
AORTIC ROOT: 3.7 CM
APICAL FOUR CHAMBER EJECTION FRACTION: 47 %
ASCENDING AORTA: 3.5 CM
FRACTIONAL SHORTENING: 20 (ref 28–44)
INTERVENTRICULAR SEPTUM IN DIASTOLE (PARASTERNAL SHORT AXIS VIEW): 1.2 CM
INTERVENTRICULAR SEPTUM: 1.2 CM (ref 0.6–1.1)
LAAS-AP2: 34.5 CM2
LAAS-AP4: 32.9 CM2
LEFT ATRIUM SIZE: 4.8 CM
LEFT INTERNAL DIMENSION IN SYSTOLE: 3.6 CM (ref 2.1–4)
LEFT VENTRICLE DIASTOLIC VOLUME (MOD BIPLANE): 158 ML
LEFT VENTRICLE SYSTOLIC VOLUME (MOD BIPLANE): 88 ML
LEFT VENTRICULAR INTERNAL DIMENSION IN DIASTOLE: 4.5 CM (ref 3.5–6)
LEFT VENTRICULAR POSTERIOR WALL IN END DIASTOLE: 1.5 CM
LEFT VENTRICULAR STROKE VOLUME: 38 ML
LV EF: 44 %
LVSV (TEICH): 38 ML
RIGHT ATRIUM AREA SYSTOLE A4C: 37.2 CM2
RIGHT VENTRICLE ID DIMENSION: 4.8 CM
SL CV LEFT ATRIUM LENGTH A2C: 7.6 CM
SL CV LV EF: 50
SL CV PED ECHO LEFT VENTRICLE DIASTOLIC VOLUME (MOD BIPLANE) 2D: 90 ML
SL CV PED ECHO LEFT VENTRICLE SYSTOLIC VOLUME (MOD BIPLANE) 2D: 53 ML
TR MAX PG: 25 MMHG
TR PEAK VELOCITY: 2.5 M/S
TRICUSPID ANNULAR PLANE SYSTOLIC EXCURSION: 1.9 CM
TRICUSPID VALVE PEAK REGURGITATION VELOCITY: 2.52 M/S

## 2023-04-25 ENCOUNTER — PROCEDURE VISIT (OUTPATIENT)
Age: 83
End: 2023-04-25

## 2023-04-25 VITALS
HEART RATE: 63 BPM | DIASTOLIC BLOOD PRESSURE: 81 MMHG | HEIGHT: 69 IN | SYSTOLIC BLOOD PRESSURE: 153 MMHG | BODY MASS INDEX: 34.94 KG/M2 | WEIGHT: 235.89 LBS

## 2023-04-25 DIAGNOSIS — M54.41 CHRONIC BILATERAL LOW BACK PAIN WITH RIGHT-SIDED SCIATICA: ICD-10-CM

## 2023-04-25 DIAGNOSIS — M99.03 SEGMENTAL DYSFUNCTION OF LUMBAR REGION: ICD-10-CM

## 2023-04-25 DIAGNOSIS — M54.16 LUMBAR RADICULOPATHY: ICD-10-CM

## 2023-04-25 DIAGNOSIS — M99.05 SEGMENTAL DYSFUNCTION OF PELVIC REGION: Primary | ICD-10-CM

## 2023-04-25 DIAGNOSIS — G89.29 CHRONIC BILATERAL LOW BACK PAIN WITH RIGHT-SIDED SCIATICA: ICD-10-CM

## 2023-04-25 DIAGNOSIS — M99.04 SEGMENTAL DYSFUNCTION OF SACRAL REGION: ICD-10-CM

## 2023-04-25 DIAGNOSIS — M79.18 MYOFASCIAL PAIN: ICD-10-CM

## 2023-04-25 NOTE — PROGRESS NOTES
Date of first visit: 4/25/2023 1      HPI:  Samira Bajwa presents for treatment today of increased symptomatology low back occasionally into the right lower extremity  He has a history of lumbar spinal stenosis this protrusion and subsequent radiculopathy  He is mainly managed this with conservative care has not been treated in a number of months is in today for treatment  He denies any significant radicular symptoms  He denies any numbness or tingling  He denies any weakness  Reports no changes in bowel bladder  Most recently he has been sick with a bad viral infection stomach upset and now is dealing with allergies  The following portions of the patient's history were reviewed and updated as appropriate: allergies, current medications, past family history, past medical history, past social history, past surgical history, and problem list     Review of Systems    Physical Exam:  Exam reveals a visible pelvic obliquity elevated left versus right innominate internal rotation of the right innominate on sacrum biomechanically joint dysfunction right SI L5-S1 motion unit erector spinae hypertonicity noted bilaterally  His overall global range of motion is reduced on extension lateral bending planes as well as forward flexion  Stable neurologically the lower extremity  Assessment:   Diagnosis ICD-10-CM Associated Orders   1  Segmental dysfunction of pelvic region  M99 05       2  Lumbar radiculopathy  M54 16       3  Segmental dysfunction of sacral region  M99 04       4  Segmental dysfunction of lumbar region  M99 03       5  Chronic bilateral low back pain with right-sided sciatica  M54 41     G89 29       6   Myofascial pain  M79 18                 Treatment: 41569  Manipulation to the right innominate, sacrum, L5 via Piper drop maneuver pretreat MFR bilateral hips well-tolerated    Discussion:  Continue his daily stretching I will see him back for follow-up

## 2023-05-05 ENCOUNTER — HOSPITAL ENCOUNTER (OUTPATIENT)
Dept: NON INVASIVE DIAGNOSTICS | Facility: CLINIC | Age: 83
Discharge: HOME | End: 2023-05-05

## 2023-05-05 DIAGNOSIS — I10 ESSENTIAL HYPERTENSION: ICD-10-CM

## 2023-05-05 DIAGNOSIS — G47.33 OBSTRUCTIVE SLEEP APNEA: ICD-10-CM

## 2023-05-05 DIAGNOSIS — E78.2 MIXED HYPERLIPIDEMIA: ICD-10-CM

## 2023-05-05 DIAGNOSIS — I48.91 ATRIAL FIBRILLATION, UNSPECIFIED TYPE (HCC): ICD-10-CM

## 2023-05-08 ENCOUNTER — TELEPHONE (OUTPATIENT)
Dept: INTERNAL MEDICINE CLINIC | Facility: CLINIC | Age: 83
End: 2023-05-08

## 2023-05-08 NOTE — TELEPHONE ENCOUNTER
----- Message from Randall Jimenez DO sent at 5/5/2023  7:11 PM EDT -----  Please let patient know his vascular screening shows narrowing in the carotid arteries about 50-70% on both sides, we will need to monitor this again in 6 months   He should stay on the simvastatin

## 2023-05-16 ENCOUNTER — PROCEDURE VISIT (OUTPATIENT)
Age: 83
End: 2023-05-16

## 2023-05-16 VITALS
HEIGHT: 69 IN | BODY MASS INDEX: 34.84 KG/M2 | HEART RATE: 70 BPM | DIASTOLIC BLOOD PRESSURE: 79 MMHG | SYSTOLIC BLOOD PRESSURE: 142 MMHG

## 2023-05-16 DIAGNOSIS — M99.03 SEGMENTAL DYSFUNCTION OF LUMBAR REGION: ICD-10-CM

## 2023-05-16 DIAGNOSIS — M79.18 MYOFASCIAL PAIN: ICD-10-CM

## 2023-05-16 DIAGNOSIS — M99.04 SEGMENTAL DYSFUNCTION OF SACRAL REGION: ICD-10-CM

## 2023-05-16 DIAGNOSIS — G89.29 CHRONIC BILATERAL LOW BACK PAIN WITH RIGHT-SIDED SCIATICA: ICD-10-CM

## 2023-05-16 DIAGNOSIS — M54.41 CHRONIC BILATERAL LOW BACK PAIN WITH RIGHT-SIDED SCIATICA: ICD-10-CM

## 2023-05-16 DIAGNOSIS — M99.05 SEGMENTAL DYSFUNCTION OF PELVIC REGION: Primary | ICD-10-CM

## 2023-05-16 DIAGNOSIS — M54.16 LUMBAR RADICULOPATHY: ICD-10-CM

## 2023-05-16 NOTE — PROGRESS NOTES
Date of first visit: 4/25/2023 2      HPI:  Teresita Mcgraw presents for treatment today of increased symptomatology low back occasionally into the right lower extremity  He has a history of lumbar spinal stenosis this protrusion and subsequent radiculopathy  He is mainly managed this with conservative care has not been treated in a number of months is in today for treatment  He denies any significant radicular symptoms  He denies any numbness or tingling  He denies any weakness  Reports no changes in bowel bladder  The following portions of the patient's history were reviewed and updated as appropriate: allergies, current medications, past family history, past medical history, past social history, past surgical history, and problem list     Review of Systems    Physical Exam:  Exam reveals a visible pelvic obliquity elevated left versus right innominate internal rotation of the right innominate on sacrum biomechanically joint dysfunction right SI L5-S1 motion unit erector spinae hypertonicity noted bilaterally  His overall global range of motion is reduced on extension lateral bending planes as well as forward flexion  Stable neurologically the lower extremity  Assessment:   Diagnosis ICD-10-CM Associated Orders   1  Segmental dysfunction of pelvic region  M99 05       2  Lumbar radiculopathy  M54 16       3  Segmental dysfunction of sacral region  M99 04       4  Segmental dysfunction of lumbar region  M99 03       5  Chronic bilateral low back pain with right-sided sciatica  M54 41     G89 29       6   Myofascial pain  M79 18                 Treatment: 55185  Manipulation to the right innominate, sacrum, L5 via Piper drop maneuver pretreat MFR bilateral hips well-tolerated    Discussion:  Continue his daily stretching I will see him back for follow-up

## 2023-06-06 ENCOUNTER — PROCEDURE VISIT (OUTPATIENT)
Age: 83
End: 2023-06-06
Payer: MEDICARE

## 2023-06-06 VITALS
HEIGHT: 69 IN | BODY MASS INDEX: 34.8 KG/M2 | SYSTOLIC BLOOD PRESSURE: 132 MMHG | WEIGHT: 235 LBS | HEART RATE: 63 BPM | DIASTOLIC BLOOD PRESSURE: 65 MMHG

## 2023-06-06 DIAGNOSIS — M99.05 SEGMENTAL DYSFUNCTION OF PELVIC REGION: Primary | ICD-10-CM

## 2023-06-06 DIAGNOSIS — G89.29 CHRONIC BILATERAL LOW BACK PAIN WITH RIGHT-SIDED SCIATICA: ICD-10-CM

## 2023-06-06 DIAGNOSIS — M99.04 SEGMENTAL DYSFUNCTION OF SACRAL REGION: ICD-10-CM

## 2023-06-06 DIAGNOSIS — M99.03 SEGMENTAL DYSFUNCTION OF LUMBAR REGION: ICD-10-CM

## 2023-06-06 DIAGNOSIS — M79.18 MYOFASCIAL PAIN: ICD-10-CM

## 2023-06-06 DIAGNOSIS — M54.16 LUMBAR RADICULOPATHY: ICD-10-CM

## 2023-06-06 DIAGNOSIS — M54.41 CHRONIC BILATERAL LOW BACK PAIN WITH RIGHT-SIDED SCIATICA: ICD-10-CM

## 2023-06-06 PROCEDURE — 98941 CHIROPRACT MANJ 3-4 REGIONS: CPT | Performed by: CHIROPRACTOR

## 2023-06-06 NOTE — PROGRESS NOTES
Date of first visit: 4/25/2023 3      HPI:  Fanny Dale presents for treatment today of increased symptomatology low back occasionally into the right lower extremity  He has a history of lumbar spinal stenosis this protrusion and subsequent radiculopathy  He is mainly managed this with conservative care has not been treated in a number of months is in today for treatment  He denies any significant radicular symptoms  He denies any numbness or tingling  He denies any weakness  Reports no changes in bowel bladder  The following portions of the patient's history were reviewed and updated as appropriate: allergies, current medications, past family history, past medical history, past social history, past surgical history, and problem list     Review of Systems    Physical Exam:  Exam reveals a visible pelvic obliquity elevated left versus right innominate internal rotation of the right innominate on sacrum biomechanically joint dysfunction right SI L5-S1 motion unit erector spinae hypertonicity noted bilaterally  His overall global range of motion is reduced on extension lateral bending planes as well as forward flexion  Stable neurologically the lower extremity  Assessment:   Diagnosis ICD-10-CM Associated Orders   1  Segmental dysfunction of pelvic region  M99 05       2  Lumbar radiculopathy  M54 16       3  Segmental dysfunction of sacral region  M99 04       4  Segmental dysfunction of lumbar region  M99 03       5  Chronic bilateral low back pain with right-sided sciatica  M54 41     G89 29       6   Myofascial pain  M79 18                 Treatment: 26025  Manipulation to the right innominate, sacrum, L5 via Piper drop maneuver pretreat MFR bilateral hips well-tolerated    Discussion:  Continue his daily stretching I will see him back for follow-up

## 2023-07-11 ENCOUNTER — PROCEDURE VISIT (OUTPATIENT)
Age: 83
End: 2023-07-11
Payer: MEDICARE

## 2023-07-11 VITALS
HEART RATE: 65 BPM | BODY MASS INDEX: 34.7 KG/M2 | HEIGHT: 69 IN | DIASTOLIC BLOOD PRESSURE: 85 MMHG | SYSTOLIC BLOOD PRESSURE: 131 MMHG

## 2023-07-11 DIAGNOSIS — M79.18 MYOFASCIAL PAIN: ICD-10-CM

## 2023-07-11 DIAGNOSIS — M54.50 ACUTE RIGHT-SIDED LOW BACK PAIN WITHOUT SCIATICA: ICD-10-CM

## 2023-07-11 DIAGNOSIS — M99.03 SEGMENTAL DYSFUNCTION OF LUMBAR REGION: ICD-10-CM

## 2023-07-11 DIAGNOSIS — M54.16 LUMBAR RADICULOPATHY: ICD-10-CM

## 2023-07-11 DIAGNOSIS — M99.05 SEGMENTAL DYSFUNCTION OF PELVIC REGION: Primary | ICD-10-CM

## 2023-07-11 DIAGNOSIS — M99.04 SEGMENTAL DYSFUNCTION OF SACRAL REGION: ICD-10-CM

## 2023-07-11 PROCEDURE — 98941 CHIROPRACT MANJ 3-4 REGIONS: CPT | Performed by: CHIROPRACTOR

## 2023-07-11 NOTE — PROGRESS NOTES
Date of first visit: 4/25/2023 4      HPI:  Kelly Izaguirre presents for treatment today. He localizes right-sided low back pain with occasional radiation right posterior thigh but for the most part axial in nature. About a week or so ago he was doing a good deal of power washing of his house was up and down a ladder was reaching off to the side with an extension 1 over his head felt pulling on the right paralumbar region. Denies any bowel bladder changes reports no weakness denies any numbness or tingling lower extremity. Does admit that he has not been icing as much as he should he is "slacking" at the gym. The following portions of the patient's history were reviewed and updated as appropriate: allergies, current medications, past family history, past medical history, past social history, past surgical history, and problem list.    Review of Systems    Physical Exam:  Exam reveals right-sided erector spinae hypertonicity noted with some spasm actually up into the lower thoracic region. Lumbar range of motion is reduced on extension left lateral bending left rotation full forward flexion. Pelvic obliquity is present with an elevated right versus left innominate there is a leg length inequality noted. Biomechanically joint dysfunction of the right sacroiliac joint involvement L4-L5 L5-S1 T12-L1. Stable neurologically lower extremity. Assessment:   Diagnosis ICD-10-CM Associated Orders   1. Segmental dysfunction of pelvic region  M99.05       2. Lumbar radiculopathy  M54.16       3. Segmental dysfunction of sacral region  M99.04       4. Segmental dysfunction of lumbar region  M99.03       5. Myofascial pain  M79.18       6. Acute right-sided low back pain without sciatica  M54.50                 Treatment: 29637  Manipulation to the right innominate, sacrum, L5 via Piper drop maneuver pretreat US right low back. Discussion:  Continue his daily stretching I will see him back for follow-up in 1 week. Advised icing 15 minutes daily basis flexion-based stretching.

## 2023-07-28 ENCOUNTER — PROCEDURE VISIT (OUTPATIENT)
Age: 83
End: 2023-07-28
Payer: MEDICARE

## 2023-07-28 VITALS — DIASTOLIC BLOOD PRESSURE: 63 MMHG | HEART RATE: 73 BPM | SYSTOLIC BLOOD PRESSURE: 113 MMHG

## 2023-07-28 DIAGNOSIS — M99.05 SEGMENTAL DYSFUNCTION OF PELVIC REGION: Primary | ICD-10-CM

## 2023-07-28 DIAGNOSIS — M79.18 MYOFASCIAL PAIN: ICD-10-CM

## 2023-07-28 DIAGNOSIS — M54.16 LUMBAR RADICULOPATHY: ICD-10-CM

## 2023-07-28 DIAGNOSIS — M99.03 SEGMENTAL DYSFUNCTION OF LUMBAR REGION: ICD-10-CM

## 2023-07-28 DIAGNOSIS — M54.50 ACUTE RIGHT-SIDED LOW BACK PAIN WITHOUT SCIATICA: ICD-10-CM

## 2023-07-28 DIAGNOSIS — M99.04 SEGMENTAL DYSFUNCTION OF SACRAL REGION: ICD-10-CM

## 2023-07-28 PROCEDURE — 98941 CHIROPRACT MANJ 3-4 REGIONS: CPT | Performed by: CHIROPRACTOR

## 2023-07-31 NOTE — PROGRESS NOTES
Date of first visit: 4/25/2023 4      HPI:  Jasmyne Sebastian presents for treatment today. He localizes right-sided low back pain with occasional radiation right posterior thigh but for the most part axial in nature. Feeling better this week. Denies any bowel bladder changes reports no weakness denies any numbness or tingling lower extremity. Does admit that he has not been icing as much as he should he is "slacking" at the gym. The following portions of the patient's history were reviewed and updated as appropriate: allergies, current medications, past family history, past medical history, past social history, past surgical history, and problem list.    Review of Systems    Physical Exam:  Exam reveals right-sided erector spinae hypertonicity noted with some spasm actually up into the lower thoracic region. Lumbar range of motion is reduced on extension left lateral bending left rotation full forward flexion. Pelvic obliquity is present with an elevated right versus left innominate there is a leg length inequality noted. Biomechanically joint dysfunction of the right sacroiliac joint involvement L4-L5 L5-S1 T12-L1. Stable neurologically lower extremity. Assessment:   Diagnosis ICD-10-CM Associated Orders   1. Segmental dysfunction of pelvic region  M99.05       2. Lumbar radiculopathy  M54.16       3. Segmental dysfunction of sacral region  M99.04       4. Segmental dysfunction of lumbar region  M99.03       5. Myofascial pain  M79.18       6. Acute right-sided low back pain without sciatica  M54.50                 Treatment: 03944  Manipulation to the right innominate, sacrum, L5 via Piper drop maneuver pretreat US right low back. Discussion:  Continue his daily stretching I will see him back for follow-up in 1 week. Advised icing 15 minutes daily basis flexion-based stretching.

## 2023-08-15 ENCOUNTER — PROCEDURE VISIT (OUTPATIENT)
Age: 83
End: 2023-08-15
Payer: MEDICARE

## 2023-08-15 VITALS
DIASTOLIC BLOOD PRESSURE: 75 MMHG | HEIGHT: 69 IN | HEART RATE: 79 BPM | SYSTOLIC BLOOD PRESSURE: 148 MMHG | BODY MASS INDEX: 34.8 KG/M2 | WEIGHT: 235 LBS

## 2023-08-15 DIAGNOSIS — M79.18 MYOFASCIAL PAIN: ICD-10-CM

## 2023-08-15 DIAGNOSIS — M99.04 SEGMENTAL DYSFUNCTION OF SACRAL REGION: ICD-10-CM

## 2023-08-15 DIAGNOSIS — M54.50 ACUTE RIGHT-SIDED LOW BACK PAIN WITHOUT SCIATICA: ICD-10-CM

## 2023-08-15 DIAGNOSIS — M99.05 SEGMENTAL DYSFUNCTION OF PELVIC REGION: Primary | ICD-10-CM

## 2023-08-15 DIAGNOSIS — M54.16 LUMBAR RADICULOPATHY: ICD-10-CM

## 2023-08-15 DIAGNOSIS — M99.03 SEGMENTAL DYSFUNCTION OF LUMBAR REGION: ICD-10-CM

## 2023-08-15 PROCEDURE — 98941 CHIROPRACT MANJ 3-4 REGIONS: CPT | Performed by: CHIROPRACTOR

## 2023-08-15 NOTE — PROGRESS NOTES
Date of first visit: 4/25/2023 5      HPI:  Baldomero Garcia presents for treatment today. He localizes right-sided low back pain with occasional radiation right posterior thigh but for the most part axial in nature. Feeling better this week. Denies any bowel bladder changes reports no weakness denies any numbness or tingling lower extremity. Does admit that he has not been icing as much as he should he is "slacking" at the gym. The following portions of the patient's history were reviewed and updated as appropriate: allergies, current medications, past family history, past medical history, past social history, past surgical history, and problem list.    Review of Systems    Physical Exam:  Exam reveals right-sided erector spinae hypertonicity noted with some spasm actually up into the lower thoracic region. Lumbar range of motion is reduced on extension left lateral bending left rotation full forward flexion. Pelvic obliquity is present with an elevated right versus left innominate there is a leg length inequality noted. Biomechanically joint dysfunction of the right sacroiliac joint involvement L4-L5 L5-S1 T12-L1. Stable neurologically lower extremity. Assessment:   Diagnosis ICD-10-CM Associated Orders   1. Segmental dysfunction of pelvic region  M99.05       2. Lumbar radiculopathy  M54.16       3. Segmental dysfunction of sacral region  M99.04       4. Segmental dysfunction of lumbar region  M99.03       5. Myofascial pain  M79.18       6. Acute right-sided low back pain without sciatica  M54.50                 Treatment: 44389  Manipulation to the right innominate, sacrum, L5 via Piper drop maneuver pretreat US right low back. Discussion:  Continue his daily stretching I will see him back for follow-up in 1 week. Advised icing 15 minutes daily basis flexion-based stretching.

## 2023-08-17 ENCOUNTER — APPOINTMENT (EMERGENCY)
Dept: RADIOLOGY | Facility: HOSPITAL | Age: 83
End: 2023-08-17
Payer: MEDICARE

## 2023-08-17 ENCOUNTER — HOSPITAL ENCOUNTER (OUTPATIENT)
Facility: HOSPITAL | Age: 83
Setting detail: OBSERVATION
Discharge: HOME | End: 2023-08-18
Attending: EMERGENCY MEDICINE | Admitting: INTERNAL MEDICINE
Payer: MEDICARE

## 2023-08-17 DIAGNOSIS — I51.7 CARDIOMEGALY: ICD-10-CM

## 2023-08-17 DIAGNOSIS — I48.91 A-FIB (HCC): ICD-10-CM

## 2023-08-17 DIAGNOSIS — R03.0 ELEVATED BLOOD PRESSURE READING: ICD-10-CM

## 2023-08-17 DIAGNOSIS — R06.00 DYSPNEA: ICD-10-CM

## 2023-08-17 DIAGNOSIS — R07.9 CHEST PAIN, UNSPECIFIED TYPE: ICD-10-CM

## 2023-08-17 DIAGNOSIS — I49.3 PVC (PREMATURE VENTRICULAR CONTRACTION): ICD-10-CM

## 2023-08-17 DIAGNOSIS — R07.9 ACUTE CHEST PAIN: Primary | ICD-10-CM

## 2023-08-17 LAB
2HR DELTA HS TROPONIN: 1 NG/L
ALBUMIN SERPL BCP-MCNC: 4.3 G/DL (ref 3.5–5)
ALP SERPL-CCNC: 42 U/L (ref 34–104)
ALT SERPL W P-5'-P-CCNC: 27 U/L (ref 7–52)
ANION GAP SERPL CALCULATED.3IONS-SCNC: 8 MMOL/L
AST SERPL W P-5'-P-CCNC: 30 U/L (ref 13–39)
ATRIAL RATE: 81 BPM
ATRIAL RATE: 90 BPM
BASOPHILS # BLD AUTO: 0.02 THOUSANDS/ÂΜL (ref 0–0.1)
BASOPHILS NFR BLD AUTO: 0 % (ref 0–1)
BILIRUB SERPL-MCNC: 0.66 MG/DL (ref 0.2–1)
BNP SERPL-MCNC: 192 PG/ML (ref 0–100)
BUN SERPL-MCNC: 17 MG/DL (ref 5–25)
CALCIUM SERPL-MCNC: 9.6 MG/DL (ref 8.4–10.2)
CARDIAC TROPONIN I PNL SERPL HS: 10 NG/L
CARDIAC TROPONIN I PNL SERPL HS: 11 NG/L
CHLORIDE SERPL-SCNC: 102 MMOL/L (ref 96–108)
CO2 SERPL-SCNC: 29 MMOL/L (ref 21–32)
CREAT SERPL-MCNC: 0.9 MG/DL (ref 0.6–1.3)
EOSINOPHIL # BLD AUTO: 0.05 THOUSAND/ÂΜL (ref 0–0.61)
EOSINOPHIL NFR BLD AUTO: 0 % (ref 0–6)
ERYTHROCYTE [DISTWIDTH] IN BLOOD BY AUTOMATED COUNT: 11.9 % (ref 11.6–15.1)
GFR SERPL CREATININE-BSD FRML MDRD: 79 ML/MIN/1.73SQ M
GLUCOSE SERPL-MCNC: 134 MG/DL (ref 65–140)
HCT VFR BLD AUTO: 38.5 % (ref 36.5–49.3)
HGB BLD-MCNC: 13.5 G/DL (ref 12–17)
IMM GRANULOCYTES # BLD AUTO: 0.04 THOUSAND/UL (ref 0–0.2)
IMM GRANULOCYTES NFR BLD AUTO: 0 % (ref 0–2)
LYMPHOCYTES # BLD AUTO: 1.08 THOUSANDS/ÂΜL (ref 0.6–4.47)
LYMPHOCYTES NFR BLD AUTO: 9 % (ref 14–44)
MCH RBC QN AUTO: 34.1 PG (ref 26.8–34.3)
MCHC RBC AUTO-ENTMCNC: 35.1 G/DL (ref 31.4–37.4)
MCV RBC AUTO: 97 FL (ref 82–98)
MONOCYTES # BLD AUTO: 1.01 THOUSAND/ÂΜL (ref 0.17–1.22)
MONOCYTES NFR BLD AUTO: 9 % (ref 4–12)
NEUTROPHILS # BLD AUTO: 9.42 THOUSANDS/ÂΜL (ref 1.85–7.62)
NEUTS SEG NFR BLD AUTO: 82 % (ref 43–75)
NRBC BLD AUTO-RTO: 0 /100 WBCS
PLATELET # BLD AUTO: 161 THOUSANDS/UL (ref 149–390)
PMV BLD AUTO: 10.5 FL (ref 8.9–12.7)
POTASSIUM SERPL-SCNC: 3.7 MMOL/L (ref 3.5–5.3)
PROT SERPL-MCNC: 7 G/DL (ref 6.4–8.4)
QRS AXIS: -73 DEGREES
QRS AXIS: 96 DEGREES
QRSD INTERVAL: 124 MS
QRSD INTERVAL: 134 MS
QT INTERVAL: 378 MS
QT INTERVAL: 404 MS
QTC INTERVAL: 451 MS
QTC INTERVAL: 462 MS
RBC # BLD AUTO: 3.96 MILLION/UL (ref 3.88–5.62)
SODIUM SERPL-SCNC: 139 MMOL/L (ref 135–147)
T WAVE AXIS: 60 DEGREES
T WAVE AXIS: 64 DEGREES
VENTRICULAR RATE: 75 BPM
VENTRICULAR RATE: 90 BPM
WBC # BLD AUTO: 11.62 THOUSAND/UL (ref 4.31–10.16)

## 2023-08-17 PROCEDURE — 36415 COLL VENOUS BLD VENIPUNCTURE: CPT | Performed by: EMERGENCY MEDICINE

## 2023-08-17 PROCEDURE — 99291 CRITICAL CARE FIRST HOUR: CPT | Performed by: EMERGENCY MEDICINE

## 2023-08-17 PROCEDURE — 83880 ASSAY OF NATRIURETIC PEPTIDE: CPT | Performed by: EMERGENCY MEDICINE

## 2023-08-17 PROCEDURE — 93308 TTE F-UP OR LMTD: CPT | Performed by: EMERGENCY MEDICINE

## 2023-08-17 PROCEDURE — 85025 COMPLETE CBC W/AUTO DIFF WBC: CPT | Performed by: EMERGENCY MEDICINE

## 2023-08-17 PROCEDURE — 84484 ASSAY OF TROPONIN QUANT: CPT | Performed by: EMERGENCY MEDICINE

## 2023-08-17 PROCEDURE — 99285 EMERGENCY DEPT VISIT HI MDM: CPT

## 2023-08-17 PROCEDURE — 93010 ELECTROCARDIOGRAM REPORT: CPT | Performed by: INTERNAL MEDICINE

## 2023-08-17 PROCEDURE — 80053 COMPREHEN METABOLIC PANEL: CPT | Performed by: EMERGENCY MEDICINE

## 2023-08-17 PROCEDURE — 96374 THER/PROPH/DIAG INJ IV PUSH: CPT

## 2023-08-17 PROCEDURE — 71046 X-RAY EXAM CHEST 2 VIEWS: CPT

## 2023-08-17 PROCEDURE — 93005 ELECTROCARDIOGRAM TRACING: CPT

## 2023-08-17 PROCEDURE — 96376 TX/PRO/DX INJ SAME DRUG ADON: CPT

## 2023-08-17 PROCEDURE — 99215 OFFICE O/P EST HI 40 MIN: CPT | Performed by: INTERNAL MEDICINE

## 2023-08-17 PROCEDURE — 96375 TX/PRO/DX INJ NEW DRUG ADDON: CPT

## 2023-08-17 PROCEDURE — 99223 1ST HOSP IP/OBS HIGH 75: CPT | Performed by: INTERNAL MEDICINE

## 2023-08-17 RX ORDER — ATENOLOL 50 MG/1
50 TABLET ORAL 2 TIMES DAILY
Status: DISCONTINUED | OUTPATIENT
Start: 2023-08-17 | End: 2023-08-18 | Stop reason: HOSPADM

## 2023-08-17 RX ORDER — FENTANYL CITRATE 50 UG/ML
25 INJECTION, SOLUTION INTRAMUSCULAR; INTRAVENOUS ONCE
Status: COMPLETED | OUTPATIENT
Start: 2023-08-17 | End: 2023-08-17

## 2023-08-17 RX ORDER — ONDANSETRON 4 MG/1
4 TABLET, ORALLY DISINTEGRATING ORAL EVERY 6 HOURS PRN
Status: DISCONTINUED | OUTPATIENT
Start: 2023-08-17 | End: 2023-08-18 | Stop reason: HOSPADM

## 2023-08-17 RX ORDER — PRAVASTATIN SODIUM 80 MG/1
80 TABLET ORAL
Status: DISCONTINUED | OUTPATIENT
Start: 2023-08-17 | End: 2023-08-18 | Stop reason: HOSPADM

## 2023-08-17 RX ORDER — ACETAMINOPHEN 325 MG/1
650 TABLET ORAL EVERY 6 HOURS PRN
Status: DISCONTINUED | OUTPATIENT
Start: 2023-08-17 | End: 2023-08-18 | Stop reason: HOSPADM

## 2023-08-17 RX ORDER — NITROGLYCERIN 0.4 MG/1
0.4 TABLET SUBLINGUAL
Status: DISCONTINUED | OUTPATIENT
Start: 2023-08-17 | End: 2023-08-18 | Stop reason: HOSPADM

## 2023-08-17 RX ORDER — LOSARTAN POTASSIUM 50 MG/1
50 TABLET ORAL DAILY
Status: DISCONTINUED | OUTPATIENT
Start: 2023-08-18 | End: 2023-08-18 | Stop reason: HOSPADM

## 2023-08-17 RX ORDER — AMLODIPINE BESYLATE 5 MG/1
5 TABLET ORAL
Status: DISCONTINUED | OUTPATIENT
Start: 2023-08-17 | End: 2023-08-18 | Stop reason: HOSPADM

## 2023-08-17 RX ORDER — HYDROCHLOROTHIAZIDE 12.5 MG/1
12.5 TABLET ORAL DAILY
Status: DISCONTINUED | OUTPATIENT
Start: 2023-08-18 | End: 2023-08-18 | Stop reason: HOSPADM

## 2023-08-17 RX ORDER — ONDANSETRON 2 MG/ML
4 INJECTION INTRAMUSCULAR; INTRAVENOUS ONCE
Status: COMPLETED | OUTPATIENT
Start: 2023-08-17 | End: 2023-08-17

## 2023-08-17 RX ORDER — PANTOPRAZOLE SODIUM 40 MG/1
40 TABLET, DELAYED RELEASE ORAL
Status: DISCONTINUED | OUTPATIENT
Start: 2023-08-18 | End: 2023-08-18 | Stop reason: HOSPADM

## 2023-08-17 RX ADMIN — APIXABAN 5 MG: 5 TABLET, FILM COATED ORAL at 18:01

## 2023-08-17 RX ADMIN — FENTANYL CITRATE 25 MCG: 50 INJECTION INTRAMUSCULAR; INTRAVENOUS at 07:31

## 2023-08-17 RX ADMIN — FENTANYL CITRATE 25 MCG: 50 INJECTION INTRAMUSCULAR; INTRAVENOUS at 06:34

## 2023-08-17 RX ADMIN — AMLODIPINE BESYLATE 5 MG: 5 TABLET ORAL at 21:14

## 2023-08-17 RX ADMIN — PRAVASTATIN SODIUM 80 MG: 40 TABLET ORAL at 18:01

## 2023-08-17 RX ADMIN — NITROGLYCERIN 0.4 MG: 0.4 TABLET SUBLINGUAL at 13:39

## 2023-08-17 RX ADMIN — ATENOLOL 50 MG: 50 TABLET ORAL at 18:01

## 2023-08-17 RX ADMIN — ACETAMINOPHEN 650 MG: 325 TABLET, FILM COATED ORAL at 20:41

## 2023-08-17 RX ADMIN — NITROGLYCERIN 0.4 MG: 0.4 TABLET SUBLINGUAL at 20:41

## 2023-08-17 RX ADMIN — NITROGLYCERIN 0.4 MG: 0.4 TABLET SUBLINGUAL at 13:52

## 2023-08-17 RX ADMIN — ACETAMINOPHEN 650 MG: 325 TABLET, FILM COATED ORAL at 13:50

## 2023-08-17 RX ADMIN — ONDANSETRON 4 MG: 2 INJECTION INTRAMUSCULAR; INTRAVENOUS at 07:31

## 2023-08-17 NOTE — PLAN OF CARE
Problem: PAIN - ADULT  Goal: Verbalizes/displays adequate comfort level or baseline comfort level  Description: Interventions:  - Encourage patient to monitor pain and request assistance  - Assess pain using appropriate pain scale  - Administer analgesics based on type and severity of pain and evaluate response  - Implement non-pharmacological measures as appropriate and evaluate response  - Consider cultural and social influences on pain and pain management  - Notify physician/advanced practitioner if interventions unsuccessful or patient reports new pain  Outcome: Progressing     Problem: INFECTION - ADULT  Goal: Absence or prevention of progression during hospitalization  Description: INTERVENTIONS:  - Assess and monitor for signs and symptoms of infection  - Monitor lab/diagnostic results  - Monitor all insertion sites, i.e. indwelling lines, tubes, and drains  - Monitor endotracheal if appropriate and nasal secretions for changes in amount and color  - Rosser appropriate cooling/warming therapies per order  - Administer medications as ordered  - Instruct and encourage patient and family to use good hand hygiene technique  - Identify and instruct in appropriate isolation precautions for identified infection/condition  Outcome: Progressing  Goal: Absence of fever/infection during neutropenic period  Description: INTERVENTIONS:  - Monitor WBC    Outcome: Progressing     Problem: SAFETY ADULT  Goal: Patient will remain free of falls  Description: INTERVENTIONS:  - Educate patient/family on patient safety including physical limitations  - Instruct patient to call for assistance with activity   - Consult OT/PT to assist with strengthening/mobility   - Keep Call bell within reach  - Keep bed low and locked with side rails adjusted as appropriate  - Keep care items and personal belongings within reach  - Initiate and maintain comfort rounds  - Make Fall Risk Sign visible to staff  - Apply yellow socks and bracelet for high fall risk patients  - Consider moving patient to room near nurses station  Outcome: Progressing  Goal: Maintain or return to baseline ADL function  Description: INTERVENTIONS:  -  Assess patient's ability to carry out ADLs; assess patient's baseline for ADL function and identify physical deficits which impact ability to perform ADLs (bathing, care of mouth/teeth, toileting, grooming, dressing, etc.)  - Assess/evaluate cause of self-care deficits   - Assess range of motion  - Assess patient's mobility; develop plan if impaired  - Assess patient's need for assistive devices and provide as appropriate  - Encourage maximum independence but intervene and supervise when necessary  - Involve family in performance of ADLs  - Assess for home care needs following discharge   - Consider OT consult to assist with ADL evaluation and planning for discharge  - Provide patient education as appropriate  Outcome: Progressing  Goal: Maintains/Returns to pre admission functional level  Description: INTERVENTIONS:  - Perform BMAT or MOVE assessment daily.   - Set and communicate daily mobility goal to care team and patient/family/caregiver.    - Collaborate with rehabilitation services on mobility goals if consulted  - Out of bed for toileting  - Record patient progress and toleration of activity level   Outcome: Progressing     Problem: DISCHARGE PLANNING  Goal: Discharge to home or other facility with appropriate resources  Description: INTERVENTIONS:  - Identify barriers to discharge w/patient and caregiver  - Arrange for needed discharge resources and transportation as appropriate  - Identify discharge learning needs (meds, wound care, etc.)  - Arrange for interpretive services to assist at discharge as needed  - Refer to Case Management Department for coordinating discharge planning if the patient needs post-hospital services based on physician/advanced practitioner order or complex needs related to functional status, cognitive ability, or social support system  Outcome: Progressing    Problem: Knowledge Deficit  Goal: Patient/family/caregiver demonstrates understanding of disease process, treatment plan, medications, and discharge instructions  Description: Complete learning assessment and assess knowledge base.   Interventions:  - Provide teaching at level of understanding  - Provide teaching via preferred learning methods  Outcome: Progressing

## 2023-08-17 NOTE — ASSESSMENT & PLAN NOTE
Plan:  -Currently on simvastatin 40 mg at home but switch to pravastatin 80 mg due to hospital formulary

## 2023-08-17 NOTE — ED PROVIDER NOTES
History  Chief Complaint   Patient presents with   • Chest Pain     Pt. Reports right shoulder pain all night, and at 3am chest pain started. Patient brought by EMS from home. Patient is an 57-year-old male, with a history significant for atrial fibrillation, permanent on Eliquis taken consistently, cardiomyopathy, multiple cardiac risk factors, who presents to the ED today, via EMS from home, due to chest pain. Patient states he was in his usual state of health yesterday; however, during the night he developed right shoulder pain that began radiating to the chest: The radiation and the abdomen of chest pain began at 3 AM.  The pain has been constant since then. The pain is nonexertional, nonpleuritic. It is a sharp/tight sensation. There is associated dyspnea. There is no associated fever, abdominal pain, urinary symptoms, weakness, numbness, vision change, dysphagia. Per EMS, prehospital, patient received aspirin and nitroglycerin and this improved his pain significantly. Patient is without other concerns at this time. Prior to Admission Medications   Prescriptions Last Dose Informant Patient Reported? Taking?    Hydrocod Rolando-Chlorphe Rolando ER (TUSSIONEX) 10-8 mg/5 mL ER suspension  Self No No   Sig: Take 5 mL by mouth every 12 (twelve) hours as needed for cough Max Daily Amount: 10 mL   Multiple Vitamins-Minerals (PRESERVISION AREDS PO)  Self Yes No   Sig: Take 1 tablet by mouth 2 (two) times a day   Vitamin D, Cholecalciferol, 1000 UNITS CAPS  Self Yes No   Sig: Take by mouth   amLODIPine (NORVASC) 5 mg tablet  Self No No   Sig: Take 1 tablet (5 mg total) by mouth daily at bedtime   apixaban (Eliquis) 5 mg  Self No No   Sig: Take 1 tablet (5 mg total) by mouth 2 (two) times a day   atenolol (TENORMIN) 50 mg tablet  Self No No   Sig: Take 1 tablet (50 mg total) by mouth 2 (two) times a day   fluticasone (FLONASE) 50 mcg/act nasal spray  Self No No   Sig: Spray 2 squirts in each nostril once daily   hydrocortisone 2.5 % cream  Self No No   Sig: Apply topically in the morning   losartan-hydrochlorothiazide (HYZAAR) 50-12.5 mg per tablet  Self No No   Sig: Take 1 tablet by mouth daily   omeprazole (PriLOSEC) 20 mg delayed release capsule  Self No No   Sig: Take 1 capsule (20 mg total) by mouth daily   simvastatin (ZOCOR) 40 mg tablet  Self No No   Sig: Take 1 tablet (40 mg total) by mouth daily at bedtime   triamcinolone (KENALOG) 0.1 % ointment  Self No No   Sig: APPLY TOPICALLY DAILY FOR 2 WEEKS      Facility-Administered Medications: None       Past Medical History:   Diagnosis Date   • Atrial fibrillation (HCC)    • Cardiomyopathy ([address])     LAST ASSESSED [id number]   • Cataract    • Dysphagia    • GERD (gastroesophageal reflux disease)    • Hyperlipidemia    • Hypertension    • Irregular heart beat     afib   • Rosacea    • Schatzki's ring    • Sleep apnea     no cpap       Past Surgical History:   Procedure Laterality Date   • BACK SURGERY     • CATARACT EXTRACTION     • COLONOSCOPY N/A 1/15/2016    Procedure: COLONOSCOPY;  Surgeon: Kayce Mayberry MD;  Location: [facility]; Service:    • ESOPHAGOGASTRODUODENOSCOPY     • EYE SURGERY      B/L cataract sx ([id number],[id number])   • AK CIRCUMCISION AGE >28 DAYS N/A 1/29/2018    Procedure: CIRCUMCISION ADULT;  Surgeon: Jaja Murguia MD;  Location: [facility];  Service: Urology   • AK ESOPHAGOGASTRODUODENOSCOPY TRANSORAL DIAGNOSTIC N/A 2/15/2017    Procedure: ESOPHAGOGASTRODUODENOSCOPY (EGD) WITH DILATION;  Surgeon: Jennie Crowell MD;  Location: [facility]; Service: Gastroenterology       Family History   Problem Relation Age of Onset   • Coronary artery disease Father    • Coronary artery disease Family    • Hyperlipidemia Family    • Hypertension Family    • Other Family         SUDDEN CARDIAC DEATH      I have reviewed and agree with the history as documented.     E-Cigarette/Vaping   • E-Cigarette Use Never User      E-Cigarette/Vaping Substances   • Nicotine No    • THC No    • CBD No    • Flavoring No    • Other No    • Unknown No      Social History     Tobacco Use   • Smoking status: Former   • Smokeless tobacco: Never   • Tobacco comments:     about 55 years ago   Vaping Use   • Vaping Use: Never used   Substance Use Topics   • Alcohol use: Yes     Comment: 1-2 glasses of wine daily   • Drug use: No       Review of Systems   Constitutional: Negative for fever. HENT: Negative for trouble swallowing. Eyes: Negative for visual disturbance. Respiratory: Positive for shortness of breath. Cardiovascular: Positive for chest pain. Gastrointestinal: Negative for abdominal pain. Endocrine: Negative for polyuria. Genitourinary: Negative for dysuria. Musculoskeletal: Negative for gait problem. Skin: Negative for rash. Allergic/Immunologic: Positive for environmental allergies. Neurological: Negative for weakness, light-headedness and numbness. Hematological: Negative for adenopathy. Psychiatric/Behavioral: Negative for confusion. All other systems reviewed and are negative. Physical Exam  Physical Exam  Vitals and nursing note reviewed. Constitutional:       General: He is not in acute distress. Appearance: He is obese. He is not toxic-appearing. HENT:      Head: Normocephalic and atraumatic. Right Ear: External ear normal.      Left Ear: External ear normal.      Nose: Nose normal. No rhinorrhea. Mouth/Throat:      Mouth: Mucous membranes are moist.      Pharynx: Oropharynx is clear. No oropharyngeal exudate or posterior oropharyngeal erythema. Comments: Uvula midline. No oropharyngeal or submandibular mass/swelling  Eyes:      General: No scleral icterus. Right eye: No discharge. Left eye: No discharge. Conjunctiva/sclera: Conjunctivae normal.      Pupils: Pupils are equal, round, and reactive to light.    Neck:      Comments: Patient is spontaneously rotating their neck to the left and right during the history and physical exam interaction without difficulty or apparent discomfort    Cardiovascular:      Rate and Rhythm: Normal rate and regular rhythm. Pulses: Normal pulses. Heart sounds: Normal heart sounds. No murmur heard. No friction rub. No gallop. Comments: 2+ Radial and DP bilaterally. Systolic blood pressure difference less than 20 between the upper extremities: Right 129, left 141  Pulmonary:      Effort: Pulmonary effort is normal. No respiratory distress. Breath sounds: Normal breath sounds. No stridor. No wheezing, rhonchi or rales. Chest:      Chest wall: No tenderness. Abdominal:      General: Abdomen is flat. There is no distension. Palpations: Abdomen is soft. Tenderness: There is no abdominal tenderness. There is no right CVA tenderness, left CVA tenderness, guarding or rebound. Musculoskeletal:         General: No deformity. Cervical back: Neck supple. No tenderness. No muscular tenderness. Right lower leg: No edema. Left lower leg: No edema. Comments: No pain with calf squeeze   Lymphadenopathy:      Cervical: No cervical adenopathy. Skin:     General: Skin is warm and dry. Capillary Refill: Capillary refill takes less than 2 seconds. Neurological:      Mental Status: He is alert. Comments: Patient is speaking clearly in complete sentences. Patient is answering appropriately and able follow commands. Patient is moving all four extremities spontaneously. No facial droop. Tongue midline.       Psychiatric:         Mood and Affect: Mood normal.         Behavior: Behavior normal.         Vital Signs  ED Triage Vitals [08/17/23 0618]   Temperature Pulse Respirations Blood Pressure SpO2   98.4 °F (36.9 °C) 77 20 133/61 97 %      Temp Source Heart Rate Source Patient Position - Orthostatic VS BP Location FiO2 (%)   Oral Monitor Lying Right arm --      Pain Score       4           Vitals:    08/17/23 1215 08/17/23 0645   BP: 133/61 121/63   Pulse: 77 80   Patient Position - Orthostatic VS: Lying Sitting         Visual Acuity      ED Medications  Medications   fentanyl citrate (PF) 100 MCG/2ML 25 mcg (25 mcg Intravenous Given 8/17/23 0634)   ondansetron (ZOFRAN) injection 4 mg (4 mg Intravenous Given 8/17/23 0731)   fentanyl citrate (PF) 100 MCG/2ML 25 mcg (25 mcg Intravenous Given 8/17/23 0731)       Diagnostic Studies  Results Reviewed     Procedure Component Value Units Date/Time    B-Type Natriuretic Peptide(BNP) [[id number]] Collected: 08/17/23 0628    Lab Status:  In process Specimen: Blood from Arm, Left Updated: 08/17/23 0733    HS Troponin 0hr (reflex protocol) [[id number]]  (Normal) Collected: 08/17/23 0628    Lab Status: Final result Specimen: Blood from Arm, Left Updated: 08/17/23 0713     hs TnI 0hr 10 ng/L     HS Troponin I 2hr [[id number]]     Lab Status: No result Specimen: Blood     Comprehensive metabolic panel [[id number]] Collected: 08/17/23 0628    Lab Status: Final result Specimen: Blood from Arm, Left Updated: 08/17/23 0707     Sodium 139 mmol/L      Potassium 3.7 mmol/L      Chloride 102 mmol/L      CO2 29 mmol/L      ANION GAP 8 mmol/L      BUN 17 mg/dL      Creatinine 0.90 mg/dL      Glucose 134 mg/dL      Calcium 9.6 mg/dL      AST 30 U/L      ALT 27 U/L      Alkaline Phosphatase 42 U/L      Total Protein 7.0 g/dL      Albumin 4.3 g/dL      Total Bilirubin 0.66 mg/dL      eGFR 79 ml/min/1.73sq m     Narrative:      [place] guidelines for Chronic Kidney Disease (CKD):   •  Stage 1 with normal or high GFR (GFR > 90 mL/min/1.73 square meters)  •  Stage 2 Mild CKD (GFR = 60-89 mL/min/1.73 square meters)  •  Stage 3A Moderate CKD (GFR = 45-59 mL/min/1.73 square meters)  •  Stage 3B Moderate CKD (GFR = 30-44 mL/min/1.73 square meters)  •  Stage 4 Severe CKD (GFR = 15-29 mL/min/1.73 square meters)  •  Stage 5 End Stage CKD (GFR <15 mL/min/1.73 square meters)  Note: GFR calculation is accurate only with a steady state creatinine    CBC and differential [[id number]]  (Abnormal) Collected: 08/17/23 0628    Lab Status: Final result Specimen: Blood from Arm, Left Updated: 08/17/23 0650     WBC 11.62 Thousand/uL      RBC 3.96 Million/uL      Hemoglobin 13.5 g/dL      Hematocrit 38.5 %      MCV 97 fL      MCH 34.1 pg      MCHC 35.1 g/dL      RDW 11.9 %      MPV 10.5 fL      Platelets 659 Thousands/uL      nRBC 0 /100 WBCs      Neutrophils Relative 82 %      Immat GRANS % 0 %      Lymphocytes Relative 9 %      Monocytes Relative 9 %      Eosinophils Relative 0 %      Basophils Relative 0 %      Neutrophils Absolute 9.42 Thousands/µL      Immature Grans Absolute 0.04 Thousand/uL      Lymphocytes Absolute 1.08 Thousands/µL      Monocytes Absolute 1.01 Thousand/µL      Eosinophils Absolute 0.05 Thousand/µL      Basophils Absolute 0.02 Thousands/µL                  XR chest 2 views   ED Interpretation by Ale Byrne MD (08/17 1586)   Per my independent interpretation: Cardiomegaly.   Enlarged from prior                 Procedures  CriticalCare Time    Date/Time: 8/17/2023 7:34 AM    Performed by: Ale Byrne MD  Authorized by: Ale Byrne MD    Critical care provider statement:     Critical care time (minutes):  30    Critical care start time:  8/17/2023 6:34 AM    Critical care end time:  8/17/2023 7:04 AM    Critical care time was exclusive of:  Teaching time and separately billable procedures and treating other patients    Critical care was necessary to treat or prevent imminent or life-threatening deterioration of the following conditions:  Cardiac failure and circulatory failure    Critical care was time spent personally by me on the following activities:  Ordering and performing treatments and interventions, ordering and review of laboratory studies, ordering and review of radiographic studies, re-evaluation of patient's condition, evaluation of patient's response to treatment, examination of patient, development of treatment plan with patient or surrogate and obtaining history from patient or surrogate    I assumed direction of critical care for this patient from another provider in my specialty: no      POC Cardiac US    Date/Time: 8/17/2023 7:47 AM    Performed by: Roshan Montiel MD  Authorized by: Roshan Montiel MD    Patient location:  ED  Other Assisting Provider: No    Procedure details:     Exam Type:  Diagnostic    Indications: chest pain      Assessment / Evaluation for: cardiac function and pericardial effusion      Exam Type: initial exam      Image quality: diagnostic      Image availability:  Images available in PACS  Patient Details:     Cardiac Rhythm:  Irregular    Mechanical ventilation: No    Cardiac findings:     Echo technique: limited 2D      Views obtained: parasternal long axis, parasternal short axis, subcostal and apical      Pericardial effusion: absent      Tamponade physiology: absent      Wall motion: normal      LV systolic function: normal      RV dilation: none               ED Course  ED Course as of 08/17/23 0748   u Aug 17, 2023   0624 ECG per my independent interpretation: Normal rate, irregular rhythm, PVC, left axis, no ST elevations or depressions     0721 On reevaluation, patient reports improvement in pain but it is still present. Patient also reports nausea. Fentanyl and Zofran ordered. Patient agreeable with admission at this time. Furthermore, patient states that his pain in the shoulder has resolved but the chest pain is still present in the middle of the anterior chest.  This discomfort does not radiate. 8856 Patient reports further improvement at this time.              HEART Risk Score    Flowsheet Row Most Recent Value   Heart Score Risk Calculator    History 2 Filed at: 08/17/2023 0721   ECG 1 Filed at: 08/17/2023 7908   Age 2 Filed at: 08/17/2023 0721   Risk Factors 2 Filed at: 08/17/2023 4185 Troponin 0 Filed at: 08/17/2023 4219   HEART Score 7 Filed at: 08/17/2023 3223                        SBIRT 20yo+    Flowsheet Row Most Recent Value   Initial Alcohol Screen: US AUDIT-C     1. How often do you have a drink containing alcohol? 6 Filed at: 08/17/2023 0636   2. How many drinks containing alcohol do you have on a typical day you are drinking? 2 Filed at: 08/17/2023 0636   3a. Male UNDER 65: How often do you have five or more drinks on one occasion? 1 Filed at: 08/17/2023 0636   3b. FEMALE Any Age, or MALE 65+: How often do you have 4 or more drinks on one occassion? 0 Filed at: 08/17/2023 8261   Audit-C Score 9 Filed at: 08/17/2023 9079   Full Alcohol Screen: US AUDIT    4. How often during the last year have you found that you were not able to stop drinking once you had started? 0 Filed at: 08/17/2023 0636   5. How often during past year have you failed to do what was normally expected of you because of drinking? 0 Filed at: 08/17/2023 0636   6. How often in past year have you needed a first drink in the morning to get yourself going after a heavy drinking session? 0 Filed at: 08/17/2023 0636   7. How often in past year have you had feeling of guilt or remorse after drinking? 0 Filed at: 08/17/2023 0636   8. How often in past year have you been unable to remember what happened night before because you had been drinking? 0 Filed at: 08/17/2023 0636   9. Have you or someone else been injured as a result of your drinking? 0 Filed at: 08/17/2023 0636   10. Has a relative, friend, doctor or other health worker been concerned about your drinking and suggested you cut down? 2 Filed at: 08/17/2023 0636   AUDIT Total Score 11 Filed at: 08/17/2023 5169   ANJANA: How many times in the past year have you. .. Used an illegal drug or used a prescription medication for non-medical reasons?  Never Filed at: 08/17/2023 0636                    Medical Decision Making  Patient is an 82-year-old male, with a history significant for atrial fibrillation, permanent on Eliquis taken consistently, cardiomyopathy, multiple cardiac risk factors, who presents to the ED today, via EMS from home, due to chest pain. Patient states he was in his usual state of health yesterday; however, during the night he developed right shoulder pain that began radiating to the chest: The radiation and the abdomen of chest pain began at 3 AM.  The pain has been constant since then. The pain is nonexertional, nonpleuritic. It is a sharp/tight sensation. There is associated dyspnea. There is no associated fever, abdominal pain, urinary symptoms, weakness, numbness, vision change, dysphagia. Per EMS, prehospital, patient received aspirin and nitroglycerin and this improved his pain significantly. Patient is currently afebrile and hemodynamically stable. His physical exam is notable for systolic blood pressure difference less than 20 between the upper extremities, 2+ pulses all 4 extremities, clear heart and lungs, no calf tenderness, no abdominal tenderness palpation. This presentation is concerning for: ACS. I also considered pneumonia, pneumothorax, anemia, arrhythmia. No reason to suspect acute aortic dissection at this time based on history and physical exam.  No reason to suspect pulmonary embolism at this time based on history, physical exam, current anticoagulation status. Will investigate with cardiac work-up. Will manage with fentanyl and further based on work-up. Amount and/or Complexity of Data Reviewed  Labs: ordered. Radiology: ordered. Risk  Prescription drug management.           Disposition  Final diagnoses:   Acute chest pain   Elevated blood pressure reading   Dyspnea   PVC (premature ventricular contraction)   A-fib (HCC)   Cardiomegaly     Time reflects when diagnosis was documented in both MDM as applicable and the Disposition within this note     Time User Action Codes Description Comment    8/17/2023  6:29 AM Antonio Estrella A Add [R07.9] Acute chest pain     8/17/2023  6:29 AM Chris Harrison A Add [R03.0] Elevated blood pressure reading     8/17/2023  6:29 AM Chris Harrison A Add [R06.00] Dyspnea     8/17/2023  6:29 AM Chris Nevarez A Add [I49.3] PVC (premature ventricular contraction)     8/17/2023  6:29 AM Antonio Estrella A Add [I48.91] A-fib ([address])     8/17/2023  7:30 AM Antonio Estrella Add [I51.7] Cardiomegaly       ED Disposition     ED Disposition   Admit    Condition   Stable    Date/Time   u Aug 17, 2023  7:33 AM    Comment   Case was discussed with BRIDGETTE and the patient's admission status was agreed to be Admission Status: observation status to the service of Dr. Wild Miles . Follow-up Information    None         Patient's Medications   Discharge Prescriptions    No medications on file       No discharge procedures on file.     PDMP Review       Value Time User    PDMP Reviewed  Yes 4/12/2023 12:26 PM Anand Schaefer DO          ED Provider  Electronically Signed by           Adelina Mccoy MD  08/17/23 6972

## 2023-08-17 NOTE — H&P
[facility]  H&P  Name: Adin Guy 82 y.o. male I MRN: [id number]  Unit/Bed#: S -01 I Date of Admission: 8/17/2023   Date of Service: 8/17/2023 I Hospital Day: 0      Assessment/Plan   * Chest pain  Assessment & Plan  - Patient began complaining of chest pain at 3am this morning with radiation to the R arm and abdomen.   - ASA and nitro given by EMS with some relief of pain  - EKG nonischemic, trops normal at 0 and 2 hours  -JEFERSON of 4      Plan:   - Cardiology consulted   - Nitro prn   - Morphine 2mg prn   - Zofran 4 mg as needed    Gastroesophageal reflux disease  Assessment & Plan  Plan:  -Pantoprazole 40 mg daily    Obstructive sleep apnea  Assessment & Plan  Compliant with Apap at home    Plan:  - Continue APAP    Benign essential hypertension  Assessment & Plan  Well controlled htn on home regimen    Plan:   -Continue amlodipine 5 mg  -Continue atenolol 50 mg twice daily  -Continue losartan- hydrochlorothiazide 50-12.5mg daily    Hyperlipidemia  Assessment & Plan  Plan:  -Currently on simvastatin 40 mg at home but switch to pravastatin 80 mg due to hospital formulary    Atrial fibrillation [facility])  Assessment & Plan  Plan:  -Patient currently taking Eliquis 5 mg twice daily       VTE Pharmacologic Prophylaxis: VTE Score: 5 Moderate Risk (Score 3-4) - Pharmacological DVT Prophylaxis Ordered: apixaban (Eliquis). Code Status: Level 1 - Full Code   Discussion with family: Updated  (wife) at bedside. Anticipated Length of Stay: Patient will be admitted on an observation basis with an anticipated length of stay of less than 2 midnights secondary to chest pain. Chief Complaint: chest pain    History of Present Illness:  Ankur Christina is a 82 y.o. male with a PMH of A-fib on Eliquis, cardiomyopathy, hypertension, hyperlipidemia, and LUÍS who presents with chest pain.   Patient states that overnight at about 3 AM he developed right shoulder pain that radiated to the chest and abdomen. He describes it as sharp and bandlike sensation with associated shortness of breath. The pain is nonexertional and nonpleuritic. Patient received aspirin and nitro with some relief. Denies fever, chills, headache, abdominal pain, vomiting, diarrhea, constipation. In ED, EKG not concerning for ischemia and trops were flat at 0 and 2 hours. BNP slightly elevated at 192, but otherwise labs are unremarkable. Chest x-ray with no evidence of acute cardiopulmonary disease. Patient received Fetanyl and Zofran with some relief of the pain and nausea. Review of Systems:  Review of Systems   Constitutional: Negative for activity change, appetite change, fatigue and fever. HENT: Negative for rhinorrhea, sinus pressure, sore throat and trouble swallowing. Respiratory: Positive for chest tightness. Negative for apnea, cough, shortness of breath and wheezing. Cardiovascular: Positive for chest pain. Negative for palpitations and leg swelling. Gastrointestinal: Positive for nausea. Negative for abdominal distention, constipation, diarrhea and vomiting. Musculoskeletal: Positive for back pain. Negative for myalgias and neck pain. Neurological: Negative for dizziness, syncope, weakness, light-headedness and headaches. Past Medical and Surgical History:   Past Medical History:   Diagnosis Date   • Atrial fibrillation ([address])    • Cardiomyopathy ([address])     LAST ASSESSED [id number]   • Cataract    • Dysphagia    • GERD (gastroesophageal reflux disease)    • Hyperlipidemia    • Hypertension    • Irregular heart beat     afib   • Rosacea    • Schatzki's ring    • Sleep apnea     no cpap       Past Surgical History:   Procedure Laterality Date   • BACK SURGERY     • CATARACT EXTRACTION     • COLONOSCOPY N/A 1/15/2016    Procedure: COLONOSCOPY;  Surgeon: Crow Ortega MD;  Location: [facility];   Service:    • ESOPHAGOGASTRODUODENOSCOPY     • EYE SURGERY      B/L cataract sx ([id number])   • MI CIRCUMCISION AGE >28 DAYS N/A 1/29/2018    Procedure: CIRCUMCISION ADULT;  Surgeon: Minesh Ferrera MD;  Location: [facility];  Service: Urology   • MI ESOPHAGOGASTRODUODENOSCOPY TRANSORAL DIAGNOSTIC N/A 2/15/2017    Procedure: ESOPHAGOGASTRODUODENOSCOPY (EGD) WITH DILATION;  Surgeon: Neville Haro MD;  Location: [facility]; Service: Gastroenterology       Meds/Allergies:  Prior to Admission medications    Medication Sig Start Date End Date Taking?  Authorizing Provider   amLODIPine (NORVASC) 5 mg tablet Take 1 tablet (5 mg total) by mouth daily at bedtime 1/11/23   Silver Javier DO   apixaban (Eliquis) 5 mg Take 1 tablet (5 mg total) by mouth 2 (two) times a day 1/11/23   Silver Javier DO   atenolol (TENORMIN) 50 mg tablet Take 1 tablet (50 mg total) by mouth 2 (two) times a day 1/11/23   Silver Javier DO   fluticasone [facility]) 50 mcg/act nasal spray Spray 2 squirts in each nostril once daily 1/13/21   Zoila Boggs MD   Hydrocod Rolando-Chlorphe Rolando ER (TUSSIONEX) 10-8 mg/5 mL ER suspension Take 5 mL by mouth every 12 (twelve) hours as needed for cough Max Daily Amount: 10 mL 4/12/23   Silver Javier DO   hydrocortisone 2.5 % cream Apply topically in the morning 1/11/23   Silver Javier DO   losartan-hydrochlorothiazide [facility]) 50-12.5 mg per tablet Take 1 tablet by mouth daily 3/1/23   Vertell Sicard, MD   Multiple Vitamins-Minerals (PRESERVISION AREDS PO) Take 1 tablet by mouth 2 (two) times a day    Historical Provider, MD   omeprazole (PriLOSEC) 20 mg delayed release capsule Take 1 capsule (20 mg total) by mouth daily 1/11/23   Silver Javier DO   simvastatin (ZOCOR) 40 mg tablet Take 1 tablet (40 mg total) by mouth daily at bedtime 1/11/23   Joenathan Isle, DO   triamcinolone (KENALOG) 0.1 % ointment APPLY TOPICALLY DAILY FOR 2 WEEKS 5/17/21   Bettie Rollins MD   Vitamin D, Cholecalciferol, 1000 UNITS CAPS Take by mouth    Historical Provider, MD     I have reviewed home medications with patient personally. Allergies: Allergies   Allergen Reactions   • Lisinopril Other (See Comments)     Persistent cough       Social History:  Marital Status: /Civil Union   Patient Pre-hospital Living Situation: Home  Patient Pre-hospital Level of Mobility: walks  Substance Use History:   Social History     Substance and Sexual Activity   Alcohol Use Yes    Comment: 1-2 glasses of wine daily     Social History     Tobacco Use   Smoking Status Former   Smokeless Tobacco Never   Tobacco Comments    about 55 years ago     Social History     Substance and Sexual Activity   Drug Use No       Family History:  Family History   Problem Relation Age of Onset   • Coronary artery disease Father    • Coronary artery disease Family    • Hyperlipidemia Family    • Hypertension Family    • Other Family         SUDDEN CARDIAC DEATH        Physical Exam:     Vitals:   Blood Pressure: 139/77 (08/17/23 1023)  Pulse: 87 (08/17/23 0839)  Temperature: 98.3 °F (36.8 °C) (08/17/23 1033)  Temp Source: Oral (08/17/23 1033)  Respirations: 18 (08/17/23 1023)  Height: 5' 9" (175.3 cm) (08/17/23 0618)  Weight - Scale: 111 kg (245 lb 9.5 oz) (08/17/23 0618)  SpO2: 96 % (08/17/23 0839)    Physical Exam  Constitutional:       Appearance: Normal appearance. He is not ill-appearing. HENT:      Mouth/Throat:      Mouth: Mucous membranes are moist.      Pharynx: Oropharynx is clear. Eyes:      Extraocular Movements: Extraocular movements intact. Conjunctiva/sclera: Conjunctivae normal.   Cardiovascular:      Rate and Rhythm: Normal rate. Rhythm irregular. Pulses: Normal pulses. Heart sounds: Normal heart sounds. No murmur heard. No gallop. Pulmonary:      Effort: Pulmonary effort is normal. No respiratory distress. Breath sounds: Normal breath sounds. No wheezing or rales. Abdominal:      General: Bowel sounds are normal. There is no distension. Palpations: Abdomen is soft. Tenderness:  There is no abdominal tenderness. There is no guarding. Musculoskeletal:      Cervical back: Neck supple. Right lower leg: Edema present. Left lower leg: Edema present. Skin:     General: Skin is warm and dry. Comments: Venous stasis on bilat lower extremities   Neurological:      General: No focal deficit present. Mental Status: He is alert and oriented to person, place, and time. Mental status is at baseline. Additional Data:     Lab Results:  Results from last 7 days   Lab Units 08/17/23  0628   WBC Thousand/uL 11.62*   HEMOGLOBIN g/dL 13.5   HEMATOCRIT % 38.5   PLATELETS Thousands/uL 161   NEUTROS PCT % 82*   LYMPHS PCT % 9*   MONOS PCT % 9   EOS PCT % 0     Results from last 7 days   Lab Units 08/17/23  0628   SODIUM mmol/L 139   POTASSIUM mmol/L 3.7   CHLORIDE mmol/L 102   CO2 mmol/L 29   BUN mg/dL 17   CREATININE mg/dL 0.90   ANION GAP mmol/L 8   CALCIUM mg/dL 9.6   ALBUMIN g/dL 4.3   TOTAL BILIRUBIN mg/dL 0.66   ALK PHOS U/L 42   ALT U/L 27   AST U/L 30   GLUCOSE RANDOM mg/dL 134                       Lines/Drains:  Invasive Devices     Peripheral Intravenous Line  Duration           Peripheral IV 08/17/23 Dorsal (posterior); Right Hand <1 day    Peripheral IV 08/17/23 Left;Proximal;Ventral (anterior) Forearm <1 day                    Imaging: Reviewed radiology reports from this admission including: chest xray  XR chest 2 views   ED Interpretation by Lilian Tse MD (59/22 [id number])   Per my independent interpretation: Cardiomegaly. Enlarged from prior      Final Result by Lindsey Pozo MD (08/17 6008)      No acute cardiopulmonary disease. Workstation performed: [id number]             EKG and Other Studies Reviewed on Admission:   · EKG: NSR. HR 80's. ** Please Note: This note has been constructed using a voice recognition system.  **

## 2023-08-17 NOTE — CONSULTS
Consultation - Cardiology Team One  Bailey Guy 82 y.o. male MRN: [id number]  Unit/Bed#: S -01 Encounter: [id number]    Inpatient consult to Cardiology  Consult performed by: DORINDA Chase  Consult ordered by: Johnna Plascencia DO      Physician Requesting Consult: Santiago Fernandez MD  Reason for Consult / Principal Problem: chest pain    Assessment    1. Chest pain  With both typical and atypical features  Hs troponin negative x2  ECG- atrial fibrillation with PVCs  Had some relief with nitroglycerin as well as fentanyl but still having residual chest pain  No significant associated factors    2. Permanent atrial fibrillation  Rate controlled on atenolol 50 mg twice daily  On anticoagulation with Eliquis milligrams twice daily    3. Low normal LV function, LVEF 50%  Dating back to at least 2018    4. HTN-controlled, average /71. Home medications continued in the hospital-amlodipine 5 mg daily, atenolol 50 mg twice daily, losartan-HCTZ 50-12.5 mg daily    5. HLD-total cholesterol 119, triglycerides 112, HDL 30, LDL 67 in February 2023 on simvastatin 40 mg daily. This has been substituted with pravastatin 80 mg daily while in the hospital.    6.  LUÍS intolerant of CPAP    7. Morbid obesity-BMI 36.27    8. Alcohol use-drinking at least 1 to 2 glasses of wine every night, sometimes more. 9.  GERD-on omeprazole    Plan  · Exercise nuclear stress test tomorrow. If cannot tolerate the treadmill then can switch to Lexiscan  · Continue rest of cardiac medications  · Telemetry monitoring x 24 hours     History of Present Illness   HPI: Atul Sanchez is a 82y.o. year old male with permanent atrial fibrillation, low normal LV function, EF 50%, LE edema, hypertension, hyperlipidemia, LUÍS intolerant to CPAP, and morbid obesity who follows with cardiologist Dr. Sheldon Trujillo and was last seen in the office in March 2023.   He noted some increased lower extremity edema and hydrochlorothiazide was added to his medication regimen. His weight in the office was 236 pounds. His most recent echocardiogram was completed in April demonstrating LVEF 50%    He presented to the ED today with complaints of chest pain that began overnight. He went to bed with some right shoulder discomfort which he has been having on and off for several months related to an injury from power washing the house. He woke up around 3 in the morning and almost immediately noticed midsternal chest pain. He is not 100% sure if it woke him from sleep or if he woke up and then the pain started. He describes it as a squeezing or tight sensation around his chest.  He had some mild shortness of breath but nothing significant and does not recall having any diaphoresis, nausea, or vomiting. He does have a history of reflux but this feels different than prior reflux symptoms and his GERD is usually very well controlled on omeprazole which he has not missed any doses of. He got up to go to the bathroom thinking it might have been indigestion from something he ate but had no relief. He took a shower but the pain persisted so he had his wife call 911. They gave him sublingual nitroglycerin to cut his pain in half and just a few minutes. The pain did not fully resolve however. The ED treated him with 2 doses of fentanyl and Zofran. He does report some improvement with that but still no resolution of symptoms. At time of my exam he is still reporting chest pain 4-5/10 but now it is slightly reproducible on exam.    High-sensitivity troponin negative x2 with mildly elevated . CXR read as no acute cardiopulmonary disease. CMP unremarkable with CBC demonstrating mild leukocytosis. He is afebrile and hemodynamically stable with controlled blood pressures. He is maintaining adequate O2 saturations on room air. Cardiology consulted for further evaluation and work-up of his chest pain.     He lives at home with his wife and is independent with all ADLs. He stays active going to the gym 3-4 times a week where he does 10 minutes of an elliptical and weight training. He does admit to doing a chest press exercise yesterday but he does not feel that that would have contributed to his chest pain because he does these types of exercises all the time. He does drink alcohol- mainly wine- on a regular basis and had 2-3 glasses last night. EKG reviewed personally: Afib, PVCs, HR 75    Telemetry reviewed personally: afib rates 70s-90s    Review of Systems   Constitutional: Negative for chills, malaise/fatigue and weight gain. Cardiovascular: Positive for chest pain. Negative for dyspnea on exertion, leg swelling, orthopnea, palpitations and syncope. Respiratory: Negative for cough, shortness of breath, sleep disturbances due to breathing and sputum production. Gastrointestinal: Negative for bloating, nausea and vomiting. Neurological: Positive for headaches. Negative for dizziness, light-headedness and weakness. Psychiatric/Behavioral: Negative for altered mental status. All other systems reviewed and are negative. Historical Information   Past Medical History:   Diagnosis Date   • Atrial fibrillation ([address])    • Cardiomyopathy ([address])     LAST ASSESSED [id number]   • Cataract    • Dysphagia    • GERD (gastroesophageal reflux disease)    • Hyperlipidemia    • Hypertension    • Irregular heart beat     afib   • Rosacea    • Schatzki's ring    • Sleep apnea     no cpap     Past Surgical History:   Procedure Laterality Date   • BACK SURGERY     • CATARACT EXTRACTION     • COLONOSCOPY N/A 1/15/2016    Procedure: COLONOSCOPY;  Surgeon: Crow Ortega MD;  Location: [facility];   Service:    • ESOPHAGOGASTRODUODENOSCOPY     • EYE SURGERY      B/L cataract sx ([id number])   • CO CIRCUMCISION AGE >28 DAYS N/A 1/29/2018    Procedure: CIRCUMCISION ADULT;  Surgeon: Felicita Damon MD;  Location: [facility];  Service: Urology   • CO ESOPHAGOGASTRODUODENOSCOPY TRANSORAL DIAGNOSTIC N/A 2/15/2017    Procedure: ESOPHAGOGASTRODUODENOSCOPY (EGD) WITH DILATION;  Surgeon: Madeleine May MD;  Location: [facility]; Service: Gastroenterology     Social History     Substance and Sexual Activity   Alcohol Use Yes    Comment: 1-2 glasses of wine daily     Social History     Substance and Sexual Activity   Drug Use No     Social History     Tobacco Use   Smoking Status Former   Smokeless Tobacco Never   Tobacco Comments    about 55 years ago     Family History:   Family History   Problem Relation Age of Onset   • Coronary artery disease Father    • Coronary artery disease Family    • Hyperlipidemia Family    • Hypertension Family    • Other Family         SUDDEN CARDIAC DEATH        Meds/Allergies   all current active meds have been reviewed and current meds:   Current Facility-Administered Medications   Medication Dose Route Frequency   • amLODIPine (NORVASC) tablet 5 mg  5 mg Oral HS   • apixaban (ELIQUIS) tablet 5 mg  5 mg Oral BID   • atenolol (TENORMIN) tablet 50 mg  50 mg Oral BID   • [START ON 8/18/2023] losartan (COZAAR) tablet 50 mg  50 mg Oral Daily    And   • [START ON 8/18/2023] hydrochlorothiazide (HYDRODIURIL) tablet 12.5 mg  12.5 mg Oral Daily   • hydrocortisone 2.5 % cream   Topical Daily PRN   • morphine injection 2 mg  2 mg Intravenous Q4H PRN   • nitroglycerin (NITROSTAT) SL tablet 0.4 mg  0.4 mg Sublingual Q5 Min PRN   • ondansetron (ZOFRAN-ODT) dispersible tablet 4 mg  4 mg Oral Q6H PRN   • [START ON 8/18/2023] pantoprazole (PROTONIX) EC tablet 40 mg  40 mg Oral Early Morning   • pravastatin (PRAVACHOL) tablet 80 mg  80 mg Oral Daily With Dinner          Allergies   Allergen Reactions   • Lisinopril Other (See Comments)     Persistent cough       Objective   Vitals: Blood pressure 139/77, pulse 87, temperature 98.3 °F (36.8 °C), temperature source Oral, resp. rate 18, height 5' 9" (1.753 m), weight 111 kg (245 lb 9.5 oz), SpO2 96 %. , Body mass index is 36.27 kg/m². ,     Systolic (23BSO), ZFT:720 , Min:121 , PQJ:102     Diastolic (71RKZ), PGD:79, Min:61, Max:81    No intake or output data in the 24 hours ending 08/17/23 1134  Wt Readings from Last 3 Encounters:   08/17/23 111 kg (245 lb 9.5 oz)   08/15/23 107 kg (235 lb)   06/06/23 107 kg (235 lb)     Invasive Devices     Peripheral Intravenous Line  Duration           Peripheral IV 08/17/23 Dorsal (posterior); Right Hand <1 day    Peripheral IV 08/17/23 Left;Proximal;Ventral (anterior) Forearm <1 day              Physical Exam  Vitals reviewed. Constitutional:       General: He is not in acute distress. Appearance: He is obese. Neck:      Vascular: No hepatojugular reflux or JVD. Cardiovascular:      Rate and Rhythm: Normal rate. Rhythm irregularly irregular. Heart sounds: Normal heart sounds. No murmur heard. No friction rub. No gallop. Comments: Trace bilateral lower extremity edema  Pulmonary:      Effort: Pulmonary effort is normal. No respiratory distress. Breath sounds: No rales. Comments: Diminished at bases, on room air  Abdominal:      General: Bowel sounds are normal. There is no distension. Palpations: Abdomen is soft. Tenderness: There is no abdominal tenderness. Musculoskeletal:         General: No tenderness. Normal range of motion. Cervical back: Neck supple. Skin:     General: Skin is warm and dry. Capillary Refill: Capillary refill takes less than 2 seconds. Findings: No erythema. Neurological:      Mental Status: He is alert and oriented to person, place, and time.    Psychiatric:         Mood and Affect: Mood normal.         LABORATORY RESULTS:      CBC with diff:   Results from last 7 days   Lab Units 08/17/23  0628   WBC Thousand/uL 11.62*   HEMOGLOBIN g/dL 13.5   HEMATOCRIT % 38.5   MCV fL 97   PLATELETS Thousands/uL 161   RBC Million/uL 3.96   MCH pg 34.1   MCHC g/dL 35.1   RDW % 11.9   MPV fL 10.5   NRBC AUTO /100 WBCs 0     CMP:  Results from last 7 days   Lab Units 23  0628   POTASSIUM mmol/L 3.7   CHLORIDE mmol/L 102   CO2 mmol/L 29   BUN mg/dL 17   CREATININE mg/dL 0.90   CALCIUM mg/dL 9.6   AST U/L 30   ALT U/L 27   ALK PHOS U/L 42   EGFR ml/min/1.73sq m 79     BMP:  Results from last 7 days   Lab Units 23  0628   POTASSIUM mmol/L 3.7   CHLORIDE mmol/L 102   CO2 mmol/L 29   BUN mg/dL 17   CREATININE mg/dL 0.90   CALCIUM mg/dL 9.6     Lab Results   Component Value Date    CREATININE 0.90 2023    CREATININE 0.90 2023    CREATININE 0.87 2022       Lipid Profile:   Lab Results   Component Value Date    CHOL 136 2015     Lab Results   Component Value Date    HDL 30 (L) 2023    HDL 40 2022    HDL 34 (L) 2022     Lab Results   Component Value Date    LDLCALC 67 2023    LDLCALC 80 2022    LDLCALC 61 2015     Lab Results   Component Value Date    TRIG 112 2023    TRIG 186 (H) 2022    TRIG 107 2022     Cardiac testing:   Results for orders placed during the hospital encounter of 18    Echo complete with contrast if indicated    Narrative  [address] [place] [address]  [phone]    Transthoracic Echocardiogram  2D, M-mode, Doppler, and Color Doppler    Study date:  2018    Patient: Atul Sanchez  MR number: [id number]  Account number: [de-identified]  : 1940  Age: 78 years  Gender: Male  Status: Outpatient  Location: [address] [place] [address] [facility]  Height: 69 in  Weight: 208 lb  BP: 130/ 78 mmHg    Indications: Atrial fibrillation    Diagnoses: I48.0 - Atrial fibrillation    Sonographer:  JOJO Mitchell  Primary Physician:  Irene Faye MD  Referring Physician:  Kailee Eldridge MD  Group:  [facility]  Interpreting Physician:  Chel Inman MD    SUMMARY    LEFT VENTRICLE:  Systolic function was at the lower limits of normal. Ejection fraction was estimated to be 50 %. There were no regional wall motion abnormalities. Wall thickness was at the upper limits of normal.    RIGHT VENTRICLE:  The ventricle was mildly dilated. Systolic function was normal.    LEFT ATRIUM:  The atrium was mildly dilated. RIGHT ATRIUM:  The atrium was mildly dilated. MITRAL VALVE:  There was mild annular calcification. There was mild to moderate regurgitation. TRICUSPID VALVE:  There was mild regurgitation. Estimated peak PA pressure was 40 mmHg. HISTORY: PRIOR HISTORY: Hypertension, hyperlipidemia, atrial fibrillation, cardiomyopathy, obstructive sleep apnea, former smoker, prediabetes    PROCEDURE: The study was performed in the [facility]. This was a routine study. The transthoracic approach was used. The study included complete 2D imaging, M-mode, complete spectral Doppler, and color Doppler. Image  quality was adequate. LEFT VENTRICLE: Size was normal. Systolic function was at the lower limits of normal. Ejection fraction was estimated to be 50 %. There were no regional wall motion abnormalities. Wall thickness was at the upper limits of normal. DOPPLER:  Transmitral flow pattern: atrial fibrillation. RIGHT VENTRICLE: The ventricle was mildly dilated. Systolic function was normal.    LEFT ATRIUM: The atrium was mildly dilated. RIGHT ATRIUM: The atrium was mildly dilated. MITRAL VALVE: There was mild annular calcification. Valve structure was normal. There was normal leaflet separation. DOPPLER: The transmitral velocity was within the normal range. There was no evidence for stenosis. There was mild to  moderate regurgitation. AORTIC VALVE: The valve was trileaflet. Leaflets exhibited mildly increased thickness and normal cuspal separation. DOPPLER: Transaortic velocity was within the normal range. There was no evidence for stenosis. There was no regurgitation.     TRICUSPID VALVE: The valve structure was normal. There was normal leaflet separation. DOPPLER: The transtricuspid velocity was within the normal range. There was no evidence for stenosis. There was mild regurgitation. Pulmonary artery  systolic pressure was mildly increased. Estimated peak PA pressure was 40 mmHg. PULMONIC VALVE: Leaflets exhibited normal thickness, no calcification, and normal cuspal separation. DOPPLER: The transpulmonic velocity was within the normal range. There was mild regurgitation. PERICARDIUM: There was no pericardial effusion. AORTA: The root exhibited normal size. SYSTEMIC VEINS: IVC: The inferior vena cava was normal in size. SYSTEM MEASUREMENT TABLES    2D  %FS: 27.43 %  AV Diam: 3.63 cm  EDV(Teich): 103.95 ml  EF(Cube): 61.78 %  EF(Teich): 53.27 %  ESV(Cube): 40.48 ml  ESV(Teich): 48.57 ml  IVSd: 1.05 cm  LA Area: 22.88 cm2  LA Diam: 3.99 cm  LVEDV MOD A4C: 132.92 ml  LVEF MOD A4C: 44.61 %  LVESV MOD A4C: 73.63 ml  LVIDd: 4.73 cm  LVIDs: 3.43 cm  LVLd A4C: 7.57 cm  LVLs A4C: 6.65 cm  LVPWd: 1.09 cm  RA Area: 27.19 cm2  RV Diam.: 4.56 cm  SV MOD A4C: 59.29 ml  SV(Cube): 65.42 ml  SV(Teich): 55.38 ml    CW  TR Vmax: 2.95 m/s  TR maxP.72 mmHg    MM  TAPSE: 1.6 cm    [facility] Accredited Echocardi[facility]    Prepared and electronically signed by    Merary Mustafa MD  Signed [id number] 16:49:48    Imaging: I have personally reviewed pertinent reports. and I have personally reviewed pertinent films in PACS  XR chest 2 views    Result Date: 2023  Narrative: CHEST INDICATION:   CP. COMPARISON:  None EXAM PERFORMED/VIEWS:  XR CHEST PA & LATERAL The frontal view was performed utilizing dual energy radiographic technique. Images: 5 FINDINGS: Cardiomediastinal silhouette appears enlarged. The lungs are clear. No pneumothorax or pleural effusion. Osseous structures appear within normal limits for patient age. Impression: No acute cardiopulmonary disease.  Workstation performed: [id number]     Thank you for allowing us to participate in this patient's care. This pt will follow up with Dr. Syed Sifuentes once discharged. Counseling / Coordination of Care  Total floor / unit time spent today 45 minutes. Greater than 50% of total time was spent with the patient and / or family counseling and / or coordination of care. A description of the counseling / coordination of care: Review of history, current assessment, development of a plan. Code Status: Level 1 - Full Code    ** Please Note: Dragon 360 Dictation voice to text software may have been used in the creation of this document.  **

## 2023-08-17 NOTE — ED NOTES
Patient transported to [address] [place] [address], [address] [place] [address]  08/17/23 4510

## 2023-08-17 NOTE — ASSESSMENT & PLAN NOTE
Well controlled htn on home regimen    Plan:   -Continue amlodipine 5 mg  -Continue atenolol 50 mg twice daily  -Continue losartan- hydrochlorothiazide 50-12.5mg daily

## 2023-08-17 NOTE — ED NOTES
Patient transported to room 208 and placed on telemetry, unit not transmitting to central station.  Spoke to receiving RN and she will check unit and make adjustments for proper transmittal     [employer]  08/17/23 6701

## 2023-08-17 NOTE — ASSESSMENT & PLAN NOTE
- Patient began complaining of chest pain at 3am this morning with radiation to the R arm and abdomen.   - ASA and nitro given by EMS with some relief of pain  - EKG nonischemic, trops normal at 0 and 2 hours  -JEFRESON of 4      Plan:   - Cardiology consulted   - Nitro prn   - Morphine 2mg prn   - Zofran 4 mg as needed

## 2023-08-18 ENCOUNTER — APPOINTMENT (OUTPATIENT)
Dept: NUCLEAR MEDICINE | Facility: HOSPITAL | Age: 83
End: 2023-08-18
Payer: MEDICARE

## 2023-08-18 ENCOUNTER — APPOINTMENT (OUTPATIENT)
Dept: NON INVASIVE DIAGNOSTICS | Facility: HOSPITAL | Age: 83
End: 2023-08-18
Payer: MEDICARE

## 2023-08-18 VITALS
HEIGHT: 69 IN | RESPIRATION RATE: 18 BRPM | SYSTOLIC BLOOD PRESSURE: 148 MMHG | HEART RATE: 63 BPM | WEIGHT: 226 LBS | BODY MASS INDEX: 33.47 KG/M2 | OXYGEN SATURATION: 94 % | TEMPERATURE: 98.6 F | DIASTOLIC BLOOD PRESSURE: 76 MMHG

## 2023-08-18 LAB
ANION GAP SERPL CALCULATED.3IONS-SCNC: 9 MMOL/L
ATRIAL RATE: 96 BPM
ATRIAL RATE: 98 BPM
BASOPHILS # BLD AUTO: 0.02 THOUSANDS/ÂΜL (ref 0–0.1)
BASOPHILS NFR BLD AUTO: 0 % (ref 0–1)
BUN SERPL-MCNC: 13 MG/DL (ref 5–25)
CALCIUM SERPL-MCNC: 9.4 MG/DL (ref 8.4–10.2)
CHEST PAIN STATEMENT: NORMAL
CHLORIDE SERPL-SCNC: 100 MMOL/L (ref 96–108)
CHOLEST SERPL-MCNC: 151 MG/DL
CO2 SERPL-SCNC: 27 MMOL/L (ref 21–32)
CREAT SERPL-MCNC: 0.76 MG/DL (ref 0.6–1.3)
EOSINOPHIL # BLD AUTO: 0.04 THOUSAND/ÂΜL (ref 0–0.61)
EOSINOPHIL NFR BLD AUTO: 1 % (ref 0–6)
ERYTHROCYTE [DISTWIDTH] IN BLOOD BY AUTOMATED COUNT: 12.2 % (ref 11.6–15.1)
EST. AVERAGE GLUCOSE BLD GHB EST-MCNC: 120 MG/DL
GFR SERPL CREATININE-BSD FRML MDRD: 85 ML/MIN/1.73SQ M
GLUCOSE SERPL-MCNC: 107 MG/DL (ref 65–140)
HBA1C MFR BLD: 5.8 %
HCT VFR BLD AUTO: 39.1 % (ref 36.5–49.3)
HDLC SERPL-MCNC: 50 MG/DL
HGB BLD-MCNC: 13.3 G/DL (ref 12–17)
IMM GRANULOCYTES # BLD AUTO: 0.03 THOUSAND/UL (ref 0–0.2)
IMM GRANULOCYTES NFR BLD AUTO: 0 % (ref 0–2)
LDLC SERPL CALC-MCNC: 73 MG/DL (ref 0–100)
LYMPHOCYTES # BLD AUTO: 1.27 THOUSANDS/ÂΜL (ref 0.6–4.47)
LYMPHOCYTES NFR BLD AUTO: 19 % (ref 14–44)
MAX DIASTOLIC BP: 88 MMHG
MAX HEART RATE: 155 BPM
MAX HR PERCENT: 111 %
MAX HR: 155 BPM
MAX PREDICTED HEART RATE: 139 BPM
MAX. SYSTOLIC BP: 202 MMHG
MCH RBC QN AUTO: 34.5 PG (ref 26.8–34.3)
MCHC RBC AUTO-ENTMCNC: 34 G/DL (ref 31.4–37.4)
MCV RBC AUTO: 102 FL (ref 82–98)
MONOCYTES # BLD AUTO: 0.98 THOUSAND/ÂΜL (ref 0.17–1.22)
MONOCYTES NFR BLD AUTO: 14 % (ref 4–12)
NEUTROPHILS # BLD AUTO: 4.45 THOUSANDS/ÂΜL (ref 1.85–7.62)
NEUTS SEG NFR BLD AUTO: 66 % (ref 43–75)
NONHDLC SERPL-MCNC: 101 MG/DL
NRBC BLD AUTO-RTO: 0 /100 WBCS
NUC STRESS EJECTION FRACTION: 45 %
PLATELET # BLD AUTO: 144 THOUSANDS/UL (ref 149–390)
PMV BLD AUTO: 10.6 FL (ref 8.9–12.7)
POTASSIUM SERPL-SCNC: 3.8 MMOL/L (ref 3.5–5.3)
PROTOCOL NAME: NORMAL
QRS AXIS: -36 DEGREES
QRS AXIS: -39 DEGREES
QRSD INTERVAL: 130 MS
QRSD INTERVAL: 130 MS
QT INTERVAL: 382 MS
QT INTERVAL: 388 MS
QTC INTERVAL: 457 MS
QTC INTERVAL: 477 MS
RATE PRESSURE PRODUCT: NORMAL
RBC # BLD AUTO: 3.85 MILLION/UL (ref 3.88–5.62)
REASON FOR TERMINATION: NORMAL
SL CV REST NUCLEAR ISOTOPE DOSE: 10.7 MCI
SL CV STRESS NUCLEAR ISOTOPE DOSE: 33 MCI
SL CV STRESS RECOVERY BP: NORMAL MMHG
SL CV STRESS RECOVERY HR: 98 BPM
SL CV STRESS STAGE REACHED: 2
SODIUM SERPL-SCNC: 136 MMOL/L (ref 135–147)
STRESS ANGINA INDEX: 0
STRESS BASELINE BP: NORMAL MMHG
STRESS BASELINE HR: 75 BPM
STRESS O2 SAT REST: 96 %
STRESS PEAK HR: 155 BPM
STRESS POST ESTIMATED WORKLOAD: 7 METS
STRESS POST EXERCISE DUR MIN: 5 MIN
STRESS POST EXERCISE DUR SEC: 15 SEC
STRESS POST O2 SAT PEAK: 98 %
STRESS POST PEAK BP: 182 MMHG
STRESS/REST PERFUSION RATIO: 1.12
T WAVE AXIS: 101 DEGREES
T WAVE AXIS: 76 DEGREES
TARGET HR FORMULA: NORMAL
TEST INDICATION: NORMAL
TIME IN EXERCISE PHASE: NORMAL
TRIGL SERPL-MCNC: 142 MG/DL
VENTRICULAR RATE: 86 BPM
VENTRICULAR RATE: 91 BPM
WBC # BLD AUTO: 6.79 THOUSAND/UL (ref 4.31–10.16)

## 2023-08-18 PROCEDURE — 93010 ELECTROCARDIOGRAM REPORT: CPT | Performed by: INTERNAL MEDICINE

## 2023-08-18 PROCEDURE — 93017 CV STRESS TEST TRACING ONLY: CPT

## 2023-08-18 PROCEDURE — A9502 TC99M TETROFOSMIN: HCPCS

## 2023-08-18 PROCEDURE — 99239 HOSP IP/OBS DSCHRG MGMT >30: CPT | Performed by: INTERNAL MEDICINE

## 2023-08-18 PROCEDURE — 78452 HT MUSCLE IMAGE SPECT MULT: CPT

## 2023-08-18 PROCEDURE — 80048 BASIC METABOLIC PNL TOTAL CA: CPT

## 2023-08-18 PROCEDURE — 93018 CV STRESS TEST I&R ONLY: CPT | Performed by: INTERNAL MEDICINE

## 2023-08-18 PROCEDURE — 85025 COMPLETE CBC W/AUTO DIFF WBC: CPT

## 2023-08-18 PROCEDURE — 80061 LIPID PANEL: CPT

## 2023-08-18 PROCEDURE — 93016 CV STRESS TEST SUPVJ ONLY: CPT | Performed by: INTERNAL MEDICINE

## 2023-08-18 PROCEDURE — 78452 HT MUSCLE IMAGE SPECT MULT: CPT | Performed by: INTERNAL MEDICINE

## 2023-08-18 PROCEDURE — 83036 HEMOGLOBIN GLYCOSYLATED A1C: CPT | Performed by: INTERNAL MEDICINE

## 2023-08-18 PROCEDURE — G1004 CDSM NDSC: HCPCS

## 2023-08-18 PROCEDURE — 99214 OFFICE O/P EST MOD 30 MIN: CPT | Performed by: INTERNAL MEDICINE

## 2023-08-18 RX ADMIN — LOSARTAN POTASSIUM 50 MG: 50 TABLET, FILM COATED ORAL at 08:09

## 2023-08-18 RX ADMIN — PANTOPRAZOLE SODIUM 40 MG: 40 TABLET, DELAYED RELEASE ORAL at 06:32

## 2023-08-18 RX ADMIN — APIXABAN 5 MG: 5 TABLET, FILM COATED ORAL at 15:07

## 2023-08-18 RX ADMIN — HYDROCHLOROTHIAZIDE 12.5 MG: 12.5 TABLET ORAL at 08:09

## 2023-08-18 RX ADMIN — ATENOLOL 50 MG: 50 TABLET ORAL at 08:09

## 2023-08-18 NOTE — ASSESSMENT & PLAN NOTE
-Lipid panel unremarkable.     Plan:  -Currently on simvastatin 40 mg at home but switch to pravastatin 80 mg due to hospital formulary

## 2023-08-18 NOTE — ASSESSMENT & PLAN NOTE
- Patient began complaining of chest pain at 3am this morning with radiation to the R arm and abdomen.   - ASA and nitro given by EMS with some relief of pain  - EKG nonischemic, trops normal at 0 and 2 hours, atrial fibrillation with PVCs  -JEFERSON of 4  -Nuclear stress test negative for ischemia, some PVCs noted on rest and stress ECGs. Plan:   - Nitro prn   - Morphine 2mg prn   - Zofran 4 mg as needed  -Cleared by cardiology for discharge.

## 2023-08-18 NOTE — DISCHARGE INSTR - AVS FIRST PAGE
Dear Елена Black,     It was our pleasure to care for you here at [facility], [facility]. It is our hope that we were always able to exceed the expected standards for your care during your stay. You were hospitalized due to chest pain. You were cared for on the second floor by Stephanie Guajardo DO under the service of Jodie Rivera DO with the [address] [facility] who covers for your primary care physician (PCP), Ade Lan DO, while you were hospitalized. If you have any questions or concerns related to this hospitalization, you may contact us at [phone]. For follow up as well as any medication refills, we recommend that you follow up with your primary care physician. A registered nurse will reach out to you by phone within a few days after your discharge to answer any additional questions that you may have after going home. However, at this time we provide for you here, the most important instructions / recommendations at discharge:     Notable Medication Adjustments -   None  Testing Required after Discharge -   None  Important follow up information -   Please follow-up with the PCP in a week  Please follow-up with cardiologist, Smita Miranda MD in a week. Other Instructions -   If you experience any heartburns, belching, nausea, regurgitation please increase your Omeprazole to twice a day or take Tums for 2 weeks. If you continue to experience musculoskeletal chest pain, please apply topical Bengay for relief. If experience any fever, chills, lightheadedness, tightness or pain in the chest, neck, back or arms as well as fatigue, palpitations, shortness of breath please head back to the ED for further evaluation.   Please review this entire after visit summary as additional general instructions including medication list, appointments, activity, diet, any pertinent wound care, and other additional recommendations from your care team that may be provided for you.       Sincerely,     Zachary Mcqueen, DO

## 2023-08-18 NOTE — PROGRESS NOTES
General Cardiology   Progress Note -  Team One   [facility] Navjotmicah 82 y.o. male MRN: [id number]  Unit/Bed#: S -01 Encounter: [id number]    Assessment     1. Chest pain  With both typical and atypical features  Hs troponin negative x2  ECG- atrial fibrillation with PVCs  Had some relief with nitroglycerin as well as fentanyl but still having residual chest pain  No significant associated factors  Woke up this morning without any symptoms  Nuclear stress test negative for ischemia, some PVCs noted on rest and stress ECGs     2.  Permanent atrial fibrillation  Rate controlled on atenolol 50 mg twice daily  On anticoagulation with Eliquis 5 mg twice daily     3. Low normal LV function, LVEF 50%  Dating back to at least 2018     4. HTN-stable, average /75. Home medications continued in the hospital-amlodipine 5 mg daily, atenolol 50 mg twice daily, losartan-HCTZ 50-12.5 mg daily     5. HLD-total cholesterol 119, triglycerides 112, HDL 30, LDL 67 in February 2023 on simvastatin 40 mg daily. This has been substituted with pravastatin 80 mg daily while in the hospital.     6. LUÍS intolerant of CPAP     7. Morbid obesity-BMI 33.37     8. Alcohol use-drinking at least 1 to 2 glasses of wine every night, sometimes more.     9. GERD-on omeprazole     Plan  · Symptoms have all resolved, nuclear stress test is negative. Stable for discharge planning from cardiac standpoint  · Continue home cardiac regimen and follow-up with primary cardiologist as planned    Subjective  Review of Systems   Constitutional: Negative for chills, malaise/fatigue and weight gain. Cardiovascular: Negative for chest pain, dyspnea on exertion, leg swelling, orthopnea, palpitations and syncope. Respiratory: Negative for cough, shortness of breath, sleep disturbances due to breathing and sputum production. Gastrointestinal: Negative for bloating and nausea.    Neurological: Negative for dizziness, light-headedness and weakness. Psychiatric/Behavioral: Negative for altered mental status. All other systems reviewed and are negative. Objective:   Vitals: Blood pressure 148/76, pulse 63, temperature 98.6 °F (37 °C), resp. rate 18, height 5' 9" (1.753 m), weight 103 kg (226 lb), SpO2 94 %. , Body mass index is 33.37 kg/m². ,     Systolic (36HTG), MEJ:846 , Min:129 , WVZ:320     Diastolic (15HPS), KAEL:87, Min:74, Max:77    No intake or output data in the 24 hours ending 08/18/23 0908  Wt Readings from Last 3 Encounters:   08/18/23 103 kg (226 lb)   08/15/23 107 kg (235 lb)   06/06/23 107 kg (235 lb)     Telemetry Review: Atrial fibrillation with occasional PVCs, rates 70s-80s    Physical Exam  Vitals reviewed. Constitutional:       General: He is not in acute distress. Appearance: He is obese. Neck:      Vascular: No hepatojugular reflux or JVD. Cardiovascular:      Rate and Rhythm: Normal rate. Rhythm irregularly irregular. Heart sounds: Normal heart sounds. No murmur heard. No friction rub. No gallop. Pulmonary:      Effort: Pulmonary effort is normal. No respiratory distress. Breath sounds: No rales. Comments: Room air  Abdominal:      General: Bowel sounds are normal. There is no distension. Palpations: Abdomen is soft. Tenderness: There is no abdominal tenderness. Musculoskeletal:         General: No tenderness. Normal range of motion. Cervical back: Neck supple. Right lower leg: No edema. Left lower leg: No edema. Skin:     General: Skin is warm and dry. Findings: No erythema. Neurological:      Mental Status: He is alert and oriented to person, place, and time.    Psychiatric:         Mood and Affect: Mood normal.         LABORATORY RESULTS      CBC with diff:   Results from last 7 days   Lab Units 08/18/23  0503 08/17/23  0628   WBC Thousand/uL 6.79 11.62*   HEMOGLOBIN g/dL 13.3 13.5   HEMATOCRIT % 39.1 38.5   MCV fL 102* 97   PLATELETS Thousands/uL 144* 161   RBC Million/uL 3.85* 3.96   MCH pg 34.5* 34.1   MCHC g/dL 34.0 35.1   RDW % 12.2 11.9   MPV fL 10.6 10.5   NRBC AUTO /100 WBCs 0 0     CMP:  Results from last 7 days   Lab Units 23  0503 23  0628   POTASSIUM mmol/L 3.8 3.7   CHLORIDE mmol/L 100 102   CO2 mmol/L 27 29   BUN mg/dL 13 17   CREATININE mg/dL 0.76 0.90   CALCIUM mg/dL 9.4 9.6   AST U/L  --  30   ALT U/L  --  27   ALK PHOS U/L  --  42   EGFR ml/min/1.73sq m 85 79     BMP:  Results from last 7 days   Lab Units 23  0503 23  0628   POTASSIUM mmol/L 3.8 3.7   CHLORIDE mmol/L 100 102   CO2 mmol/L 27 29   BUN mg/dL 13 17   CREATININE mg/dL 0.76 0.90   CALCIUM mg/dL 9.4 9.6     Lab Results   Component Value Date    CREATININE 0.76 2023    CREATININE 0.90 2023    CREATININE 0.90 2023       Lipid Profile:   Lab Results   Component Value Date    CHOL 136 2015     Lab Results   Component Value Date    HDL 50 2023    HDL 30 (L) 2023    HDL 40 2022     Lab Results   Component Value Date    LDLCALC 73 2023    LDLCALC 67 2023    LDLCALC 80 2022     Lab Results   Component Value Date    TRIG 142 2023    TRIG 112 2023    TRIG 186 (H) 2022       Cardiac testing:   Results for orders placed during the hospital encounter of 18    Echo complete with contrast if indicated    Narrative  [address] [place] [address] 43  [address] [facility], [address] [place] [address]  [phone]    Transthoracic Echocardiogram  2D, M-mode, Doppler, and Color Doppler    Study date:  2018    Patient: Morris Miles  MR number: [id number]  Account number: [de-identified]  : 1940  Age: 78 years  Gender: Male  Status: Outpatient  Location: [address] [place] [address] [facility]  Height: 69 in  Weight: 208 lb  BP: 130/ 78 mmHg    Indications: Atrial fibrillation    Diagnoses: I48.0 - Atrial fibrillation    Sonographer:  JOJO Wilson  Primary Physician:  Kate Polo Vivi Melo MD  Referring Physician:  Nathan Baron MD  Group:  [facility]  Interpreting Physician:  Julia Au MD    SUMMARY    LEFT VENTRICLE:  Systolic function was at the lower limits of normal. Ejection fraction was estimated to be 50 %. There were no regional wall motion abnormalities. Wall thickness was at the upper limits of normal.    RIGHT VENTRICLE:  The ventricle was mildly dilated. Systolic function was normal.    LEFT ATRIUM:  The atrium was mildly dilated. RIGHT ATRIUM:  The atrium was mildly dilated. MITRAL VALVE:  There was mild annular calcification. There was mild to moderate regurgitation. TRICUSPID VALVE:  There was mild regurgitation. Estimated peak PA pressure was 40 mmHg. HISTORY: PRIOR HISTORY: Hypertension, hyperlipidemia, atrial fibrillation, cardiomyopathy, obstructive sleep apnea, former smoker, prediabetes    PROCEDURE: The study was performed in the [facility]. This was a routine study. The transthoracic approach was used. The study included complete 2D imaging, M-mode, complete spectral Doppler, and color Doppler. Image  quality was adequate. LEFT VENTRICLE: Size was normal. Systolic function was at the lower limits of normal. Ejection fraction was estimated to be 50 %. There were no regional wall motion abnormalities. Wall thickness was at the upper limits of normal. DOPPLER:  Transmitral flow pattern: atrial fibrillation. RIGHT VENTRICLE: The ventricle was mildly dilated. Systolic function was normal.    LEFT ATRIUM: The atrium was mildly dilated. RIGHT ATRIUM: The atrium was mildly dilated. MITRAL VALVE: There was mild annular calcification. Valve structure was normal. There was normal leaflet separation. DOPPLER: The transmitral velocity was within the normal range. There was no evidence for stenosis. There was mild to  moderate regurgitation. AORTIC VALVE: The valve was trileaflet.  Leaflets exhibited mildly increased thickness and normal cuspal separation. DOPPLER: Transaortic velocity was within the normal range. There was no evidence for stenosis. There was no regurgitation. TRICUSPID VALVE: The valve structure was normal. There was normal leaflet separation. DOPPLER: The transtricuspid velocity was within the normal range. There was no evidence for stenosis. There was mild regurgitation. Pulmonary artery  systolic pressure was mildly increased. Estimated peak PA pressure was 40 mmHg. PULMONIC VALVE: Leaflets exhibited normal thickness, no calcification, and normal cuspal separation. DOPPLER: The transpulmonic velocity was within the normal range. There was mild regurgitation. PERICARDIUM: There was no pericardial effusion. AORTA: The root exhibited normal size. SYSTEMIC VEINS: IVC: The inferior vena cava was normal in size.     SYSTEM MEASUREMENT TABLES    2D  %FS: 27.43 %  AV Diam: 3.63 cm  EDV(Teich): 103.95 ml  EF(Cube): 61.78 %  EF(Teich): 53.27 %  ESV(Cube): 40.48 ml  ESV(Teich): 48.57 ml  IVSd: 1.05 cm  LA Area: 22.88 cm2  LA Diam: 3.99 cm  LVEDV MOD A4C: 132.92 ml  LVEF MOD A4C: 44.61 %  LVESV MOD A4C: 73.63 ml  LVIDd: 4.73 cm  LVIDs: 3.43 cm  LVLd A4C: 7.57 cm  LVLs A4C: 6.65 cm  LVPWd: 1.09 cm  RA Area: 27.19 cm2  RV Diam.: 4.56 cm  SV MOD A4C: 59.29 ml  SV(Cube): 65.42 ml  SV(Teich): 55.38 ml    CW  TR Vmax: 2.95 m/s  TR maxP.72 mmHg    MM  TAPSE: 1.6 cm    [employer] Commission Accredited Echocardiography Laboratory    Prepared and electronically signed by    Nikki Kohler MD  Signed 2018 16:49:48    Meds/Allergies   all current active meds have been reviewed and current meds:   Current Facility-Administered Medications   Medication Dose Route Frequency   • acetaminophen (TYLENOL) tablet 650 mg  650 mg Oral Q6H PRN   • amLODIPine (NORVASC) tablet 5 mg  5 mg Oral HS   • apixaban (ELIQUIS) tablet 5 mg  5 mg Oral BID   • atenolol (TENORMIN) tablet 50 mg  50 mg Oral BID • losartan (COZAAR) tablet 50 mg  50 mg Oral Daily    And   • hydrochlorothiazide (HYDRODIURIL) tablet 12.5 mg  12.5 mg Oral Daily   • hydrocortisone 2.5 % cream   Topical Daily PRN   • morphine injection 2 mg  2 mg Intravenous Q4H PRN   • nitroglycerin (NITROSTAT) SL tablet 0.4 mg  0.4 mg Sublingual Q5 Min PRN   • ondansetron (ZOFRAN-ODT) dispersible tablet 4 mg  4 mg Oral Q6H PRN   • pantoprazole (PROTONIX) EC tablet 40 mg  40 mg Oral Early Morning   • pravastatin (PRAVACHOL) tablet 80 mg  80 mg Oral Daily With Dinner     Medications Prior to Admission   Medication   • amLODIPine (NORVASC) 5 mg tablet   • apixaban (Eliquis) 5 mg   • atenolol (TENORMIN) 50 mg tablet   • hydrocortisone 2.5 % cream   • losartan-hydrochlorothiazide (HYZAAR) 50-12.5 mg per tablet   • Multiple Vitamins-Minerals (PRESERVISION AREDS PO)   • omeprazole (PriLOSEC) 20 mg delayed release capsule   • simvastatin (ZOCOR) 40 mg tablet   • triamcinolone (KENALOG) 0.1 % ointment   • Vitamin D, Cholecalciferol, 1000 UNITS CAPS   • fluticasone (FLONASE) 50 mcg/act nasal spray   • Hydrocod Rolando-Chlorphe Rolando ER (TUSSIONEX) 10-8 mg/5 mL ER suspension     Counseling / Coordination of Care  Total floor / unit time spent today 20 minutes. Greater than 50% of total time was spent with the patient and / or family counseling and / or coordination of care. ** Please Note: Dragon 360 Dictation voice to text software may have been used in the creation of this document.  **

## 2023-08-18 NOTE — DISCHARGE SUMMARY
[facility]  Discharge- Jessica Guy 1940, 82 y.o. male MRN: [id number]  Unit/Bed#: S -01 Encounter: [id number]  Primary Care Provider: Silver Javier DO   Date and time admitted to hospital: 8/17/2023  6:12 AM    * Chest pain  Assessment & Plan  - Patient began complaining of chest pain at 3am this morning with radiation to the R arm and abdomen.   - ASA and nitro given by EMS with some relief of pain  - EKG nonischemic, trops normal at 0 and 2 hours, atrial fibrillation with PVCs  -JEFERSON of 4  -Nuclear stress test negative for ischemia, some PVCs noted on rest and stress ECGs. Plan:   - Nitro prn   - Morphine 2mg prn   - Zofran 4 mg as needed  -Cleared by cardiology for discharge. Gastroesophageal reflux disease  Assessment & Plan  Plan:  -Pantoprazole 40 mg daily    Atrial fibrillation [facility])  Assessment & Plan  Plan:  -Patient currently taking Eliquis 5 mg twice daily, atenolol 50 mg twice daily. Hyperlipidemia  Assessment & Plan  -Lipid panel unremarkable.     Plan:  -Currently on simvastatin 40 mg at home but switch to pravastatin 80 mg due to hospital formulary    Benign essential hypertension  Assessment & Plan  Well controlled htn on home regimen    Plan:   -Continue amlodipine 5 mg  -Continue atenolol 50 mg twice daily  -Continue losartan- hydrochlorothiazide 50-12.5mg daily    Obstructive sleep apnea  Assessment & Plan  Compliant with Cpap at home    Plan:  - Continue CPAP      Medical Problems     Resolved Problems  Date Reviewed: 8/15/2023   None       Discharging Resident: Catherine Nguyen DO  Discharging Attending: Anastacio Alarcon DO  PCP: Silver Javier DO  Admission Date:   Admission Orders (From admission, onward)     Ordered        08/17/23 0734  Place in Observation  Once                      Discharge Date: 08/18/23    Consultations During Hospital Stay:  · Cardiology    Procedures Performed:   · None    Significant Findings / Test Results:   XR chest 2 views   ED Interpretation by Beronica Blas MD (58/44 [id number])   Per my independent interpretation: Cardiomegaly. Enlarged from prior      Final Result by Dasia Mejia MD (08/17 5400)      No acute cardiopulmonary disease. Workstation performed: [id number]         ·       Incidental Findings:   · None    Test Results Pending at Discharge (will require follow up): · Hemoglobin A1c     Outpatient Tests Requested:  · None    Complications: None    Reason for Admission: Chest pain    Hospital Course:   Brendon Norman is a 82 y.o. male with permanent A-fib, low normal LV function, EF 50%, hypertension, hyperlipidemia, LUÍS and morbid obesity who originally presented to the hospital on 8/17/2023 due to chest pain that began overnight around 3 AM. Described as squeezing or tight sensation on his chest with some mild shortness of breath. Patient was given nitroglycerin that improved his pain but not completely gone in just a few minutes by the paramedics. Patient was afebrile and hemodynamically stable with controlled blood pressure. High-sensitivity troponins were negative x2 with mildly elevated . Lipid panel was unremarkable. CMP unremarkable with CBC demonstrating mild leukocytosis. Chest x-ray did not reveal any acute cardiopulmonary disease. Symptoms of all resolved, nuclear test negative the next day revealing EF of 45%. Patient was stable for discharge from cardiology standpoint and was advised to follow-up with his cardiologist outpatient. The patient, initially admitted to the hospital as inpatient, was discharged earlier than expected given the following: Chest pain. Please see above list of diagnoses and related plan for additional information. Condition at Discharge: good    Discharge Day Visit / Exam:   Subjective: Patient did report having some chest discomfort overnight after supper. However the pain was gone likely when he ate the night before. Telemetry did not show any abnormal rhythms other than A-fib with rate in 80s. Patient was seen bedside this morning and was resting comfortably in his chair. He denies any fever, chills, chest pain, shortness of breath, nausea, vomiting, abdominal pain. Vitals: Blood Pressure: 148/76 (08/18/23 0816)  Pulse: 63 (08/18/23 0816)  Temperature: 98.6 °F (37 °C) (08/17/23 2115)  Temp Source: Oral (08/17/23 1515)  Respirations: 18 (08/18/23 0816)  Height: 5' 9" (175.3 cm) (08/17/23 0618)  Weight - Scale: 103 kg (226 lb) (08/18/23 0628)  SpO2: 94 % (08/18/23 0816)  Exam:   Physical Exam  Vitals and nursing note reviewed. Constitutional:       Appearance: Normal appearance. HENT:      Head: Normocephalic and atraumatic. Eyes:      Extraocular Movements: Extraocular movements intact. Pupils: Pupils are equal, round, and reactive to light. Cardiovascular:      Rate and Rhythm: Normal rate and regular rhythm. Pulses: Normal pulses. Pulmonary:      Effort: Pulmonary effort is normal.      Breath sounds: Normal breath sounds. Abdominal:      General: Bowel sounds are normal.      Palpations: Abdomen is soft. Skin:     General: Skin is warm and dry. Capillary Refill: Capillary refill takes less than 2 seconds. Neurological:      General: No focal deficit present. Mental Status: He is alert and oriented to person, place, and time. Discussion with Family: Updated  (wife) at bedside. Discharge instructions/Information to patient and family:   See after visit summary for information provided to patient and family. Provisions for Follow-Up Care:  See after visit summary for information related to follow-up care and any pertinent home health orders. Disposition:   Home    Planned Readmission: No    Discharge Medications:  See after visit summary for reconciled discharge medications provided to patient and/or family.       **Please Note: This note may have been constructed using a voice recognition system**

## 2023-08-21 ENCOUNTER — TRANSITIONAL CARE MANAGEMENT (OUTPATIENT)
Dept: INTERNAL MEDICINE CLINIC | Facility: CLINIC | Age: 83
End: 2023-08-21

## 2023-08-22 LAB
CHEST PAIN STATEMENT: NORMAL
MAX DIASTOLIC BP: 88 MMHG
MAX HEART RATE: 155 BPM
MAX PREDICTED HEART RATE: 139 BPM
MAX. SYSTOLIC BP: 202 MMHG
PROTOCOL NAME: NORMAL
REASON FOR TERMINATION: NORMAL
TARGET HR FORMULA: NORMAL
TEST INDICATION: NORMAL
TIME IN EXERCISE PHASE: NORMAL

## 2023-08-29 ENCOUNTER — OFFICE VISIT (OUTPATIENT)
Dept: INTERNAL MEDICINE CLINIC | Facility: CLINIC | Age: 83
End: 2023-08-29
Payer: MEDICARE

## 2023-08-29 VITALS
DIASTOLIC BLOOD PRESSURE: 84 MMHG | HEIGHT: 69 IN | SYSTOLIC BLOOD PRESSURE: 132 MMHG | HEART RATE: 70 BPM | RESPIRATION RATE: 15 BRPM | WEIGHT: 232.2 LBS | BODY MASS INDEX: 34.39 KG/M2 | OXYGEN SATURATION: 98 %

## 2023-08-29 DIAGNOSIS — R73.03 PREDIABETES: ICD-10-CM

## 2023-08-29 DIAGNOSIS — E78.2 MIXED HYPERLIPIDEMIA: ICD-10-CM

## 2023-08-29 DIAGNOSIS — I65.23 CAROTID STENOSIS, ASYMPTOMATIC, BILATERAL: ICD-10-CM

## 2023-08-29 DIAGNOSIS — R07.82 INTERCOSTAL PAIN: ICD-10-CM

## 2023-08-29 DIAGNOSIS — E55.9 VITAMIN D DEFICIENCY: ICD-10-CM

## 2023-08-29 DIAGNOSIS — K21.9 GASTROESOPHAGEAL REFLUX DISEASE, UNSPECIFIED WHETHER ESOPHAGITIS PRESENT: ICD-10-CM

## 2023-08-29 DIAGNOSIS — N40.0 BENIGN PROSTATIC HYPERPLASIA, UNSPECIFIED WHETHER LOWER URINARY TRACT SYMPTOMS PRESENT: ICD-10-CM

## 2023-08-29 DIAGNOSIS — I48.91 ATRIAL FIBRILLATION, UNSPECIFIED TYPE (HCC): Primary | ICD-10-CM

## 2023-08-29 PROCEDURE — 99496 TRANSJ CARE MGMT HIGH F2F 7D: CPT | Performed by: INTERNAL MEDICINE

## 2023-08-29 NOTE — PROGRESS NOTES
Assessment/Plan:    TCM Call     Date and time call was made  8/21/2023  8:07 AM    Hospital care reviewed  Records reviewed    Patient was hospitialized at  [address]    Date of Admission  08/17/23    Date of discharge  08/18/23    Diagnosis  CHEST PAIN    Disposition  Home    Current Symptoms  None      TCM Call     Post hospital issues  None    Scheduled for follow up? Yes    I have advised the patient to call PCP with any new or worsening symptoms  CAS VELASQUEZ M.A    Living Arrangements  Spouse or Significiant other    Support System  [place]-based;  Family; Friends; Spouse    The type of support provided  Emotional; Physical    Do you have social support  Yes, as much as I need        #Midsternal Chest Pain  -present 8/17/23 and woke him up from sleep with chest pressure and discomfort  -never had this before  -went to ER and admitted  -given zofran and morphine and nitro and symptoms improved  -denies SOB, acid reflux  -had gone to the gym earlier that day and had no issues  -NM stress test normal with EF 45%  -EKG with afib 86  -now resolved, symptoms likely musculoskeletal    #Carotid Stenosis  -VAS screen with 50-70% stenosis b/l  -repeat carotid doppler in November    #HLD   -LDL 73 and HDL 50 and stable  -continue simvastatin  -exercising at the [facility] with friends 6-7 times per week and will continue    #Prediabetes  -a1c at 5.8 and will monitor    #Neuralgia   -over right posterior ear previously  -was on amitryptyline for many years   -no longer on amitriptyline     #Ingrown Toenail  -noted on right foot but denies pain previously  -history of trimming with podiatry in the past     #Venous Stasis Changes  -noted in lower legs  -is using vasoline as needed     #Verruca  -noted on right wrist and saw dermatology for removal    #Hearing Loss   -worsening  -waiting for OTC hearing aids, has hearing loss  -retired from [employer] and was not exposed to loud noises but mother had hearing loss #LUÍS  -on APAP 6-20cm and wearing every night and complaint     #Erectile Dysfunction   -remains on levetra 20mg 1/2 tablet as needed     #Worsening vision   -seeing retina specialist  -had cataract surgery 8-9 years ago     #HTN   -BP without issues on amlodipine and losartan, atenolol    #Alcohol  -drinking 3 glasses wine a day and encouraged to cut back    #Sciatica   -over right thigh posterior leg  -MRI with L4-5 moderate disc osteophyte with complex ecentric to right with superimposed right L5 nerve compression  -neurosurgery suggested laminectomy but patinet holding off since symptoms are mild unless he is driving  -seeing PT  -has pinching sensation after 30 minutes of driving and has to stretch  -previous epidural without relief  -seeing chiropractor and referral given    #Atrial Fibrillation   -remains on eliquis and atenolol   -chronic afib but denies symptoms and is rate controlled     #GERD  -remains on omeprazole and will try to take every other day to reduce risk of dementia  -defers alternating with pepcid for now  -is eating late at night    #Cardiomyopathy   -2018 ECHO with EF 50%   -continue atenolol and losartan  -2023 ECHO EF 50% with mild MVR and moderate TVR     Health Maintenance   -routine labs and followup 6 months   -cologuard completed 2021 and does not require further screening due to age  -retired from [employer]  -covid bivalent up to date 2022  -flu vaccine up to date 2022    No problem-specific Assessment & Plan notes found for this encounter. Diagnoses and all orders for this visit:    Atrial fibrillation, unspecified type ([address])  -     CBC and differential; Future  -     Comprehensive metabolic panel; Future    Mixed hyperlipidemia  -     CBC and differential; Future  -     Comprehensive metabolic panel; Future  -     Lipid Panel with Direct LDL reflex;  Future    Gastroesophageal reflux disease, unspecified whether esophagitis present  -     CBC and differential; Future  -     Comprehensive metabolic panel; Future    Carotid stenosis, asymptomatic, bilateral  -     VAS carotid complete study; Future  -     CBC and differential; Future  -     Comprehensive metabolic panel; Future    Intercostal pain  -     CBC and differential; Future  -     Comprehensive metabolic panel; Future    Vitamin D deficiency  -     Vitamin D 25 hydroxy; Future    Prediabetes  -     Hemoglobin A1C; Future    Benign prostatic hyperplasia, unspecified whether lower urinary tract symptoms present  -     PSA, total and free;  Future            Current Outpatient Medications:   •  amLODIPine (NORVASC) 5 mg tablet, Take 1 tablet (5 mg total) by mouth daily at bedtime, Disp: 90 tablet, Rfl: 3  •  apixaban (Eliquis) 5 mg, Take 1 tablet (5 mg total) by mouth 2 (two) times a day, Disp: 180 tablet, Rfl: 3  •  atenolol (TENORMIN) 50 mg tablet, Take 1 tablet (50 mg total) by mouth 2 (two) times a day, Disp: 180 tablet, Rfl: 3  •  Hydrocod Rolando-Chlorphe Rolando ER (TUSSIONEX) 10-8 mg/5 mL ER suspension, Take 5 mL by mouth every 12 (twelve) hours as needed for cough Max Daily Amount: 10 mL, Disp: 115 mL, Rfl: 0  •  hydrocortisone 2.5 % cream, Apply topically in the morning, Disp: 56 g, Rfl: 3  •  losartan-hydrochlorothiazide (HYZAAR) 50-12.5 mg per tablet, Take 1 tablet by mouth daily, Disp: 90 tablet, Rfl: 3  •  Multiple Vitamins-Minerals (PRESERVISION AREDS PO), Take 1 tablet by mouth 2 (two) times a day, Disp: , Rfl:   •  omeprazole (PriLOSEC) 20 mg delayed release capsule, Take 1 capsule (20 mg total) by mouth daily, Disp: 90 capsule, Rfl: 3  •  simvastatin (ZOCOR) 40 mg tablet, Take 1 tablet (40 mg total) by mouth daily at bedtime, Disp: 90 tablet, Rfl: 3  •  triamcinolone (KENALOG) 0.1 % ointment, APPLY TOPICALLY DAILY FOR 2 WEEKS, Disp: 454 g, Rfl: 0  •  Vitamin D, Cholecalciferol, 1000 UNITS CAPS, Take by mouth, Disp: , Rfl:   •  fluticasone (FLONASE) 50 mcg/act nasal spray, Spray 2 squirts in each nostril once daily, Disp: 16 g, Rfl: 3    Subjective:      Patient ID: Joel Bach is a 82 y.o. male. HPI      Patient presents for a TCM appointment. He was seen in the hospital on 8/17/2023 for an acute episode of midsternal chest pain that woke him up from sleep at around 3 AM.  He states that he had chest pressure. He denies any shortness of breath. He states that this is never happened before. He denies any acid reflux. He states that he goes to the gym routinely and never has any issues. His troponins were trended and they were negative. He underwent a nuclear medicine stress test that was negative as well. His ejection fraction was 45%. He was noted to have atrial fibrillation with a rate of 86 on EKG. He was given Zofran and morphine and nitroglycerin and his symptoms resolved. His A1c is 5.8 and stable. LDL was 73 and HDL 50 currently controlled with simvastatin. He does have carotid stenosis bilaterally and we will trend his carotid Doppler. He will return to care in 6 months. The following portions of the patient's history were reviewed and updated as appropriate: allergies, current medications, past family history, past medical history, past social history, past surgical history and problem list.    Review of Systems   Constitutional: Negative for activity change, appetite change, fatigue and fever. HENT: Negative for congestion, ear pain, hearing loss, sore throat and tinnitus. Eyes: Negative for photophobia, pain and visual disturbance. Respiratory: Negative for cough, shortness of breath and wheezing. Cardiovascular: Negative for chest pain and leg swelling. Gastrointestinal: Negative for abdominal distention, abdominal pain, nausea and vomiting. Genitourinary: Negative for difficulty urinating, frequency and hematuria. Musculoskeletal: Negative for arthralgias, back pain, joint swelling, neck pain and neck stiffness.    Skin: Negative for color change, pallor, rash and wound. Neurological: Negative for dizziness, tremors, numbness and headaches. Hematological: Does not bruise/bleed easily. Objective:      /84   Pulse 70   Resp 15   Ht 5' 9" (1.753 m)   Wt 105 kg (232 lb 3.2 oz)   SpO2 98%   BMI 34.29 kg/m²          Physical Exam  Vitals reviewed. Constitutional:       Appearance: Normal appearance. He is well-developed. He is obese. HENT:      Head: Normocephalic and atraumatic. Right Ear: Tympanic membrane, ear canal and external ear normal. There is no impacted cerumen. Left Ear: Tympanic membrane, ear canal and external ear normal. There is no impacted cerumen. Nose: Nose normal.      Mouth/Throat:      Pharynx: Oropharynx is clear. No oropharyngeal exudate or posterior oropharyngeal erythema. Eyes:      Conjunctiva/sclera: Conjunctivae normal.      Pupils: Pupils are equal, round, and reactive to light. Neck:      Thyroid: No thyromegaly. Vascular: No JVD. Cardiovascular:      Rate and Rhythm: Normal rate and regular rhythm. Pulses: Normal pulses. Heart sounds: Normal heart sounds. No murmur heard. Pulmonary:      Effort: Pulmonary effort is normal. No respiratory distress. Breath sounds: Normal breath sounds. No stridor. No wheezing, rhonchi or rales. Abdominal:      General: Bowel sounds are normal. There is no distension. Palpations: Abdomen is soft. There is no mass. Tenderness: There is no abdominal tenderness. There is no guarding or rebound. Musculoskeletal:         General: No tenderness or deformity. Normal range of motion. Cervical back: Normal range of motion and neck supple. No rigidity or tenderness. Right lower leg: Edema (trace) present. Left lower leg: Edema (trace) present. Lymphadenopathy:      Cervical: No cervical adenopathy. Skin:     General: Skin is warm and dry. Findings: No erythema or rash.       Comments: Venous stasis changes on lower legs Neurological:      Mental Status: He is alert and oriented to person, place, and time. Mental status is at baseline. Deep Tendon Reflexes: Reflexes are normal and symmetric. Psychiatric:         Mood and Affect: Mood normal.         Behavior: Behavior normal.         Thought Content: Thought content normal.         Judgment: Judgment normal.           This note was completed in part utilizing [employer] direct voice recognition software. Grammatical errors, random word insertion, spelling mistakes, and incomplete sentences may be an occasional consequence of the system secondary to software limitations, ambient noise and hardware issues. At the time of dictation, efforts were made to edit, clarify and /or correct errors. Please read the chart carefully and recognize, using context, where substitutions have occurred. If you have any questions or concerns about the context, text or information contained within the body of this dictation, please contact myself, the provider, for further clarification.

## 2023-09-05 ENCOUNTER — PROCEDURE VISIT (OUTPATIENT)
Age: 83
End: 2023-09-05
Payer: MEDICARE

## 2023-09-05 VITALS
HEIGHT: 69 IN | DIASTOLIC BLOOD PRESSURE: 73 MMHG | BODY MASS INDEX: 34.36 KG/M2 | SYSTOLIC BLOOD PRESSURE: 142 MMHG | WEIGHT: 232 LBS | HEART RATE: 75 BPM

## 2023-09-05 DIAGNOSIS — M54.16 LUMBAR RADICULOPATHY: ICD-10-CM

## 2023-09-05 DIAGNOSIS — M99.05 SEGMENTAL DYSFUNCTION OF PELVIC REGION: Primary | ICD-10-CM

## 2023-09-05 DIAGNOSIS — M99.04 SEGMENTAL DYSFUNCTION OF SACRAL REGION: ICD-10-CM

## 2023-09-05 DIAGNOSIS — M99.03 SEGMENTAL DYSFUNCTION OF LUMBAR REGION: ICD-10-CM

## 2023-09-05 DIAGNOSIS — M79.18 MYOFASCIAL PAIN: ICD-10-CM

## 2023-09-05 DIAGNOSIS — M54.50 ACUTE RIGHT-SIDED LOW BACK PAIN WITHOUT SCIATICA: ICD-10-CM

## 2023-09-05 PROCEDURE — 98941 CHIROPRACT MANJ 3-4 REGIONS: CPT | Performed by: CHIROPRACTOR

## 2023-09-05 NOTE — PROGRESS NOTES
Date of first visit: 4/25/2023 5      HPI:  Vahe Mohamud presents for treatment today. He localizes right-sided low back pain with occasional radiation right posterior thigh but for the most part axial in nature. Feeling better this week. Denies any bowel bladder changes reports no weakness denies any numbness or tingling lower extremity. The following portions of the patient's history were reviewed and updated as appropriate: allergies, current medications, past family history, past medical history, past social history, past surgical history, and problem list.    Review of Systems    Physical Exam:  Exam reveals right-sided erector spinae hypertonicity noted with some spasm actually up into the lower thoracic region. Lumbar range of motion is reduced on extension left lateral bending left rotation full forward flexion. Pelvic obliquity is present with an elevated right versus left innominate there is a leg length inequality noted. Biomechanically joint dysfunction of the right sacroiliac joint involvement L4-L5 L5-S1 T12-L1. Stable neurologically lower extremity. Assessment:   Diagnosis ICD-10-CM Associated Orders   1. Segmental dysfunction of pelvic region  M99.05       2. Lumbar radiculopathy  M54.16       3. Segmental dysfunction of sacral region  M99.04       4. Segmental dysfunction of lumbar region  M99.03       5. Myofascial pain  M79.18       6. Acute right-sided low back pain without sciatica  M54.50                 Treatment: 36558  Manipulation to the right innominate, sacrum, L5 via Piper drop maneuver pretreat US right low back. Discussion:  Continue his daily stretching I will see him back for follow-up in 1 week. Advised icing 15 minutes daily basis flexion-based stretching.

## 2023-09-27 ENCOUNTER — PROCEDURE VISIT (OUTPATIENT)
Age: 83
End: 2023-09-27
Payer: MEDICARE

## 2023-09-27 VITALS
HEART RATE: 81 BPM | DIASTOLIC BLOOD PRESSURE: 82 MMHG | BODY MASS INDEX: 34.36 KG/M2 | SYSTOLIC BLOOD PRESSURE: 143 MMHG | WEIGHT: 232 LBS | HEIGHT: 69 IN

## 2023-09-27 DIAGNOSIS — M54.16 LUMBAR RADICULOPATHY: ICD-10-CM

## 2023-09-27 DIAGNOSIS — M99.04 SEGMENTAL DYSFUNCTION OF SACRAL REGION: ICD-10-CM

## 2023-09-27 DIAGNOSIS — M99.05 SEGMENTAL DYSFUNCTION OF PELVIC REGION: Primary | ICD-10-CM

## 2023-09-27 DIAGNOSIS — M99.03 SEGMENTAL DYSFUNCTION OF LUMBAR REGION: ICD-10-CM

## 2023-09-27 DIAGNOSIS — M54.50 ACUTE RIGHT-SIDED LOW BACK PAIN WITHOUT SCIATICA: ICD-10-CM

## 2023-09-27 DIAGNOSIS — M79.18 MYOFASCIAL PAIN: ICD-10-CM

## 2023-09-27 PROCEDURE — 98941 CHIROPRACT MANJ 3-4 REGIONS: CPT | Performed by: CHIROPRACTOR

## 2023-09-27 NOTE — PROGRESS NOTES
Date of first visit: 4/25/2023 5      HPI:  Andra Dunlap presents for treatment today. He localizes right-sided low back pain with occasional radiation right posterior thigh but for the most part axial in nature. Feeling better this week. VPAS  2    Denies any bowel bladder changes reports no weakness denies any numbness or tingling lower extremity. The following portions of the patient's history were reviewed and updated as appropriate: allergies, current medications, past family history, past medical history, past social history, past surgical history, and problem list.    Review of Systems    Physical Exam:  Exam reveals right-sided erector spinae hypertonicity noted with some spasm actually up into the lower thoracic region. Lumbar range of motion is reduced on extension left lateral bending left rotation full forward flexion. Pelvic obliquity is present with an elevated right versus left innominate there is a leg length inequality noted. Biomechanically joint dysfunction of the right sacroiliac joint involvement L4-L5 L5-S1 T12-L1. Stable neurologically lower extremity. Assessment:   Diagnosis ICD-10-CM Associated Orders   1. Segmental dysfunction of pelvic region  M99.05       2. Lumbar radiculopathy  M54.16       3. Segmental dysfunction of sacral region  M99.04       4. Segmental dysfunction of lumbar region  M99.03       5. Myofascial pain  M79.18       6. Acute right-sided low back pain without sciatica  M54.50                 Treatment: 60537  Manipulation to the right innominate, sacrum, L5 via Piper drop maneuver       Discussion:  Continue his daily stretching I will see him back for follow-up in 3 weeks. Advised icing 15 minutes daily basis flexion-based stretching.

## 2023-10-18 ENCOUNTER — PROCEDURE VISIT (OUTPATIENT)
Age: 83
End: 2023-10-18
Payer: MEDICARE

## 2023-10-18 VITALS
SYSTOLIC BLOOD PRESSURE: 138 MMHG | HEIGHT: 69 IN | WEIGHT: 232 LBS | HEART RATE: 58 BPM | BODY MASS INDEX: 34.36 KG/M2 | DIASTOLIC BLOOD PRESSURE: 70 MMHG

## 2023-10-18 DIAGNOSIS — M99.05 SEGMENTAL DYSFUNCTION OF PELVIC REGION: Primary | ICD-10-CM

## 2023-10-18 DIAGNOSIS — M54.16 LUMBAR RADICULOPATHY: ICD-10-CM

## 2023-10-18 DIAGNOSIS — M99.04 SEGMENTAL DYSFUNCTION OF SACRAL REGION: ICD-10-CM

## 2023-10-18 DIAGNOSIS — M79.18 MYOFASCIAL PAIN: ICD-10-CM

## 2023-10-18 DIAGNOSIS — M54.50 ACUTE RIGHT-SIDED LOW BACK PAIN WITHOUT SCIATICA: ICD-10-CM

## 2023-10-18 DIAGNOSIS — M99.03 SEGMENTAL DYSFUNCTION OF LUMBAR REGION: ICD-10-CM

## 2023-10-18 PROCEDURE — 98941 CHIROPRACT MANJ 3-4 REGIONS: CPT | Performed by: CHIROPRACTOR

## 2023-10-18 NOTE — PROGRESS NOTES
Date of first visit: 4/25/2023 5      HPI:  Елена Rodriguez presents for treatment today. He localizes right-sided low back pain with occasional radiation right posterior thigh but for the most part axial in nature. Feeling better this week. VPAS  2    Denies any bowel bladder changes reports no weakness denies any numbness or tingling lower extremity. The following portions of the patient's history were reviewed and updated as appropriate: allergies, current medications, past family history, past medical history, past social history, past surgical history, and problem list.    Review of Systems    Physical Exam:  Exam reveals right-sided erector spinae hypertonicity noted with some spasm actually up into the lower thoracic region. Lumbar range of motion is reduced on extension left lateral bending left rotation full forward flexion. Pelvic obliquity is present with an elevated right versus left innominate there is a leg length inequality noted. Biomechanically joint dysfunction of the right sacroiliac joint involvement L4-L5 L5-S1 T12-L1. Stable neurologically lower extremity. Assessment:   Diagnosis ICD-10-CM Associated Orders   1. Segmental dysfunction of pelvic region  M99.05       2. Lumbar radiculopathy  M54.16       3. Segmental dysfunction of sacral region  M99.04       4. Segmental dysfunction of lumbar region  M99.03       5. Myofascial pain  M79.18       6. Acute right-sided low back pain without sciatica  M54.50                 Treatment: 33680  Manipulation to the right innominate, sacrum, L5 via Piper drop maneuver       Discussion:  Continue his daily stretching I will see him back for follow-up in 3 weeks. Advised icing 15 minutes daily basis flexion-based stretching.

## 2023-11-06 ENCOUNTER — HOSPITAL ENCOUNTER (OUTPATIENT)
Dept: NON INVASIVE DIAGNOSTICS | Facility: CLINIC | Age: 83
Discharge: HOME | End: 2023-11-06
Payer: MEDICARE

## 2023-11-06 DIAGNOSIS — I65.23 CAROTID STENOSIS, ASYMPTOMATIC, BILATERAL: ICD-10-CM

## 2023-11-06 PROCEDURE — 93880 EXTRACRANIAL BILAT STUDY: CPT

## 2023-11-06 PROCEDURE — 93880 EXTRACRANIAL BILAT STUDY: CPT | Performed by: SURGERY

## 2023-11-07 ENCOUNTER — TELEPHONE (OUTPATIENT)
Dept: INTERNAL MEDICINE CLINIC | Facility: CLINIC | Age: 83
End: 2023-11-07

## 2023-11-07 PROBLEM — G47.33 OSA (OBSTRUCTIVE SLEEP APNEA): Status: ACTIVE | Noted: 2023-11-07

## 2023-11-07 NOTE — TELEPHONE ENCOUNTER
----- Message from Kiara Serna DO sent at 11/6/2023  9:41 PM EST -----  Please let patient know his carotid doppler shows cholesterol plaque in both arteries that is moderate and the same as last year. We will need to monitor this again in 1 year.

## 2023-11-15 ENCOUNTER — PROCEDURE VISIT (OUTPATIENT)
Age: 83
End: 2023-11-15
Payer: MEDICARE

## 2023-11-15 VITALS
BODY MASS INDEX: 34.96 KG/M2 | SYSTOLIC BLOOD PRESSURE: 134 MMHG | WEIGHT: 236 LBS | HEIGHT: 69 IN | DIASTOLIC BLOOD PRESSURE: 79 MMHG

## 2023-11-15 DIAGNOSIS — M99.04 SEGMENTAL DYSFUNCTION OF SACRAL REGION: ICD-10-CM

## 2023-11-15 DIAGNOSIS — M79.18 MYOFASCIAL PAIN: ICD-10-CM

## 2023-11-15 DIAGNOSIS — M54.50 ACUTE RIGHT-SIDED LOW BACK PAIN WITHOUT SCIATICA: ICD-10-CM

## 2023-11-15 DIAGNOSIS — M99.03 SEGMENTAL DYSFUNCTION OF LUMBAR REGION: ICD-10-CM

## 2023-11-15 DIAGNOSIS — M54.16 LUMBAR RADICULOPATHY: ICD-10-CM

## 2023-11-15 DIAGNOSIS — M99.05 SEGMENTAL DYSFUNCTION OF PELVIC REGION: Primary | ICD-10-CM

## 2023-11-15 PROCEDURE — 98941 CHIROPRACT MANJ 3-4 REGIONS: CPT | Performed by: CHIROPRACTOR

## 2023-11-16 NOTE — PROGRESS NOTES
Date of first visit: 4/25/2023 5      HPI:  Jodi Wilson presents for treatment today. He localizes right-sided low back pain with occasional radiation right posterior thigh but for the most part axial in nature. Feeling better this week. VPAS  2    Denies any bowel bladder changes reports no weakness denies any numbness or tingling lower extremity. The following portions of the patient's history were reviewed and updated as appropriate: allergies, current medications, past family history, past medical history, past social history, past surgical history, and problem list.    Review of Systems    Physical Exam:  Exam reveals right-sided erector spinae hypertonicity noted with some spasm actually up into the lower thoracic region. Lumbar range of motion is reduced on extension left lateral bending left rotation full forward flexion. Pelvic obliquity is present with an elevated right versus left innominate there is a leg length inequality noted. Biomechanically joint dysfunction of the right sacroiliac joint involvement L4-L5 L5-S1 T12-L1. Stable neurologically lower extremity. Assessment:   Diagnosis ICD-10-CM Associated Orders   1. Segmental dysfunction of pelvic region  M99.05       2. Lumbar radiculopathy  M54.16       3. Segmental dysfunction of sacral region  M99.04       4. Segmental dysfunction of lumbar region  M99.03       5. Myofascial pain  M79.18       6. Acute right-sided low back pain without sciatica  M54.50                 Treatment: 12405  Manipulation to the right innominate, sacrum, L5 via Piper drop maneuver       Discussion:  Continue his daily stretching I will see him back for follow-up in 3 weeks. Advised icing 15 minutes daily basis flexion-based stretching.

## 2023-12-13 ENCOUNTER — PROCEDURE VISIT (OUTPATIENT)
Age: 83
End: 2023-12-13
Payer: MEDICARE

## 2023-12-13 VITALS
HEART RATE: 81 BPM | HEIGHT: 69 IN | WEIGHT: 236 LBS | SYSTOLIC BLOOD PRESSURE: 136 MMHG | BODY MASS INDEX: 34.96 KG/M2 | DIASTOLIC BLOOD PRESSURE: 73 MMHG

## 2023-12-13 DIAGNOSIS — M99.05 SEGMENTAL DYSFUNCTION OF PELVIC REGION: Primary | ICD-10-CM

## 2023-12-13 DIAGNOSIS — M99.03 SEGMENTAL DYSFUNCTION OF LUMBAR REGION: ICD-10-CM

## 2023-12-13 DIAGNOSIS — M79.18 MYOFASCIAL PAIN: ICD-10-CM

## 2023-12-13 DIAGNOSIS — M54.16 LUMBAR RADICULOPATHY: ICD-10-CM

## 2023-12-13 DIAGNOSIS — M54.50 ACUTE RIGHT-SIDED LOW BACK PAIN WITHOUT SCIATICA: ICD-10-CM

## 2023-12-13 DIAGNOSIS — M99.04 SEGMENTAL DYSFUNCTION OF SACRAL REGION: ICD-10-CM

## 2023-12-13 PROCEDURE — 98941 CHIROPRACT MANJ 3-4 REGIONS: CPT | Performed by: CHIROPRACTOR

## 2023-12-13 NOTE — PROGRESS NOTES
Date of first visit: 4/25/2023 6      HPI:  Claudette Alpers presents for treatment today. He localizes right-sided low back pain with occasional radiation right posterior thigh but for the most part axial in nature. Feeling better this week. VPAS  2    Denies any bowel bladder changes reports no weakness denies any numbness or tingling lower extremity. The following portions of the patient's history were reviewed and updated as appropriate: allergies, current medications, past family history, past medical history, past social history, past surgical history, and problem list.    Review of Systems    Physical Exam:  Exam reveals right-sided erector spinae hypertonicity noted with some spasm actually up into the lower thoracic region. Lumbar range of motion is reduced on extension left lateral bending left rotation full forward flexion. Pelvic obliquity is present with an elevated right versus left innominate there is a leg length inequality noted. Biomechanically joint dysfunction of the right sacroiliac joint involvement L4-L5 L5-S1 T12-L1. Stable neurologically lower extremity. Assessment:   Diagnosis ICD-10-CM Associated Orders   1. Segmental dysfunction of pelvic region  M99.05       2. Lumbar radiculopathy  M54.16       3. Segmental dysfunction of sacral region  M99.04       4. Segmental dysfunction of lumbar region  M99.03       5. Myofascial pain  M79.18       6. Acute right-sided low back pain without sciatica  M54.50                 Treatment: 68379  Manipulation to the right innominate, sacrum, L5 via Piper drop maneuver       Discussion:  Continue his daily stretching I will see him back for follow-up in 3 weeks. Advised icing 15 minutes daily basis flexion-based stretching.

## 2023-12-14 ENCOUNTER — OFFICE VISIT (OUTPATIENT)
Dept: CARDIOLOGY CLINIC | Facility: CLINIC | Age: 83
End: 2023-12-14
Payer: MEDICARE

## 2023-12-14 VITALS
OXYGEN SATURATION: 96 % | HEIGHT: 69 IN | DIASTOLIC BLOOD PRESSURE: 74 MMHG | WEIGHT: 234.6 LBS | SYSTOLIC BLOOD PRESSURE: 150 MMHG | BODY MASS INDEX: 34.75 KG/M2 | HEART RATE: 76 BPM

## 2023-12-14 DIAGNOSIS — E78.49 OTHER HYPERLIPIDEMIA: Chronic | ICD-10-CM

## 2023-12-14 DIAGNOSIS — I48.21 PERMANENT ATRIAL FIBRILLATION (HCC): Primary | Chronic | ICD-10-CM

## 2023-12-14 DIAGNOSIS — I48.91 A-FIB (HCC): ICD-10-CM

## 2023-12-14 DIAGNOSIS — I10 BENIGN ESSENTIAL HYPERTENSION: Chronic | ICD-10-CM

## 2023-12-14 DIAGNOSIS — R60.0 BILATERAL EDEMA OF LOWER EXTREMITY: ICD-10-CM

## 2023-12-14 PROBLEM — E66.01 OBESITY, MORBID (HCC): Status: RESOLVED | Noted: 2022-08-23 | Resolved: 2023-12-14

## 2023-12-14 PROCEDURE — 99214 OFFICE O/P EST MOD 30 MIN: CPT | Performed by: INTERNAL MEDICINE

## 2023-12-14 RX ORDER — HYDROCHLOROTHIAZIDE 25 MG/1
25 TABLET ORAL DAILY
Qty: 90 TABLET | Refills: 3 | Status: SHIPPED | OUTPATIENT
Start: 2023-12-14

## 2023-12-14 RX ORDER — LOSARTAN POTASSIUM 50 MG/1
50 TABLET ORAL DAILY
Qty: 90 TABLET | Refills: 3 | Status: SHIPPED | OUTPATIENT
Start: 2023-12-14

## 2023-12-14 RX ORDER — MULTIVITAMIN
1 TABLET ORAL DAILY
COMMUNITY

## 2023-12-14 NOTE — PROGRESS NOTES
Follow-up - Cardiology   Jaycee Guy 82 y.o. male MRN: [id number]        Problems    1. Permanent atrial fibrillation ([address])        2. A-fib [facility])  Ambulatory referral to Cardiology    Basic metabolic panel      3. Other hyperlipidemia  Lipid panel      4. Benign essential hypertension  losartan (COZAAR) 50 mg tablet      5. Bilateral edema of lower extremity  hydrochlorothiazide (HYDRODIURIL) 25 mg tablet          Impression:    Atrial fibrillation  Permanent, asymptomatic, on a longstanding rate control strategy    Hypertension  Continues to be elevated, likely due to mild volume overload    Hyperlipidemia  Controlled    Obstructive sleep apnea   Uses CPAP  but has had longstanding intolerance to many masks    Nonischemic cardiomyopathy/edema  LVEF 50% chronically, recent reassessment 4/23 by echo and 8/23 by stress Myoview reveals no changes  Minimally elevated BNP  Volume overloaded, technically probably CHF, but he is not overtly symptomatic although his gym activity has decreased in the last couple years  He indulges in sodium containing foods      Plan:    He was recently hospitalized with noncardiac recumbent nocturnal chest pain which was likely reflux  His cardiac testing was normal  He did however have a mildly elevated BNP of 186  Consistent with his exam and my office visit earlier this year, he has edema, mild hepatojugular reflex, the 12-1/2 mg of HCTZ we added to his losartan has not been that effective. Thankfully he is not really symptomatic from a volume overload standpoint, but I did indicate to him that this is really just the tipping point of developing congestive heart failure with a low normal ejection fraction of 50% which has been chronic for him in the context of longstanding atrial fibrillation.   Like him to discontinue the losartan/HCTZ  I like him to start losartan 50 mg daily  I would like him to start HCTZ 25 mg daily, and he agreed to reduce the amount of sodium in his diet  Jardiance would be an option but he is very sensitive to brand-name cost, he enters the donut hole for his Eliquis and has a very expensive bill for 6 months of the year      HPI:   Aixa Meyer is a 82y.o. year old male with hypertension, hyperlipidemia, permanent atrial fibrillation, chronic anticoagulation, history of alcohol abuse, GERD, comes in for a follow-up visit. A-fib is well rate controlled  Anticoagulation with Eliquis remains well-tolerated  He has no symptoms attributable to A-fib and has been on an extended rate control strategy for many years  He has a mildly elevated BNP on recent hospitalization, and my prior office visit I noted edema, still present, he does not complain of dyspnea, but when taking a more detailed history about his exercise tolerance, he used to be able to exercise up to 30 minutes on the elliptical, now only doing about 10 minutes. He denies orthopnea. We started him on HCTZ last visit, has not made much difference, he admits to a high sodium diet. He was also recently in the ER for noncardiac recumbent nocturnal chest pain with a negative cardiac workup and suspicious for GERD          Review of Systems   Constitutional:  Positive for fatigue. Negative for appetite change, diaphoresis and fever. Respiratory:  Negative for chest tightness, shortness of breath and wheezing. Cardiovascular:  Negative for chest pain, palpitations and leg swelling. Gastrointestinal:  Negative for abdominal pain and blood in stool. Musculoskeletal:  Negative for arthralgias and joint swelling. Skin:  Negative for rash. Neurological:  Negative for dizziness, syncope and light-headedness.          Past Medical History:   Diagnosis Date   • Atrial fibrillation ([address])    • Cardiomyopathy ([address])     LAST ASSESSED [id number]   • Cataract    • Dysphagia    • GERD (gastroesophageal reflux disease)    • Hyperlipidemia    • Hypertension    • Irregular heart beat     afib   • Rosacea    • Schatzki's ring    • Sleep apnea     no cpap     Social History     Substance and Sexual Activity   Alcohol Use Yes    Comment: 1-2 glasses of wine daily     Social History     Substance and Sexual Activity   Drug Use No     Social History     Tobacco Use   Smoking Status Former   Smokeless Tobacco Never   Tobacco Comments    about 55 years ago       Allergies:   Allergies   Allergen Reactions   • Lisinopril Other (See Comments)     Persistent cough       Medications:     Current Outpatient Medications:   •  amLODIPine (NORVASC) 5 mg tablet, Take 1 tablet (5 mg total) by mouth daily at bedtime, Disp: 90 tablet, Rfl: 3  •  apixaban (Eliquis) 5 mg, Take 1 tablet (5 mg total) by mouth 2 (two) times a day, Disp: 180 tablet, Rfl: 3  •  atenolol (TENORMIN) 50 mg tablet, Take 1 tablet (50 mg total) by mouth 2 (two) times a day, Disp: 180 tablet, Rfl: 3  •  hydrochlorothiazide (HYDRODIURIL) 25 mg tablet, Take 1 tablet (25 mg total) by mouth daily, Disp: 90 tablet, Rfl: 3  •  losartan (COZAAR) 50 mg tablet, Take 1 tablet (50 mg total) by mouth daily, Disp: 90 tablet, Rfl: 3  •  Multiple Vitamin (multivitamin) tablet, Take 1 tablet by mouth daily, Disp: , Rfl:   •  omeprazole (PriLOSEC) 20 mg delayed release capsule, Take 1 capsule (20 mg total) by mouth daily, Disp: 90 capsule, Rfl: 3  •  simvastatin (ZOCOR) 40 mg tablet, Take 1 tablet (40 mg total) by mouth daily at bedtime, Disp: 90 tablet, Rfl: 3  •  Vitamin D, Cholecalciferol, 1000 UNITS CAPS, Take by mouth 2,000 per day, Disp: , Rfl:   •  fluticasone (FLONASE) 50 mcg/act nasal spray, Spray 2 squirts in each nostril once daily (Patient not taking: Reported on 12/14/2023), Disp: 16 g, Rfl: 3  •  Hydrocod Rolando-Chlorphe Rolando ER (TUSSIONEX) 10-8 mg/5 mL ER suspension, Take 5 mL by mouth every 12 (twelve) hours as needed for cough Max Daily Amount: 10 mL (Patient not taking: Reported on 12/14/2023), Disp: 115 mL, Rfl: 0  •  hydrocortisone 2.5 % cream, Apply topically in the morning, Disp: 56 g, Rfl: 3  •  Multiple Vitamins-Minerals (PRESERVISION AREDS PO), Take 1 tablet by mouth 2 (two) times a day (Patient not taking: Reported on 12/14/2023), Disp: , Rfl:   •  triamcinolone (KENALOG) 0.1 % ointment, APPLY TOPICALLY DAILY FOR 2 WEEKS, Disp: 661 g, Rfl: 0      Vitals:    12/14/23 1504   BP: 150/74   Pulse: 76   SpO2: 96%     Weight (last 2 days)     Date/Time Weight    12/14/23 1504 106 (234.6)        Physical Exam  Constitutional:       General: He is not in acute distress. Appearance: He is not diaphoretic. HENT:      Head: Normocephalic and atraumatic. Eyes:      General: No scleral icterus. Conjunctiva/sclera: Conjunctivae normal.   Neck:      Vascular: No JVD. Cardiovascular:      Rate and Rhythm: Normal rate. Rhythm irregular. Heart sounds: Normal heart sounds. No murmur heard. Pulmonary:      Effort: Pulmonary effort is normal.      Breath sounds: Normal breath sounds. No wheezing or rales. Musculoskeletal:      Cervical back: Normal range of motion. Right lower leg: No edema. Left lower leg: No edema. Skin:     General: Skin is warm and dry. Neurological:      Mental Status: He is alert and oriented to person, place, and time. Laboratory Studies:  CMP:        Invalid input(s): "ALBUMIN"  NT-proBNP: No results found for: "NTBNP"   Coags:    Lipid Profile:   Lab Results   Component Value Date    CHOL 136 11/04/2015     Lab Results   Component Value Date    HDL 50 08/18/2023     Lab Results   Component Value Date    LDLCALC 73 08/18/2023     Lab Results   Component Value Date    TRIG 142 08/18/2023       Cardiac testing:   EKG reviewed personally:    No results found for this visit on 12/14/23. Echocardiogram  2018-EF 50%          Trever James MD    Portions of the record may have been created with voice recognition software.   Occasional wrong word or "sound a like" substitutions may have occurred due to the inherent limitations of voice recognition software. Read the chart carefully and recognize, using context, where substitutions have occurred.

## 2024-01-10 ENCOUNTER — PROCEDURE VISIT (OUTPATIENT)
Age: 84
End: 2024-01-10
Payer: MEDICARE

## 2024-01-10 VITALS
WEIGHT: 234 LBS | DIASTOLIC BLOOD PRESSURE: 72 MMHG | SYSTOLIC BLOOD PRESSURE: 131 MMHG | HEART RATE: 73 BPM | HEIGHT: 69 IN | BODY MASS INDEX: 34.66 KG/M2

## 2024-01-10 DIAGNOSIS — M99.03 SEGMENTAL DYSFUNCTION OF LUMBAR REGION: ICD-10-CM

## 2024-01-10 DIAGNOSIS — M99.05 SEGMENTAL DYSFUNCTION OF PELVIC REGION: Primary | ICD-10-CM

## 2024-01-10 DIAGNOSIS — M99.04 SEGMENTAL DYSFUNCTION OF SACRAL REGION: ICD-10-CM

## 2024-01-10 DIAGNOSIS — M54.50 ACUTE RIGHT-SIDED LOW BACK PAIN WITHOUT SCIATICA: ICD-10-CM

## 2024-01-10 DIAGNOSIS — M79.18 MYOFASCIAL PAIN: ICD-10-CM

## 2024-01-10 DIAGNOSIS — M54.16 LUMBAR RADICULOPATHY: ICD-10-CM

## 2024-01-10 PROCEDURE — 98941 CHIROPRACT MANJ 3-4 REGIONS: CPT | Performed by: CHIROPRACTOR

## 2024-01-10 RX ORDER — NEOMYCIN SULFATE, POLYMYXIN B SULFATE, HYDROCORTISONE 3.5; 10000; 1 MG/ML; [USP'U]/ML; MG/ML
SOLUTION/ DROPS AURICULAR (OTIC)
COMMUNITY
Start: 2023-12-12

## 2024-01-10 NOTE — PROGRESS NOTES
Date of first visit: 4/25/2023 8      HPI:  Primo presents for treatment today.  He localizes right-sided low back pain with occasional radiation right posterior thigh but for the most part axial in nature.      VPAS  2    Denies any bowel bladder changes reports no weakness denies any numbness or tingling lower extremity.      The following portions of the patient's history were reviewed and updated as appropriate: allergies, current medications, past family history, past medical history, past social history, past surgical history, and problem list.    Review of Systems    Physical Exam:  Exam reveals right-sided erector spinae hypertonicity noted with some spasm actually up into the lower thoracic region.  Lumbar range of motion is reduced on extension left lateral bending left rotation full forward flexion.  Pelvic obliquity is present with an elevated right versus left innominate there is a leg length inequality noted.  Biomechanically joint dysfunction of the right sacroiliac joint involvement L4-L5 L5-S1 T12-L1.  Stable neurologically lower extremity.    Assessment:   Diagnosis ICD-10-CM Associated Orders   1. Segmental dysfunction of pelvic region  M99.05       2. Lumbar radiculopathy  M54.16       3. Segmental dysfunction of sacral region  M99.04       4. Segmental dysfunction of lumbar region  M99.03       5. Myofascial pain  M79.18       6. Acute right-sided low back pain without sciatica  M54.50                 Treatment: 84406  Manipulation to the right innominate, sacrum, L5 via Piper drop maneuver       Discussion:  Continue his daily stretching I will see him back for follow-up in 3 weeks.  Advised icing 15 minutes daily basis flexion-based stretching.

## 2024-01-22 DIAGNOSIS — I10 ESSENTIAL HYPERTENSION: ICD-10-CM

## 2024-01-22 DIAGNOSIS — K21.9 GASTROESOPHAGEAL REFLUX DISEASE: ICD-10-CM

## 2024-01-22 DIAGNOSIS — R21 SKIN RASH: ICD-10-CM

## 2024-01-22 DIAGNOSIS — E78.2 MIXED HYPERLIPIDEMIA: ICD-10-CM

## 2024-01-22 DIAGNOSIS — I48.91 ATRIAL FIBRILLATION, UNSPECIFIED TYPE (HCC): ICD-10-CM

## 2024-01-22 RX ORDER — ATENOLOL 50 MG/1
50 TABLET ORAL 2 TIMES DAILY
Qty: 180 TABLET | Refills: 1 | Status: SHIPPED | OUTPATIENT
Start: 2024-01-22

## 2024-01-22 RX ORDER — SIMVASTATIN 40 MG
40 TABLET ORAL
Qty: 90 TABLET | Refills: 1 | Status: SHIPPED | OUTPATIENT
Start: 2024-01-22

## 2024-01-22 RX ORDER — AMLODIPINE BESYLATE 5 MG/1
5 TABLET ORAL
Qty: 90 TABLET | Refills: 1 | Status: SHIPPED | OUTPATIENT
Start: 2024-01-22

## 2024-01-22 RX ORDER — OMEPRAZOLE 20 MG/1
20 CAPSULE, DELAYED RELEASE ORAL DAILY
Qty: 90 CAPSULE | Refills: 1 | Status: SHIPPED | OUTPATIENT
Start: 2024-01-22

## 2024-01-26 ENCOUNTER — APPOINTMENT (OUTPATIENT)
Dept: LAB | Facility: CLINIC | Age: 84
End: 2024-01-26
Payer: MEDICARE

## 2024-01-26 DIAGNOSIS — E55.9 VITAMIN D DEFICIENCY: ICD-10-CM

## 2024-01-26 DIAGNOSIS — I48.91 ATRIAL FIBRILLATION, UNSPECIFIED TYPE (HCC): ICD-10-CM

## 2024-01-26 DIAGNOSIS — I65.23 CAROTID STENOSIS, ASYMPTOMATIC, BILATERAL: ICD-10-CM

## 2024-01-26 DIAGNOSIS — R60.0 BILATERAL EDEMA OF LOWER EXTREMITY: ICD-10-CM

## 2024-01-26 DIAGNOSIS — N40.0 BENIGN PROSTATIC HYPERPLASIA, UNSPECIFIED WHETHER LOWER URINARY TRACT SYMPTOMS PRESENT: ICD-10-CM

## 2024-01-26 DIAGNOSIS — E78.2 MIXED HYPERLIPIDEMIA: ICD-10-CM

## 2024-01-26 DIAGNOSIS — K21.9 GASTROESOPHAGEAL REFLUX DISEASE, UNSPECIFIED WHETHER ESOPHAGITIS PRESENT: ICD-10-CM

## 2024-01-26 DIAGNOSIS — R07.82 INTERCOSTAL PAIN: ICD-10-CM

## 2024-01-26 DIAGNOSIS — I48.91 A-FIB (HCC): ICD-10-CM

## 2024-01-26 DIAGNOSIS — R73.03 PREDIABETES: ICD-10-CM

## 2024-01-26 LAB
25(OH)D3 SERPL-MCNC: 40.6 NG/ML (ref 30–100)
ALBUMIN SERPL BCP-MCNC: 4.4 G/DL (ref 3.5–5)
ALP SERPL-CCNC: 40 U/L (ref 34–104)
ALT SERPL W P-5'-P-CCNC: 28 U/L (ref 7–52)
ANION GAP SERPL CALCULATED.3IONS-SCNC: 7 MMOL/L
AST SERPL W P-5'-P-CCNC: 27 U/L (ref 13–39)
BASOPHILS # BLD AUTO: 0.02 THOUSANDS/ÂΜL (ref 0–0.1)
BASOPHILS NFR BLD AUTO: 0 % (ref 0–1)
BILIRUB SERPL-MCNC: 0.76 MG/DL (ref 0.2–1)
BNP SERPL-MCNC: 231 PG/ML (ref 0–100)
BUN SERPL-MCNC: 16 MG/DL (ref 5–25)
CALCIUM SERPL-MCNC: 9.7 MG/DL (ref 8.4–10.2)
CHLORIDE SERPL-SCNC: 100 MMOL/L (ref 96–108)
CHOLEST SERPL-MCNC: 164 MG/DL
CO2 SERPL-SCNC: 31 MMOL/L (ref 21–32)
CREAT SERPL-MCNC: 0.94 MG/DL (ref 0.6–1.3)
EOSINOPHIL # BLD AUTO: 0.09 THOUSAND/ÂΜL (ref 0–0.61)
EOSINOPHIL NFR BLD AUTO: 2 % (ref 0–6)
ERYTHROCYTE [DISTWIDTH] IN BLOOD BY AUTOMATED COUNT: 12.3 % (ref 11.6–15.1)
EST. AVERAGE GLUCOSE BLD GHB EST-MCNC: 126 MG/DL
GFR SERPL CREATININE-BSD FRML MDRD: 75 ML/MIN/1.73SQ M
GLUCOSE P FAST SERPL-MCNC: 118 MG/DL (ref 65–99)
HBA1C MFR BLD: 6 %
HCT VFR BLD AUTO: 40.4 % (ref 36.5–49.3)
HDLC SERPL-MCNC: 49 MG/DL
HGB BLD-MCNC: 14 G/DL (ref 12–17)
IMM GRANULOCYTES # BLD AUTO: 0.01 THOUSAND/UL (ref 0–0.2)
IMM GRANULOCYTES NFR BLD AUTO: 0 % (ref 0–2)
LDLC SERPL CALC-MCNC: 87 MG/DL (ref 0–100)
LYMPHOCYTES # BLD AUTO: 1.63 THOUSANDS/ÂΜL (ref 0.6–4.47)
LYMPHOCYTES NFR BLD AUTO: 32 % (ref 14–44)
MCH RBC QN AUTO: 33.7 PG (ref 26.8–34.3)
MCHC RBC AUTO-ENTMCNC: 34.7 G/DL (ref 31.4–37.4)
MCV RBC AUTO: 97 FL (ref 82–98)
MONOCYTES # BLD AUTO: 0.67 THOUSAND/ÂΜL (ref 0.17–1.22)
MONOCYTES NFR BLD AUTO: 13 % (ref 4–12)
NEUTROPHILS # BLD AUTO: 2.62 THOUSANDS/ÂΜL (ref 1.85–7.62)
NEUTS SEG NFR BLD AUTO: 53 % (ref 43–75)
NRBC BLD AUTO-RTO: 0 /100 WBCS
PLATELET # BLD AUTO: 183 THOUSANDS/UL (ref 149–390)
PMV BLD AUTO: 10.7 FL (ref 8.9–12.7)
POTASSIUM SERPL-SCNC: 3.9 MMOL/L (ref 3.5–5.3)
PROT SERPL-MCNC: 7.1 G/DL (ref 6.4–8.4)
RBC # BLD AUTO: 4.16 MILLION/UL (ref 3.88–5.62)
SODIUM SERPL-SCNC: 138 MMOL/L (ref 135–147)
TRIGL SERPL-MCNC: 141 MG/DL
WBC # BLD AUTO: 5.04 THOUSAND/UL (ref 4.31–10.16)

## 2024-01-26 PROCEDURE — 82306 VITAMIN D 25 HYDROXY: CPT

## 2024-01-26 PROCEDURE — 83880 ASSAY OF NATRIURETIC PEPTIDE: CPT

## 2024-01-26 PROCEDURE — 83036 HEMOGLOBIN GLYCOSYLATED A1C: CPT

## 2024-01-26 PROCEDURE — 84154 ASSAY OF PSA FREE: CPT

## 2024-01-26 PROCEDURE — 80053 COMPREHEN METABOLIC PANEL: CPT

## 2024-01-26 PROCEDURE — 36415 COLL VENOUS BLD VENIPUNCTURE: CPT

## 2024-01-26 PROCEDURE — 84153 ASSAY OF PSA TOTAL: CPT

## 2024-01-26 PROCEDURE — 85025 COMPLETE CBC W/AUTO DIFF WBC: CPT

## 2024-01-26 PROCEDURE — 80061 LIPID PANEL: CPT

## 2024-01-27 LAB
PSA FREE MFR SERPL: 32.8 %
PSA FREE SERPL-MCNC: 0.95 NG/ML
PSA SERPL-MCNC: 2.9 NG/ML (ref 0–4)

## 2024-01-30 ENCOUNTER — TELEPHONE (OUTPATIENT)
Dept: NEUROLOGY | Facility: CLINIC | Age: 84
End: 2024-01-30

## 2024-01-30 ENCOUNTER — OFFICE VISIT (OUTPATIENT)
Dept: INTERNAL MEDICINE CLINIC | Facility: CLINIC | Age: 84
End: 2024-01-30
Payer: MEDICARE

## 2024-01-30 VITALS
HEIGHT: 69 IN | BODY MASS INDEX: 34.78 KG/M2 | SYSTOLIC BLOOD PRESSURE: 138 MMHG | HEART RATE: 61 BPM | DIASTOLIC BLOOD PRESSURE: 70 MMHG | OXYGEN SATURATION: 96 % | WEIGHT: 234.8 LBS

## 2024-01-30 DIAGNOSIS — K21.9 GASTROESOPHAGEAL REFLUX DISEASE, UNSPECIFIED WHETHER ESOPHAGITIS PRESENT: Chronic | ICD-10-CM

## 2024-01-30 DIAGNOSIS — Z00.00 MEDICARE ANNUAL WELLNESS VISIT, SUBSEQUENT: ICD-10-CM

## 2024-01-30 DIAGNOSIS — I48.21 PERMANENT ATRIAL FIBRILLATION (HCC): Chronic | ICD-10-CM

## 2024-01-30 DIAGNOSIS — G47.33 OSA (OBSTRUCTIVE SLEEP APNEA): ICD-10-CM

## 2024-01-30 DIAGNOSIS — R73.03 PRE-DIABETES: Chronic | ICD-10-CM

## 2024-01-30 DIAGNOSIS — E78.2 MIXED HYPERLIPIDEMIA: ICD-10-CM

## 2024-01-30 DIAGNOSIS — I10 BENIGN ESSENTIAL HYPERTENSION: Primary | Chronic | ICD-10-CM

## 2024-01-30 DIAGNOSIS — G47.33 OBSTRUCTIVE SLEEP APNEA: Chronic | ICD-10-CM

## 2024-01-30 PROCEDURE — 99214 OFFICE O/P EST MOD 30 MIN: CPT | Performed by: INTERNAL MEDICINE

## 2024-01-30 PROCEDURE — G0439 PPPS, SUBSEQ VISIT: HCPCS | Performed by: INTERNAL MEDICINE

## 2024-01-30 NOTE — TELEPHONE ENCOUNTER
Wife called to advise pt is still seeing Dr Smith at his new office, wants to cancell appt w/Dr Ang Ahumada. Cancelled per request.

## 2024-01-30 NOTE — ASSESSMENT & PLAN NOTE
>>ASSESSMENT AND PLAN FOR OBSTRUCTIVE SLEEP APNEA WRITTEN ON 1/30/2024  3:20 PM BY DEVANTE GALVAN MD    Continue CPAP    >>ASSESSMENT AND PLAN FOR LUÍS (OBSTRUCTIVE SLEEP APNEA) WRITTEN ON 1/30/2024  3:18 PM BY DEVANTE GALVAN MD    Continue CPAP

## 2024-01-30 NOTE — PROGRESS NOTES
Assessment and Plan:     Problem List Items Addressed This Visit        Digestive    Gastroesophageal reflux disease (Chronic)     Patient takes omeprazole daily, no GERD symptoms, no problems with food getting stuck.  I recommend patient try titrating down the dose a little to see how he does, he could try every other day to see how he does            Respiratory    Obstructive sleep apnea (Chronic)     >>ASSESSMENT AND PLAN FOR OBSTRUCTIVE SLEEP APNEA WRITTEN ON 1/30/2024  3:20 PM BY DEVANTE GALVAN MD    Continue CPAP    >>ASSESSMENT AND PLAN FOR LUÍS (OBSTRUCTIVE SLEEP APNEA) WRITTEN ON 1/30/2024  3:18 PM BY DEVANTE GALVAN MD    Continue CPAP            Cardiovascular and Mediastinum    Atrial fibrillation (HCC) (Chronic)     Rate controlled, continue anticoagulation, no bleeding problems, follow-up cardiology         Benign essential hypertension - Primary (Chronic)     Elevated at first, then came down to better level, continue medications along with healthy diet and exercise            Other    Pre-diabetes (Chronic)     Recent hemoglobin A1c okay at 6.0, continue with healthy diet and exercise         Relevant Orders    Comprehensive metabolic panel    Hemoglobin A1C    Mixed hyperlipidemia     Continue simvastatin along with healthy diet and exercise         Medicare annual wellness visit, subsequent     Discussed preventative health, cancer screening, immunizations, and safety issues.  Pt had Colonoscopy 2016 and Cologuard Nov 2021.  Patient up-to-date with immunizations.        Other Visit Diagnoses     LUÍS (obstructive sleep apnea)                Depression Screening and Follow-up Plan: Patient was screened for depression during today's encounter. They screened negative with a PHQ-2 score of 0.      Preventive health issues were discussed with patient, and age appropriate screening tests were ordered as noted in patient's After Visit Summary.  Personalized health advice and appropriate referrals for  health education or preventive services given if needed, as noted in patient's After Visit Summary.     History of Present Illness:     Patient presents for a Medicare Wellness Visit    Patient here to establish care, and due for annual wellness visit       Patient Care Team:  Arnaldo Luo MD as PCP - General (Internal Medicine)  Vu Devine MD as Cardiologist  MD Melly Patel MD as Endoscopist     Review of Systems:     Review of Systems   Constitutional:  Negative for chills, fatigue and fever.   HENT:  Negative for congestion, nosebleeds, postnasal drip, sore throat and trouble swallowing.    Eyes:  Negative for pain.   Respiratory:  Negative for cough, chest tightness, shortness of breath and wheezing.    Cardiovascular:  Negative for chest pain, palpitations and leg swelling.   Gastrointestinal:  Negative for abdominal pain, constipation, diarrhea, nausea and vomiting.   Endocrine: Negative for polydipsia and polyuria.   Genitourinary:  Negative for dysuria, flank pain and hematuria.   Musculoskeletal:  Negative for arthralgias.   Skin:  Negative for rash.   Neurological:  Negative for dizziness, tremors, light-headedness and headaches.   Hematological:  Does not bruise/bleed easily.   Psychiatric/Behavioral:  Negative for confusion and dysphoric mood. The patient is not nervous/anxious.         Problem List:     Patient Active Problem List   Diagnosis   • Atrial fibrillation (HCC)   • Mixed hyperlipidemia   • Benign essential hypertension   • Obstructive sleep apnea   • Anxiety   • Arthritis   • Benign prostatic hyperplasia   • Diverticulosis   • Dysphagia   • Gastroesophageal reflux disease   • Insomnia   • Pre-diabetes   • Rosacea   • Tinea cruris   • Chest pain   • Medicare annual wellness visit, subsequent      Past Medical and Surgical History:     Past Medical History:   Diagnosis Date   • Atrial fibrillation (HCC)    • Cardiomyopathy (HCC)     LAST ASSESSED [id number]   •  Cataract    • Dysphagia    • GERD (gastroesophageal reflux disease)    • Hyperlipidemia    • Hypertension    • Irregular heart beat     afib   • Rosacea    • Schatzki's ring    • Sleep apnea     no cpap     Past Surgical History:   Procedure Laterality Date   • BACK SURGERY     • CATARACT EXTRACTION     • COLONOSCOPY N/A 1/15/2016    Procedure: COLONOSCOPY;  Surgeon: Melly Kumar MD;  Location:  GI LAB;  Service:    • ESOPHAGOGASTRODUODENOSCOPY     • EYE SURGERY      B/L cataract sx (2008,2009)   • CT CIRCUMCISION AGE >28 DAYS N/A 1/29/2018    Procedure: CIRCUMCISION ADULT;  Surgeon: Charles Hannah MD;  Location:  MAIN OR;  Service: Urology   • CT ESOPHAGOGASTRODUODENOSCOPY TRANSORAL DIAGNOSTIC N/A 2/15/2017    Procedure: ESOPHAGOGASTRODUODENOSCOPY (EGD) WITH DILATION;  Surgeon: Marbin Bullard MD;  Location:  GI LAB;  Service: Gastroenterology      Family History:     Family History   Problem Relation Age of Onset   • No Known Problems Mother    • Heart disease Father    • Coronary artery disease Father    • No Known Problems Sister    • No Known Problems Sister    • Heart disease Brother    • Heart disease Brother    • Coronary artery disease Family    • Hyperlipidemia Family    • Hypertension Family    • Other Family         SUDDEN CARDIAC DEATH       Social History:     Social History     Socioeconomic History   • Marital status: /Civil Union     Spouse name: None   • Number of children: None   • Years of education: None   • Highest education level: None   Occupational History   • None   Tobacco Use   • Smoking status: Former     Types: Cigarettes   • Smokeless tobacco: Never   • Tobacco comments:     about 55 years ago,  unknown start date quit 1996   Vaping Use   • Vaping status: Never Used   Substance and Sexual Activity   • Alcohol use: Yes     Alcohol/week: 7.0 - 14.0 standard drinks of alcohol     Types: 7 - 14 Glasses of wine per week     Comment: 1-2 glasses of wine daily   • Drug  use: No   • Sexual activity: Not Currently   Other Topics Concern   • None   Social History Narrative   • None     Social Determinants of Health     Financial Resource Strain: Low Risk  (1/30/2024)    Overall Financial Resource Strain (CARDIA)    • Difficulty of Paying Living Expenses: Not hard at all   Food Insecurity: Not on file   Transportation Needs: No Transportation Needs (1/30/2024)    PRAPARE - Transportation    • Lack of Transportation (Medical): No    • Lack of Transportation (Non-Medical): No   Physical Activity: Not on file   Stress: Not on file   Social Connections: Not on file   Intimate Partner Violence: Not on file   Housing Stability: Not on file      Medications and Allergies:     Current Outpatient Medications   Medication Sig Dispense Refill   • amLODIPine (NORVASC) 5 mg tablet Take 1 tablet (5 mg total) by mouth daily at bedtime 90 tablet 1   • apixaban (Eliquis) 5 mg Take 1 tablet (5 mg total) by mouth 2 (two) times a day 180 tablet 1   • atenolol (TENORMIN) 50 mg tablet Take 1 tablet (50 mg total) by mouth 2 (two) times a day 180 tablet 1   • hydrochlorothiazide (HYDRODIURIL) 25 mg tablet Take 1 tablet (25 mg total) by mouth daily 90 tablet 3   • hydrocortisone 2.5 % cream Apply topically in the morning 56 g 3   • Multiple Vitamin (multivitamin) tablet Take 1 tablet by mouth daily     • omeprazole (PriLOSEC) 20 mg delayed release capsule Take 1 capsule (20 mg total) by mouth daily 90 capsule 1   • simvastatin (ZOCOR) 40 mg tablet Take 1 tablet (40 mg total) by mouth daily at bedtime 90 tablet 1   • triamcinolone (KENALOG) 0.1 % ointment APPLY TOPICALLY DAILY FOR 2 WEEKS 454 g 0   • Vitamin D, Cholecalciferol, 1000 UNITS CAPS Take by mouth 2,000 per day     • fluticasone (FLONASE) 50 mcg/act nasal spray Spray 2 squirts in each nostril once daily (Patient not taking: Reported on 12/14/2023) 16 g 3   • Hydrocod Rolando-Chlorphe Rolando ER (TUSSIONEX) 10-8 mg/5 mL ER suspension Take 5 mL by mouth every  12 (twelve) hours as needed for cough Max Daily Amount: 10 mL (Patient not taking: Reported on 12/14/2023) 115 mL 0   • Multiple Vitamins-Minerals (PRESERVISION AREDS PO) Take 1 tablet by mouth 2 (two) times a day (Patient not taking: Reported on 12/14/2023)     • neomycin-polymyxin-hydrocortisone (CORTISPORIN) 1 % SOLN 2 DROPS INTO THE AFFECTED EAR TWICE A DAY AS DIRECTED (Patient not taking: Reported on 1/30/2024)       No current facility-administered medications for this visit.     No Known Allergies   Immunizations:     Immunization History   Administered Date(s) Administered   • COVID-19 PFIZER VACCINE 0.3 ML IM 01/12/2021, 02/05/2021, 11/20/2021   • COVID-19 Pfizer Vac BIVALENT Daniel-sucrose 12 Yr+ IM 09/22/2022   • COVID-19 Pfizer mRNA vacc PF daniel-sucrose 12 yr and older (Comirnaty) 10/06/2023   • H1N1, All Formulations 01/29/2010   • INFLUENZA 09/30/2014, 10/18/2015, 09/28/2016, 10/14/2018, 10/06/2023   • Influenza Quadrivalent Preservative Free 3 years and older IM 09/30/2014   • Influenza Split High Dose Preservative Free IM 10/18/2015, 09/28/2016   • Influenza, high dose seasonal 0.7 mL 09/17/2019, 10/05/2020, 11/15/2021, 10/20/2022   • Influenza, seasonal, injectable 10/24/2012   • Pneumococcal Conjugate 13-Valent 05/24/2016   • Pneumococcal Polysaccharide PPV23 07/17/2020   • Respiratory Syncytial Virus Vaccine (Recombinant, Adjuvanted) 10/27/2023   • Tdap 09/28/2016   • Zoster Vaccine Recombinant 06/18/2019, 08/19/2019      Health Maintenance:         Topic Date Due   • Colorectal Cancer Screening  Discontinued         Topic Date Due   • COVID-19 Vaccine (6 - 2023-24 season) 12/01/2023      Medicare Screening Tests and Risk Assessments:     Primo is here for his Subsequent Wellness visit.     Health Risk Assessment:   Patient rates overall health as good. Patient feels that their physical health rating is same. Patient is very satisfied with their life. Eyesight was rated as slightly worse. Hearing  was rated as slightly worse. Patient feels that their emotional and mental health rating is same. Patients states they are sometimes angry. Patient states they are never, rarely unusually tired/fatigued. Pain experienced in the last 7 days has been some. Patient's pain rating has been 2/10. Patient states that he has experienced no weight loss or gain in last 6 months.     Depression Screening:   PHQ-2 Score: 0      Fall Risk Screening:   In the past year, patient has experienced: no history of falling in past year      Home Safety:  Patient does not have trouble with stairs inside or outside of their home. Patient has working smoke alarms and has no working carbon monoxide detector. Home safety hazards include: none.     Nutrition:   Current diet is Regular. Low sodium    Medications:   Patient is currently taking over-the-counter supplements. OTC medications include: see medication list. Patient is able to manage medications.     Activities of Daily Living (ADLs)/Instrumental Activities of Daily Living (IADLs):   Walk and transfer into and out of bed and chair?: Yes  Dress and groom yourself?: Yes    Bathe or shower yourself?: Yes    Feed yourself? Yes  Do your laundry/housekeeping?: Yes  Manage your money, pay your bills and track your expenses?: Yes  Make your own meals?: Yes    Do your own shopping?: Yes    Previous Hospitalizations:   Any hospitalizations or ED visits within the last 12 months?: Yes    How many hospitalizations have you had in the last year?: 1-2    Advance Care Planning:   Living will: No    Durable POA for healthcare: No    Advanced directive: No    Advanced directive counseling given: Yes    ACP document given: Yes    Patient declined ACP directive: No      Cognitive Screening:   Provider or family/friend/caregiver concerned regarding cognition?: No    PREVENTIVE SCREENINGS      Cardiovascular Screening:    General: Screening Not Indicated, History Lipid Disorder, Risks and Benefits  "Discussed and Screening Current      Diabetes Screening:     General: Screening Current and Risks and Benefits Discussed      Colorectal Cancer Screening:     General: Screening Current and Risks and Benefits Discussed      Prostate Cancer Screening:    General: Screening Not Indicated, Risks and Benefits Discussed and Screening Current      Osteoporosis Screening:    General: Screening Not Indicated      Abdominal Aortic Aneurysm (AAA) Screening:    Risk factors include: tobacco use        General: Risks and Benefits Discussed and Screening Current      Lung Cancer Screening:     General: Screening Not Indicated      Hepatitis C Screening:    General: Screening Not Indicated    Screening, Brief Intervention, and Referral to Treatment (SBIRT)    Screening  Typical number of drinks in a day: 2  Typical number of drinks in a week: 12  Interpretation: Low risk drinking behavior.    Single Item Drug Screening:  How often have you used an illegal drug (including marijuana) or a prescription medication for non-medical reasons in the past year? never    Single Item Drug Screen Score: 0  Interpretation: Negative screen for possible drug use disorder    Brief Intervention  Alcohol & drug use screenings were reviewed. No concerns regarding substance use disorder identified.     Other Counseling Topics:   Car/seat belt/driving safety, skin self-exam and sunscreen.     No results found.     Physical Exam:     /70   Pulse 61   Ht 5' 9\" (1.753 m)   Wt 107 kg (234 lb 12.8 oz)   SpO2 96%   BMI 34.67 kg/m²     Physical Exam  Vitals reviewed.   Constitutional:       General: He is not in acute distress.     Appearance: Normal appearance. He is well-developed.   HENT:      Head: Normocephalic and atraumatic.      Right Ear: External ear normal.      Left Ear: External ear normal.      Nose: Nose normal.   Eyes:      General: No scleral icterus.     Conjunctiva/sclera: Conjunctivae normal.   Neck:      Trachea: No tracheal " deviation.   Cardiovascular:      Rate and Rhythm: Normal rate. Rhythm irregular.      Heart sounds: Normal heart sounds. No murmur heard.  Pulmonary:      Effort: Pulmonary effort is normal. No respiratory distress.      Breath sounds: Normal breath sounds. No wheezing or rales.   Abdominal:      General: Bowel sounds are normal.      Palpations: Abdomen is soft. There is no mass.      Tenderness: There is no abdominal tenderness. There is no guarding.      Hernia: There is no hernia in the left inguinal area or right inguinal area.   Genitourinary:     Testes: Normal.   Musculoskeletal:      Cervical back: Normal range of motion and neck supple.      Right lower leg: Edema (trace) present.      Left lower leg: Edema (trace) present.   Lymphadenopathy:      Cervical: No cervical adenopathy.   Skin:     Coloration: Skin is not jaundiced or pale.   Neurological:      General: No focal deficit present.      Mental Status: He is alert and oriented to person, place, and time.   Psychiatric:         Mood and Affect: Mood normal.         Behavior: Behavior normal.         Thought Content: Thought content normal.         Judgment: Judgment normal.          Arnaldo Luo MD

## 2024-01-30 NOTE — ASSESSMENT & PLAN NOTE
Discussed preventative health, cancer screening, immunizations, and safety issues.  Pt had Colonoscopy 2016 and Cologuard Nov 2021.  Patient up-to-date with immunizations.

## 2024-01-30 NOTE — ASSESSMENT & PLAN NOTE
Elevated at first, then came down to better level, continue medications along with healthy diet and exercise

## 2024-01-30 NOTE — ASSESSMENT & PLAN NOTE
Patient takes omeprazole daily, no GERD symptoms, no problems with food getting stuck.  I recommend patient try titrating down the dose a little to see how he does, he could try every other day to see how he does

## 2024-01-30 NOTE — PATIENT INSTRUCTIONS
Problem List Items Addressed This Visit          Digestive    Gastroesophageal reflux disease (Chronic)     Patient takes omeprazole daily, no GERD symptoms, no problems with food getting stuck.  I recommend patient try titrating down the dose a little to see how he does, he could try every other day to see how he does            Respiratory    Obstructive sleep apnea (Chronic)     >>ASSESSMENT AND PLAN FOR OBSTRUCTIVE SLEEP APNEA WRITTEN ON 1/30/2024  3:20 PM BY DEVANTE GALVAN MD    Continue CPAP    >>ASSESSMENT AND PLAN FOR LUÍS (OBSTRUCTIVE SLEEP APNEA) WRITTEN ON 1/30/2024  3:18 PM BY DEVANTE GALVAN MD    Continue CPAP            Cardiovascular and Mediastinum    Atrial fibrillation (HCC) (Chronic)     Rate controlled, continue anticoagulation, no bleeding problems, follow-up cardiology         Benign essential hypertension - Primary (Chronic)     Elevated at first, then came down to better level, continue medications along with healthy diet and exercise            Other    Pre-diabetes (Chronic)     Recent hemoglobin A1c okay at 6.0, continue with healthy diet and exercise         Mixed hyperlipidemia     Continue simvastatin along with healthy diet and exercise         Medicare annual wellness visit, subsequent     Discussed preventative health, cancer screening, immunizations, and safety issues.  Pt had Colonoscopy 2016 and Cologuard Nov 2021.  Patient up-to-date with immunizations.          Other Visit Diagnoses       LUÍS (obstructive sleep apnea)                Medicare Preventive Visit Patient Instructions  Thank you for completing your Welcome to Medicare Visit or Medicare Annual Wellness Visit today. Your next wellness visit will be due in one year (1/30/2025).  The screening/preventive services that you may require over the next 5-10 years are detailed below. Some tests may not apply to you based off risk factors and/or age. Screening tests ordered at today's visit but not completed yet may show as past  due. Also, please note that scanned in results may not display below.  Preventive Screenings:  Service Recommendations Previous Testing/Comments   Colorectal Cancer Screening  Colonoscopy    Fecal Occult Blood Test (FOBT)/Fecal Immunochemical Test (FIT)  Fecal DNA/Cologuard Test  Flexible Sigmoidoscopy Age: 45-75 years old   Colonoscopy: every 10 years (May be performed more frequently if at higher risk)  OR  FOBT/FIT: every 1 year  OR  Cologuard: every 3 years  OR  Sigmoidoscopy: every 5 years  Screening may be recommended earlier than age 45 if at higher risk for colorectal cancer. Also, an individualized decision between you and your healthcare provider will decide whether screening between the ages of 76-85 would be appropriate. Colonoscopy: 11/02/2021  FOBT/FIT: 11/02/2021  Cologuard: 11/02/2021  Sigmoidoscopy: 11/02/2021          Prostate Cancer Screening Individualized decision between patient and health care provider in men between ages of 55-69   Medicare will cover every 12 months beginning on the day after your 50th birthday PSA: 2.9 ng/mL           Hepatitis C Screening Once for adults born between 1945 and 1965  More frequently in patients at high risk for Hepatitis C Hep C Antibody: Not on file        Diabetes Screening 1-2 times per year if you're at risk for diabetes or have pre-diabetes Fasting glucose: 118 mg/dL (1/26/2024)  A1C: 6.0 % (1/26/2024)      Cholesterol Screening Once every 5 years if you don't have a lipid disorder. May order more often based on risk factors. Lipid panel: 01/26/2024         Other Preventive Screenings Covered by Medicare:  Abdominal Aortic Aneurysm (AAA) Screening: covered once if your at risk. You're considered to be at risk if you have a family history of AAA or a male between the age of 65-75 who smoking at least 100 cigarettes in your lifetime.  Lung Cancer Screening: covers low dose CT scan once per year if you meet all of the following conditions: (1) Age 55-77;  (2) No signs or symptoms of lung cancer; (3) Current smoker or have quit smoking within the last 15 years; (4) You have a tobacco smoking history of at least 20 pack years (packs per day x number of years you smoked); (5) You get a written order from a healthcare provider.  Glaucoma Screening: covered annually if you're considered high risk: (1) You have diabetes OR (2) Family history of glaucoma OR (3)  aged 50 and older OR (4)  American aged 65 and older  Osteoporosis Screening: covered every 2 years if you meet one of the following conditions: (1) Have a vertebral abnormality; (2) On glucocorticoid therapy for more than 3 months; (3) Have primary hyperparathyroidism; (4) On osteoporosis medications and need to assess response to drug therapy.  HIV Screening: covered annually if you're between the age of 15-65. Also covered annually if you are younger than 15 and older than 65 with risk factors for HIV infection. For pregnant patients, it is covered up to 3 times per pregnancy.    Immunizations:  Immunization Recommendations   Influenza Vaccine Annual influenza vaccination during flu season is recommended for all persons aged >= 6 months who do not have contraindications   Pneumococcal Vaccine   * Pneumococcal conjugate vaccine = PCV13 (Prevnar 13), PCV15 (Vaxneuvance), PCV20 (Prevnar 20)  * Pneumococcal polysaccharide vaccine = PPSV23 (Pneumovax) Adults 19-65 yo with certain risk factors or if 65+ yo  If never received any pneumonia vaccine: recommend Prevnar 20 (PCV20)  Give PCV20 if previously received 1 dose of PCV13 or PPSV23   Hepatitis B Vaccine 3 dose series if at intermediate or high risk (ex: diabetes, end stage renal disease, liver disease)   Respiratory syncytial virus (RSV) Vaccine - COVERED BY MEDICARE PART D  * RSVPreF3 (Arexvy) CDC recommends that adults 60 years of age and older may receive a single dose of RSV vaccine using shared clinical decision-making (SCDM)   Tetanus  (Td) Vaccine - COST NOT COVERED BY MEDICARE PART B Following completion of primary series, a booster dose should be given every 10 years to maintain immunity against tetanus. Td may also be given as tetanus wound prophylaxis.   Tdap Vaccine - COST NOT COVERED BY MEDICARE PART B Recommended at least once for all adults. For pregnant patients, recommended with each pregnancy.   Shingles Vaccine (Shingrix) - COST NOT COVERED BY MEDICARE PART B  2 shot series recommended in those 19 years and older who have or will have weakened immune systems or those 50 years and older     Health Maintenance Due:      Topic Date Due    Colorectal Cancer Screening  Discontinued     Immunizations Due:      Topic Date Due    COVID-19 Vaccine (6 - 2023-24 season) 12/01/2023     Advance Directives   What are advance directives?  Advance directives are legal documents that state your wishes and plans for medical care. These plans are made ahead of time in case you lose your ability to make decisions for yourself. Advance directives can apply to any medical decision, such as the treatments you want, and if you want to donate organs.   What are the types of advance directives?  There are many types of advance directives, and each state has rules about how to use them. You may choose a combination of any of the following:  Living will:  This is a written record of the treatment you want. You can also choose which treatments you do not want, which to limit, and which to stop at a certain time. This includes surgery, medicine, IV fluid, and tube feedings.   Durable power of  for healthcare (DPAHC):  This is a written record that states who you want to make healthcare choices for you when you are unable to make them for yourself. This person, called a proxy, is usually a family member or a friend. You may choose more than 1 proxy.  Do not resuscitate (DNR) order:  A DNR order is used in case your heart stops beating or you stop breathing.  It is a request not to have certain forms of treatment, such as CPR. A DNR order may be included in other types of advance directives.  Medical directive:  This covers the care that you want if you are in a coma, near death, or unable to make decisions for yourself. You can list the treatments you want for each condition. Treatment may include pain medicine, surgery, blood transfusions, dialysis, IV or tube feedings, and a ventilator (breathing machine).  Values history:  This document has questions about your views, beliefs, and how you feel and think about life. This information can help others choose the care that you would choose.  Why are advance directives important?  An advance directive helps you control your care. Although spoken wishes may be used, it is better to have your wishes written down. Spoken wishes can be misunderstood, or not followed. Treatments may be given even if you do not want them. An advance directive may make it easier for your family to make difficult choices about your care.   Weight Management   Why it is important to manage your weight:  Being overweight increases your risk of health conditions such as heart disease, high blood pressure, type 2 diabetes, and certain types of cancer. It can also increase your risk for osteoarthritis, sleep apnea, and other respiratory problems. Aim for a slow, steady weight loss. Even a small amount of weight loss can lower your risk of health problems.  How to lose weight safely:  A safe and healthy way to lose weight is to eat fewer calories and get regular exercise. You can lose up about 1 pound a week by decreasing the number of calories you eat by 500 calories each day.   Healthy meal plan for weight management:  A healthy meal plan includes a variety of foods, contains fewer calories, and helps you stay healthy. A healthy meal plan includes the following:  Eat whole-grain foods more often.  A healthy meal plan should contain fiber. Fiber is the  part of grains, fruits, and vegetables that is not broken down by your body. Whole-grain foods are healthy and provide extra fiber in your diet. Some examples of whole-grain foods are whole-wheat breads and pastas, oatmeal, brown rice, and bulgur.  Eat a variety of vegetables every day.  Include dark, leafy greens such as spinach, kale, lavelle greens, and mustard greens. Eat yellow and orange vegetables such as carrots, sweet potatoes, and winter squash.   Eat a variety of fruits every day.  Choose fresh or canned fruit (canned in its own juice or light syrup) instead of juice. Fruit juice has very little or no fiber.  Eat low-fat dairy foods.  Drink fat-free (skim) milk or 1% milk. Eat fat-free yogurt and low-fat cottage cheese. Try low-fat cheeses such as mozzarella and other reduced-fat cheeses.  Choose meat and other protein foods that are low in fat.  Choose beans or other legumes such as split peas or lentils. Choose fish, skinless poultry (chicken or turkey), or lean cuts of red meat (beef or pork). Before you cook meat or poultry, cut off any visible fat.   Use less fat and oil.  Try baking foods instead of frying them. Add less fat, such as margarine, sour cream, regular salad dressing and mayonnaise to foods. Eat fewer high-fat foods. Some examples of high-fat foods include french fries, doughnuts, ice cream, and cakes.  Eat fewer sweets.  Limit foods and drinks that are high in sugar. This includes candy, cookies, regular soda, and sweetened drinks.  Exercise:  Exercise at least 30 minutes per day on most days of the week. Some examples of exercise include walking, biking, dancing, and swimming. You can also fit in more physical activity by taking the stairs instead of the elevator or parking farther away from stores. Ask your healthcare provider about the best exercise plan for you.      © Copyright "[employer]" 2018 Information is for End User's use only and may not be sold, redistributed or  otherwise used for commercial purposes. All illustrations and images included in CareNotes® are the copyrighted property of [employer] or [employer]

## 2024-02-07 ENCOUNTER — PROCEDURE VISIT (OUTPATIENT)
Age: 84
End: 2024-02-07
Payer: MEDICARE

## 2024-02-07 VITALS
DIASTOLIC BLOOD PRESSURE: 77 MMHG | BODY MASS INDEX: 34.66 KG/M2 | HEIGHT: 69 IN | WEIGHT: 234 LBS | SYSTOLIC BLOOD PRESSURE: 134 MMHG

## 2024-02-07 DIAGNOSIS — M79.18 MYOFASCIAL PAIN: ICD-10-CM

## 2024-02-07 DIAGNOSIS — M99.03 SEGMENTAL DYSFUNCTION OF LUMBAR REGION: ICD-10-CM

## 2024-02-07 DIAGNOSIS — M54.50 ACUTE RIGHT-SIDED LOW BACK PAIN WITHOUT SCIATICA: ICD-10-CM

## 2024-02-07 DIAGNOSIS — M99.05 SEGMENTAL DYSFUNCTION OF PELVIC REGION: Primary | ICD-10-CM

## 2024-02-07 DIAGNOSIS — M99.04 SEGMENTAL DYSFUNCTION OF SACRAL REGION: ICD-10-CM

## 2024-02-07 DIAGNOSIS — M54.41 CHRONIC BILATERAL LOW BACK PAIN WITH RIGHT-SIDED SCIATICA: ICD-10-CM

## 2024-02-07 DIAGNOSIS — G89.29 CHRONIC BILATERAL LOW BACK PAIN WITH RIGHT-SIDED SCIATICA: ICD-10-CM

## 2024-02-07 DIAGNOSIS — M54.16 LUMBAR RADICULOPATHY: ICD-10-CM

## 2024-02-07 PROCEDURE — 98941 CHIROPRACT MANJ 3-4 REGIONS: CPT | Performed by: CHIROPRACTOR

## 2024-02-09 NOTE — PROGRESS NOTES
Date of first visit: 4/25/2023 9      HPI:  Primo presents for treatment today.  He localizes right-sided low back pain with occasional radiation right posterior thigh but for the most part axial in nature.      VPAS  2    Denies any bowel bladder changes reports no weakness denies any numbness or tingling lower extremity.      The following portions of the patient's history were reviewed and updated as appropriate: allergies, current medications, past family history, past medical history, past social history, past surgical history, and problem list.    Review of Systems    Physical Exam:  Exam reveals right-sided erector spinae hypertonicity noted with some spasm actually up into the lower thoracic region.  Lumbar range of motion is reduced on extension left lateral bending left rotation full forward flexion.  Pelvic obliquity is present with an elevated right versus left innominate there is a leg length inequality noted.  Biomechanically joint dysfunction of the right sacroiliac joint involvement L4-L5 L5-S1 T12-L1.  Stable neurologically lower extremity.    Assessment:   Diagnosis ICD-10-CM Associated Orders   1. Segmental dysfunction of pelvic region  M99.05       2. Lumbar radiculopathy  M54.16       3. Segmental dysfunction of sacral region  M99.04       4. Segmental dysfunction of lumbar region  M99.03       5. Myofascial pain  M79.18       6. Acute right-sided low back pain without sciatica  M54.50       7. Chronic bilateral low back pain with right-sided sciatica  M54.41     G89.29                 Treatment: 22676  Manipulation to the right innominate, sacrum, L5 via Piper drop maneuver       Discussion:  Continue his daily stretching I will see him back for follow-up in 3 weeks.  Advised icing 15 minutes daily basis flexion-based stretching.

## 2024-02-21 PROBLEM — Z00.00 MEDICARE ANNUAL WELLNESS VISIT, SUBSEQUENT: Status: RESOLVED | Noted: 2024-01-30 | Resolved: 2024-02-21

## 2024-03-05 ENCOUNTER — OFFICE VISIT (OUTPATIENT)
Dept: DERMATOLOGY | Facility: CLINIC | Age: 84
End: 2024-03-05
Payer: MEDICARE

## 2024-03-05 DIAGNOSIS — I87.2 STASIS DERMATITIS OF BOTH LEGS: Primary | ICD-10-CM

## 2024-03-05 PROCEDURE — 99213 OFFICE O/P EST LOW 20 MIN: CPT | Performed by: DERMATOLOGY

## 2024-03-05 NOTE — PROGRESS NOTES
"[facility] Note     Patient Name: Primo Guy  Encounter Date: 3/5/24     Have you been cared for by a [facility] Dermatologist in the last 3 years and, if so, which description applies to you?    Yes.  I have been here within the last 3 years, and my medical history has NOT changed since that time.  I am MALE/not capable of bearing children.    REVIEW OF SYSTEMS:  Have you recently had or currently have any of the following? No changes in my recent health.   PAST MEDICAL HISTORY:  Have you personally ever had or currently have any of the following?  If \"YES,\" then please provide more detail. No changes in my medical history.   HISTORY OF IMMUNOSUPPRESSION: Do you have a history of any of the following:  Systemic Immunosuppression such as Diabetes, Biologic or Immunotherapy, Chemotherapy, Organ Transplantation, Bone Marrow Transplantation?  No     Answering \"YES\" requires the addition of the dotphrase \"IMMUNOSUPPRESSED\" as the first diagnosis of the patient's visit.   FAMILY HISTORY:  Any \"first degree relatives\" (parent, brother, sister, or child) with the following?    No changes in my family's known health.   PATIENT EXPERIENCE:    Do you want the Dermatologist to perform a COMPLETE skin exam today including a clinical examination under the \"bra and underwear\" areas?  Yes  If necessary, do we have your permission to call and leave a detailed message on your Preferred Phone number that includes your specific medical information?  Yes      No Known Allergies   Current Outpatient Medications:   •  amLODIPine (NORVASC) 5 mg tablet, Take 1 tablet (5 mg total) by mouth daily at bedtime, Disp: 90 tablet, Rfl: 1  •  apixaban (Eliquis) 5 mg, Take 1 tablet (5 mg total) by mouth 2 (two) times a day, Disp: 180 tablet, Rfl: 1  •  atenolol (TENORMIN) 50 mg tablet, Take 1 tablet (50 mg total) by mouth 2 (two) times a day, Disp: 180 tablet, Rfl: 1  •  fluticasone (FLONASE) 50 mcg/act nasal spray, Spray " 2 squirts in each nostril once daily, Disp: 16 g, Rfl: 3  •  hydrochlorothiazide (HYDRODIURIL) 25 mg tablet, Take 1 tablet (25 mg total) by mouth daily, Disp: 90 tablet, Rfl: 3  •  hydrocortisone 2.5 % cream, Apply topically in the morning, Disp: 56 g, Rfl: 3  •  Multiple Vitamin (multivitamin) tablet, Take 1 tablet by mouth daily, Disp: , Rfl:   •  omeprazole (PriLOSEC) 20 mg delayed release capsule, Take 1 capsule (20 mg total) by mouth daily, Disp: 90 capsule, Rfl: 1  •  simvastatin (ZOCOR) 40 mg tablet, Take 1 tablet (40 mg total) by mouth daily at bedtime, Disp: 90 tablet, Rfl: 1  •  triamcinolone (KENALOG) 0.1 % ointment, APPLY TOPICALLY DAILY FOR 2 WEEKS, Disp: 454 g, Rfl: 0  •  Vitamin D, Cholecalciferol, 1000 UNITS CAPS, Take by mouth 2,000 per day, Disp: , Rfl:   •  Hydrocod Rolando-Chlorphe Rolando ER (TUSSIONEX) 10-8 mg/5 mL ER suspension, Take 5 mL by mouth every 12 (twelve) hours as needed for cough Max Daily Amount: 10 mL (Patient not taking: Reported on 12/14/2023), Disp: 115 mL, Rfl: 0  •  Multiple Vitamins-Minerals (PRESERVISION AREDS PO), Take 1 tablet by mouth 2 (two) times a day (Patient not taking: Reported on 12/14/2023), Disp: , Rfl:   •  neomycin-polymyxin-hydrocortisone (CORTISPORIN) 1 % SOLN, 2 DROPS INTO THE AFFECTED EAR TWICE A DAY AS DIRECTED (Patient not taking: Reported on 1/30/2024), Disp: , Rfl:           Whom besides the patient is providing clinical information about today's encounter?   NO ADDITIONAL HISTORIAN (patient alone provided history)    Physical Exam and Assessment/Plan by Diagnosis:    DERMATOFIBROMA    Physical Exam:  Anatomic Location Affected:  Left Knee  Morphological Description:  firm dermal nodule 0.6 cm  Pertinent Positives:  Pertinent Negatives:    Additional History of Present Condition:  Present for a number of years    Assessment and Plan:  Based on a thorough discussion of this condition and the management approach to it (including a comprehensive discussion of  "the known risks, side effects and potential benefits of treatment), the patient (family) agrees to implement the following specific plan:  Assured Benign    Assessment and Plan:  A dermatofibroma is a common benign fibrous nodule that most often arises on the skin of the lower legs.  A dermatofibroma is also called a \"cutaneous fibrous histiocytoma.\"  Dermatofibromas occur at all ages and in people of every ethnicity. They are more common in women than in men.    It is not clear if dermatofibroma is a reactive process or if it is a neoplasm. The lesions are made up of proliferating fibroblasts. Histiocytes may also be involved.  They are sometimes attributed to an insect bite or ingrownhair or local trauma, but not consistently. They may be more numerous in patients with altered immunity.    Dermatofibromas most often occur on the legs and arms, but may also arise on the trunk or any site of the body.  Typical clinical features include the following:  People may have 1 or up to 15 lesions.  Size varies from 0.5-1.5 cm diameter; most lesions are 7-10 mm diameter.  They are firm nodules tethered to the skin surface and mobile over subcutaneous tissue.  The skin \"dimples\" on pinching the lesion.  Color may be pink to light brown in white skin, and dark brown to black in dark skin; some appear paler in the center.  They do not usually cause symptoms, but they are sometimes painful or itchy.  Because they are often raised lesions, they may be traumatized, for example by a razor.  Occasionally dozens may erupt within a few months, usually in the setting of immunosuppression (for example autoimmune disease, cancer or certain medications).  Dermatofibroma does not give rise to cancer. However, occasionally, it may be mistaken for dermatofibrosarcoma or desmoplastic melanoma.    A dermatofibroma is harmless and seldom causes any symptoms. Usually, only reassurance is needed. If it is nuisance or causing concern, the lesion " "can be removed surgically, resulting in a scar that is, by definition, usually longer in diameter than the widest portion of the dermatofibroma.  Cryotherapy, shave biopsy and laser surgery are rarely completely successful.  Skin punch biopsy or incisional biopsy may be undertaken if there is an atypical feature such as recent enlargement, ulceration, or asymmetrical structures and colours on dermatoscopy.    STATIS DERMATITIS (\"VENUS ECZEMA\")    Physical Exam:  Anatomic Location Affected:  Bilateral lower legs  Morphological Description:  Indurated hyperpigmentation and sclerosus anterior shin right greater than left   Pertinent Positives:  Pertinent Negatives:    Additional History of Present Condition:  Present for a number of years    Assessment and Plan:  Based on a thorough discussion of this condition and the management approach to it (including a comprehensive discussion of the known risks, side effects and potential benefits of treatment), the patient (family) agrees to implement the following specific plan:  Apply Vaseline 2-3 times a day   Recommended to use moderate support stockinets  I discussed that support stockings are inmportant to prevent ulceration and progression of stasis changes.                          ConitnueTriamcinolone 0.1% ointment once daily for 2 weeks for flares. Discussed the Triamcinolone can thin your skin.     What is venous eczema?  Venous eczema is a common form of eczema/dermatitis that affects one or both lower legs in association with venous insufficiency. It is also called gravitational dermatitis.    Who gets venous eczema?  Venous eczema is most often seen in middle-aged and elderly patients -- it is reported to affect 20% of those over 70 years. It is associated with:  History of deep venous thrombosis in an affected limb  History of cellulitis in an affected limb  Chronic swelling of the lower leg, aggravated by hot weather and prolonged standing  Varicose " veins  Venous leg ulcers.    What causes venous eczema?  Venous eczema appears to be due to fluid collecting in the tissues and activation of the innate immune response.    Normally during walking the leg muscles pump blood upwards and valves in the veins prevent pooling. A clot in the deep leg veins (deep venous thrombosis or DVT) or varicose veins may damage the valves. As a result back pressure develops and fluid collects in the tissues. An inflammatory reaction occurs.    What are the clinical features of venous eczema?  Venous eczema can form discrete patches or become confluent and circumferential. Features include:  Itchy red, blistered and crusted plaques; or dry fissured and scaly plaques on one or both lower legs  Orange-brown macular pigmentation due to haemosiderin deposition  Atrophie laura (white irregular scars surrounded by red spots)  'Champagne bottle' shape of the lower leg -- narrowing at the ankles and induration (lipodermatosclerosis)    What are the complications of venous eczema?  Impetiginisation -- secondary infection with Staphylococcus aureus resulting in yellowish crusts  Cellulitis -- infection with Streptococcus pyogenes: there may be redness, swelling, pain, fever, a red streak up the leg and swollen nodes in the groin  Secondary eczema -- the eczema spreads to other areas on the body  Contact allergy to one or more components of the ointments or creams used    How is venous eczema diagnosed?  The diagnosis of venous eczema is clinical.  Patch tests may be undertaken if there is suspicion of contact allergy.    What is the treatment for venous eczema?  Reduce swelling in the leg  Don't stand for long periods.  Take regular walks.  Elevate your feet when sitting: if your legs are swollen they need to be above your hips to drain effectively.  Elevate the foot of your bed overnight.  During the acute phase of eczema, bandaging is important to reduce swelling.  When eczema has settled,  wear graduated compression socks or stockings long term. Fitted moderate to high compression socks can be obtained from a surgical supplies company. Light compression using travel socks may be adequate, and these are easy to put on. They can be bought at pharmacies, travel and sports stores. More compression is obtained by wearing two pairs.  Horse chestnut extract appears to be of benefit for at least some patients with venous disease.    Treat the eczema  Dry up oozing patches with Condy's solution (potassium permanganate) or dilute vinegar on gauze as compresses.  Oral antibiotics such as flucloxacillin may be prescribed for a secondary infection.  Apply a prescribed topical steroid: start with a potent steroid cream applied accurately daily to the patches until they have flattened out. After a few days, change to a milder steroid cream (eg. hydrocortisone) until the itchy patches have resolved (maintenance treatment). Check with your doctor if you are using steroid creams for more than a few weeks. Overuse can thin the skin, but short courses of stronger preparations can be used from time to time if necessary to control dermatitis. Coal tar ointment may also help.  Use a moisturizing cream frequently to keep the skin on the legs smooth and soft. If the skin is very scaly, urea cream may be especially effective.  Protect your skin from injury: this can result in infection or ulceration that may be difficult to heal.    Treatment for varicose veins  Seek the opinion of a vascular surgeon regarding varicose veins  These can be treated surgically or sclerotherapy.  Varicose veins may develop again after an apparently successful operation because venous disease is progressive.    How can venous eczema be prevented?  Venous eczema cannot be completely prevented but the number and severity of flare-ups can be reduced by the following measures.  Avoid prolonged standing or sitting with legs down  Wear compression socks  "or stockings  Avoid and treat leg swelling  Apply emollients frequently and regularly to dry skin  Avoid soap; use water alone or non-soap cleansers when bathing    What is the outlook for venous eczema?  Venous eczema tends to be a recurring or chronic disorder lifelong. Treat recurrence promptly with topical steroids.      CHERRY ANGIOMAS     Physical Exam:  Anatomic Location Affected:  Trunk and extremities  Morphological Description:  Scattered cherry red papules  Denies pain, itch, bleeding. No treatments tried. Present for years. Present constantly; no modifying factors which make it worse or better.     Assessment and Plan:  Based on a thorough discussion of this condition and the management approach to it (including a comprehensive discussion of the known risks, side effects and potential benefits of treatment), the patient (family) agrees to implement the following specific plan:  Reassure benign        SEBORRHEIC KERATOSIS; NON-INFLAMED     Physical Exam:  Anatomic Location Affected:  Trunk and extremities  Morphological Description:  Waxy, smooth to warty textured, yellow to brownish-grey to dark brown to blackish, discrete, \"stuck-on\" appearing papules.  Present for years. Denies pain, itch, bleeding.      Additional History of Present Condition:  Present constantly; no modifying factors which make it worse or better. No prior treatment.       Assessment and Plan:  Based on a thorough discussion of this condition and the management approach to it (including a comprehensive discussion of the known risks, side effects and potential benefits of treatment), the patient (family) agrees to implement the following specific plan:  Reassure benign  Use sun protection.  Apply SPF 30 or higher at least three times a day.  Wear sun protecting clothing and hats.        SOLAR LENTIGINES   OTHER SKIN CHANGES DUE TO CHRONIC EXPOSURE TO NONIONIZING RADIATION     Physical Exam:  Anatomic Location Affected:  Sun exposed " "areas of back, chest, arms, legs  Morphological Description:  Multiple scattered brown to tan evenly pigmented macules   Denies pain, itch, bleeding. No treatments tried. Present for months - years. Reports getting newer lesions with sun exposure.         Assessment and Plan:  Based on a thorough discussion of this condition and the management approach to it (including a comprehensive discussion of the known risks, side effects and potential benefits of treatment), the patient (family) agrees to implement the following specific plan:  Reassure benign  Use sun protection.  Apply SPF 30 or higher at least three times a day.  Wear sun protecting clothing and hats.         MULTIPLE MELANOCYTIC NEVI (\"Moles\")     Physical Exam:  Anatomic Location Affected: Trunk and extremities  Morphological Description:  Scattered, round to ovoid, symmetrical-appearing, even bordered, skin colored to dark brown macules/papules  Denies pain, itch, bleeding. No treatments tried. Present for years. Present constantly; no modifying factors which make it worse or better. Denies actively changing or growing moles.      Assessment and Plan:  Based on a thorough discussion of this condition and the management approach to it (including a comprehensive discussion of the known risks, side effects and potential benefits of treatment), the patient (family) agrees to implement the following specific plan:  Reassure benign  Monitor for changes  Use sun protection.  Apply SPF 30 or higher at least three times a day.  Wear sun protecting clothing and hats.       Worrisome signs of skin malignancy discussed, questions answered. Regular self-skin check discussed. Advised to call or return to office if patient notices any spots of concern, rapidly growing/changing lesions, bleeding lesions, non-healing lesions. Advised regular SPF use.     Scribe Attestation    I,:  Bertin Harrington am acting as a scribe while in the presence of the attending physician.: "       I,:  Darryl Richardson MD personally performed the services described in this documentation    as scribed in my presence.:

## 2024-03-07 ENCOUNTER — PROCEDURE VISIT (OUTPATIENT)
Age: 84
End: 2024-03-07
Payer: MEDICARE

## 2024-03-07 VITALS
WEIGHT: 234 LBS | HEART RATE: 70 BPM | SYSTOLIC BLOOD PRESSURE: 142 MMHG | DIASTOLIC BLOOD PRESSURE: 63 MMHG | HEIGHT: 69 IN | BODY MASS INDEX: 34.66 KG/M2

## 2024-03-07 DIAGNOSIS — M99.03 SEGMENTAL DYSFUNCTION OF LUMBAR REGION: ICD-10-CM

## 2024-03-07 DIAGNOSIS — M79.18 MYOFASCIAL PAIN: ICD-10-CM

## 2024-03-07 DIAGNOSIS — M54.50 ACUTE RIGHT-SIDED LOW BACK PAIN WITHOUT SCIATICA: ICD-10-CM

## 2024-03-07 DIAGNOSIS — I10 BENIGN ESSENTIAL HYPERTENSION: Primary | Chronic | ICD-10-CM

## 2024-03-07 DIAGNOSIS — M54.16 LUMBAR RADICULOPATHY: ICD-10-CM

## 2024-03-07 DIAGNOSIS — M99.05 SEGMENTAL DYSFUNCTION OF PELVIC REGION: Primary | ICD-10-CM

## 2024-03-07 DIAGNOSIS — R60.0 BILATERAL EDEMA OF LOWER EXTREMITY: ICD-10-CM

## 2024-03-07 DIAGNOSIS — M99.04 SEGMENTAL DYSFUNCTION OF SACRAL REGION: ICD-10-CM

## 2024-03-07 PROCEDURE — 98941 CHIROPRACT MANJ 3-4 REGIONS: CPT | Performed by: CHIROPRACTOR

## 2024-03-07 RX ORDER — LOSARTAN POTASSIUM 50 MG/1
50 TABLET ORAL DAILY
Qty: 90 TABLET | Refills: 3 | Status: SHIPPED | OUTPATIENT
Start: 2024-03-07

## 2024-03-07 RX ORDER — HYDROCHLOROTHIAZIDE 25 MG/1
25 TABLET ORAL DAILY
Qty: 90 TABLET | Refills: 3 | Status: SHIPPED | OUTPATIENT
Start: 2024-03-07

## 2024-03-10 NOTE — PROGRESS NOTES
Date of first visit: 4/25/2023 9      HPI:  Primo presents for treatment today.  He localizes right-sided low back pain with occasional radiation right posterior thigh but for the most part axial in nature.      VPAS  1    Denies any bowel bladder changes reports no weakness denies any numbness or tingling lower extremity.      The following portions of the patient's history were reviewed and updated as appropriate: allergies, current medications, past family history, past medical history, past social history, past surgical history, and problem list.    Review of Systems    Physical Exam:  Exam reveals right-sided erector spinae hypertonicity noted with some spasm actually up into the lower thoracic region.  Lumbar range of motion is reduced on extension left lateral bending left rotation full forward flexion.  Pelvic obliquity is present with an elevated right versus left innominate there is a leg length inequality noted.  Biomechanically joint dysfunction of the right sacroiliac joint involvement L4-L5 L5-S1 T12-L1.  Stable neurologically lower extremity.    Assessment:   Diagnosis ICD-10-CM Associated Orders   1. Segmental dysfunction of pelvic region  M99.05       2. Lumbar radiculopathy  M54.16       3. Segmental dysfunction of sacral region  M99.04       4. Segmental dysfunction of lumbar region  M99.03       5. Myofascial pain  M79.18       6. Acute right-sided low back pain without sciatica  M54.50                 Treatment: 71090  Manipulation to the right innominate, sacrum, L5 via Piper drop maneuver       Discussion:  Continue his daily stretching I will see him back for follow-up in 3 weeks.  Advised icing 15 minutes daily basis flexion-based stretching.

## 2024-04-09 ENCOUNTER — PROCEDURE VISIT (OUTPATIENT)
Age: 84
End: 2024-04-09
Payer: MEDICARE

## 2024-04-09 VITALS
BODY MASS INDEX: 34.66 KG/M2 | WEIGHT: 234 LBS | SYSTOLIC BLOOD PRESSURE: 132 MMHG | HEART RATE: 79 BPM | DIASTOLIC BLOOD PRESSURE: 72 MMHG | HEIGHT: 69 IN

## 2024-04-09 DIAGNOSIS — M99.03 SEGMENTAL DYSFUNCTION OF LUMBAR REGION: ICD-10-CM

## 2024-04-09 DIAGNOSIS — M54.16 LUMBAR RADICULOPATHY: ICD-10-CM

## 2024-04-09 DIAGNOSIS — M54.50 ACUTE RIGHT-SIDED LOW BACK PAIN WITHOUT SCIATICA: ICD-10-CM

## 2024-04-09 DIAGNOSIS — M99.05 SEGMENTAL DYSFUNCTION OF PELVIC REGION: Primary | ICD-10-CM

## 2024-04-09 DIAGNOSIS — M79.18 MYOFASCIAL PAIN: ICD-10-CM

## 2024-04-09 DIAGNOSIS — M99.04 SEGMENTAL DYSFUNCTION OF SACRAL REGION: ICD-10-CM

## 2024-04-09 PROCEDURE — 98941 CHIROPRACT MANJ 3-4 REGIONS: CPT | Performed by: CHIROPRACTOR

## 2024-04-15 NOTE — PROGRESS NOTES
Date of first visit: 4/25/2023 9      HPI:  Primo presents for treatment today.  He localizes right-sided low back pain with occasional radiation right posterior thigh but for the most part axial in nature.      VPAS  2    Denies any bowel bladder changes reports no weakness denies any numbness or tingling lower extremity.      The following portions of the patient's history were reviewed and updated as appropriate: allergies, current medications, past family history, past medical history, past social history, past surgical history, and problem list.    Review of Systems    Physical Exam:  Exam reveals right-sided erector spinae hypertonicity noted with some spasm actually up into the lower thoracic region.  Lumbar range of motion is reduced on extension left lateral bending left rotation full forward flexion.  Pelvic obliquity is present with an elevated right versus left innominate there is a leg length inequality noted.  Biomechanically joint dysfunction of the right sacroiliac joint involvement L4-L5 L5-S1 T12-L1.  Stable neurologically lower extremity.    Assessment:   Diagnosis ICD-10-CM Associated Orders   1. Segmental dysfunction of pelvic region  M99.05       2. Lumbar radiculopathy  M54.16       3. Segmental dysfunction of sacral region  M99.04       4. Segmental dysfunction of lumbar region  M99.03       5. Myofascial pain  M79.18       6. Acute right-sided low back pain without sciatica  M54.50                 Treatment: 83450  Manipulation to the right innominate, sacrum, L5 via Piper drop maneuver       Discussion:  Continue his daily stretching I will see him back for follow-up in 3 weeks.  Advised icing 15 minutes daily basis flexion-based stretching.

## 2024-05-02 ENCOUNTER — TELEPHONE (OUTPATIENT)
Age: 84
End: 2024-05-02

## 2024-05-02 NOTE — TELEPHONE ENCOUNTER
Caller: Patient     Doctor: Vu Devine MD     Reason for call: PT requesting Eliquis samples     Call back#: [phone]

## 2024-05-06 ENCOUNTER — TELEPHONE (OUTPATIENT)
Dept: CARDIOLOGY CLINIC | Facility: CLINIC | Age: 84
End: 2024-05-06

## 2024-05-06 NOTE — TELEPHONE ENCOUNTER
05/06/24 lvm on ans mach that we are cxing his 10/16 appt due to   Dr Devine unavailable at 3:40pm and to please call to [email or web address]

## 2024-05-14 ENCOUNTER — PROCEDURE VISIT (OUTPATIENT)
Age: 84
End: 2024-05-14
Payer: MEDICARE

## 2024-05-14 VITALS
BODY MASS INDEX: 34.66 KG/M2 | HEART RATE: 78 BPM | HEIGHT: 69 IN | WEIGHT: 234 LBS | SYSTOLIC BLOOD PRESSURE: 132 MMHG | DIASTOLIC BLOOD PRESSURE: 82 MMHG

## 2024-05-14 DIAGNOSIS — M54.50 ACUTE RIGHT-SIDED LOW BACK PAIN WITHOUT SCIATICA: ICD-10-CM

## 2024-05-14 DIAGNOSIS — M99.03 SEGMENTAL DYSFUNCTION OF LUMBAR REGION: ICD-10-CM

## 2024-05-14 DIAGNOSIS — M79.18 MYOFASCIAL PAIN: ICD-10-CM

## 2024-05-14 DIAGNOSIS — M54.16 LUMBAR RADICULOPATHY: ICD-10-CM

## 2024-05-14 DIAGNOSIS — M99.05 SEGMENTAL DYSFUNCTION OF PELVIC REGION: Primary | ICD-10-CM

## 2024-05-14 DIAGNOSIS — M99.04 SEGMENTAL DYSFUNCTION OF SACRAL REGION: ICD-10-CM

## 2024-05-14 PROCEDURE — 98941 CHIROPRACT MANJ 3-4 REGIONS: CPT | Performed by: CHIROPRACTOR

## 2024-05-14 NOTE — PROGRESS NOTES
Date of first visit: 4/25/2023       HPI:  Primo presents for treatment today.  He localizes right-sided low back pain with occasional radiation right posterior thigh but for the most part axial in nature.      VPAS  4    Denies any bowel bladder changes reports no weakness denies any numbness or tingling lower extremity.      The following portions of the patient's history were reviewed and updated as appropriate: allergies, current medications, past family history, past medical history, past social history, past surgical history, and problem list.    Review of Systems    Physical Exam:  Exam reveals right-sided erector spinae hypertonicity noted with some spasm actually up into the lower thoracic region.  Lumbar range of motion is reduced on extension left lateral bending left rotation full forward flexion.  Pelvic obliquity is present with an elevated right versus left innominate there is a leg length inequality noted.  Biomechanically joint dysfunction of the right sacroiliac joint involvement L4-L5 L5-S1 T12-L1.  Stable neurologically lower extremity.    Assessment:   Diagnosis ICD-10-CM Associated Orders   1. Segmental dysfunction of pelvic region  M99.05       2. Lumbar radiculopathy  M54.16       3. Segmental dysfunction of sacral region  M99.04       4. Segmental dysfunction of lumbar region  M99.03       5. Myofascial pain  M79.18       6. Acute right-sided low back pain without sciatica  M54.50                 Treatment: 29053  Manipulation to the right innominate, sacrum, L5 via Piper drop maneuver       Discussion:  Continue his daily stretching I will see him back for follow-up in 3 weeks.  Advised icing 15 minutes daily basis flexion-based stretching.

## 2024-06-18 ENCOUNTER — PROCEDURE VISIT (OUTPATIENT)
Age: 84
End: 2024-06-18
Payer: MEDICARE

## 2024-06-18 VITALS
HEART RATE: 61 BPM | DIASTOLIC BLOOD PRESSURE: 70 MMHG | WEIGHT: 234 LBS | HEIGHT: 69 IN | BODY MASS INDEX: 34.66 KG/M2 | SYSTOLIC BLOOD PRESSURE: 133 MMHG

## 2024-06-18 DIAGNOSIS — M79.18 MYOFASCIAL PAIN: ICD-10-CM

## 2024-06-18 DIAGNOSIS — M54.50 ACUTE RIGHT-SIDED LOW BACK PAIN WITHOUT SCIATICA: ICD-10-CM

## 2024-06-18 DIAGNOSIS — M99.05 SEGMENTAL DYSFUNCTION OF PELVIC REGION: Primary | ICD-10-CM

## 2024-06-18 DIAGNOSIS — M54.16 LUMBAR RADICULOPATHY: ICD-10-CM

## 2024-06-18 DIAGNOSIS — M99.03 SEGMENTAL DYSFUNCTION OF LUMBAR REGION: ICD-10-CM

## 2024-06-18 DIAGNOSIS — M99.04 SEGMENTAL DYSFUNCTION OF SACRAL REGION: ICD-10-CM

## 2024-06-18 PROCEDURE — 98941 CHIROPRACT MANJ 3-4 REGIONS: CPT | Performed by: CHIROPRACTOR

## 2024-06-18 NOTE — PROGRESS NOTES
Date of first visit: 4/25/2023       HPI:  Primo presents for treatment today.  He localizes right-sided low back pain with occasional radiation right posterior thigh but for the most part axial in nature.      VPAS  4    Denies any bowel bladder changes reports no weakness denies any numbness or tingling lower extremity.      The following portions of the patient's history were reviewed and updated as appropriate: allergies, current medications, past family history, past medical history, past social history, past surgical history, and problem list.    Review of Systems    Physical Exam:  Exam reveals right-sided erector spinae hypertonicity noted with some spasm actually up into the lower thoracic region.  Lumbar range of motion is reduced on extension left lateral bending left rotation full forward flexion.  Pelvic obliquity is present with an elevated right versus left innominate there is a leg length inequality noted.  Biomechanically joint dysfunction of the right sacroiliac joint involvement L4-L5 L5-S1 T12-L1.  Stable neurologically lower extremity.    Assessment:   Diagnosis ICD-10-CM Associated Orders   1. Segmental dysfunction of pelvic region  M99.05       2. Lumbar radiculopathy  M54.16       3. Segmental dysfunction of sacral region  M99.04       4. Segmental dysfunction of lumbar region  M99.03       5. Myofascial pain  M79.18       6. Acute right-sided low back pain without sciatica  M54.50                 Treatment: 02356  Manipulation to the right innominate, sacrum, L5 via Piper drop maneuver       Discussion:  Continue his daily stretching I will see him back for follow-up in 3 weeks.  Advised icing 15 minutes daily basis flexion-based stretching.

## 2024-07-01 ENCOUNTER — LAB (OUTPATIENT)
Dept: LAB | Facility: CLINIC | Age: 84
End: 2024-07-01
Payer: MEDICARE

## 2024-07-01 DIAGNOSIS — R73.03 PRE-DIABETES: Chronic | ICD-10-CM

## 2024-07-01 DIAGNOSIS — E78.49 OTHER HYPERLIPIDEMIA: Chronic | ICD-10-CM

## 2024-07-01 LAB
ALBUMIN SERPL BCG-MCNC: 4.3 G/DL (ref 3.5–5)
ALP SERPL-CCNC: 38 U/L (ref 34–104)
ALT SERPL W P-5'-P-CCNC: 32 U/L (ref 7–52)
ANION GAP SERPL CALCULATED.3IONS-SCNC: 8 MMOL/L (ref 4–13)
AST SERPL W P-5'-P-CCNC: 36 U/L (ref 13–39)
BILIRUB SERPL-MCNC: 0.71 MG/DL (ref 0.2–1)
BUN SERPL-MCNC: 20 MG/DL (ref 5–25)
CALCIUM SERPL-MCNC: 9.3 MG/DL (ref 8.4–10.2)
CHLORIDE SERPL-SCNC: 100 MMOL/L (ref 96–108)
CHOLEST SERPL-MCNC: 166 MG/DL
CO2 SERPL-SCNC: 29 MMOL/L (ref 21–32)
CREAT SERPL-MCNC: 0.99 MG/DL (ref 0.6–1.3)
EST. AVERAGE GLUCOSE BLD GHB EST-MCNC: 123 MG/DL
GFR SERPL CREATININE-BSD FRML MDRD: 71 ML/MIN/1.73SQ M
GLUCOSE P FAST SERPL-MCNC: 122 MG/DL (ref 65–99)
HBA1C MFR BLD: 5.9 %
HDLC SERPL-MCNC: 43 MG/DL
LDLC SERPL CALC-MCNC: 86 MG/DL (ref 0–100)
NONHDLC SERPL-MCNC: 123 MG/DL
POTASSIUM SERPL-SCNC: 4.1 MMOL/L (ref 3.5–5.3)
PROT SERPL-MCNC: 6.8 G/DL (ref 6.4–8.4)
SODIUM SERPL-SCNC: 137 MMOL/L (ref 135–147)
TRIGL SERPL-MCNC: 186 MG/DL

## 2024-07-01 PROCEDURE — 80053 COMPREHEN METABOLIC PANEL: CPT

## 2024-07-01 PROCEDURE — 83036 HEMOGLOBIN GLYCOSYLATED A1C: CPT

## 2024-07-01 PROCEDURE — 36415 COLL VENOUS BLD VENIPUNCTURE: CPT

## 2024-07-01 PROCEDURE — 80061 LIPID PANEL: CPT

## 2024-07-01 NOTE — RESULT ENCOUNTER NOTE
Metabolic profile okay, sugar and A1c in the prediabetic range, will review in more detail at upcoming appt.

## 2024-07-16 ENCOUNTER — PROCEDURE VISIT (OUTPATIENT)
Age: 84
End: 2024-07-16
Payer: MEDICARE

## 2024-07-16 VITALS
DIASTOLIC BLOOD PRESSURE: 76 MMHG | WEIGHT: 234 LBS | SYSTOLIC BLOOD PRESSURE: 146 MMHG | HEIGHT: 69 IN | BODY MASS INDEX: 34.66 KG/M2 | HEART RATE: 81 BPM

## 2024-07-16 DIAGNOSIS — M99.03 SEGMENTAL DYSFUNCTION OF LUMBAR REGION: ICD-10-CM

## 2024-07-16 DIAGNOSIS — M54.16 LUMBAR RADICULOPATHY: ICD-10-CM

## 2024-07-16 DIAGNOSIS — M99.04 SEGMENTAL DYSFUNCTION OF SACRAL REGION: ICD-10-CM

## 2024-07-16 DIAGNOSIS — M54.50 ACUTE RIGHT-SIDED LOW BACK PAIN WITHOUT SCIATICA: ICD-10-CM

## 2024-07-16 DIAGNOSIS — M79.18 MYOFASCIAL PAIN: ICD-10-CM

## 2024-07-16 DIAGNOSIS — M99.05 SEGMENTAL DYSFUNCTION OF PELVIC REGION: Primary | ICD-10-CM

## 2024-07-16 PROCEDURE — 98941 CHIROPRACT MANJ 3-4 REGIONS: CPT | Performed by: CHIROPRACTOR

## 2024-07-18 ENCOUNTER — OFFICE VISIT (OUTPATIENT)
Dept: DERMATOLOGY | Facility: CLINIC | Age: 84
End: 2024-07-18

## 2024-07-18 VITALS — TEMPERATURE: 97.7 F | HEIGHT: 69 IN | BODY MASS INDEX: 34.66 KG/M2 | WEIGHT: 234 LBS

## 2024-07-18 DIAGNOSIS — L72.0 MILIUM: Primary | ICD-10-CM

## 2024-07-18 DIAGNOSIS — L82.1 SEBORRHEIC KERATOSES: ICD-10-CM

## 2024-07-18 DIAGNOSIS — Z41.1 ENCOUNTER FOR COSMETIC PROCEDURE: ICD-10-CM

## 2024-07-18 PROCEDURE — LESIONRML10 LESION REMOVAL FIRST 10

## 2024-07-18 NOTE — PROGRESS NOTES
"[facility] D[facility]atology Clinic Note     Patient Name: Primo Guy  Encounter Date: 07/18/2024     Have you been cared for by a [facility] Dermatologist in the last 3 years and, if so, which description applies to you?    Yes.  I have been here within the last 3 years, and my medical history has NOT changed since that time.  I am MALE/not capable of bearing children.    REVIEW OF SYSTEMS:  Have you recently had or currently have any of the following? No changes in my recent health.   PAST MEDICAL HISTORY:  Have you personally ever had or currently have any of the following?  If \"YES,\" then please provide more detail. No changes in my medical history.   HISTORY OF IMMUNOSUPPRESSION: Do you have a history of any of the following:  Systemic Immunosuppression such as Diabetes, Biologic or Immunotherapy, Chemotherapy, Organ Transplantation, Bone Marrow Transplantation?  No     Answering \"YES\" requires the addition of the dotphrase \"IMMUNOSUPPRESSED\" as the first diagnosis of the patient's visit.   FAMILY HISTORY:  Any \"first degree relatives\" (parent, brother, sister, or child) with the following?    No changes in my family's known health.   PATIENT EXPERIENCE:    Do you want the Dermatologist to perform a COMPLETE skin exam today including a clinical examination under the \"bra and underwear\" areas?  NO  If necessary, do we have your permission to call and leave a detailed message on your Preferred Phone number that includes your specific medical information?  Yes      No Known Allergies   Current Outpatient Medications:     amLODIPine (NORVASC) 5 mg tablet, Take 1 tablet (5 mg total) by mouth daily at bedtime, Disp: 90 tablet, Rfl: 1    apixaban (Eliquis) 5 mg, Take 1 tablet (5 mg total) by mouth 2 (two) times a day, Disp: 180 tablet, Rfl: 1    atenolol (TENORMIN) 50 mg tablet, Take 1 tablet (50 mg total) by mouth 2 (two) times a day, Disp: 180 tablet, Rfl: 1    fluticasone (FLONASE) 50 mcg/act nasal spray, Spray " 2 squirts in each nostril once daily, Disp: 16 g, Rfl: 3    hydroCHLOROthiazide 25 mg tablet, Take 1 tablet (25 mg total) by mouth daily, Disp: 90 tablet, Rfl: 3    hydrocortisone 2.5 % cream, Apply topically in the morning, Disp: 56 g, Rfl: 3    losartan (COZAAR) 50 mg tablet, Take 1 tablet (50 mg total) by mouth daily, Disp: 90 tablet, Rfl: 3    Multiple Vitamin (multivitamin) tablet, Take 1 tablet by mouth daily, Disp: , Rfl:     omeprazole (PriLOSEC) 20 mg delayed release capsule, Take 1 capsule (20 mg total) by mouth daily, Disp: 90 capsule, Rfl: 1    simvastatin (ZOCOR) 40 mg tablet, Take 1 tablet (40 mg total) by mouth daily at bedtime, Disp: 90 tablet, Rfl: 1    triamcinolone (KENALOG) 0.1 % ointment, APPLY TOPICALLY DAILY FOR 2 WEEKS, Disp: 454 g, Rfl: 0    Vitamin D, Cholecalciferol, 1000 UNITS CAPS, Take by mouth 2,000 per day, Disp: , Rfl:     Hydrocod Rolando-Chlorphe Rolando ER (TUSSIONEX) 10-8 mg/5 mL ER suspension, Take 5 mL by mouth every 12 (twelve) hours as needed for cough Max Daily Amount: 10 mL (Patient not taking: Reported on 12/14/2023), Disp: 115 mL, Rfl: 0    Multiple Vitamins-Minerals (PRESERVISION AREDS PO), Take 1 tablet by mouth 2 (two) times a day (Patient not taking: Reported on 12/14/2023), Disp: , Rfl:     neomycin-polymyxin-hydrocortisone (CORTISPORIN) 1 % SOLN, 2 DROPS INTO THE AFFECTED EAR TWICE A DAY AS DIRECTED (Patient not taking: Reported on 1/30/2024), Disp: , Rfl:           Whom besides the patient is providing clinical information about today's encounter?   NO ADDITIONAL HISTORIAN (patient alone provided history)    Physical Exam and Assessment/Plan by Diagnosis:    Cosmetic Note      PROCEDURE #1 Extraction of milia  After a thorough discussion of treatment options and risk/benefits/alternatives (including but not limited to local pain, scarring, dyspigmentation, persistance/recurrence of lesions), verbal and written consent were obtained and the aforementioned lesions were  treated by katelyn with 11 blade and extraction with comedone extractor    TOTAL NUMBER of  1 lesions were treated today on the ANATOMIC LOCATION: right nasolabial fold.              The patient tolerated the procedure well, and after-care instructions were provided.        COSMETIC, SEBORRHEIC KERATOSES    Physical Exam:  Anatomic Location Affected & Morphological Description:  stuck on tan to skin colored papules on the left temple    Additional History of Present Condition:  patient wants removal    Assessment and Plan:  Based on a thorough discussion of this condition and the management approach to it (including a comprehensive discussion of the known risks, side effects and potential benefits of treatment), the patient (family) agrees to implement the following specific plan:    Discussed destruction with electrodessication with light curettage or cryotherapy.   Discussed cosmetic charge: $150 charge for destruction of up to 10 lesions Pt agreeable to do procedure today. Consent signed, see procedure note below  To help smoothen those on body, recommended using over the counter creams containaining gentle acids. Including:   Skin Fix Renewing cream  Amlactin's line of lotions (lactic acid containing)   Cerave SA (salicylic acid containing) lotion or cream  KP Duty cream (see photo below)  Choose one of the above to start.                             PROCEDURE  NOTE    Destruction of Seborrheic Keratosis with Cryotherapy   After a thorough discussion of treatment options and risk/benefits/alternatives (including but not limited to local pain, scarring, dyspigmentation, blistering, and possible superinfection), verbal and written consent were obtained and the aforementioned lesions were treated on with cryotherapy using liquid nitrogen x 1 cycle for 5-10 seconds.     TOTAL NUMBER of 2 lesions were treated today on the left temple      The patient tolerated the procedure well, and after-care instructions were  provided.     This was cosmetic visit that patient paid out of pocket for: $150 charge for up to 10 lesions (HYFRLES)     DO NOT BILL INSURANCE     This was cosmetic visit that patient paid out of pocket for.    $150 charge for up to 10 lesions  Total cost: $150.00     DO NOT BILL INSURANCE        Scribe Attestation      I,:  Delmy Ferreira am acting as a scribe while in the presence of the attending physician.:       I,:  Chioma Jefferson PA-C personally performed the services described in this documentation    as scribed in my presence.:

## 2024-07-22 NOTE — PROGRESS NOTES
Date of first visit: 4/25/2023       HPI:  Primo presents for treatment today.  He localizes right-sided low back pain with occasional radiation right posterior thigh but for the most part axial in nature.      VPAS  1-2    Denies any bowel bladder changes reports no weakness denies any numbness or tingling lower extremity.      The following portions of the patient's history were reviewed and updated as appropriate: allergies, current medications, past family history, past medical history, past social history, past surgical history, and problem list.    Review of Systems    Physical Exam:  Exam reveals right-sided erector spinae hypertonicity noted with some spasm actually up into the lower thoracic region.  Lumbar range of motion is reduced on extension left lateral bending left rotation full forward flexion.  Pelvic obliquity is present with an elevated right versus left innominate there is a leg length inequality noted.  Biomechanically joint dysfunction of the right sacroiliac joint involvement L4-L5 L5-S1 T12-L1.  Stable neurologically lower extremity.    Assessment:   Diagnosis ICD-10-CM Associated Orders   1. Segmental dysfunction of pelvic region  M99.05       2. Lumbar radiculopathy  M54.16       3. Segmental dysfunction of sacral region  M99.04       4. Segmental dysfunction of lumbar region  M99.03       5. Myofascial pain  M79.18       6. Acute right-sided low back pain without sciatica  M54.50                 Treatment: 76513  Manipulation to the right innominate, sacrum, L5 via Piper drop maneuver       Discussion:  Continue his daily stretching I will see him back for follow-up in 3 weeks.  Advised icing 15 minutes daily basis flexion-based stretching.

## 2024-07-23 ENCOUNTER — RA CDI HCC (OUTPATIENT)
Dept: OTHER | Facility: HOSPITAL | Age: 84
End: 2024-07-23

## 2024-07-24 DIAGNOSIS — E78.2 MIXED HYPERLIPIDEMIA: ICD-10-CM

## 2024-07-24 DIAGNOSIS — R21 SKIN RASH: ICD-10-CM

## 2024-07-24 DIAGNOSIS — I48.91 ATRIAL FIBRILLATION, UNSPECIFIED TYPE (HCC): ICD-10-CM

## 2024-07-24 RX ORDER — AMLODIPINE BESYLATE 5 MG/1
5 TABLET ORAL
Qty: 90 TABLET | Refills: 3 | Status: SHIPPED | OUTPATIENT
Start: 2024-07-24

## 2024-07-24 RX ORDER — SIMVASTATIN 40 MG
40 TABLET ORAL
Qty: 90 TABLET | Refills: 3 | Status: SHIPPED | OUTPATIENT
Start: 2024-07-24

## 2024-07-25 ENCOUNTER — TELEPHONE (OUTPATIENT)
Age: 84
End: 2024-07-25

## 2024-07-25 NOTE — TELEPHONE ENCOUNTER
PA for Hydrocortsone 2.5% cream     Submitted via    []CMM-KEY   [x][employer]-Case ID # [id number]   []Faxed to plan   []Other website   []Phone call Case ID #     Office notes sent, clinical questions answered. Awaiting determination    Turnaround time for your insurance to make a decision on your Prior Authorization can take 7-21 business days.

## 2024-07-26 NOTE — TELEPHONE ENCOUNTER
PA for hydrocortisone cream 2.5 % Approved     Date(s) approved January 1, 2024 to December 31, 2024     Case #[id number]     Patient advised by          [] [employer] Message  [x] Phone call   [x]LMOM  []L/M to call office as no active Communication consent on file  []Unable to leave detailed message as VM not approved on Communication consent       Pharmacy advised by    [x]Fax  []Phone call    Approval letter scanned into Media Yes

## 2024-07-29 ENCOUNTER — OFFICE VISIT (OUTPATIENT)
Dept: INTERNAL MEDICINE CLINIC | Facility: CLINIC | Age: 84
End: 2024-07-29
Payer: MEDICARE

## 2024-07-29 VITALS
OXYGEN SATURATION: 97 % | HEIGHT: 69 IN | DIASTOLIC BLOOD PRESSURE: 72 MMHG | HEART RATE: 59 BPM | SYSTOLIC BLOOD PRESSURE: 130 MMHG | WEIGHT: 237 LBS | BODY MASS INDEX: 35.1 KG/M2

## 2024-07-29 DIAGNOSIS — R60.0 EDEMA OF EXTREMITIES: ICD-10-CM

## 2024-07-29 DIAGNOSIS — I10 BENIGN ESSENTIAL HYPERTENSION: Primary | Chronic | ICD-10-CM

## 2024-07-29 DIAGNOSIS — E55.9 VITAMIN D DEFICIENCY: ICD-10-CM

## 2024-07-29 DIAGNOSIS — E78.2 MIXED HYPERLIPIDEMIA: ICD-10-CM

## 2024-07-29 DIAGNOSIS — G47.33 OBSTRUCTIVE SLEEP APNEA: Chronic | ICD-10-CM

## 2024-07-29 DIAGNOSIS — R73.03 PRE-DIABETES: Chronic | ICD-10-CM

## 2024-07-29 DIAGNOSIS — I48.21 PERMANENT ATRIAL FIBRILLATION (HCC): Chronic | ICD-10-CM

## 2024-07-29 PROCEDURE — G2211 COMPLEX E/M VISIT ADD ON: HCPCS | Performed by: INTERNAL MEDICINE

## 2024-07-29 PROCEDURE — 99214 OFFICE O/P EST MOD 30 MIN: CPT | Performed by: INTERNAL MEDICINE

## 2024-07-29 NOTE — PATIENT INSTRUCTIONS
Problem List Items Addressed This Visit          Cardiovascular and Mediastinum    Atrial fibrillation (HCC) (Chronic)     No palpitations, no bleeding problems, continue meds, rate controlled         Benign essential hypertension - Primary (Chronic)     Blood pressure is well-controlled, continue current meds along with healthy diet and exercise            Respiratory    Obstructive sleep apnea (Chronic)     Continue CPAP            Other    Pre-diabetes (Chronic)     Continue with healthy diet and exercise         Relevant Orders    Hemoglobin A1C    Mixed hyperlipidemia     Continue simvastatin along with healthy diet         Relevant Orders    CBC and differential    Comprehensive metabolic panel    Lipid Panel with Direct LDL reflex    TSH, 3rd generation with Free T4 reflex    Edema of extremities     Cardiology started hydrochlorothiazide 25 mg daily, continue to stay active, watch excessive salt intake, can elevate 3 times a day for 30 minutes.  Can also use compression stockings          Other Visit Diagnoses       Vitamin D deficiency        Relevant Orders    Vitamin D 25 hydroxy

## 2024-07-29 NOTE — PROGRESS NOTES
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 Benign essential hypertension  Assessment & Plan:  Blood pressure is well-controlled, continue current meds along with healthy diet and exercise  2. Permanent atrial fibrillation (HCC)  Assessment & Plan:  No palpitations, no bleeding problems, continue meds, rate controlled  3. Mixed hyperlipidemia  Assessment & Plan:  Continue simvastatin along with healthy diet  Orders:  -     CBC and differential; Future  -     Comprehensive metabolic panel; Future  -     Lipid Panel with Direct LDL reflex; Future  -     TSH, 3rd generation with Free T4 reflex; Future  4. Pre-diabetes  Assessment & Plan:  Continue with healthy diet and exercise  Orders:  -     Hemoglobin A1C; Future  5. Edema of extremities  Assessment & Plan:  Cardiology started hydrochlorothiazide 25 mg daily, continue to stay active, watch excessive salt intake, can elevate 3 times a day for 30 minutes.  Can also use compression stockings  6. Obstructive sleep apnea  Assessment & Plan:  Continue CPAP  7. Vitamin D deficiency  -     Vitamin D 25 hydroxy; Future         History of Present Illness     Patient here for regular follow-up      Review of Systems   Constitutional:  Negative for chills, fatigue and fever.   HENT:  Negative for congestion, nosebleeds, postnasal drip, sore throat and trouble swallowing.    Eyes:  Negative for pain.   Respiratory:  Negative for cough, chest tightness, shortness of breath and wheezing.    Cardiovascular:  Negative for chest pain, palpitations and leg swelling.   Gastrointestinal:  Negative for abdominal pain, constipation, diarrhea, nausea and vomiting.   Endocrine: Negative for polydipsia and polyuria.   Genitourinary:  Negative for dysuria, flank pain and hematuria.   Musculoskeletal:   Negative for arthralgias.   Skin:  Negative for rash.   Neurological:  Negative for dizziness, tremors, light-headedness and headaches.   Hematological:  Does not bruise/bleed easily.   Psychiatric/Behavioral:  Negative for confusion and dysphoric mood. The patient is not nervous/anxious.      Past Medical History:   Diagnosis Date   • Atrial fibrillation (HCC)    • Cardiomyopathy (HCC)     LAST ASSESSED [id number]   • Cataract    • Dysphagia    • GERD (gastroesophageal reflux disease)    • Hyperlipidemia    • Hypertension    • Irregular heart beat     afib   • Rosacea    • Schatzki's ring    • Sleep apnea     no cpap     Past Surgical History:   Procedure Laterality Date   • BACK SURGERY     • CATARACT EXTRACTION     • COLONOSCOPY N/A 1/15/2016    Procedure: COLONOSCOPY;  Surgeon: Melly Kumar MD;  Location: [facility];  Service:    • ESOPHAGOGASTRODUODENOSCOPY     • EYE SURGERY      B/L cataract sx (2008,2009)   • PA CIRCUMCISION AGE >28 DAYS N/A 1/29/2018    Procedure: CIRCUMCISION ADULT;  Surgeon: Charles Hannah MD;  Location: [facility];  Service: Urology   • PA ESOPHAGOGASTRODUODENOSCOPY TRANSORAL DIAGNOSTIC N/A 2/15/2017    Procedure: ESOPHAGOGASTRODUODENOSCOPY (EGD) WITH DILATION;  Surgeon: Marbin Bullard MD;  Location: [facility];  Service: Gastroenterology     Family History   Problem Relation Age of Onset   • No Known Problems Mother    • Heart disease Father    • Coronary artery disease Father    • No Known Problems Sister    • No Known Problems Sister    • Heart disease Brother    • Heart disease Brother    • Coronary artery disease Family    • Hyperlipidemia Family    • Hypertension Family    • Other Family         SUDDEN CARDIAC DEATH      Social History     Tobacco Use   • Smoking status: Former     Types: Cigarettes   • Smokeless tobacco: Never   • Tobacco comments:     about 55 years ago,  unknown start date quit 1996   Vaping Use   • Vaping status: Never Used   Substance and Sexual Activity    • Alcohol use: Yes     Alcohol/week: 7.0 - 14.0 standard drinks of alcohol     Types: 7 - 14 Glasses of wine per week     Comment: 1-2 glasses of wine daily   • Drug use: No   • Sexual activity: Not Currently     Current Outpatient Medications on File Prior to Visit   Medication Sig   • amLODIPine (NORVASC) 5 mg tablet Take 1 tablet (5 mg total) by mouth daily at bedtime   • apixaban (Eliquis) 5 mg Take 1 tablet (5 mg total) by mouth 2 (two) times a day   • atenolol (TENORMIN) 50 mg tablet Take 1 tablet (50 mg total) by mouth 2 (two) times a day   • fluticasone (FLONASE) 50 mcg/act nasal spray Spray 2 squirts in each nostril once daily   • hydroCHLOROthiazide 25 mg tablet Take 1 tablet (25 mg total) by mouth daily   • hydrocortisone 2.5 % cream Apply topically in the morning   • losartan (COZAAR) 50 mg tablet Take 1 tablet (50 mg total) by mouth daily   • Multiple Vitamin (multivitamin) tablet Take 1 tablet by mouth daily   • omeprazole (PriLOSEC) 20 mg delayed release capsule Take 1 capsule (20 mg total) by mouth daily   • simvastatin (ZOCOR) 40 mg tablet Take 1 tablet (40 mg total) by mouth daily at bedtime   • triamcinolone (KENALOG) 0.1 % ointment APPLY TOPICALLY DAILY FOR 2 WEEKS   • Vitamin D, Cholecalciferol, 1000 UNITS CAPS Take by mouth 2,000 per day   • Hydrocod Rolando-Chlorphe Rolando ER (TUSSIONEX) 10-8 mg/5 mL ER suspension Take 5 mL by mouth every 12 (twelve) hours as needed for cough Max Daily Amount: 10 mL (Patient not taking: Reported on 12/14/2023)   • Multiple Vitamins-Minerals (PRESERVISION AREDS PO) Take 1 tablet by mouth 2 (two) times a day (Patient not taking: Reported on 12/14/2023)   • neomycin-polymyxin-hydrocortisone (CORTISPORIN) 1 % SOLN 2 DROPS INTO THE AFFECTED EAR TWICE A DAY AS DIRECTED (Patient not taking: Reported on 1/30/2024)     No Known Allergies  Immunization History   Administered Date(s) Administered   • COVID-19 PFIZER VACCINE 0.3 ML IM 01/12/2021, 02/05/2021, 11/20/2021   •  "COVID-19 Pfizer Vac BIVALENT Daniel-sucrose 12 Yr+ IM 09/22/2022   • COVID-19 Pfizer mRNA vacc PF daniel-sucrose 12 yr and older (Comirnaty) 10/06/2023   • H1N1, All Formulations 01/29/2010   • INFLUENZA 09/30/2014, 10/18/2015, 09/28/2016, 10/14/2018, 10/06/2023   • Influenza Quadrivalent Preservative Free 3 years and older IM 09/30/2014   • Influenza Split High Dose Preservative Free IM 10/18/2015, 09/28/2016   • Influenza, high dose seasonal 0.7 mL 09/17/2019, 10/05/2020, 11/15/2021, 10/20/2022   • Influenza, seasonal, injectable 10/24/2012   • Pneumococcal Conjugate 13-Valent 05/24/2016   • Pneumococcal Polysaccharide PPV23 07/17/2020   • Respiratory Syncytial Virus Vaccine (Recombinant, Adjuvanted) 10/27/2023   • Tdap 09/28/2016   • Zoster Vaccine Recombinant 06/18/2019, 08/19/2019     Objective     /72   Pulse 59   Ht 5' 9\" (1.753 m)   Wt 108 kg (237 lb)   SpO2 97%   BMI 35.00 kg/m²     Physical Exam  Vitals reviewed.   Constitutional:       General: He is not in acute distress.     Appearance: Normal appearance. He is well-developed.   HENT:      Head: Normocephalic and atraumatic.      Right Ear: External ear normal.      Left Ear: External ear normal.      Nose: Nose normal.   Eyes:      General: No scleral icterus.     Conjunctiva/sclera: Conjunctivae normal.   Neck:      Trachea: No tracheal deviation.   Cardiovascular:      Rate and Rhythm: Normal rate. Rhythm irregular.      Heart sounds: Normal heart sounds. No murmur heard.  Pulmonary:      Effort: Pulmonary effort is normal. No respiratory distress.      Breath sounds: Normal breath sounds. No wheezing or rales.   Abdominal:      General: Bowel sounds are normal.      Palpations: Abdomen is soft. There is no mass.      Tenderness: There is no abdominal tenderness. There is no guarding.   Musculoskeletal:      Cervical back: Normal range of motion and neck supple.      Right lower leg: Edema present.      Left lower leg: Edema present. "   Lymphadenopathy:      Cervical: No cervical adenopathy.   Skin:     Coloration: Skin is not jaundiced or pale.   Neurological:      Mental Status: He is alert and oriented to person, place, and time.   Psychiatric:         Mood and Affect: Mood normal.         Behavior: Behavior normal.         Thought Content: Thought content normal.         Judgment: Judgment normal.

## 2024-07-29 NOTE — ASSESSMENT & PLAN NOTE
Cardiology started hydrochlorothiazide 25 mg daily, continue to stay active, watch excessive salt intake, can elevate 3 times a day for 30 minutes.  Can also use compression stockings

## 2024-08-01 ENCOUNTER — TELEPHONE (OUTPATIENT)
Age: 84
End: 2024-08-01

## 2024-08-01 NOTE — TELEPHONE ENCOUNTER
Pt wife was calling inquiring about seeing if we have any samples for Eliquis. Please advise with the pt wife thank you.

## 2024-08-13 ENCOUNTER — PROCEDURE VISIT (OUTPATIENT)
Age: 84
End: 2024-08-13
Payer: MEDICARE

## 2024-08-13 VITALS
HEIGHT: 69 IN | SYSTOLIC BLOOD PRESSURE: 140 MMHG | BODY MASS INDEX: 35.1 KG/M2 | DIASTOLIC BLOOD PRESSURE: 68 MMHG | HEART RATE: 83 BPM | WEIGHT: 237 LBS

## 2024-08-13 DIAGNOSIS — M54.50 ACUTE RIGHT-SIDED LOW BACK PAIN WITHOUT SCIATICA: ICD-10-CM

## 2024-08-13 DIAGNOSIS — M99.03 SEGMENTAL DYSFUNCTION OF LUMBAR REGION: ICD-10-CM

## 2024-08-13 DIAGNOSIS — M99.05 SEGMENTAL DYSFUNCTION OF PELVIC REGION: Primary | ICD-10-CM

## 2024-08-13 DIAGNOSIS — M54.16 LUMBAR RADICULOPATHY: ICD-10-CM

## 2024-08-13 DIAGNOSIS — M99.04 SEGMENTAL DYSFUNCTION OF SACRAL REGION: ICD-10-CM

## 2024-08-13 DIAGNOSIS — M79.18 MYOFASCIAL PAIN: ICD-10-CM

## 2024-08-13 DIAGNOSIS — K21.9 GASTROESOPHAGEAL REFLUX DISEASE: ICD-10-CM

## 2024-08-13 DIAGNOSIS — I10 ESSENTIAL HYPERTENSION: ICD-10-CM

## 2024-08-13 PROCEDURE — 98941 CHIROPRACT MANJ 3-4 REGIONS: CPT | Performed by: CHIROPRACTOR

## 2024-08-13 NOTE — TELEPHONE ENCOUNTER
Pt called to ask if his mail order pharmacy had sent in a request for refills for his atenolol and omeprazole.  Nothing has been documented in his chart yet.  Pt needs refills:    Requested Prescriptions     Pending Prescriptions Disp Refills    omeprazole (PriLOSEC) 20 mg delayed release capsule 90 capsule 1     Sig: Take 1 capsule (20 mg total) by mouth daily    atenolol (TENORMIN) 50 mg tablet 180 tablet 1     Sig: Take 1 tablet (50 mg total) by mouth 2 (two) times a day     Please send to [facility] Mail order pharmacy

## 2024-08-14 RX ORDER — ATENOLOL 50 MG/1
50 TABLET ORAL 2 TIMES DAILY
Qty: 180 TABLET | Refills: 3 | Status: SHIPPED | OUTPATIENT
Start: 2024-08-14

## 2024-08-14 NOTE — TELEPHONE ENCOUNTER
Refill must be reviewed and completed by the office or provider. The refill is unable to be approved or denied by the medication management team.      Last ordered by another provider - Please review to see if the refill is appropriate.

## 2024-08-15 NOTE — PROGRESS NOTES
Date of first visit: 4/25/2023       HPI:  Primo presents for treatment today.  He localizes right-sided low back pain with occasional radiation right posterior thigh but for the most part axial in nature.        Denies any bowel bladder changes reports no weakness denies any numbness or tingling lower extremity.      The following portions of the patient's history were reviewed and updated as appropriate: allergies, current medications, past family history, past medical history, past social history, past surgical history, and problem list.    Review of Systems    Physical Exam:  Exam reveals right-sided erector spinae hypertonicity noted with some spasm actually up into the lower thoracic region.  Lumbar range of motion is reduced on extension left lateral bending left rotation full forward flexion.  Pelvic obliquity is present with an elevated right versus left innominate there is a leg length inequality noted.  Biomechanically joint dysfunction of the right sacroiliac joint involvement L4-L5 L5-S1 T12-L1.  Stable neurologically lower extremity.    Assessment:   Diagnosis ICD-10-CM Associated Orders   1. Segmental dysfunction of pelvic region  M99.05       2. Lumbar radiculopathy  M54.16       3. Segmental dysfunction of sacral region  M99.04       4. Segmental dysfunction of lumbar region  M99.03       5. Myofascial pain  M79.18       6. Acute right-sided low back pain without sciatica  M54.50                 Treatment: 14311  Manipulation to the right innominate, sacrum, L5 via Piper drop maneuver       Discussion:  Continue his daily stretching I will see him back for follow-up in 2 weeks.  Advised icing 15 minutes daily basis flexion-based stretching.

## 2024-08-29 ENCOUNTER — PROCEDURE VISIT (OUTPATIENT)
Age: 84
End: 2024-08-29
Payer: MEDICARE

## 2024-08-29 VITALS
HEART RATE: 82 BPM | BODY MASS INDEX: 35.1 KG/M2 | SYSTOLIC BLOOD PRESSURE: 130 MMHG | HEIGHT: 69 IN | DIASTOLIC BLOOD PRESSURE: 81 MMHG | WEIGHT: 237 LBS

## 2024-08-29 DIAGNOSIS — M99.05 SEGMENTAL DYSFUNCTION OF PELVIC REGION: Primary | ICD-10-CM

## 2024-08-29 DIAGNOSIS — M99.03 SEGMENTAL DYSFUNCTION OF LUMBAR REGION: ICD-10-CM

## 2024-08-29 DIAGNOSIS — M54.50 ACUTE RIGHT-SIDED LOW BACK PAIN WITHOUT SCIATICA: ICD-10-CM

## 2024-08-29 DIAGNOSIS — M99.04 SEGMENTAL DYSFUNCTION OF SACRAL REGION: ICD-10-CM

## 2024-08-29 DIAGNOSIS — M79.18 MYOFASCIAL PAIN: ICD-10-CM

## 2024-08-29 DIAGNOSIS — M54.16 LUMBAR RADICULOPATHY: ICD-10-CM

## 2024-08-29 PROCEDURE — 98941 CHIROPRACT MANJ 3-4 REGIONS: CPT | Performed by: CHIROPRACTOR

## 2024-08-29 NOTE — PROGRESS NOTES
Date of first visit: 4/25/2023       HPI:  Primo presents for treatment today.  He localizes right-sided low back pain with occasional radiation right posterior thigh but for the most part axial in nature.        Denies any bowel bladder changes reports no weakness denies any numbness or tingling lower extremity.      The following portions of the patient's history were reviewed and updated as appropriate: allergies, current medications, past family history, past medical history, past social history, past surgical history, and problem list.    Review of Systems    Physical Exam:  Exam reveals right-sided erector spinae hypertonicity noted with some spasm actually up into the lower thoracic region.  Lumbar range of motion is reduced on extension left lateral bending left rotation full forward flexion.  Pelvic obliquity is present with an elevated right versus left innominate there is a leg length inequality noted.  Biomechanically joint dysfunction of the right sacroiliac joint involvement L4-L5 L5-S1 T12-L1.  Stable neurologically lower extremity.    Assessment:   Diagnosis ICD-10-CM Associated Orders   1. Segmental dysfunction of pelvic region  M99.05       2. Lumbar radiculopathy  M54.16       3. Segmental dysfunction of sacral region  M99.04       4. Segmental dysfunction of lumbar region  M99.03       5. Myofascial pain  M79.18       6. Acute right-sided low back pain without sciatica  M54.50                 Treatment: 52002  Manipulation to the right innominate, sacrum, L5 via Piper drop maneuver       Discussion:  Continue his daily stretching I will see him back for follow-up in 2 weeks.  Advised icing 15 minutes daily basis flexion-based stretching.

## 2024-09-17 ENCOUNTER — PROCEDURE VISIT (OUTPATIENT)
Age: 84
End: 2024-09-17
Payer: MEDICARE

## 2024-09-17 VITALS
DIASTOLIC BLOOD PRESSURE: 77 MMHG | HEART RATE: 79 BPM | WEIGHT: 237 LBS | HEIGHT: 69 IN | BODY MASS INDEX: 35.1 KG/M2 | SYSTOLIC BLOOD PRESSURE: 132 MMHG

## 2024-09-17 DIAGNOSIS — M54.16 LUMBAR RADICULOPATHY: ICD-10-CM

## 2024-09-17 DIAGNOSIS — M99.05 SEGMENTAL DYSFUNCTION OF PELVIC REGION: Primary | ICD-10-CM

## 2024-09-17 DIAGNOSIS — M99.04 SEGMENTAL DYSFUNCTION OF SACRAL REGION: ICD-10-CM

## 2024-09-17 DIAGNOSIS — M79.18 MYOFASCIAL PAIN: ICD-10-CM

## 2024-09-17 DIAGNOSIS — M54.50 ACUTE RIGHT-SIDED LOW BACK PAIN WITHOUT SCIATICA: ICD-10-CM

## 2024-09-17 DIAGNOSIS — M99.03 SEGMENTAL DYSFUNCTION OF LUMBAR REGION: ICD-10-CM

## 2024-09-17 PROCEDURE — 98941 CHIROPRACT MANJ 3-4 REGIONS: CPT | Performed by: CHIROPRACTOR

## 2024-09-18 NOTE — PROGRESS NOTES
Date of first visit: 4/25/2023       HPI:  Primo presents for treatment today.  He localizes right-sided low back pain with occasional radiation right posterior thigh but for the most part axial in nature.  5 on a pain scale      Denies any bowel bladder changes reports no weakness denies any numbness or tingling lower extremity.      The following portions of the patient's history were reviewed and updated as appropriate: allergies, current medications, past family history, past medical history, past social history, past surgical history, and problem list.    Review of Systems    Physical Exam:  Exam reveals right-sided erector spinae hypertonicity noted with some spasm actually up into the lower thoracic region.  Lumbar range of motion is reduced on extension left lateral bending left rotation full forward flexion.  Pelvic obliquity is present with an elevated right versus left innominate there is a leg length inequality noted.  Biomechanically joint dysfunction of the right sacroiliac joint involvement L4-L5 L5-S1 T12-L1.  Stable neurologically lower extremity.    Assessment:   Diagnosis ICD-10-CM Associated Orders   1. Segmental dysfunction of pelvic region  M99.05       2. Lumbar radiculopathy  M54.16       3. Segmental dysfunction of sacral region  M99.04       4. Segmental dysfunction of lumbar region  M99.03       5. Myofascial pain  M79.18       6. Acute right-sided low back pain without sciatica  M54.50                 Treatment: 21029  Manipulation to the right innominate, sacrum, L5 via Piper drop maneuver       Discussion:  Continue his daily stretching I will see him back for follow-up in 2 weeks.  Advised icing 15 minutes daily basis flexion-based stretching.

## 2024-09-24 ENCOUNTER — TELEPHONE (OUTPATIENT)
Age: 84
End: 2024-09-24

## 2024-10-07 ENCOUNTER — TELEPHONE (OUTPATIENT)
Dept: CARDIOLOGY CLINIC | Facility: CLINIC | Age: 84
End: 2024-10-07

## 2024-10-07 ENCOUNTER — IMMUNIZATIONS (OUTPATIENT)
Dept: INTERNAL MEDICINE CLINIC | Facility: CLINIC | Age: 84
End: 2024-10-07
Payer: MEDICARE

## 2024-10-07 ENCOUNTER — OFFICE VISIT (OUTPATIENT)
Dept: CARDIOLOGY CLINIC | Facility: CLINIC | Age: 84
End: 2024-10-07
Payer: MEDICARE

## 2024-10-07 VITALS
SYSTOLIC BLOOD PRESSURE: 122 MMHG | HEART RATE: 68 BPM | HEIGHT: 69 IN | OXYGEN SATURATION: 96 % | DIASTOLIC BLOOD PRESSURE: 70 MMHG | BODY MASS INDEX: 35.4 KG/M2 | WEIGHT: 239 LBS

## 2024-10-07 DIAGNOSIS — I10 BENIGN ESSENTIAL HYPERTENSION: Chronic | ICD-10-CM

## 2024-10-07 DIAGNOSIS — I48.21 PERMANENT ATRIAL FIBRILLATION (HCC): Primary | Chronic | ICD-10-CM

## 2024-10-07 DIAGNOSIS — I50.33 ACUTE ON CHRONIC DIASTOLIC (CONGESTIVE) HEART FAILURE (HCC): ICD-10-CM

## 2024-10-07 DIAGNOSIS — Z23 NEED FOR INFLUENZA VACCINATION: Primary | ICD-10-CM

## 2024-10-07 DIAGNOSIS — E78.2 MIXED HYPERLIPIDEMIA: ICD-10-CM

## 2024-10-07 DIAGNOSIS — R60.0 EDEMA OF EXTREMITIES: ICD-10-CM

## 2024-10-07 PROCEDURE — G0008 ADMIN INFLUENZA VIRUS VAC: HCPCS

## 2024-10-07 PROCEDURE — 99215 OFFICE O/P EST HI 40 MIN: CPT | Performed by: INTERNAL MEDICINE

## 2024-10-07 PROCEDURE — 93000 ELECTROCARDIOGRAM COMPLETE: CPT | Performed by: INTERNAL MEDICINE

## 2024-10-07 PROCEDURE — 90662 IIV NO PRSV INCREASED AG IM: CPT

## 2024-10-07 RX ORDER — FUROSEMIDE 40 MG
TABLET ORAL
Qty: 94 TABLET | Refills: 3 | Status: SHIPPED | OUTPATIENT
Start: 2024-10-07

## 2024-10-07 NOTE — PROGRESS NOTES
Follow-up - Cardiology   Primo Guy 84 y.o. male MRN: [id number]        Problems    1. Permanent atrial fibrillation (HCC)  POCT ECG          Impression:    Atrial fibrillation  Permanent, asymptomatic, well rate controlled, on chronic Eliquis, no prior stroke.    Hypertension  Well-controlled today    Hyperlipidemia  Controlled    Obstructive sleep apnea   Uses CPAP  but has had longstanding intolerance to many masks    Nonischemic cardiomyopathy/acute on chronic diastolic CHF  LVEF chronically 50% in the context of longstanding A-fib  Stress Myoview last year without ischemia or scar  High salt diet, somewhat resistant to change  High calorie diet, but goes to the gym religiously 5 to 6 days a week, has been noticing some hip discomfort at the end of exercise  Also increased shortness of breath with bending.  Significant edema, picture added to his media file  This is despite HCTZ 25 mg daily      Plan:    Furosemide 40 mg twice daily for 3 days, then once daily thereafter  If lightheadedness develops on day 2 or day 3, drop to once daily immediately  BMP in 1 week  2-week follow-up NP visit  No need for repeat echo at this time, this was reassessed last year  Talked extensively about decreasing his alcohol intake especially wine, up to 3 drinks a day, and also reducing calorie intake, portion control, and sodium.      HPI:   Primo Guy is a 84 y.o. year old male with hypertension, hyperlipidemia, permanent atrial fibrillation, chronic anticoagulation, history of alcohol abuse, GERD, comes in for a follow-up visit.    He has increased shortness of breath with bending, he is also noticed hip discomfort at the end of his routine exercise which she does 5 days a week.  He has not had significant cardiopulmonary limitation from an exercise standpoint however, but he has noticed an increase in difficulty around the house especially if he is moving stuff in a wheelbarrow.  He has chronic atrial  fibrillation, well rate controlled, on Eliquis, on atenolol.  His cholesterol is well-controlled.  His weight is up 5 pounds over the course of 2024, and he has significant leg swelling today.        Review of Systems   Constitutional:  Negative for appetite change, diaphoresis, fatigue and fever.   Respiratory:  Positive for shortness of breath. Negative for chest tightness and wheezing.    Cardiovascular:  Positive for leg swelling. Negative for chest pain and palpitations.   Gastrointestinal:  Negative for abdominal pain and blood in stool.   Musculoskeletal:  Negative for arthralgias and joint swelling.   Skin:  Negative for rash.   Neurological:  Negative for dizziness, syncope and light-headedness.   All other systems reviewed and are negative.        Past Medical History:   Diagnosis Date    Atrial fibrillation (HCC)     Cardiomyopathy (HCC)     LAST ASSESSED [id number]    Cataract     Dysphagia     GERD (gastroesophageal reflux disease)     Hyperlipidemia     Hypertension     Irregular heart beat     afib    Rosacea     Schatzki's ring     Sleep apnea     no cpap     Social History     Substance and Sexual Activity   Alcohol Use Yes    Alcohol/week: 7.0 - 14.0 standard drinks of alcohol    Types: 7 - 14 Glasses of wine per week    Comment: 1-2 glasses of wine daily     Social History     Substance and Sexual Activity   Drug Use No     Social History     Tobacco Use   Smoking Status Former    Types: Cigarettes   Smokeless Tobacco Never   Tobacco Comments    about 55 years ago,  unknown start date quit 1996       Allergies:  No Known Allergies      Medications:     Current Outpatient Medications:     amLODIPine (NORVASC) 5 mg tablet, Take 1 tablet (5 mg total) by mouth daily at bedtime, Disp: 90 tablet, Rfl: 3    apixaban (Eliquis) 5 mg, Take 1 tablet (5 mg total) by mouth 2 (two) times a day, Disp: 180 tablet, Rfl: 1    atenolol (TENORMIN) 50 mg tablet, Take 1 tablet (50 mg total) by mouth 2 (two) times a day,  Disp: 180 tablet, Rfl: 3    fluticasone (FLONASE) 50 mcg/act nasal spray, Spray 2 squirts in each nostril once daily, Disp: 16 g, Rfl: 3    hydroCHLOROthiazide 25 mg tablet, Take 1 tablet (25 mg total) by mouth daily, Disp: 90 tablet, Rfl: 3    hydrocortisone 2.5 % cream, Apply topically in the morning, Disp: 56 g, Rfl: 3    losartan (COZAAR) 50 mg tablet, Take 1 tablet (50 mg total) by mouth daily, Disp: 90 tablet, Rfl: 3    Multiple Vitamin (multivitamin) tablet, Take 1 tablet by mouth daily, Disp: , Rfl:     omeprazole (PriLOSEC) 20 mg delayed release capsule, Take 1 capsule (20 mg total) by mouth daily, Disp: 90 capsule, Rfl: 3    simvastatin (ZOCOR) 40 mg tablet, Take 1 tablet (40 mg total) by mouth daily at bedtime, Disp: 90 tablet, Rfl: 3    Vitamin D, Cholecalciferol, 1000 UNITS CAPS, Take by mouth 2,000 per day, Disp: , Rfl:     Hydrocod Rolando-Chlorphe Rolando ER (TUSSIONEX) 10-8 mg/5 mL ER suspension, Take 5 mL by mouth every 12 (twelve) hours as needed for cough Max Daily Amount: 10 mL (Patient not taking: Reported on 12/14/2023), Disp: 115 mL, Rfl: 0    Multiple Vitamins-Minerals (PRESERVISION AREDS PO), Take 1 tablet by mouth 2 (two) times a day (Patient not taking: Reported on 12/14/2023), Disp: , Rfl:     neomycin-polymyxin-hydrocortisone (CORTISPORIN) 1 % SOLN, 2 DROPS INTO THE AFFECTED EAR TWICE A DAY AS DIRECTED (Patient not taking: Reported on 1/30/2024), Disp: , Rfl:     triamcinolone (KENALOG) 0.1 % ointment, APPLY TOPICALLY DAILY FOR 2 WEEKS (Patient not taking: Reported on 10/7/2024), Disp: 454 g, Rfl: 0      Vitals:    10/07/24 1615   BP: 122/70   Pulse: 68   SpO2: 96%     Weight (last 2 days)       Date/Time Weight    10/07/24 1615 108 (239)          Physical Exam  Constitutional:       General: He is not in acute distress.     Appearance: Normal appearance. He is well-developed. He is not diaphoretic.   HENT:      Head: Normocephalic and atraumatic.   Eyes:      General: No scleral icterus.      "Conjunctiva/sclera: Conjunctivae normal.      Pupils: Pupils are equal, round, and reactive to light.   Neck:      Thyroid: No thyromegaly.      Vascular: Hepatojugular reflux and JVD present.   Cardiovascular:      Rate and Rhythm: Normal rate. Rhythm irregular.      Heart sounds: Normal heart sounds. No murmur heard.     No friction rub. No gallop.   Pulmonary:      Effort: Pulmonary effort is normal. No respiratory distress.      Breath sounds: Normal breath sounds. No wheezing or rales.   Abdominal:      General: Bowel sounds are normal. There is no distension.      Palpations: Abdomen is soft.      Tenderness: There is no abdominal tenderness.   Musculoskeletal:         General: No deformity. Normal range of motion.      Cervical back: Normal range of motion and neck supple.      Right lower leg: Edema present.      Left lower leg: Edema present.   Skin:     General: Skin is warm and dry.      Findings: No erythema or rash.   Neurological:      General: No focal deficit present.      Mental Status: He is alert and oriented to person, place, and time.      Cranial Nerves: No cranial nerve deficit.      Motor: No weakness.           Laboratory Studies:  CMP:        Invalid input(s): \"ALBUMIN\"  NT-proBNP: No results found for: \"NTBNP\"   Coags:    Lipid Profile:   Lab Results   Component Value Date    CHOL 136 11/04/2015     Lab Results   Component Value Date    HDL 43 07/01/2024     Lab Results   Component Value Date    LDLCALC 86 07/01/2024     Lab Results   Component Value Date    TRIG 186 (H) 07/01/2024       Cardiac testing:   EKG reviewed personally:    No results found for this visit on 10/07/24.    Echocardiogram  2018-EF 50%  4/23-LVEF 50%, systolic function low normal, atria moderately dilated, mild MR, mild to moderate TR-personally reviewed    Stress test  8/23-Lexiscan stress Myoview-LVEF 45%, no ischemia or scar, personally reviewed      Vu Devine MD    Portions of the record may have been created " "with voice recognition software.  Occasional wrong word or \"sound a like\" substitutions may have occurred due to the inherent limitations of voice recognition software.  Read the chart carefully and recognize, using context, where substitutions have occurred.  "

## 2024-10-07 NOTE — TELEPHONE ENCOUNTER
Pt received 4 sample boxes of eliquis 5 mg at [facility]     LOT#[id number]  EXP 10/2025

## 2024-10-15 ENCOUNTER — NURSE TRIAGE (OUTPATIENT)
Age: 84
End: 2024-10-15

## 2024-10-15 NOTE — TELEPHONE ENCOUNTER
"Please advise for scheduling . Patient hurt himself trying to reposition himself on the  and then trying to get himself out of bed. He was pulling hard . Hurt his shoulder, groin and hip. Denies any fall , declines ED. Wife is asking if he can see his doctor tomorrow  Reason for Disposition   MODERATE pain (e.g., interferes with normal activities) and present > 3 days    Answer Assessment - Initial Assessment Questions  1. ONSET: \"When did the pain start?\"      Sunday   2. LOCATION: \"Where is the pain located?\"      Right Shoulder, right groin and both hip    3. PAIN: \"How bad is the pain?\" (Scale 1-10; or mild, moderate, severe)    - MILD (1-3): doesn't interfere with normal activities    - MODERATE (4-7): interferes with normal activities (e.g., work or school) or awakens from sleep    - SEVERE (8-10): excruciating pain, unable to do any normal activities, unable to move arm at all due to pain      7/10  4. WORK OR EXERCISE: \"Has there been any recent work or exercise that involved this part of the body?\"      Trying to reposition himself on the    5. CAUSE: \"What do you think is causing the shoulder pain?\"      Driving the driving mower  and trying to transfer himself out of bed   6. OTHER SYMPTOMS: \"Do you have any other symptoms?\" (e.g., neck pain, swelling, rash, fever, numbness, weakness)      Denies   7. PREGNANCY: \"Is there any chance you are pregnant?\" \"When was your last menstrual period?\"      N/a    Protocols used: Shoulder Pain-ADULT-[place]    "

## 2024-10-16 ENCOUNTER — OFFICE VISIT (OUTPATIENT)
Dept: INTERNAL MEDICINE CLINIC | Facility: CLINIC | Age: 84
End: 2024-10-16
Payer: MEDICARE

## 2024-10-16 VITALS
OXYGEN SATURATION: 98 % | DIASTOLIC BLOOD PRESSURE: 70 MMHG | SYSTOLIC BLOOD PRESSURE: 140 MMHG | HEART RATE: 70 BPM | BODY MASS INDEX: 34.41 KG/M2 | WEIGHT: 233 LBS

## 2024-10-16 DIAGNOSIS — M54.2 CERVICALGIA: Primary | ICD-10-CM

## 2024-10-16 DIAGNOSIS — M25.511 ACUTE PAIN OF RIGHT SHOULDER: ICD-10-CM

## 2024-10-16 DIAGNOSIS — R10.31 RIGHT GROIN PAIN: ICD-10-CM

## 2024-10-16 DIAGNOSIS — I10 BENIGN ESSENTIAL HYPERTENSION: Chronic | ICD-10-CM

## 2024-10-16 PROCEDURE — 99214 OFFICE O/P EST MOD 30 MIN: CPT | Performed by: INTERNAL MEDICINE

## 2024-10-16 PROCEDURE — G2211 COMPLEX E/M VISIT ADD ON: HCPCS | Performed by: INTERNAL MEDICINE

## 2024-10-16 NOTE — ASSESSMENT & PLAN NOTE
-Appears to be affecting mostly the trapezius.  He has noticeable spasm and tenderness with palpation.  Heat/warm compress as mentioned above.  Muscle relaxer.

## 2024-10-16 NOTE — PATIENT INSTRUCTIONS
-Take Tylenol 2 tablets 3 times a day as needed.   -Take Tizanidine 2-3 times a day as needed.   -Apply warm compresses to your neck and shoulder.  You may alternate between applying heat and ice to your groin.  Use a compressive wrap such as an Ace wrap to help with stability of the groin region.  -A referral has been made to physical therapy.

## 2024-10-16 NOTE — PROGRESS NOTES
Name: Primo Guy      : 1940      MRN: [id number]  Encounter Provider: Daniel Sanchez MD  Encounter Date: 10/16/2024   Enc[facility]: [facility]    Assessment & Plan     1. Cervicalgia  Assessment & Plan:  -This appears to be muscular in etiology.  A prescription for muscle relaxer has been sent.  Recommended applying warm compresses or heating pad.  Tylenol 1000 mg 3 times daily as needed  Orders:  -     tiZANidine (ZANAFLEX) 4 mg tablet; Take 1 tablet (4 mg total) by mouth every 8 (eight) hours as needed for muscle spasms  -     Ambulatory Referral to Physical Therapy; Future  2. Right groin pain  Assessment & Plan:  -Pain likely secondary to groin strain.  Recommended compression banding.  Ice/heat as needed.  A referral has been made to PT.  Orders:  -     Ambulatory Referral to Physical Therapy; Future  3. Acute pain of right shoulder  Assessment & Plan:  -Appears to be affecting mostly the trapezius.  He has noticeable spasm and tenderness with palpation.  Heat/warm compress as mentioned above.  Muscle relaxer.  Orders:  -     Ambulatory Referral to Physical Therapy; Future  4. Benign essential hypertension  Assessment & Plan:  -Blood pressure well controlled  -Continue current antihypertensive regimen           Subjective      HPI  Patient presents today as an acute visit complaining of pain involving his neck, shoulder and groin.  He reports he pulled his groin on . He is unsure how he injured his groin. To compensate he used his right arm to push off while standing from a seated position. He also reports he neck is bothering him as well.  Today he reports that his pain has improved.  He has been taking Tylenol as needed.    All other systems negative except for pertinent findings noted in HPI.       Current Outpatient Medications on File Prior to Visit   Medication Sig    amLODIPine (NORVASC) 5 mg tablet Take 1 tablet (5 mg total) by mouth daily  at bedtime    apixaban (Eliquis) 5 mg Take 1 tablet (5 mg total) by mouth 2 (two) times a day    atenolol (TENORMIN) 50 mg tablet Take 1 tablet (50 mg total) by mouth 2 (two) times a day    fluticasone (FLONASE) 50 mcg/act nasal spray Spray 2 squirts in each nostril once daily    furosemide (LASIX) 40 mg tablet Take 1 tablet twice a day morning and afternoon for 3 days then go to 1 tablet daily.    hydrocortisone 2.5 % cream Apply topically in the morning    losartan (COZAAR) 50 mg tablet Take 1 tablet (50 mg total) by mouth daily    Multiple Vitamin (multivitamin) tablet Take 1 tablet by mouth daily    omeprazole (PriLOSEC) 20 mg delayed release capsule Take 1 capsule (20 mg total) by mouth daily    simvastatin (ZOCOR) 40 mg tablet Take 1 tablet (40 mg total) by mouth daily at bedtime    Vitamin D, Cholecalciferol, 1000 UNITS CAPS Take by mouth 2,000 per day    Hydrocod Rolando-Chlorphe Rolando ER (TUSSIONEX) 10-8 mg/5 mL ER suspension Take 5 mL by mouth every 12 (twelve) hours as needed for cough Max Daily Amount: 10 mL (Patient not taking: Reported on 12/14/2023)    Multiple Vitamins-Minerals (PRESERVISION AREDS PO) Take 1 tablet by mouth 2 (two) times a day (Patient not taking: Reported on 12/14/2023)    neomycin-polymyxin-hydrocortisone (CORTISPORIN) 1 % SOLN 2 DROPS INTO THE AFFECTED EAR TWICE A DAY AS DIRECTED (Patient not taking: Reported on 1/30/2024)    triamcinolone (KENALOG) 0.1 % ointment APPLY TOPICALLY DAILY FOR 2 WEEKS (Patient not taking: Reported on 10/7/2024)       Objective     /70   Pulse 70   Wt 106 kg (233 lb)   SpO2 98%   BMI 34.41 kg/m²     BP Readings from Last 3 Encounters:   10/16/24 140/70   10/07/24 122/70   09/17/24 132/77        Wt Readings from Last 3 Encounters:   10/16/24 106 kg (233 lb)   10/07/24 108 kg (239 lb)   09/17/24 108 kg (237 lb)       Physical Exam    General: NAD  HEENT: NCAT, EOMI, normal conjunctiva  MSK: Normal bulk and tone, tenderness to palpation and spasm  over right posterior scalene and trapezius, tenderness to palpation over  right thigh adductors  Neuro: Non-focal, ambulating without difficulty, non-antalgic gait  Extremities: No lower extremity edema  Skin: Normal skin color, no rashes     Daniel Sanchez MD

## 2024-10-16 NOTE — ASSESSMENT & PLAN NOTE
-This appears to be muscular in etiology.  A prescription for muscle relaxer has been sent.  Recommended applying warm compresses or heating pad.  Tylenol 1000 mg 3 times daily as needed

## 2024-10-16 NOTE — ASSESSMENT & PLAN NOTE
-Pain likely secondary to groin strain.  Recommended compression banding.  Ice/heat as needed.  A referral has been made to PT.

## 2024-10-21 ENCOUNTER — EVALUATION (OUTPATIENT)
Dept: PHYSICAL THERAPY | Facility: CLINIC | Age: 84
End: 2024-10-21
Payer: MEDICARE

## 2024-10-21 ENCOUNTER — APPOINTMENT (OUTPATIENT)
Dept: LAB | Facility: CLINIC | Age: 84
End: 2024-10-21
Payer: MEDICARE

## 2024-10-21 DIAGNOSIS — I48.21 PERMANENT ATRIAL FIBRILLATION (HCC): Chronic | ICD-10-CM

## 2024-10-21 DIAGNOSIS — E78.2 MIXED HYPERLIPIDEMIA: ICD-10-CM

## 2024-10-21 DIAGNOSIS — E55.9 VITAMIN D DEFICIENCY: ICD-10-CM

## 2024-10-21 DIAGNOSIS — M25.511 ACUTE PAIN OF RIGHT SHOULDER: ICD-10-CM

## 2024-10-21 DIAGNOSIS — R73.03 PRE-DIABETES: Chronic | ICD-10-CM

## 2024-10-21 DIAGNOSIS — R10.31 RIGHT GROIN PAIN: ICD-10-CM

## 2024-10-21 DIAGNOSIS — M54.2 CERVICALGIA: Primary | ICD-10-CM

## 2024-10-21 LAB
ANION GAP SERPL CALCULATED.3IONS-SCNC: 10 MMOL/L (ref 4–13)
BUN SERPL-MCNC: 20 MG/DL (ref 5–25)
CALCIUM SERPL-MCNC: 9.3 MG/DL (ref 8.4–10.2)
CHLORIDE SERPL-SCNC: 98 MMOL/L (ref 96–108)
CO2 SERPL-SCNC: 29 MMOL/L (ref 21–32)
CREAT SERPL-MCNC: 0.85 MG/DL (ref 0.6–1.3)
GFR SERPL CREATININE-BSD FRML MDRD: 81 ML/MIN/1.73SQ M
GLUCOSE SERPL-MCNC: 101 MG/DL (ref 65–140)
POTASSIUM SERPL-SCNC: 3.4 MMOL/L (ref 3.5–5.3)
SODIUM SERPL-SCNC: 137 MMOL/L (ref 135–147)

## 2024-10-21 PROCEDURE — 97535 SELF CARE MNGMENT TRAINING: CPT | Performed by: PHYSICAL THERAPIST

## 2024-10-21 PROCEDURE — 97161 PT EVAL LOW COMPLEX 20 MIN: CPT | Performed by: PHYSICAL THERAPIST

## 2024-10-21 PROCEDURE — 36415 COLL VENOUS BLD VENIPUNCTURE: CPT

## 2024-10-21 PROCEDURE — 97112 NEUROMUSCULAR REEDUCATION: CPT | Performed by: PHYSICAL THERAPIST

## 2024-10-21 PROCEDURE — 80048 BASIC METABOLIC PNL TOTAL CA: CPT

## 2024-10-21 NOTE — PROGRESS NOTES
PT Evaluation     Today's date: 10/21/2024  Patient name: Primo Guy  : 1940  MRN: [id number]  Referring provider: Daniel Sanchez,*  Dx:   Encounter Diagnosis     ICD-10-CM    1. Cervicalgia  M54.2 Ambulatory Referral to Physical Therapy      2. Right groin pain  R10.31 Ambulatory Referral to Physical Therapy      3. 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  Pt's groin was not assess today secondary a resolution of sx and pt being unable to lye supine without significant pain.    Goals  ST-4 weeks.  1.  Pt will decrease pain > 50%.  2.  Pt will increase B c/s Rot to < or += mod restriction without pain.    LT-8 weeks.  1.  Pt will decrease pain > 75%.  2.  Pt will transfer from STS and supine to sit without pain.    Plan    Frequency: 2x week  Duration in weeks: 6    Subjective Evaluation    History of Present Illness  Mechanism of injury: Pt is a 84 yom c/o a sudden onset of c/s pain, R shoulder and R groin pain that began on 10/21/24 after changing the seat of his .    PMHx: A-fib, cardiomyopathy, GERD.  Patient Goals  Patient goals for therapy: decreased edema, decreased pain, increased motion, increased strength, independence with ADLs/IADLs and return to sport/leisure activities    Pain  Current pain ratin  At best pain ratin  At worst pain ratin  Location: lower cervical spine, L>R scap, UT  Quality: sharp, radiating and dull ache  Relieving factors: rest (muscle relaxers)  Exacerbated by: looking up, looking L, reaching  overhead, STS, supine to sit.  Progression: improved      Objective     Postural Observations  Seated posture: poor  Standing posture: poor  Correction of posture: makes symptoms better      Active Range of Motion   Cervical/Thoracic Spine       Cervical    Subcranial retraction:  with pain   Restriction level: maximal  Flexion:  Restriction level: minimal  Extension:  with pain Restriction level: maximal  Left lateral flexion:  with pain Restriction level: maximal  Right lateral flexion:  with pain Restriction level maximal  Left rotation:  with pain Restriction level: maximal  Right rotation:  with pain Restriction level: maximal    Thoracic    Extension:  Restriction level: maximal  Left Shoulder   Flexion: 120 degrees   Abduction: 110 degrees   External rotation 0°: 70 degrees   Internal rotation BTB: L1     Right Shoulder   Flexion: 120 degrees   Abduction: 110 degrees   External rotation 0°: 70 degrees   Internal rotation BTB: T11     Strength/Myotome Testing     Left Shoulder     Planes of Motion   External rotation at 0°: 4-     Right Shoulder     Planes of Motion   External rotation at 0°: 4-     Tests     Left Shoulder   Negative Hawkin's and Neer's.     Right Shoulder   Negative Hawkin's and Neer's.     Additional Tests Details  STS: mod UE assist.           Precautions: A-fib, cardiomyopathy, GERD.    Dx: c/s stenosis with opening preference, R shoulder pain, R groin pain.    Manuals 10/21            Seated t/s P-A Gr mobs             Saeted c/s Rot clin OP             STM/graston B UT                          Neuro Re-Ed             Slouch over-correction 1x15 1x15           Seated c/s B Rot 1x10 ea 2x10 ea           Seated c/s B SB  1x5 ea           Posture correction 5 min                                                                Ther Ex             B TB ER Y/ 1x10 Y/ 2x10           TB Rows  R/ 3x10                                                                                         Ther  Activity             STS  Foam/ 2x5                        Gait Training                                       Modalities

## 2024-10-23 ENCOUNTER — OFFICE VISIT (OUTPATIENT)
Dept: CARDIOLOGY CLINIC | Facility: CLINIC | Age: 84
End: 2024-10-23
Payer: MEDICARE

## 2024-10-23 ENCOUNTER — TELEPHONE (OUTPATIENT)
Dept: CARDIOLOGY CLINIC | Facility: CLINIC | Age: 84
End: 2024-10-23

## 2024-10-23 VITALS
DIASTOLIC BLOOD PRESSURE: 84 MMHG | HEIGHT: 69 IN | SYSTOLIC BLOOD PRESSURE: 142 MMHG | WEIGHT: 232 LBS | OXYGEN SATURATION: 96 % | HEART RATE: 58 BPM | BODY MASS INDEX: 34.36 KG/M2

## 2024-10-23 DIAGNOSIS — R73.03 PRE-DIABETES: Chronic | ICD-10-CM

## 2024-10-23 DIAGNOSIS — G47.33 OBSTRUCTIVE SLEEP APNEA: Chronic | ICD-10-CM

## 2024-10-23 DIAGNOSIS — I10 BENIGN ESSENTIAL HYPERTENSION: Chronic | ICD-10-CM

## 2024-10-23 DIAGNOSIS — I48.21 PERMANENT ATRIAL FIBRILLATION (HCC): Chronic | ICD-10-CM

## 2024-10-23 DIAGNOSIS — I50.32 CHRONIC HEART FAILURE WITH PRESERVED EJECTION FRACTION (HFPEF) (HCC): Primary | ICD-10-CM

## 2024-10-23 DIAGNOSIS — E78.2 MIXED HYPERLIPIDEMIA: ICD-10-CM

## 2024-10-23 PROCEDURE — 99214 OFFICE O/P EST MOD 30 MIN: CPT | Performed by: NURSE PRACTITIONER

## 2024-10-23 NOTE — LETTER
October 23, 2024     Arnaldo Luo MD  [address] [facility] [address]    Patient: Primo Guy   YOB: 1940   Date of Visit: 10/23/2024       Dear Dr. Luo:    Thank you for referring Primo Guy to me for evaluation. Below are my notes for this consultation.    If you have questions, please do not hesitate to call me. I look forward to following your patient along with you.         Sincerely,        DORINDA Mckeon        CC: MD Cris Toribio CRNP  10/23/2024  4:05 PM  Sign when Signing Visit  Cardiology  Follow Up   Office Visit Note  Primo Guy   84 y.o.   male   MRN: [id number]  [facility] [place]  [address]  [address]  [facility] [id number] [phone]    PCP: Arnaldo Luo MD  Cardiologist: Dr. Devine          Summary of Plan:  Increase foods high in K  Continue with salt restricted diet, lifestyle changes as he has.    He has significantly reduced alcohol, actually has not had any since he was last seen  Heart healthy diet: Mediterranean or DASH.  Education provided  Continue the current plan  Follow-up with me: 3 months, Dr. Devine 6 months  Last Cologuard: 11/02/2021 - needs repeat- F/U with his PCP          Assessment/Plan  Chronic HFpEF  LVEF chronically 50% in the context of longstanding A-fib  Stress Myoview 8/18/23:  without ischemia or scar  Wt Readings from Last 3 Encounters:   10/23/24 105 kg (232 lb)   10/16/24 106 kg (233 lb)   10/07/24 108 kg (239 lb)   --beta-blocker: Atenolol 50 mg twice daily  --Diuretic: 10/7/24: Lasix 40 mg twice daily x 3 days then 1 tablet daily.  Previously on HCTZ  10/23/2024 continue Lasix 40 mg daily.  Increase potassium in his diet.  He declines potassium supplement.  --SGLT2i:  --2 g sodium diet, 1800 cc fluid restriction. Daily weights.   Permanent atrial fibrillation  Rate controlled with atenolol 50 mg twice daily  On OAC with Eliquis 5 mg twice daily  given heightened CHADS2 Vasc score  Essential hypertension  /84  On amlodipine 5 mg nightly, atenolol 50 mg twice daily, losartan 50 mg daily  Mixed dyslipidemia  On simvastatin 40 mg daily  7/1/2024: LDL 86 non-.  Not addressed at today's visit  Obstructive sleep apnea, utilizing CPAP  Prediabetes  7/10/2024: Hemoglobin A1c 5.9  Morbid obesity BMI 34.  Weight loss recommended, calorie restriction, reduced portions, etc.  Cardiac testing  TTE 4/7/2023.  EF 50%.  Wall motion normal.  Moderate JEMIMA.  Mild MR.  Mild to moderate TR   SPECT 8/18/23: Perfusion is normal.  EF 45%.                  HPI  Primo Guy is a 84 y.o.year old male with hypertension, dyslipidemia, permanent atrial fibrillation on chronic anticoagulation, GERD, and a history of alcohol abuse.  He follows in the office with Dr. Devine.  He was last seen 10/7/2024.    10/7/24 OV Dr Devine  Per his note:  He has increased shortness of breath with bending, he is also noticed hip discomfort at the end of his routine exercise which she does 5 days a week.  He has not had significant cardiopulmonary limitation from an exercise standpoint however, but he has noticed an increase in difficulty around the house especially if he is moving stuff in a wheelbarrow.  He has chronic atrial fibrillation, well rate controlled, on Eliquis, on atenolol.  His cholesterol is well-controlled.  His weight is up 5 pounds over the course of 2024, and he has significant leg swelling today.     Plan  Furosemide 40 mg twice daily for 3 days, then once daily thereafter  If lightheadedness develops on day 2 or day 3, drop to once daily immediately  BMP in 1 week  2-week follow-up NP visit  No need for repeat echo at this time, this was reassessed last year  Talked extensively about decreasing his alcohol intake especially wine, up to 3 drinks a day, and also reducing calorie intake, portion control, and sodium.     10/23/24  Close follow-up, for volume  "overload, shortness of breath  ROS: He is lost 7 pounds.  He is cut out alcohol so far since his last visit.  He tells me \"it is all or nothing\".  He has reduced sodium in his diet.  He tells me there is still some room for improvement.  /84  Weight 232  Last labs 10/21/2024: Creatinine 0.8 BUN 20 potassium 3.4  On exam, he was fairly euvolemic.  He still has some lower extremity edema which I do suspect some is chronic.  He has chronic venous stasis changes.  I encouraged him to be a little bit more strict with reducing sodium      Review of Systems   Constitutional: Negative for chills.   Cardiovascular:  Negative for chest pain, claudication, cyanosis, dyspnea on exertion, irregular heartbeat, leg swelling, near-syncope, orthopnea, palpitations, paroxysmal nocturnal dyspnea and syncope.   Respiratory:  Negative for cough and shortness of breath.    Gastrointestinal:  Negative for heartburn and nausea.   Neurological:  Negative for dizziness, focal weakness, headaches, light-headedness and weakness.   All other systems reviewed and are negative.            Assessment  Diagnoses and all orders for this visit:    Chronic heart failure with preserved ejection fraction (HFpEF) (HCC)    Permanent atrial fibrillation (HCC)    Benign essential hypertension    Obstructive sleep apnea    Mixed hyperlipidemia    Pre-diabetes        Past Medical History:   Diagnosis Date   • Atrial fibrillation (HCC)    • Cardiomyopathy (HCC)     LAST ASSESSED [id number]   • Cataract    • Dysphagia    • GERD (gastroesophageal reflux disease)    • Hyperlipidemia    • Hypertension    • Irregular heart beat     afib   • Rosacea    • Schatzki's ring    • Sleep apnea     no cpap       Past Surgical History:   Procedure Laterality Date   • BACK SURGERY     • CATARACT EXTRACTION     • COLONOSCOPY N/A 1/15/2016    Procedure: COLONOSCOPY;  Surgeon: Melly Kumar MD;  Location: [facility];  Service:    • ESOPHAGOGASTRODUODENOSCOPY     • EYE " SURGERY      B/L cataract sx (2008,2009)   • WV CIRCUMCISION AGE >28 DAYS N/A 1/29/2018    Procedure: CIRCUMCISION ADULT;  Surgeon: Charles Hannah MD;  Location:  MAIN OR;  Service: Urology   • WV ESOPHAGOGASTRODUODENOSCOPY TRANSORAL DIAGNOSTIC N/A 2/15/2017    Procedure: ESOPHAGOGASTRODUODENOSCOPY (EGD) WITH DILATION;  Surgeon: Marbin Bullard MD;  Location:  GI LAB;  Service: Gastroenterology           Allergies  No Known Allergies      Medications    Current Outpatient Medications:   •  amLODIPine (NORVASC) 5 mg tablet, Take 1 tablet (5 mg total) by mouth daily at bedtime, Disp: 90 tablet, Rfl: 3  •  apixaban (Eliquis) 5 mg, Take 1 tablet (5 mg total) by mouth 2 (two) times a day, Disp: 180 tablet, Rfl: 1  •  atenolol (TENORMIN) 50 mg tablet, Take 1 tablet (50 mg total) by mouth 2 (two) times a day, Disp: 180 tablet, Rfl: 3  •  fluticasone (FLONASE) 50 mcg/act nasal spray, Spray 2 squirts in each nostril once daily, Disp: 16 g, Rfl: 3  •  furosemide (LASIX) 40 mg tablet, Take 1 tablet twice a day morning and afternoon for 3 days then go to 1 tablet daily., Disp: 94 tablet, Rfl: 3  •  hydrocortisone 2.5 % cream, Apply topically in the morning, Disp: 56 g, Rfl: 3  •  losartan (COZAAR) 50 mg tablet, Take 1 tablet (50 mg total) by mouth daily, Disp: 90 tablet, Rfl: 3  •  Multiple Vitamin (multivitamin) tablet, Take 1 tablet by mouth daily, Disp: , Rfl:   •  omeprazole (PriLOSEC) 20 mg delayed release capsule, Take 1 capsule (20 mg total) by mouth daily, Disp: 90 capsule, Rfl: 3  •  simvastatin (ZOCOR) 40 mg tablet, Take 1 tablet (40 mg total) by mouth daily at bedtime, Disp: 90 tablet, Rfl: 3  •  tiZANidine (ZANAFLEX) 4 mg tablet, Take 1 tablet (4 mg total) by mouth every 8 (eight) hours as needed for muscle spasms, Disp: 20 tablet, Rfl: 0  •  Vitamin D, Cholecalciferol, 1000 UNITS CAPS, Take by mouth 2,000 per day, Disp: , Rfl:   •  triamcinolone (KENALOG) 0.1 % ointment, APPLY TOPICALLY DAILY FOR 2 WEEKS  (Patient not taking: Reported on 10/7/2024), Disp: 454 g, Rfl: 0      Social History     Socioeconomic History   • Marital status: /Civil Union     Spouse name: Not on file   • Number of children: Not on file   • Years of education: Not on file   • Highest education level: Not on file   Occupational History   • Not on file   Tobacco Use   • Smoking status: Former     Types: Cigarettes   • Smokeless tobacco: Never   • Tobacco comments:     about 55 years ago,  unknown start date quit 1996   Vaping Use   • Vaping status: Never Used   Substance and Sexual Activity   • Alcohol use: Yes     Alcohol/week: 7.0 - 14.0 standard drinks of alcohol     Types: 7 - 14 Glasses of wine per week     Comment: 1-2 glasses of wine daily   • Drug use: No   • Sexual activity: Not Currently   Other Topics Concern   • Not on file   Social History Narrative   • Not on file     Social Determinants of Health     Financial Resource Strain: Low Risk  (1/30/2024)    Overall Financial Resource Strain (CARDIA)    • Difficulty of Paying Living Expenses: Not hard at all   Food Insecurity: Not on file   Transportation Needs: No Transportation Needs (1/30/2024)    PRAPARE - Transportation    • Lack of Transportation (Medical): No    • Lack of Transportation (Non-Medical): No   Physical Activity: Not on file   Stress: Not on file   Social Connections: Not on file   Intimate Partner Violence: Not on file   Housing Stability: Not on file       Family History   Problem Relation Age of Onset   • No Known Problems Mother    • Heart disease Father    • Coronary artery disease Father    • No Known Problems Sister    • No Known Problems Sister    • Heart disease Brother    • Heart disease Brother    • Coronary artery disease Family    • Hyperlipidemia Family    • Hypertension Family    • Other Family         SUDDEN CARDIAC DEATH        Physical Exam  Vitals and nursing note reviewed.   Constitutional:       General: He is not in acute distress.      "Appearance: He is obese.   HENT:      Head: Normocephalic and atraumatic.   Eyes:      Extraocular Movements: Extraocular movements intact.      Conjunctiva/sclera: Conjunctivae normal.   Cardiovascular:      Rate and Rhythm: Normal rate and regular rhythm.      Pulses: Intact distal pulses.      Heart sounds: Normal heart sounds.   Pulmonary:      Effort: Pulmonary effort is normal.      Breath sounds: Normal breath sounds.   Abdominal:      General: Bowel sounds are normal.      Palpations: Abdomen is soft.   Musculoskeletal:         General: Normal range of motion.      Cervical back: Normal range of motion and neck supple.      Right lower leg: Edema present.      Left lower leg: Edema present.      Comments: Chronic venous stasis changes   Skin:     General: Skin is warm and dry.   Neurological:      Mental Status: He is alert and oriented to person, place, and time.   Psychiatric:         Mood and Affect: Mood normal.         Vitals: Blood pressure 142/84, pulse 58, height 5' 9\" (1.753 m), weight 105 kg (232 lb), SpO2 96%.   Wt Readings from Last 3 Encounters:   10/23/24 105 kg (232 lb)   10/16/24 106 kg (233 lb)   10/07/24 108 kg (239 lb)           Labs & Results:  Lab Results   Component Value Date    WBC 5.04 01/26/2024    HGB 14.0 01/26/2024    HCT 40.4 01/26/2024    MCV 97 01/26/2024     01/26/2024     BNP   Date Value Ref Range Status   01/26/2024 231 (H) 0 - 100 pg/mL Final   08/17/2023 192 (H) 0 - 100 pg/mL Final     No components found for: \"CHEM\"  Troponin I   Date Value Ref Range Status   11/04/2015 <0.02 0.00 - 0.04 ng/mL Final     Comment:     The above 1 analytes were performed by [address],[place] [address]       Results for orders placed during the hospital encounter of 04/24/18    Echo complete with contrast if indicated    Narrative  [address] [place] [address] [phone]    Transthoracic Echocardiogram  2D, " M-mode, Doppler, and Color Doppler    Study date:  2018    Patient: PRINCE OLMEDO  MR number: [id number]  Account number: [id number]  : 1940  Age: 77 years  Gender: Male  Status: Outpatient  Location: [address] [place] [address]  Height: 69 in  Weight: 208 lb  BP: 130/ 78 mmHg    Indications: Atrial fibrillation    Diagnoses: I48.0 - Atrial fibrillation    Sonographer:  JOJO Koch  Primary Physician:  COLBY Carey MD  Referring Physician:  Vu Devine MD  Group:  [facility]  Interpreting Physician:  Manuel Gardner MD    SUMMARY    LEFT VENTRICLE:  Systolic function was at the lower limits of normal. Ejection fraction was estimated to be 50 %.  There were no regional wall motion abnormalities.  Wall thickness was at the upper limits of normal.    RIGHT VENTRICLE:  The ventricle was mildly dilated.  Systolic function was normal.    LEFT ATRIUM:  The atrium was mildly dilated.    RIGHT ATRIUM:  The atrium was mildly dilated.    MITRAL VALVE:  There was mild annular calcification.  There was mild to moderate regurgitation.    TRICUSPID VALVE:  There was mild regurgitation.  Estimated peak PA pressure was 40 mmHg.    HISTORY: PRIOR HISTORY: Hypertension, hyperlipidemia, atrial fibrillation, cardiomyopathy, obstructive sleep apnea, former smoker, prediabetes    PROCEDURE: The study was performed in the [address] [place] [address]. This was a routine study. The transthoracic approach was used. The study included complete 2D imaging, M-mode, complete spectral Doppler, and color Doppler. Image  quality was adequate.    LEFT VENTRICLE: Size was normal. Systolic function was at the lower limits of normal. Ejection fraction was estimated to be 50 %. There were no regional wall motion abnormalities. Wall thickness was at the upper limits of normal. DOPPLER:  Transmitral flow pattern: atrial fibrillation.    RIGHT VENTRICLE: The ventricle was mildly  dilated. Systolic function was normal.    LEFT ATRIUM: The atrium was mildly dilated.    RIGHT ATRIUM: The atrium was mildly dilated.    MITRAL VALVE: There was mild annular calcification. Valve structure was normal. There was normal leaflet separation. DOPPLER: The transmitral velocity was within the normal range. There was no evidence for stenosis. There was mild to  moderate regurgitation.    AORTIC VALVE: The valve was trileaflet. Leaflets exhibited mildly increased thickness and normal cuspal separation. DOPPLER: Transaortic velocity was within the normal range. There was no evidence for stenosis. There was no regurgitation.    TRICUSPID VALVE: The valve structure was normal. There was normal leaflet separation. DOPPLER: The transtricuspid velocity was within the normal range. There was no evidence for stenosis. There was mild regurgitation. Pulmonary artery  systolic pressure was mildly increased. Estimated peak PA pressure was 40 mmHg.    PULMONIC VALVE: Leaflets exhibited normal thickness, no calcification, and normal cuspal separation. DOPPLER: The transpulmonic velocity was within the normal range. There was mild regurgitation.    PERICARDIUM: There was no pericardial effusion.    AORTA: The root exhibited normal size.    SYSTEMIC VEINS: IVC: The inferior vena cava was normal in size.    SYSTEM MEASUREMENT TABLES    2D  %FS: 27.43 %  AV Diam: 3.63 cm  EDV(Teich): 103.95 ml  EF(Cube): 61.78 %  EF(Teich): 53.27 %  ESV(Cube): 40.48 ml  ESV(Teich): 48.57 ml  IVSd: 1.05 cm  LA Area: 22.88 cm2  LA Diam: 3.99 cm  LVEDV MOD A4C: 132.92 ml  LVEF MOD A4C: 44.61 %  LVESV MOD A4C: 73.63 ml  LVIDd: 4.73 cm  LVIDs: 3.43 cm  LVLd A4C: 7.57 cm  LVLs A4C: 6.65 cm  LVPWd: 1.09 cm  RA Area: 27.19 cm2  RV Diam.: 4.56 cm  SV MOD A4C: 59.29 ml  SV(Cube): 65.42 ml  SV(Teich): 55.38 ml    CW  TR Vmax: 2.95 m/s  TR maxP.72 mmHg    MM  TAPSE: 1.6 cm    [employer] Accredited Echocardiography Laboratory    Prepared  and electronically signed by    Manuel Gardner MD  Signed 24-Apr-2018 16:49:48    No results found for this or any previous visit.    No valid procedures specified.  Results for orders placed during the hospital encounter of 08/17/23    NM myocardial perfusion spect (stress and/or rest)    Interpretation Summary  •  Stress ECG: No ST deviation is noted. The ECG was negative for ischemia. The stress ECG is negative for ischemia after maximal exercise, without reproduction of symptoms.  •  Perfusion: There are no perfusion defects.  •  Stress Function: Left ventricular function post-stress is low normal. Stress ejection fraction is 45 %.  •  Stress Combined Conclusion: Left ventricular perfusion is normal.                This note was completed in part utilizing [employer] direct voice recognition software.   Grammatical errors, random word insertion, spelling mistakes, and incomplete sentences may be an occasional consequence of the system secondary to software limitations, ambient noise and hardware issues. At the time of dictation, efforts were made to edit, clarify and /or correct errors.  Please read the chart carefully and recognize, using context, where substitutions have occurred.  If you have any questions or concerns about the context, text or information contained within the body of this dictation, please contact myself, the provider, for further clarification

## 2024-10-23 NOTE — PROGRESS NOTES
Cardiology  Follow Up   Office Visit Note  Primo Guy   84 y.o.   male   MRN: [id number]  [facility] [place]  [address] [address]  [address]  [place] [address] [phone]    PCP: Arnaldo Luo MD  Cardiologist: Dr. Devine          Summary of Plan:  Increase foods high in K  Continue with salt restricted diet, lifestyle changes as he has.    He has significantly reduced alcohol, actually has not had any since he was last seen  Heart healthy diet: Mediterranean or DASH.  Education provided  Continue the current plan  Follow-up with me: 3 months, Dr. Devine 6 months  Last Cologuard: 11/02/2021 - needs repeat- F/U with his PCP          Assessment/Plan  Chronic HFpEF  LVEF chronically 50% in the context of longstanding A-fib  Stress Myoview 8/18/23:  without ischemia or scar  Wt Readings from Last 3 Encounters:   10/23/24 105 kg (232 lb)   10/16/24 106 kg (233 lb)   10/07/24 108 kg (239 lb)   --beta-blocker: Atenolol 50 mg twice daily  --Diuretic: 10/7/24: Lasix 40 mg twice daily x 3 days then 1 tablet daily.  Previously on HCTZ  10/23/2024 continue Lasix 40 mg daily.  Increase potassium in his diet.  He declines potassium supplement.  --SGLT2i:  --2 g sodium diet, 1800 cc fluid restriction. Daily weights.   Permanent atrial fibrillation  Rate controlled with atenolol 50 mg twice daily  On OAC with Eliquis 5 mg twice daily given heightened CHADS2 Vasc score  Essential hypertension  /84  On amlodipine 5 mg nightly, atenolol 50 mg twice daily, losartan 50 mg daily  Mixed dyslipidemia  On simvastatin 40 mg daily  7/1/2024: LDL 86 non-.  Not addressed at today's visit  Obstructive sleep apnea, utilizing CPAP  Prediabetes  7/10/2024: Hemoglobin A1c 5.9  Morbid obesity BMI 34.  Weight loss recommended, calorie restriction, reduced portions, etc.  Cardiac testing  TTE 4/7/2023.  EF 50%.  Wall motion normal.  Moderate JEMIMA.  Mild MR.  Mild to moderate TR   SPECT 8/18/23:  "Perfusion is normal.  EF 45%.                  HPI  Primo Guy is a 84 y.o.year old male with hypertension, dyslipidemia, permanent atrial fibrillation on chronic anticoagulation, GERD, and a history of alcohol abuse.  He follows in the office with Dr. Devine.  He was last seen 10/7/2024.    10/7/24 OV Dr Devine  Per his note:  He has increased shortness of breath with bending, he is also noticed hip discomfort at the end of his routine exercise which she does 5 days a week.  He has not had significant cardiopulmonary limitation from an exercise standpoint however, but he has noticed an increase in difficulty around the house especially if he is moving stuff in a wheelbarrow.  He has chronic atrial fibrillation, well rate controlled, on Eliquis, on atenolol.  His cholesterol is well-controlled.  His weight is up 5 pounds over the course of 2024, and he has significant leg swelling today.     Plan  Furosemide 40 mg twice daily for 3 days, then once daily thereafter  If lightheadedness develops on day 2 or day 3, drop to once daily immediately  BMP in 1 week  2-week follow-up NP visit  No need for repeat echo at this time, this was reassessed last year  Talked extensively about decreasing his alcohol intake especially wine, up to 3 drinks a day, and also reducing calorie intake, portion control, and sodium.     10/23/24  Close follow-up, for volume overload, shortness of breath  ROS: He is lost 7 pounds.  He is cut out alcohol so far since his last visit.  He tells me \"it is all or nothing\".  He has reduced sodium in his diet.  He tells me there is still some room for improvement.  /84  Weight 232  Last labs 10/21/2024: Creatinine 0.8 BUN 20 potassium 3.4  On exam, he was fairly euvolemic.  He still has some lower extremity edema which I do suspect some is chronic.  He has chronic venous stasis changes.  I encouraged him to be a little bit more strict with reducing sodium      Review of Systems "   Constitutional: Negative for chills.   Cardiovascular:  Negative for chest pain, claudication, cyanosis, dyspnea on exertion, irregular heartbeat, leg swelling, near-syncope, orthopnea, palpitations, paroxysmal nocturnal dyspnea and syncope.   Respiratory:  Negative for cough and shortness of breath.    Gastrointestinal:  Negative for heartburn and nausea.   Neurological:  Negative for dizziness, focal weakness, headaches, light-headedness and weakness.   All other systems reviewed and are negative.            Assessment  Diagnoses and all orders for this visit:    Chronic heart failure with preserved ejection fraction (HFpEF) (HCC)    Permanent atrial fibrillation (HCC)    Benign essential hypertension    Obstructive sleep apnea    Mixed hyperlipidemia    Pre-diabetes        Past Medical History:   Diagnosis Date    Atrial fibrillation (HCC)     Cardiomyopathy (HCC)     LAST ASSESSED [id number]    Cataract     Dysphagia     GERD (gastroesophageal reflux disease)     Hyperlipidemia     Hypertension     Irregular heart beat     afib    Rosacea     Schatzki's ring     Sleep apnea     no cpap       Past Surgical History:   Procedure Laterality Date    BACK SURGERY      CATARACT EXTRACTION      COLONOSCOPY N/A 1/15/2016    Procedure: COLONOSCOPY;  Surgeon: Melly Kumar MD;  Location: [facility];  Service:     ESOPHAGOGASTRODUODENOSCOPY      EYE SURGERY      B/L cataract sx (2008,2009)    ID CIRCUMCISION AGE >28 DAYS N/A 1/29/2018    Procedure: CIRCUMCISION ADULT;  Surgeon: Charles Hannah MD;  Location: [facility];  Service: Urology    ID ESOPHAGOGASTRODUODENOSCOPY TRANSORAL DIAGNOSTIC N/A 2/15/2017    Procedure: ESOPHAGOGASTRODUODENOSCOPY (EGD) WITH DILATION;  Surgeon: Marbin Bullard MD;  Location: [facility];  Service: Gastroenterology           Allergies  No Known Allergies      Medications    Current Outpatient Medications:     amLODIPine (NORVASC) 5 mg tablet, Take 1 tablet (5 mg total) by mouth daily at  bedtime, Disp: 90 tablet, Rfl: 3    apixaban (Eliquis) 5 mg, Take 1 tablet (5 mg total) by mouth 2 (two) times a day, Disp: 180 tablet, Rfl: 1    atenolol (TENORMIN) 50 mg tablet, Take 1 tablet (50 mg total) by mouth 2 (two) times a day, Disp: 180 tablet, Rfl: 3    fluticasone (FLONASE) 50 mcg/act nasal spray, Spray 2 squirts in each nostril once daily, Disp: 16 g, Rfl: 3    furosemide (LASIX) 40 mg tablet, Take 1 tablet twice a day morning and afternoon for 3 days then go to 1 tablet daily., Disp: 94 tablet, Rfl: 3    hydrocortisone 2.5 % cream, Apply topically in the morning, Disp: 56 g, Rfl: 3    losartan (COZAAR) 50 mg tablet, Take 1 tablet (50 mg total) by mouth daily, Disp: 90 tablet, Rfl: 3    Multiple Vitamin (multivitamin) tablet, Take 1 tablet by mouth daily, Disp: , Rfl:     omeprazole (PriLOSEC) 20 mg delayed release capsule, Take 1 capsule (20 mg total) by mouth daily, Disp: 90 capsule, Rfl: 3    simvastatin (ZOCOR) 40 mg tablet, Take 1 tablet (40 mg total) by mouth daily at bedtime, Disp: 90 tablet, Rfl: 3    tiZANidine (ZANAFLEX) 4 mg tablet, Take 1 tablet (4 mg total) by mouth every 8 (eight) hours as needed for muscle spasms, Disp: 20 tablet, Rfl: 0    Vitamin D, Cholecalciferol, 1000 UNITS CAPS, Take by mouth 2,000 per day, Disp: , Rfl:     triamcinolone (KENALOG) 0.1 % ointment, APPLY TOPICALLY DAILY FOR 2 WEEKS (Patient not taking: Reported on 10/7/2024), Disp: 454 g, Rfl: 0      Social History     Socioeconomic History    Marital status: /Civil Union     Spouse name: Not on file    Number of children: Not on file    Years of education: Not on file    Highest education level: Not on file   Occupational History    Not on file   Tobacco Use    Smoking status: Former     Types: Cigarettes    Smokeless tobacco: Never    Tobacco comments:     about 55 years ago,  unknown start date quit 1996   Vaping Use    Vaping status: Never Used   Substance and Sexual Activity    Alcohol use: Yes      Alcohol/week: 7.0 - 14.0 standard drinks of alcohol     Types: 7 - 14 Glasses of wine per week     Comment: 1-2 glasses of wine daily    Drug use: No    Sexual activity: Not Currently   Other Topics Concern    Not on file   Social History Narrative    Not on file     Social Determinants of Health     Financial Resource Strain: Low Risk  (1/30/2024)    Overall Financial Resource Strain (CARDIA)     Difficulty of Paying Living Expenses: Not hard at all   Food Insecurity: Not on file   Transportation Needs: No Transportation Needs (1/30/2024)    PRAPARE - Transportation     Lack of Transportation (Medical): No     Lack of Transportation (Non-Medical): No   Physical Activity: Not on file   Stress: Not on file   Social Connections: Not on file   Intimate Partner Violence: Not on file   Housing Stability: Not on file       Family History   Problem Relation Age of Onset    No Known Problems Mother     Heart disease Father     Coronary artery disease Father     No Known Problems Sister     No Known Problems Sister     Heart disease Brother     Heart disease Brother     Coronary artery disease Family     Hyperlipidemia Family     Hypertension Family     Other Family         SUDDEN CARDIAC DEATH        Physical Exam  Vitals and nursing note reviewed.   Constitutional:       General: He is not in acute distress.     Appearance: He is obese.   HENT:      Head: Normocephalic and atraumatic.   Eyes:      Extraocular Movements: Extraocular movements intact.      Conjunctiva/sclera: Conjunctivae normal.   Cardiovascular:      Rate and Rhythm: Normal rate and regular rhythm.      Pulses: Intact distal pulses.      Heart sounds: Normal heart sounds.   Pulmonary:      Effort: Pulmonary effort is normal.      Breath sounds: Normal breath sounds.   Abdominal:      General: Bowel sounds are normal.      Palpations: Abdomen is soft.   Musculoskeletal:         General: Normal range of motion.      Cervical back: Normal range of motion and  "neck supple.      Right lower leg: Edema present.      Left lower leg: Edema present.      Comments: Chronic venous stasis changes   Skin:     General: Skin is warm and dry.   Neurological:      Mental Status: He is alert and oriented to person, place, and time.   Psychiatric:         Mood and Affect: Mood normal.         Vitals: Blood pressure 142/84, pulse 58, height 5' 9\" (1.753 m), weight 105 kg (232 lb), SpO2 96%.   Wt Readings from Last 3 Encounters:   10/23/24 105 kg (232 lb)   10/16/24 106 kg (233 lb)   10/07/24 108 kg (239 lb)           Labs & Results:  Lab Results   Component Value Date    WBC 5.04 2024    HGB 14.0 2024    HCT 40.4 2024    MCV 97 2024     2024     BNP   Date Value Ref Range Status   2024 231 (H) 0 - 100 pg/mL Final   2023 192 (H) 0 - 100 pg/mL Final     No components found for: \"CHEM\"  Troponin I   Date Value Ref Range Status   2015 <0.02 0.00 - 0.04 ng/mL Final     Comment:     The above 1 analytes were performed by [address] [address]       Results for orders placed during the hospital encounter of 18    Echo complete with contrast if indicated    Narrative  [address] [address] [phone]    Transthoracic Echocardiogram  2D, M-mode, Doppler, and Color Doppler    Study date:  2018    Patient: PRINCE OLMEDO  MR number: [id number]  Account number: [id number]  : 1940  Age: 77 years  Gender: Male  Status: Outpatient  Location: [address] [place] [address] [facility]  Height: 69 in  Weight: 208 lb  BP: 130/ 78 mmHg    Indications: Atrial fibrillation    Diagnoses: I48.0 - Atrial fibrillation    Sonographer:  JOJO Koch  Primary Physician:  COLBY Carey MD  Referring Physician:  Vu Devine MD  Group:  [facility]  Interpreting Physician:  Manuel Gardner MD    SUMMARY    LEFT " VENTRICLE:  Systolic function was at the lower limits of normal. Ejection fraction was estimated to be 50 %.  There were no regional wall motion abnormalities.  Wall thickness was at the upper limits of normal.    RIGHT VENTRICLE:  The ventricle was mildly dilated.  Systolic function was normal.    LEFT ATRIUM:  The atrium was mildly dilated.    RIGHT ATRIUM:  The atrium was mildly dilated.    MITRAL VALVE:  There was mild annular calcification.  There was mild to moderate regurgitation.    TRICUSPID VALVE:  There was mild regurgitation.  Estimated peak PA pressure was 40 mmHg.    HISTORY: PRIOR HISTORY: Hypertension, hyperlipidemia, atrial fibrillation, cardiomyopathy, obstructive sleep apnea, former smoker, prediabetes    PROCEDURE: The study was performed in the [address] [place] [address] [facility]. This was a routine study. The transthoracic approach was used. The study included complete 2D imaging, M-mode, complete spectral Doppler, and color Doppler. Image  quality was adequate.    LEFT VENTRICLE: Size was normal. Systolic function was at the lower limits of normal. Ejection fraction was estimated to be 50 %. There were no regional wall motion abnormalities. Wall thickness was at the upper limits of normal. DOPPLER:  Transmitral flow pattern: atrial fibrillation.    RIGHT VENTRICLE: The ventricle was mildly dilated. Systolic function was normal.    LEFT ATRIUM: The atrium was mildly dilated.    RIGHT ATRIUM: The atrium was mildly dilated.    MITRAL VALVE: There was mild annular calcification. Valve structure was normal. There was normal leaflet separation. DOPPLER: The transmitral velocity was within the normal range. There was no evidence for stenosis. There was mild to  moderate regurgitation.    AORTIC VALVE: The valve was trileaflet. Leaflets exhibited mildly increased thickness and normal cuspal separation. DOPPLER: Transaortic velocity was within the normal range. There was no evidence for stenosis. There  was no regurgitation.    TRICUSPID VALVE: The valve structure was normal. There was normal leaflet separation. DOPPLER: The transtricuspid velocity was within the normal range. There was no evidence for stenosis. There was mild regurgitation. Pulmonary artery  systolic pressure was mildly increased. Estimated peak PA pressure was 40 mmHg.    PULMONIC VALVE: Leaflets exhibited normal thickness, no calcification, and normal cuspal separation. DOPPLER: The transpulmonic velocity was within the normal range. There was mild regurgitation.    PERICARDIUM: There was no pericardial effusion.    AORTA: The root exhibited normal size.    SYSTEMIC VEINS: IVC: The inferior vena cava was normal in size.    SYSTEM MEASUREMENT TABLES    2D  %FS: 27.43 %  AV Diam: 3.63 cm  EDV(Teich): 103.95 ml  EF(Cube): 61.78 %  EF(Teich): 53.27 %  ESV(Cube): 40.48 ml  ESV(Teich): 48.57 ml  IVSd: 1.05 cm  LA Area: 22.88 cm2  LA Diam: 3.99 cm  LVEDV MOD A4C: 132.92 ml  LVEF MOD A4C: 44.61 %  LVESV MOD A4C: 73.63 ml  LVIDd: 4.73 cm  LVIDs: 3.43 cm  LVLd A4C: 7.57 cm  LVLs A4C: 6.65 cm  LVPWd: 1.09 cm  RA Area: 27.19 cm2  RV Diam.: 4.56 cm  SV MOD A4C: 59.29 ml  SV(Cube): 65.42 ml  SV(Teich): 55.38 ml    CW  TR Vmax: 2.95 m/s  TR maxP.72 mmHg    MM  TAPSE: 1.6 cm    [employer] Commission Accredited Echocardiography Laboratory    Prepared and electronically signed by    Manuel Gardner MD  Signed 2018 16:49:48    No results found for this or any previous visit.    No valid procedures specified.  Results for orders placed during the hospital encounter of 23    NM myocardial perfusion spect (stress and/or rest)    Interpretation Summary    Stress ECG: No ST deviation is noted. The ECG was negative for ischemia. The stress ECG is negative for ischemia after maximal exercise, without reproduction of symptoms.    Perfusion: There are no perfusion defects.    Stress Function: Left ventricular function post-stress is low normal. Stress  ejection fraction is 45 %.    Stress Combined Conclusion: Left ventricular perfusion is normal.                This note was completed in part utilizing [employer]. direct voice recognition software.   Grammatical errors, random word insertion, spelling mistakes, and incomplete sentences may be an occasional consequence of the system secondary to software limitations, ambient noise and hardware issues. At the time of dictation, efforts were made to edit, clarify and /or correct errors.  Please read the chart carefully and recognize, using context, where substitutions have occurred.  If you have any questions or concerns about the context, text or information contained within the body of this dictation, please contact myself, the provider, for further clarification

## 2024-10-23 NOTE — PATIENT INSTRUCTIONS
"Patient Education     High-potassium diet   The Basics   Written by the doctors and editors at [facility]   Why do I need a high-potassium diet? -- Potassium is a mineral found in many foods. The body needs the right amount of potassium to work well. If you do not have enough potassium in your blood, you can have problems like muscle weakness, irregular heartbeat, or kidney problems. The medical term for low potassium is \"hypokalemia.\"  Your body can lose too much potassium if:   You have had a lot of vomiting or diarrhea.   You take a medicine called a \"diuretic\" - These medicines make you urinate much more than usual.  Hypokalemia is usually treated with potassium supplements. But what you eat can also affect your potassium level. Learn how much potassium is in the food you eat. Read food labels carefully to see if they list how much potassium is in a serving. Reading the labels helps you make healthy food choices.  Follow your doctor's instructions about how much potassium to eat.  What can I eat and drink on a high-potassium diet? -- All kinds of foods can have potassium in them. Some have more potassium than others (table 1).   Grains - Whole-grain breads, wheat bran, granola, granola bars.   Fruits - Apricots, avocado, bananas, coconut, melons, kiwi, martha, nectarines, oranges, orange juice, papaya, plantains, pomegranate (and juice), dried fruits (apricots, dates, figs, prunes, raisins), prune juice, yams, grapefruit, grapefruit juice.   Vegetables - Bamboo shoots, baked or refried beans, beets, broccoli (cooked), [place] sprouts, cabbage (Chinese), carrots (raw), chard (cooked), greens (except kale), kohlrabi, mushrooms (white, cooked), potatoes (white and sweet), yams, parsnips, pickles, pumpkin, rutabaga, sauerkraut, spinach (cooked), squash (acorn, butternut, [place]), tomato, tomato sauce, tomato juice, vegetable juice cocktail, beets, artichokes, okra.   Dairy - Milk and milk products, buttermilk, " "yogurt.   Lean meats, poultry, seafood, and proteins - Clams, sardines, scallops, lobster, whitefish, salmon (and most other fish), ground beef, sirloin steak (and most other beef products), refried beans, tofu, adzuki beans, legumes (lentils, augustine beans, kidney beans, black beans, navy beans), nut butter, nuts, most seeds.   Other foods and drinks - Imitation morales bits, salt substitutes, \"lite\" salt made with potassium, chocolate, salt-free broth, molasses, nutritional supplements (sample brand names: Ensure, Boost), coconut water.  What foods and drinks should I avoid on a high-potassium diet? -- There are no foods to avoid on this diet.  What else can I do to increase the potassium in my food? -- Some tips to help keep the potassium in the foods you eat:   Eat fresh fruits and vegetables whenever possible.   Think about how you store or cook your foods. Both freezing and boiling lower the amount of potassium in a food.   Rinse foods to clean them instead of soaking them in water. Soaking foods in water lowers the amount of potassium.   Cook foods with as little water as possible. Use the water that the food was cooked in to cook other foods, because it contains a lot of potassium.   Choose salt substitutes and low-sodium foods. These often use potassium to replace the salt.  All topics are updated as new evidence becomes available and our peer review process is complete.  This topic retrieved from [employer] on: Mar 23, 2024.  Topic 130428 Version 1.0  Release: 32.2.4 - C32.81  © 2024 UpToDate, Inc. and/or its affiliates. All rights reserved.  table 1: Some foods that are high in potassium  Fruits  Vegetables  Proteins  Other    Avocado  Bananas  Coconut  Cantaloupe and honeydew melons  Dates  Dried fruits  Figs  Kiwi  [place]  Nectarines  Oranges and orange juice  Prunes and prune juice  Raisins Artichokes  Baked beans  Beets  Broccoli  [place] sprouts  Cabbage (raw)  Carrots (raw)  Chard  Olives  Potatoes (white " and sweet)  Pickles  Pumpkin  [place]aga  Squash (acorn, butternut, pickard)  Tomatoes and tomato juice Black beans  Clams  Ground beef  Kidney beans  Lobster  Navy beans  Mariscal beans  [place]  Sardines  Scallops  Steak  [place] Chocolate  Dairy products  Granola  Milk  Peanut butter  Soups that are salt-free or low-sodium  Soy milk  Sports drinks  Tomato sauce  Wheat bran and bran products  Whole-grain bread  Yogurt   The foods on this list are high in potassium. People who need to follow a low-potassium diet should avoid or limit these foods.  Graphic 09720 Version 3.0  Consumer Information Use and Disclaimer   Disclaimer: This generalized information is a limited summary of diagnosis, treatment, and/or medication information. It is not meant to be comprehensive and should be used as a tool to help the user understand and/or assess potential diagnostic and treatment options. It does NOT include all information about conditions, treatments, medications, side effects, or risks that may apply to a specific patient. It is not intended to be medical advice or a substitute for the medical advice, diagnosis, or treatment of a health care provider based on the health care provider's examination and assessment of a patient's specific and unique circumstances. Patients must speak with a health care provider for complete information about their health, medical questions, and treatment options, including any risks or benefits regarding use of medications. This information does not endorse any treatments or medications as safe, effective, or approved for treating a specific patient. UpToDate, Inc. and its affiliates disclaim any warranty or liability relating to this information or the use thereof.The use of this information is governed by the Terms of Use, available at https://www.wolterskluwer.com/en/know/clinical-effectiveness-terms. 2024© UpToDate, Inc. and its affiliates and/or licensors. All rights reserved.  Copyright    © 2024 [employer] and/or its affiliates. All rights reserved.

## 2024-10-24 ENCOUNTER — CLINICAL SUPPORT (OUTPATIENT)
Dept: INTERNAL MEDICINE CLINIC | Facility: CLINIC | Age: 84
End: 2024-10-24
Payer: MEDICARE

## 2024-10-24 ENCOUNTER — OFFICE VISIT (OUTPATIENT)
Dept: PHYSICAL THERAPY | Facility: CLINIC | Age: 84
End: 2024-10-24
Payer: MEDICARE

## 2024-10-24 DIAGNOSIS — M25.511 ACUTE PAIN OF RIGHT SHOULDER: ICD-10-CM

## 2024-10-24 DIAGNOSIS — M54.2 CERVICALGIA: Primary | ICD-10-CM

## 2024-10-24 DIAGNOSIS — R10.31 RIGHT GROIN PAIN: ICD-10-CM

## 2024-10-24 DIAGNOSIS — Z23 NEED FOR COVID-19 VACCINE: Primary | ICD-10-CM

## 2024-10-24 PROCEDURE — 91320 SARSCV2 VAC 30MCG TRS-SUC IM: CPT

## 2024-10-24 PROCEDURE — 90480 ADMN SARSCOV2 VAC 1/ONLY CMP: CPT

## 2024-10-24 PROCEDURE — 97112 NEUROMUSCULAR REEDUCATION: CPT

## 2024-10-24 PROCEDURE — 97110 THERAPEUTIC EXERCISES: CPT

## 2024-10-24 NOTE — PROGRESS NOTES
Daily Note     Today's date: 10/24/2024  Patient name: Primo Guy  : 1940  MRN: [id number]  Referring provider: Daniel Sanchez,*  Dx:   Encounter Diagnosis     ICD-10-CM    1. Cervicalgia  M54.2       2. Right groin pain  R10.31       3. Acute pain of right shoulder  M25.511                      Subjective: Pt reports 6/10 B cv pain and mild B shoulder pain pre tx. Pt reports his shoulder pain has been less.       Objective: See treatment diary below      Assessment: Pt demonstrated severe restriction with cv side bending > rotation. Pt demonstrated good tolerance to stabilization poc and increased ROM following Graston.       Plan: Continue poc as per PT.      Precautions: A-fib, cardiomyopathy, GERD.    Dx: c/s stenosis with opening preference, R shoulder pain, R groin pain.    Manuals 10/21 10/24           Seated t/s P-A Gr mobs             Saeted c/s Rot clin OP             STM/graston B UT  8 min                        Neuro Re-Ed             Slouch over-correction 1x15 1x15           Seated c/s B Rot 1x10 ea 2x10 ea           Seated c/s B SB  1x5 ea           Posture correction 5 min 3 min                                                               Ther Ex             B TB ER Y/ 1x10 Y/ 2x10           TB Rows  R/ 3x10                                                                                         Ther Activity             STS  Foam/ 2x5                        Gait Training                                       Modalities

## 2024-10-29 ENCOUNTER — OFFICE VISIT (OUTPATIENT)
Dept: PHYSICAL THERAPY | Facility: CLINIC | Age: 84
End: 2024-10-29
Payer: MEDICARE

## 2024-10-29 DIAGNOSIS — M54.2 CERVICALGIA: Primary | ICD-10-CM

## 2024-10-29 DIAGNOSIS — M25.511 ACUTE PAIN OF RIGHT SHOULDER: ICD-10-CM

## 2024-10-29 DIAGNOSIS — R10.31 RIGHT GROIN PAIN: ICD-10-CM

## 2024-10-29 PROCEDURE — 97110 THERAPEUTIC EXERCISES: CPT | Performed by: PHYSICAL THERAPIST

## 2024-10-29 PROCEDURE — 97112 NEUROMUSCULAR REEDUCATION: CPT | Performed by: PHYSICAL THERAPIST

## 2024-10-29 NOTE — PROGRESS NOTES
Daily Note     Today's date: 10/29/2024  Patient name: Primo Guy  : 1940  MRN: [id number]  Referring provider: Daniel Sanchez,*  Dx:   Encounter Diagnosis     ICD-10-CM    1. Cervicalgia  M54.2       2. Right groin pain  R10.31       3. Acute pain of right shoulder  M25.511                      Subjective: Pt reports feeling much better, pt reports 5/10 R upper c/s pain, improved shoulder elevation, min R groin pain.    Objective: See treatment diary below    Assessment: Pt demonstrated improved B c/s Rot AROM.      Plan: Continue POC.     Precautions: A-fib, cardiomyopathy, GERD.    Dx: c/s stenosis with opening preference, R shoulder pain, R groin pain.    Manuals 10/21 10/24 10/29          Seated T2, 3, 4 P-A Gr mobs   1x20 ea                       STM/graston B UT  8 min 8 min                       Neuro Re-Ed             Slouch over-correction 1x15 1x15 1x15          Seated c/s B Rot 1x10 ea 2x10 ea 1x15 ea          Seated c/s B SB  1x5 ea 1x5 ea          Posture correction 5 min 3 min 2 min                                                              Ther Ex             B TB ER Y/ 1x10 Y/ 2x10 Y/ 3x10          TB Rows  R/ 3x10 G/ 3x10                                                                                        Ther Activity             STS  Foam/ 2x5 Foam/ 2x7                       Gait Training                                       Modalities

## 2024-11-01 ENCOUNTER — OFFICE VISIT (OUTPATIENT)
Dept: PHYSICAL THERAPY | Facility: CLINIC | Age: 84
End: 2024-11-01
Payer: MEDICARE

## 2024-11-01 DIAGNOSIS — R10.31 RIGHT GROIN PAIN: ICD-10-CM

## 2024-11-01 DIAGNOSIS — M54.2 CERVICALGIA: Primary | ICD-10-CM

## 2024-11-01 DIAGNOSIS — M25.511 ACUTE PAIN OF RIGHT SHOULDER: ICD-10-CM

## 2024-11-01 PROCEDURE — 97112 NEUROMUSCULAR REEDUCATION: CPT

## 2024-11-01 PROCEDURE — 97110 THERAPEUTIC EXERCISES: CPT

## 2024-11-01 NOTE — PROGRESS NOTES
Daily Note     Today's date: 2024  Patient name: Primo Guy  : 1940  MRN: [id number]  Referring provider: Daniel Sanchez,*  Dx:   Encounter Diagnosis     ICD-10-CM    1. Cervicalgia  M54.2       2. Right groin pain  R10.31       3. Acute pain of right shoulder  M25.511           Start Time: 1403  Stop Time: 1435  Total time in clinic (min): 32 minutes    Subjective: Pt reports continued pain and stiffness along cervical spine, particularly on his R, but overall improved.  Felt better after LV or a little while, but symptoms did return. Unable to define how long he had relief following his LV.  Plans to try sleeping in bed again over the weekend, rather than his recliner.      R groin pain is still present slightly, but much better compared to before starting therapy.        Objective: See treatment diary below.  Educated patient about use of towel roll along edge of pillow, inside pillow case, to help support curvature of cervical spine, should he try sleeping in bed and still experience pain.  Advised should symptoms worsen or not improve with towel roll, to remove and discontinue use.        Assessment: Tolerated treatment well. Continues to respond well to manual IASTM along bilat cervical spine.  Slight discomfort along R UT during STS, likely d/t bracing/posture during this movement.  Patient would benefit from continued PT to further improve strength, decrease pain, and reduce stress to cervical spine.        Plan: Continue per plan of care.      Precautions: A-fib, cardiomyopathy, GERD.    Dx: c/s stenosis with opening preference, R shoulder pain, R groin pain.    Manuals 10/21 10/24 10/29 11/1         Seated T2, 3, 4 P-A Gr mobs   1x20 ea nv                      STM/graston B UT  8 min 8 min 8 min                      Neuro Re-Ed             Slouch over-correction 1x15 1x15 1x15 1x15         Seated c/s B Rot 1x10 ea 2x10 ea 1x15 ea 1x15 ea         Seated c/s B SB  1x5 ea 1x5 ea  1x5 ea         Posture correction 5 min 3 min 2 min 2 min                                                             Ther Ex             B TB ER Y/ 1x10 Y/ 2x10 Y/ 3x10 Y/ 3x10         TB Rows  R/ 3x10 G/ 3x10 G/ 3x10                                                                                       Ther Activity             STS  Foam/ 2x5 Foam/ 2x7 Foam/ 2x8                      Gait Training                                       Modalities

## 2024-11-05 ENCOUNTER — OFFICE VISIT (OUTPATIENT)
Dept: PHYSICAL THERAPY | Facility: CLINIC | Age: 84
End: 2024-11-05
Payer: MEDICARE

## 2024-11-05 DIAGNOSIS — M25.511 ACUTE PAIN OF RIGHT SHOULDER: ICD-10-CM

## 2024-11-05 DIAGNOSIS — M54.2 CERVICALGIA: Primary | ICD-10-CM

## 2024-11-05 DIAGNOSIS — R10.31 RIGHT GROIN PAIN: ICD-10-CM

## 2024-11-05 PROCEDURE — 97110 THERAPEUTIC EXERCISES: CPT | Performed by: PHYSICAL THERAPIST

## 2024-11-05 PROCEDURE — 97112 NEUROMUSCULAR REEDUCATION: CPT | Performed by: PHYSICAL THERAPIST

## 2024-11-05 NOTE — PROGRESS NOTES
Daily Note     Today's date: 2024  Patient name: Primo Guy  : 1940  MRN: [id number]  Referring provider: Daniel Sanchez,*  Dx:   Encounter Diagnosis     ICD-10-CM    1. Cervicalgia  M54.2       2. Right groin pain  R10.31       3. Acute pain of right shoulder  M25.511                      Subjective: Pt reports 7-8/10 R sided neck pain after resuming a light gym routine.  Pt reports 3/10 R groin pain.    Objective: See treatment diary below.      Assessment: Pt demonstrated restricted and painful R c/s Rot which improved after MT.    Plan: Continue per plan of care.      Precautions: A-fib, cardiomyopathy, GERD.    Dx: c/s stenosis with opening preference, R shoulder pain, R groin pain.    Manuals 10/21 10/24 10/29 11/1 11/5        Seated T2, 3, 4 P-A Gr mobs   1x20 ea nv 1x20 ea                     STM/graston B UT  8 min 8 min 8 min 8 min                     Neuro Re-Ed             Slouch over-correction 1x15 1x15 1x15 1x15 1x15        Seated c/s B Rot 1x10 ea 2x10 ea 1x15 ea 1x15 ea 1x15 ea        Seated c/s B SB  1x5 ea 1x5 ea 1x5 ea 1x5 ea        Posture correction 5 min 3 min 2 min 2 min         DLS standing hip flex     nv 1#/ 2x10                                              Ther Ex             B TB ER Y/ 1x10 Y/ 2x10 Y/ 3x10 Y/ 3x10 R/ 3x10        TB Rows  R/ 3x10 G/ 3x10 G/ 3x10 B/ 3x10        WB Rep hip ext     1x10                                                                         Ther Activity             STS  Foam/ 2x5 Foam/ 2x7 Foam/ 2x8 Foam/ 2x8                     Gait Training                                       Modalities

## 2024-11-07 ENCOUNTER — OFFICE VISIT (OUTPATIENT)
Dept: PHYSICAL THERAPY | Facility: CLINIC | Age: 84
End: 2024-11-07
Payer: MEDICARE

## 2024-11-07 DIAGNOSIS — R10.31 RIGHT GROIN PAIN: ICD-10-CM

## 2024-11-07 DIAGNOSIS — M25.511 ACUTE PAIN OF RIGHT SHOULDER: ICD-10-CM

## 2024-11-07 DIAGNOSIS — M54.2 CERVICALGIA: Primary | ICD-10-CM

## 2024-11-07 PROCEDURE — 97112 NEUROMUSCULAR REEDUCATION: CPT | Performed by: PHYSICAL THERAPIST

## 2024-11-07 PROCEDURE — 97110 THERAPEUTIC EXERCISES: CPT | Performed by: PHYSICAL THERAPIST

## 2024-11-07 NOTE — PROGRESS NOTES
"Daily Note     Today's date: 2024  Patient name: Primo Guy  : 1940  MRN: [id number]  Referring provider: Daniel Sanchez,*  Dx:   Encounter Diagnosis     ICD-10-CM    1. Cervicalgia  M54.2       2. Right groin pain  R10.31       3. Acute pain of right shoulder  M25.511                      Subjective: Pt reports improved neck pain but after 2 days of sleeping supine in bed he reports severe R shoulder pain and an inability to lift his arm overhead.    Objective: See treatment diary below.      Assessment: AROM flex: 90 deg, improved to 110 after pulleys, NE with rep sh ext and IR.  Added cane AAROM flex for HEP.    Plan: Continue per plan of care.      Precautions: A-fib, cardiomyopathy, GERD.    Dx: c/s stenosis with opening preference, R shoulder pain, R groin pain.    Manuals 10/21 10/24 10/29 11/1 11/5 11/7       Seated T2, 3, 4 P-A Gr mobs   1x20 ea nv 1x20 ea 1x20                    STM/graston B UT  8 min 8 min 8 min 8 min 8 min                    Neuro Re-Ed             Slouch over-correction 1x15 1x15 1x15 1x15 1x15 1x15       Seated c/s B Rot 1x10 ea 2x10 ea 1x15 ea 1x15 ea 1x15 ea 1x15 ea       Seated c/s B SB  1x5 ea 1x5 ea 1x5 ea 1x5 ea        Posture correction 5 min 3 min 2 min 2 min         DLS standing hip flex     nv 1#/ 2x10                                              Ther Ex             B TB ER Y/ 1x10 Y/ 2x10 Y/ 3x10 Y/ 3x10 R/ 3x10 R/ 3x10       TB Rows  R/ 3x10 G/ 3x10 G/ 3x10 B/ 3x10        WB Rep hip ext     1x10 1x10       Pulleys: flex      3\"/ 2x10                                                           Ther Activity             STS  Foam/ 2x5 Foam/ 2x7 Foam/ 2x8 Foam/ 2x8                     Gait Training                                       Modalities                                              "

## 2024-11-11 ENCOUNTER — OFFICE VISIT (OUTPATIENT)
Dept: PHYSICAL THERAPY | Facility: CLINIC | Age: 84
End: 2024-11-11
Payer: MEDICARE

## 2024-11-11 DIAGNOSIS — R10.31 RIGHT GROIN PAIN: ICD-10-CM

## 2024-11-11 DIAGNOSIS — M54.2 CERVICALGIA: Primary | ICD-10-CM

## 2024-11-11 DIAGNOSIS — M25.511 ACUTE PAIN OF RIGHT SHOULDER: ICD-10-CM

## 2024-11-11 PROCEDURE — 97110 THERAPEUTIC EXERCISES: CPT

## 2024-11-11 PROCEDURE — 97112 NEUROMUSCULAR REEDUCATION: CPT

## 2024-11-11 NOTE — PROGRESS NOTES
"Daily Note     Today's date: 2024  Patient name: Primo Guy  : 1940  MRN: [id number]  Referring provider: Daniel Sanchez,*  Dx:   Encounter Diagnosis     ICD-10-CM    1. Cervicalgia  M54.2       2. Right groin pain  R10.31       3. Acute pain of right shoulder  M25.511           Start Time: 1400  Stop Time: 1435  Total time in clinic (min): 35 minutes    Subjective: Patient reports, \"6/10\" neck pain prior to therapy. However, he notes improved neck pain and ability to sleep. Notes his R shoulder motion is getting better but still moderately hurts. Notes his R groin hasn't been bothering him lately.     Objective: See treatment diary below.      Assessment: Patient showing improvement with current exercise program. Continue to progress as able.     Plan: Continue per plan of care.      Precautions: A-fib, cardiomyopathy, GERD.    Dx: c/s stenosis with opening preference, R shoulder pain, R groin pain.    Manuals 10/21 10/24 10/29 11/1 11/5 11/7 11/11      Seated T2, 3, 4 P-A Gr mobs   1x20 ea nv 1x20 ea 1x20                    STM/graston B UT  8 min 8 min 8 min 8 min 8 min 8 min                   Neuro Re-Ed             Slouch over-correction 1x15 1x15 1x15 1x15 1x15 1x15 1x15      Seated c/s B Rot 1x10 ea 2x10 ea 1x15 ea 1x15 ea 1x15 ea 1x15 ea 1x15 ea      Seated c/s B SB  1x5 ea 1x5 ea 1x5 ea 1x5 ea        Posture correction 5 min 3 min 2 min 2 min         DLS standing hip flex     nv 1#/ 2x10 1#  2x10                                             Ther Ex             B TB ER Y/ 1x10 Y/ 2x10 Y/ 3x10 Y/ 3x10 R/ 3x10 R/ 3x10 R/  3x10      TB Rows  R/ 3x10 G/ 3x10 G/ 3x10 B/ 3x10        WB Rep hip ext     1x10 1x10 1x10      Pulleys: flex      3\"/ 2x10 3\"/  2x10                                                          Ther Activity             STS  Foam/ 2x5 Foam/ 2x7 Foam/ 2x8 Foam/ 2x8                     Gait Training                                       Modalities                      "

## 2024-11-14 ENCOUNTER — OFFICE VISIT (OUTPATIENT)
Dept: PHYSICAL THERAPY | Facility: CLINIC | Age: 84
End: 2024-11-14
Payer: MEDICARE

## 2024-11-14 DIAGNOSIS — R10.31 RIGHT GROIN PAIN: ICD-10-CM

## 2024-11-14 DIAGNOSIS — M25.511 ACUTE PAIN OF RIGHT SHOULDER: ICD-10-CM

## 2024-11-14 DIAGNOSIS — M54.2 CERVICALGIA: Primary | ICD-10-CM

## 2024-11-14 PROCEDURE — 97110 THERAPEUTIC EXERCISES: CPT | Performed by: PHYSICAL THERAPIST

## 2024-11-14 PROCEDURE — 97112 NEUROMUSCULAR REEDUCATION: CPT | Performed by: PHYSICAL THERAPIST

## 2024-11-14 NOTE — PROGRESS NOTES
"Daily Note     Today's date: 2024  Patient name: Primo Guy  : 1940  MRN: [id number]  Referring provider: Daniel Sanchez,*  Dx:   Encounter Diagnosis     ICD-10-CM    1. Cervicalgia  M54.2       2. Right groin pain  R10.31       3. Acute pain of right shoulder  M25.511                      Subjective: Patient reports 5/10 R lower c/s pain, min R sh and groin pain today.     Objective: See treatment diary below.      Assessment: Patient demonstrated painful and limited R Rot that improved after Graston, improved R shoulder elevation ROM.    Plan: Continue per plan of care.      Precautions: A-fib, cardiomyopathy, GERD.    Dx: c/s stenosis with opening preference, R shoulder pain, R groin pain.    Manuals 10/21 10/24 10/29 11/1 11/5 11/7 11/11 11/14     Seated T2, 3, 4 P-A Gr mobs   1x20 ea nv 1x20 ea 1x20  1x20                  STM/graston B UT  8 min 8 min 8 min 8 min 8 min 8 min 8 min                  Neuro Re-Ed             Slouch over-correction 1x15 1x15 1x15 1x15 1x15 1x15 1x15 1x15     Seated c/s B Rot 1x10 ea 2x10 ea 1x15 ea 1x15 ea 1x15 ea 1x15 ea 1x15 ea 1x15 ea     Seated c/s B SB  1x5 ea 1x5 ea 1x5 ea 1x5 ea        Posture correction 5 min 3 min 2 min 2 min         DLS standing hip flex     nv 1#/ 2x10 1#  2x10 2#/ 2x10                                            Ther Ex             B TB ER Y/ 1x10 Y/ 2x10 Y/ 3x10 Y/ 3x10 R/ 3x10 R/ 3x10 R/  3x10 R/ 3x12     TB Rows  R/ 3x10 G/ 3x10 G/ 3x10 B/ 3x10   Dillon/ 3x10     WB Rep hip ext     1x10 1x10 1x10      Pulleys: flex      3\"/ 2x10 3\"/  2x10 3\"/ 2x10                                                         Ther Activity             STS  Foam/ 2x5 Foam/ 2x7 Foam/ 2x8 Foam/ 2x8   Foam/ 2x10                  Gait Training                                       Modalities                                          "

## 2024-11-19 ENCOUNTER — OFFICE VISIT (OUTPATIENT)
Dept: PHYSICAL THERAPY | Facility: CLINIC | Age: 84
End: 2024-11-19
Payer: MEDICARE

## 2024-11-19 DIAGNOSIS — R10.31 RIGHT GROIN PAIN: ICD-10-CM

## 2024-11-19 DIAGNOSIS — M54.2 CERVICALGIA: Primary | ICD-10-CM

## 2024-11-19 DIAGNOSIS — M25.511 ACUTE PAIN OF RIGHT SHOULDER: ICD-10-CM

## 2024-11-19 PROCEDURE — 97110 THERAPEUTIC EXERCISES: CPT | Performed by: PHYSICAL THERAPIST

## 2024-11-19 PROCEDURE — 97112 NEUROMUSCULAR REEDUCATION: CPT | Performed by: PHYSICAL THERAPIST

## 2024-11-19 NOTE — PROGRESS NOTES
"Daily Note     Today's date: 2024  Patient name: Primo Guy  : 1940  MRN: [id number]  Referring provider: Daniel Sanchez,*  Dx:   Encounter Diagnosis     ICD-10-CM    1. Cervicalgia  M54.2       2. Right groin pain  R10.31       3. Acute pain of right shoulder  M25.511                      Subjective: Patient reports 4-5/10 R lower c/s pain, min R sh and groin pain.     Objective: See treatment diary below.      Assessment: Patient demonstrated improved STS tolerance and c/s ROM.    Plan: Continue per plan of care.      Precautions: A-fib, cardiomyopathy, GERD.    Dx: c/s stenosis with opening preference, R shoulder pain, R groin pain.    Manuals 10/21 10/24 10/29 11/1 11/5 11/7 11/11 11/14 11/19    Seated T2, 3, 4 P-A Gr mobs   1x20 ea nv 1x20 ea 1x20  1x20 1x20                 STM/graston B UT  8 min 8 min 8 min 8 min 8 min 8 min 8 min 8 min                 Neuro Re-Ed             Slouch over-correction 1x15 1x15 1x15 1x15 1x15 1x15 1x15 1x15 1x15    Seated c/s B Rot 1x10 ea 2x10 ea 1x15 ea 1x15 ea 1x15 ea 1x15 ea 1x15 ea 1x15 ea 1x15 ea    Seated c/s B SB  1x5 ea 1x5 ea 1x5 ea 1x5 ea        Posture correction 5 min 3 min 2 min 2 min         DLS standing hip flex     nv 1#/ 2x10 1#  2x10 2#/ 2x10 3#/ 2x10                                           Ther Ex             B TB ER Y/ 1x10 Y/ 2x10 Y/ 3x10 Y/ 3x10 R/ 3x10 R/ 3x10 R/  3x10 R/ 3x12 G/ 3x10    TB Rows  R/ 3x10 G/ 3x10 G/ 3x10 B/ 3x10   Dillon/ 3x10 Dillon/ 3x10    WB Rep hip ext     1x10 1x10 1x10      Pulleys: flex      3\"/ 2x10 3\"/  2x10 3\"/ 2x10 3\"/ 2x10                                                        Ther Activity             STS  Foam/ 2x5 Foam/ 2x7 Foam/ 2x8 Foam/ 2x8   Foam/ 2x10 Foam/ 2x10                 Gait Training                                       Modalities                                          "

## 2024-11-21 ENCOUNTER — OFFICE VISIT (OUTPATIENT)
Dept: PHYSICAL THERAPY | Facility: CLINIC | Age: 84
End: 2024-11-21
Payer: MEDICARE

## 2024-11-21 DIAGNOSIS — M54.2 CERVICALGIA: Primary | ICD-10-CM

## 2024-11-21 DIAGNOSIS — M25.511 ACUTE PAIN OF RIGHT SHOULDER: ICD-10-CM

## 2024-11-21 DIAGNOSIS — R10.31 RIGHT GROIN PAIN: ICD-10-CM

## 2024-11-21 PROCEDURE — 97112 NEUROMUSCULAR REEDUCATION: CPT | Performed by: PHYSICAL THERAPIST

## 2024-11-21 PROCEDURE — 97110 THERAPEUTIC EXERCISES: CPT | Performed by: PHYSICAL THERAPIST

## 2024-11-21 NOTE — PROGRESS NOTES
"Daily Note     Today's date: 2024  Patient name: Primo Guy  : 1940  MRN: [id number]  Referring provider: Daniel Sanchez,*  Dx:   Encounter Diagnosis     ICD-10-CM    1. Cervicalgia  M54.2       2. Right groin pain  R10.31       3. Acute pain of right shoulder  M25.511                      Subjective: Patient reports 7-8/10 R c/s pain is first thing in the morning that improves with a warm shoulder, meds and HEP, min R sh and groin pain.     Objective: See treatment diary below.      Assessment: Patient demonstrated limited and painful c/s R Rot, improved shoulder ROM.    Plan: Continue per plan of care.      Precautions: A-fib, cardiomyopathy, GERD.    Dx: c/s stenosis with opening preference, R shoulder pain, R groin pain.    Manuals 10/21 10/24 10/29 11/1 11/5 11/7 11/11 11/14 11/19 11/21   Seated T2, 3, 4 P-A Gr mobs   1x20 ea nv 1x20 ea 1x20  1x20 1x20 1x20                STM/graston B UT  8 min 8 min 8 min 8 min 8 min 8 min 8 min 8 min 8 min                Neuro Re-Ed             Slouch over-correction 1x15 1x15 1x15 1x15 1x15 1x15 1x15 1x15 1x15 1x15   Seated c/s B Rot 1x10 ea 2x10 ea 1x15 ea 1x15 ea 1x15 ea 1x15 ea 1x15 ea 1x15 ea 1x15 ea 1x15 ea   Seated c/s B SB  1x5 ea 1x5 ea 1x5 ea 1x5 ea        Posture correction 5 min 3 min 2 min 2 min         DLS standing hip flex     nv 1#/ 2x10 1#  2x10 2#/ 2x10 3#/ 2x10 3#/ 3x10                                          Ther Ex             B TB ER Y/ 1x10 Y/ 2x10 Y/ 3x10 Y/ 3x10 R/ 3x10 R/ 3x10 R/  3x10 R/ 3x12 G/ 3x10 G/ 3x10   TB Rows  R/ 3x10 G/ 3x10 G/ 3x10 B/ 3x10   Dillon/ 3x10 Dillon/ 3x10 Dillon/ 3x10   WB Rep hip ext     1x10 1x10 1x10      Pulleys: flex      3\"/ 2x10 3\"/  2x10 3\"/ 2x10 3\"/ 2x10 3\"/ 2x10                                                       Ther Activity             STS  Foam/ 2x5 Foam/ 2x7 Foam/ 2x8 Foam/ 2x8   Foam/ 2x10 Foam/ 2x10 Foam/ 2x10                Gait Training                                     "   Modalities

## 2024-11-26 ENCOUNTER — OFFICE VISIT (OUTPATIENT)
Dept: PHYSICAL THERAPY | Facility: CLINIC | Age: 84
End: 2024-11-26
Payer: MEDICARE

## 2024-11-26 DIAGNOSIS — R10.31 RIGHT GROIN PAIN: ICD-10-CM

## 2024-11-26 DIAGNOSIS — M54.2 CERVICALGIA: Primary | ICD-10-CM

## 2024-11-26 DIAGNOSIS — M25.511 ACUTE PAIN OF RIGHT SHOULDER: ICD-10-CM

## 2024-11-26 PROCEDURE — 97110 THERAPEUTIC EXERCISES: CPT | Performed by: PHYSICAL THERAPIST

## 2024-11-26 PROCEDURE — 97112 NEUROMUSCULAR REEDUCATION: CPT | Performed by: PHYSICAL THERAPIST

## 2024-11-26 NOTE — PROGRESS NOTES
PT Evaluation     Today's date: 2024  Patient name: Primo Guy  : 1940  MRN: [id number]  Referring provider: Daniel Sanchez,*  Dx:   Encounter Diagnosis     ICD-10-CM    1. Cervicalgia  M54.2       2. Right groin pain  R10.31       3. Acute pain of right shoulder  M25.511                        Assessment  Impairments: abnormal gait, abnormal or restricted ROM, abnormal movement, activity intolerance, impaired physical strength, lacks appropriate home exercise program, pain with function, poor posture  and unable to perform ADL  Symptom irritability: low    Assessment details: Pt has been making steady progress with cervical/shoulder AROM, shoulder ER/IR strength and postural control. Pt compliance with HEP and activity modification contributing positively to current level of progress. Pt would benefit from continued PT intervention to address current impairments and returning to PLOF.     Goals  ST-4 weeks.  1.  Pt will decrease pain > 50%. (Goal met)  2.  Pt will increase B c/s Rot to < or += mod restriction without pain. (Goal met)     LT-8 weeks.  1.  Pt will decrease pain > 75%. (Progressing toward)  2.  Pt will transfer from STS and supine to sit without pain. (Progressing toward)    Plan    Frequency: 2x week  Duration in weeks: 6      Subjective Evaluation    History of Present Illness  Mechanism of injury: Pt is a 84 yom c/o a sudden onset of c/s pain, R shoulder and R groin pain that began on 10/21/24 after changing the seat of his . Pt notes that he has been feeling great in caparison to how he was prior to beginning PT and is happy with current progress. Pt primary complaint now is morning stiffness and occasional pain at night when sleeping but it is significantly improved from when he began.     PMHx: A-fib, cardiomyopathy, GERD.  Patient Goals  Patient goals for therapy: decreased edema, decreased pain, increased motion, increased strength, independence  with ADLs/IADLs and return to sport/leisure activities    Pain  Current pain rating: 3  At best pain ratin  At worst pain ratin  Location: lower cervical spine, L>R scap, UT  Quality: sharp, radiating and dull ache  Relieving factors: rest (muscle relaxers)  Exacerbated by: looking up, looking L, reaching overhead, STS, supine to sit.  Progression: improved        Objective     Postural Observations  Seated posture: fair  Standing posture: fair  Correction of posture: makes symptoms better      Active Range of Motion   Cervical/Thoracic Spine       Cervical    Subcranial retraction:   Restriction level: maximal  Flexion:  Restriction level: minimal  Extension:  Restriction level: moderate  Left lateral flexion:  Restriction level: maximal  Right lateral flexion:  with pain Restriction level maximal  Left rotation:  Restriction level: moderate  Right rotation:  with pain Restriction level: moderate    Thoracic    Flexion:  Restriction level: moderate  Extension:  Restriction level: maximal  Left rotation:  Restriction level: moderate  Right rotation:  Restriction level: moderate  Left Shoulder   Flexion: 145 degrees   Abduction: 135 degrees   External rotation 0°: 70 degrees   Internal rotation BTB: L1     Right Shoulder   Flexion: 145 degrees   Abduction: 135 degrees   External rotation 0°: 80 degrees   Internal rotation BTB: T7     Strength/Myotome Testing     Left Shoulder     Planes of Motion   External rotation at 0°: 4+   Internal rotation at 0°: 5     Right Shoulder     Planes of Motion   External rotation at 0°: 4+   Internal rotation at 0°: 5     Tests     Additional Tests Details  STS: mod UE assist.             Precautions: A-fib, cardiomyopathy, GERD.    Dx: c/s stenosis with opening preference, R shoulder pain, R groin pain.    Manuals    Seated T2, 3, 4 P-A Gr mobs 1x20   nv 1x20 ea 1x20  1x20 1x20 1x20                STM/graston B UT 5 min   8 min 8  "min 8 min 8 min 8 min 8 min 8 min                Neuro Re-Ed             Slouch over-correction    1x15 1x15 1x15 1x15 1x15 1x15 1x15   Seated c/s B Rot 1x15 ea   1x15 ea 1x15 ea 1x15 ea 1x15 ea 1x15 ea 1x15 ea 1x15 ea   Seated c/s B SB    1x5 ea 1x5 ea        Posture correction    2 min         DLS standing hip flex 3# 3x10    nv 1#/ 2x10 1#  2x10 2#/ 2x10 3#/ 2x10 3#/ 3x10                                          Ther Ex             B TB ER G/ 3x10   Y/ 3x10 R/ 3x10 R/ 3x10 R/  3x10 R/ 3x12 G/ 3x10 G/ 3x10   TB Rows Dillon/ 3x10   G/ 3x10 B/ 3x10   Dillon/ 3x10 Dillon/ 3x10 Dillon/ 3x10   WB Rep hip ext     1x10 1x10 1x10      Pulleys: flex 3\"/ 2x10     3\"/ 2x10 3\"/  2x10 3\"/ 2x10 3\"/ 2x10 3\"/ 2x10                                                       Ther Activity             STS Foam 2x10   Foam/ 2x8 Foam/ 2x8   Foam/ 2x10 Foam/ 2x10 Foam/ 2x10                Gait Training                                       Modalities                                            "

## 2024-12-02 ENCOUNTER — OFFICE VISIT (OUTPATIENT)
Dept: PHYSICAL THERAPY | Facility: CLINIC | Age: 84
End: 2024-12-02
Payer: MEDICARE

## 2024-12-02 DIAGNOSIS — M25.511 ACUTE PAIN OF RIGHT SHOULDER: ICD-10-CM

## 2024-12-02 DIAGNOSIS — M54.2 CERVICALGIA: Primary | ICD-10-CM

## 2024-12-02 DIAGNOSIS — R10.31 RIGHT GROIN PAIN: ICD-10-CM

## 2024-12-02 PROCEDURE — 97112 NEUROMUSCULAR REEDUCATION: CPT

## 2024-12-02 PROCEDURE — 97110 THERAPEUTIC EXERCISES: CPT

## 2024-12-02 NOTE — PROGRESS NOTES
"Daily Note     Today's date: 2024  Patient name: Primo Guy  : 1940  MRN: [id number]  Referring provider: Daniel Sanchez,*  Dx:   Encounter Diagnosis     ICD-10-CM    1. Cervicalgia  M54.2       2. Right groin pain  R10.31       3. Acute pain of right shoulder  M25.511           Start Time: 1400  Stop Time: 1435  Total time in clinic (min): 35 minutes    Subjective: Patient reports, \"4-5/10\" R sided cervical pain at most prior to therapy.     Objective: See treatment diary below    Assessment: Tolerated treatment well. Patient demonstrated fatigue post treatment, exhibited good technique with therapeutic exercises, and would benefit from continued PT. Improved R middle scalene tone following STM.    Plan: Continue per plan of care.      Precautions: A-fib, cardiomyopathy, GERD.    Dx: c/s stenosis with opening preference, R shoulder pain, R groin pain.    Manuals    Seated T2, 3, 4 P-A Gr mobs 1x20       1x20 1x20 1x20                STM/graston B UT 5 min 5 min      8 min 8 min 8 min                Neuro Re-Ed             Slouch over-correction        1x15 1x15 1x15   Seated c/s B Rot 1x15 ea 1x15 ea      1x15 ea 1x15 ea 1x15 ea   Seated c/s B SB             Posture correction             DLS standing hip flex 3# 3x10 3#  3x10      2#/ 2x10 3#/ 2x10 3#/ 3x10                                          Ther Ex             B TB ER G/ 3x10 G/  3x10      R/ 3x12 G/ 3x10 G/ 3x10   TB Rows Dillon/ 3x10 Dillon/ 3x10      Dillon/ 3x10 Dillon/ 3x10 Dillon/ 3x10   WB Rep hip ext             Pulleys: flex 3\"/ 2x10 3\"/ 2x10      3\"/ 2x10 3\"/ 2x10 3\"/ 2x10                                                       Ther Activity             STS Foam 2x10 Foam  3x10      Foam/ 2x10 Foam/ 2x10 Foam/ 2x10                Gait Training                                       Modalities                                          "

## 2024-12-03 ENCOUNTER — APPOINTMENT (OUTPATIENT)
Dept: PHYSICAL THERAPY | Facility: CLINIC | Age: 84
End: 2024-12-03
Payer: MEDICARE

## 2024-12-05 ENCOUNTER — APPOINTMENT (OUTPATIENT)
Dept: PHYSICAL THERAPY | Facility: CLINIC | Age: 84
End: 2024-12-05
Payer: MEDICARE

## 2024-12-06 ENCOUNTER — OFFICE VISIT (OUTPATIENT)
Dept: PHYSICAL THERAPY | Facility: CLINIC | Age: 84
End: 2024-12-06
Payer: MEDICARE

## 2024-12-06 DIAGNOSIS — M54.2 CERVICALGIA: Primary | ICD-10-CM

## 2024-12-06 DIAGNOSIS — R10.31 RIGHT GROIN PAIN: ICD-10-CM

## 2024-12-06 DIAGNOSIS — M25.511 ACUTE PAIN OF RIGHT SHOULDER: ICD-10-CM

## 2024-12-06 PROCEDURE — 97110 THERAPEUTIC EXERCISES: CPT

## 2024-12-06 PROCEDURE — 97140 MANUAL THERAPY 1/> REGIONS: CPT

## 2024-12-06 PROCEDURE — 97112 NEUROMUSCULAR REEDUCATION: CPT

## 2024-12-06 NOTE — PROGRESS NOTES
"Daily Note     Today's date: 2024  Patient name: Primo Guy  : 1940  MRN: [id number]  Referring provider: Daniel Sanchez,*  Dx:   Encounter Diagnosis     ICD-10-CM    1. Cervicalgia  M54.2       2. Right groin pain  R10.31       3. Acute pain of right shoulder  M25.511           Start Time: 1400  Stop Time: 1440  Total time in clinic (min): 40 minutes    Subjective: Patient reports, \"4/10\" R sided cervical pain. Patient reports decreased pain following manual therapy.     Objective: See treatment diary below    Assessment: Tolerated treatment well. Patient demonstrated fatigue post treatment, exhibited good technique with therapeutic exercises, and would benefit from continued PT. Improved R middle scalene tone following STM. Cueing required for proper scap retraction.     Plan: Continue per plan of care.      Precautions: A-fib, cardiomyopathy, GERD.    Dx: c/s stenosis with opening preference, R shoulder pain, R groin pain.    Manuals    Seated T2, 3, 4 P-A Gr mobs 1x20       1x20 1x20 1x20                STM/graston B UT 5 min 5 min 8 min     8 min 8 min 8 min                Neuro Re-Ed             Slouch over-correction        1x15 1x15 1x15   Seated c/s B Rot 1x15 ea 1x15 ea 1x15 ea     1x15 ea 1x15 ea 1x15 ea   Seated c/s B SB             Posture correction             DLS standing hip flex 3# 3x10 3#  3x10 3#  3x10     2#/ 2x10 3#/ 2x10 3#/ 3x10                                          Ther Ex             B TB ER G/ 3x10 G/  3x10 G/  3x10     R/ 3x12 G/ 3x10 G/ 3x10   TB Rows Dillon/ 3x10 Dillon/ 3x10 Dillon/ 3x10     Dillon/ 3x10 Dillon/ 3x10 Dillon/ 3x10   WB Rep hip ext             Pulleys: flex 3\"/ 2x10 3\"/ 2x10 3\"/ 2x10     3\"/ 2x10 3\"/ 2x10 3\"/ 2x10                                                       Ther Activity             STS Foam 2x10 Foam  3x10 Foam  3x10     Foam/ 2x10 Foam/ 2x10 Foam/ 2x10                Gait Training                              "          Modalities

## 2024-12-09 ENCOUNTER — OFFICE VISIT (OUTPATIENT)
Dept: PHYSICAL THERAPY | Facility: CLINIC | Age: 84
End: 2024-12-09
Payer: MEDICARE

## 2024-12-09 DIAGNOSIS — M25.511 ACUTE PAIN OF RIGHT SHOULDER: ICD-10-CM

## 2024-12-09 DIAGNOSIS — M54.2 CERVICALGIA: Primary | ICD-10-CM

## 2024-12-09 DIAGNOSIS — R10.31 RIGHT GROIN PAIN: ICD-10-CM

## 2024-12-09 PROCEDURE — 97112 NEUROMUSCULAR REEDUCATION: CPT | Performed by: PHYSICAL THERAPIST

## 2024-12-09 PROCEDURE — 97110 THERAPEUTIC EXERCISES: CPT | Performed by: PHYSICAL THERAPIST

## 2024-12-09 NOTE — PROGRESS NOTES
"Daily Note     Today's date: 2024  Patient name: Primo Guy  : 1940  MRN: [id number]  Referring provider: Daniel Sanchez,*  Dx:   Encounter Diagnosis     ICD-10-CM    1. Cervicalgia  M54.2       2. Right groin pain  R10.31       3. Acute pain of right shoulder  M25.511                      Subjective: Patient reports 3-4/ R sided c/s pain, no shoulder and hip pain.     Objective: See treatment diary below    Assessment: Pt demonstrated improved irritability and c/s ROM.    Plan: Continue per plan of care.      Precautions: A-fib, cardiomyopathy, GERD.    Dx: c/s stenosis with opening preference, R shoulder pain, R groin pain.    Manuals    Seated T2, 3, 4 P-A Gr mobs 1x20   1x30 ea    1x20 1x20 1x20                STM/graston B UT 5 min 5 min 8 min 6 min    8 min 8 min 8 min                Neuro Re-Ed             Slouch over-correction        1x15 1x15 1x15   Seated c/s B Rot 1x15 ea 1x15 ea 1x15 ea 1x30 ea    1x15 ea 1x15 ea 1x15 ea   Seated c/s B SB             Posture correction             DLS standing hip flex 3# 3x10 3#  3x10 3#  3x10 4#/ 3x10    2#/ 2x10 3#/ 2x10 3#/ 3x10                                          Ther Ex             B TB ER G/ 3x10 G/  3x10 G/  3x10 G/ 3x12    R/ 3x12 G/ 3x10 G/ 3x10   TB Rows Dillon/ 3x10 Dillon/ 3x10 Dillon/ 3x10 Dillon/ 3x12    Dillon/ 3x10 Dillon/ 3x10 Dillon/ 3x10   WB Rep hip ext             Pulleys: flex 3\"/ 2x10 3\"/ 2x10 3\"/ 2x10 3\"/ 2x10    3\"/ 2x10 3\"/ 2x10 3\"/ 2x10                                                       Ther Activity             STS Foam 2x10 Foam  3x10 Foam  3x10 Foam/ 3x10    Foam/ 2x10 Foam/ 2x10 Foam/ 2x10                Gait Training                                       Modalities                                          "

## 2024-12-10 ENCOUNTER — APPOINTMENT (OUTPATIENT)
Dept: PHYSICAL THERAPY | Facility: CLINIC | Age: 84
End: 2024-12-10
Payer: MEDICARE

## 2024-12-12 ENCOUNTER — APPOINTMENT (OUTPATIENT)
Dept: PHYSICAL THERAPY | Facility: CLINIC | Age: 84
End: 2024-12-12
Payer: MEDICARE

## 2024-12-12 ENCOUNTER — OFFICE VISIT (OUTPATIENT)
Dept: PHYSICAL THERAPY | Facility: CLINIC | Age: 84
End: 2024-12-12
Payer: MEDICARE

## 2024-12-12 DIAGNOSIS — M54.2 CERVICALGIA: Primary | ICD-10-CM

## 2024-12-12 DIAGNOSIS — R10.31 RIGHT GROIN PAIN: ICD-10-CM

## 2024-12-12 DIAGNOSIS — M25.511 ACUTE PAIN OF RIGHT SHOULDER: ICD-10-CM

## 2024-12-12 PROCEDURE — 97140 MANUAL THERAPY 1/> REGIONS: CPT | Performed by: PHYSICAL THERAPIST

## 2024-12-12 PROCEDURE — 97110 THERAPEUTIC EXERCISES: CPT | Performed by: PHYSICAL THERAPIST

## 2024-12-12 PROCEDURE — 97112 NEUROMUSCULAR REEDUCATION: CPT | Performed by: PHYSICAL THERAPIST

## 2024-12-12 NOTE — PROGRESS NOTES
"Daily Note     Today's date: 2024  Patient name: Primo Guy  : 1940  MRN: [id number]  Referring provider: Daniel Sanchez,*  Dx:   Encounter Diagnosis     ICD-10-CM    1. Cervicalgia  M54.2       2. Right groin pain  R10.31       3. Acute pain of right shoulder  M25.511                      Subjective: Patient reports 4/ R sided c/s pain, no shoulder and hip pain. Pt reports overall steady improvement with pain.    Objective: See treatment diary below    Assessment: Pt demonstrated improved irritability and c/s ROM.    Plan: Continue per plan of care.      Precautions: A-fib, cardiomyopathy, GERD.    Dx: c/s stenosis with opening preference, R shoulder pain, R groin pain.    Manuals         Seated T2, 3, 4 P-A Gr mobs 1x20   1x30 ea 1x30 ea                     STM/graston B UT 5 min 5 min 8 min 6 min 6 min                     Neuro Re-Ed             Slouch over-correction     1x15        Seated c/s B Rot 1x15 ea 1x15 ea 1x15 ea 1x30 ea 1x30        Seated c/s B SB             Posture correction             DLS standing hip flex 3# 3x10 3#  3x10 3#  3x10 4#/ 3x10 4#/ 3x10                                               Ther Ex             B TB ER G/ 3x10 G/  3x10 G/  3x10 G/ 3x12 G/ 3x12        TB Rows Dillon/ 3x10 Dillon/ 3x10 Dillon/ 3x10 Dillon/ 3x12 Dillon/ 3x12        WB Rep hip ext             Pulleys: flex 3\"/ 2x10 3\"/ 2x10 3\"/ 2x10 3\"/ 2x10 3\"/ 2x10                                                            Ther Activity             STS Foam 2x10 Foam  3x10 Foam  3x10 Foam/ 3x10 Foam/ 3x10                     Gait Training                                       Modalities                                          "

## 2024-12-16 ENCOUNTER — OFFICE VISIT (OUTPATIENT)
Dept: PHYSICAL THERAPY | Facility: CLINIC | Age: 84
End: 2024-12-16
Payer: MEDICARE

## 2024-12-16 DIAGNOSIS — M25.511 ACUTE PAIN OF RIGHT SHOULDER: ICD-10-CM

## 2024-12-16 DIAGNOSIS — R10.31 RIGHT GROIN PAIN: ICD-10-CM

## 2024-12-16 DIAGNOSIS — M54.2 CERVICALGIA: Primary | ICD-10-CM

## 2024-12-16 PROCEDURE — 97110 THERAPEUTIC EXERCISES: CPT | Performed by: PHYSICAL THERAPIST

## 2024-12-16 PROCEDURE — 97112 NEUROMUSCULAR REEDUCATION: CPT | Performed by: PHYSICAL THERAPIST

## 2024-12-16 NOTE — PROGRESS NOTES
"Daily Note     Today's date: 2024  Patient name: Primo Guy  : 1940  MRN: [id number]  Referring provider: Daniel Snachez,*  Dx:   Encounter Diagnosis     ICD-10-CM    1. Cervicalgia  M54.2       2. Right groin pain  R10.31       3. Acute pain of right shoulder  M25.511                      Subjective: Patient reports 4/10 R sided c/s pain, no shoulder and hip pain.     Objective: See treatment diary below    Assessment: Pt demonstrated improved c/s ROM, continued forward rounded shoulders posture.  Pt has returned to the gym without pain.      Plan: Continue per plan of care.      Precautions: A-fib, cardiomyopathy, GERD.    Dx: c/s stenosis with opening preference, R shoulder pain, R groin pain.    Manuals        Seated T2, 3, 4 P-A Gr mobs 1x20   1x30 ea 1x30 ea 1x30 ea                    STM/graston B UT 5 min 5 min 8 min 6 min 6 min 6 min                    Neuro Re-Ed             Slouch over-correction     1x15 1x15       Seated c/s B Rot 1x15 ea 1x15 ea 1x15 ea 1x30 ea 1x30 1x30       Seated c/s B SB             Posture correction             DLS standing hip flex 3# 3x10 3#  3x10 3#  3x10 4#/ 3x10 4#/ 3x10 4#/ 3x10                                              Ther Ex             B TB ER G/ 3x10 G/  3x10 G/  3x10 G/ 3x12 G/ 3x12 G/ 3x12       TB Rows Dillon/ 3x10 Dillon/ 3x10 Dillon/ 3x10 Dillon/ 3x12 Dillon/ 3x12 Dillon/ 3x12       WB Rep hip ext             Pulleys: flex 3\"/ 2x10 3\"/ 2x10 3\"/ 2x10 3\"/ 2x10 3\"/ 2x10 3\"/ 2x10                                                           Ther Activity             STS Foam 2x10 Foam  3x10 Foam  3x10 Foam/ 3x10 Foam/ 3x10 Foam/ 2x10, no foam/ 1x10                    Gait Training                                       Modalities                                          "

## 2024-12-17 PROBLEM — G47.33 OSA (OBSTRUCTIVE SLEEP APNEA): Status: ACTIVE | Noted: 2024-12-17

## 2024-12-19 ENCOUNTER — OFFICE VISIT (OUTPATIENT)
Dept: PHYSICAL THERAPY | Facility: CLINIC | Age: 84
End: 2024-12-19
Payer: MEDICARE

## 2024-12-19 DIAGNOSIS — M54.2 CERVICALGIA: Primary | ICD-10-CM

## 2024-12-19 DIAGNOSIS — M25.511 ACUTE PAIN OF RIGHT SHOULDER: ICD-10-CM

## 2024-12-19 DIAGNOSIS — R10.31 RIGHT GROIN PAIN: ICD-10-CM

## 2024-12-19 PROCEDURE — 97110 THERAPEUTIC EXERCISES: CPT

## 2024-12-19 PROCEDURE — 97112 NEUROMUSCULAR REEDUCATION: CPT

## 2024-12-19 NOTE — PROGRESS NOTES
"Daily Note     Today's date: 2024  Patient name: Primo Guy  : 1940  MRN: [id number]  Referring provider: Daniel Sanchez,*  Dx:   Encounter Diagnosis     ICD-10-CM    1. Cervicalgia  M54.2       2. Right groin pain  R10.31       3. Acute pain of right shoulder  M25.511                      Subjective: Pt states that he is doing well. The neck is the biggest issue at this point and states pain is a 4/10 right now. The pain is always present but intensity of pain is much better.       Objective: See treatment diary below      Assessment: Tolerated treatment well. Patient  req min cueing for form for  w/ slouch, overcorrect exercise. Tolerated all movements well, including progression of STS to include higher volume of reps w/out foam.      Plan: Continue per plan of care.      Precautions: A-fib, cardiomyopathy, GERD.    Dx: c/s stenosis with opening preference, R shoulder pain, R groin pain.    Manuals       Seated T2, 3, 4 P-A Gr mobs 1x20   1x30 ea 1x30 ea 1x30 ea 1x30 ea                   STM/graston B UT 5 min 5 min 8 min 6 min 6 min 6 min 6 min                   Neuro Re-Ed             Slouch over-correction     1x15 1x15 1x15      Seated c/s B Rot 1x15 ea 1x15 ea 1x15 ea 1x30 ea 1x30 1x30 1x30      Seated c/s B SB       1x10 ea      Posture correction             DLS standing hip flex 3# 3x10 3#  3x10 3#  3x10 4#/ 3x10 4#/ 3x10 4#/ 3x10 4#/ 3x10                                             Ther Ex             B TB ER G/ 3x10 G/  3x10 G/  3x10 G/ 3x12 G/ 3x12 G/ 3x12 G/ 3x12      TB Rows Dillon/ 3x10 Dillon/ 3x10 Dillon/ 3x10 Dillon/ 3x12 Dillon/ 3x12 Dillon/ 3x12 Dillon/ 3x15      WB Rep hip ext             Pulleys: flex 3\"/ 2x10 3\"/ 2x10 3\"/ 2x10 3\"/ 2x10 3\"/ 2x10 3\"/ 2x10 3\"/ 2x10                                                          Ther Activity             STS Foam 2x10 Foam  3x10 Foam  3x10 Foam/ 3x10 Foam/ 3x10 Foam/ 2x10, no foam/ 1x10 Foam/ 1x10,   no " foam/ 2x10                   Gait Training                                       Modalities

## 2024-12-23 ENCOUNTER — OFFICE VISIT (OUTPATIENT)
Dept: PHYSICAL THERAPY | Facility: CLINIC | Age: 84
End: 2024-12-23
Payer: MEDICARE

## 2024-12-23 DIAGNOSIS — R10.31 RIGHT GROIN PAIN: ICD-10-CM

## 2024-12-23 DIAGNOSIS — M54.2 CERVICALGIA: Primary | ICD-10-CM

## 2024-12-23 DIAGNOSIS — M25.511 ACUTE PAIN OF RIGHT SHOULDER: ICD-10-CM

## 2024-12-23 PROCEDURE — 97140 MANUAL THERAPY 1/> REGIONS: CPT

## 2024-12-23 PROCEDURE — 97110 THERAPEUTIC EXERCISES: CPT

## 2024-12-23 PROCEDURE — 97112 NEUROMUSCULAR REEDUCATION: CPT

## 2024-12-23 NOTE — PROGRESS NOTES
"Daily Note     Today's date: 2024  Patient name: Primo Guy  : 1940  MRN: [id number]  Referring provider: Daniel Sanchez,*  Dx:   Encounter Diagnosis     ICD-10-CM    1. Cervicalgia  M54.2       2. Right groin pain  R10.31       3. Acute pain of right shoulder  M25.511                      Subjective: Pt reports mild right shoulder pain and 4/10 cv pain.        Objective: See treatment diary below      Assessment: Pt demonstrated increased tolerance to postural strengthening. Pt demonstrated very limited R cv SB ROM secondary to pain, moderate restricted R cv rotation. Pt is progressing well with current poc but pain levels are unchanging.       Plan: Continue per plan of care.      Precautions: A-fib, cardiomyopathy, GERD.    Dx: c/s stenosis with opening preference, R shoulder pain, R groin pain.    Manuals      Seated T2, 3, 4 P-A Gr mobs 1x20   1x30 ea 1x30 ea 1x30 ea 1x30 ea                   STM/graston B UT 5 min 5 min 8 min 6 min 6 min 6 min 6 min 8 min                  Neuro Re-Ed             Slouch over-correction     1x15 1x15 1x15 1x15     Seated c/s B Rot 1x15 ea 1x15 ea 1x15 ea 1x30 ea 1x30 1x30 1x30 1x30     Seated c/s B SB       1x10 ea 1x10 ea     Posture correction             DLS standing hip flex 3# 3x10 3#  3x10 3#  3x10 4#/ 3x10 4#/ 3x10 4#/ 3x10 4#/ 3x10 4#/ 3x10                                            Ther Ex             B TB ER G/ 3x10 G/  3x10 G/  3x10 G/ 3x12 G/ 3x12 G/ 3x12 G/ 3x12 G/ 3x15     TB Rows Dillon/ 3x10 Dillon/ 3x10 Dillon/ 3x10 Dillon/ 3x12 Dillon/ 3x12 Dillon/ 3x12 Dillon/ 3x15 Black/ 3x15     WB Rep hip ext             Pulleys: flex 3\"/ 2x10 3\"/ 2x10 3\"/ 2x10 3\"/ 2x10 3\"/ 2x10 3\"/ 2x10 3\"/ 2x10 3\"/   2x10                                                         Ther Activity             STS Foam 2x10 Foam  3x10 Foam  3x10 Foam/ 3x10 Foam/ 3x10 Foam/ 2x10, no foam/ 1x10 Foam/ 1x10,   no foam/ 2x10 Foam/ 1x10,   no foam/ " 2x10                  Gait Training                                       Modalities

## 2024-12-30 ENCOUNTER — OFFICE VISIT (OUTPATIENT)
Dept: PHYSICAL THERAPY | Facility: CLINIC | Age: 84
End: 2024-12-30
Payer: MEDICARE

## 2024-12-30 ENCOUNTER — TELEPHONE (OUTPATIENT)
Dept: DERMATOLOGY | Facility: CLINIC | Age: 84
End: 2024-12-30

## 2024-12-30 DIAGNOSIS — R10.31 RIGHT GROIN PAIN: ICD-10-CM

## 2024-12-30 DIAGNOSIS — M25.511 ACUTE PAIN OF RIGHT SHOULDER: ICD-10-CM

## 2024-12-30 DIAGNOSIS — M54.2 CERVICALGIA: Primary | ICD-10-CM

## 2024-12-30 PROCEDURE — 97112 NEUROMUSCULAR REEDUCATION: CPT

## 2024-12-30 PROCEDURE — 97110 THERAPEUTIC EXERCISES: CPT

## 2024-12-30 PROCEDURE — 97140 MANUAL THERAPY 1/> REGIONS: CPT

## 2024-12-30 NOTE — TELEPHONE ENCOUNTER
Spoke to wife she wanted an ealier appt for pt because of scabbing of the nose, original appt was for 6/11/25, new time is Feb 5, 25 at 10:20 am. pt's wife confirmed that new appt time is ok for them.

## 2024-12-30 NOTE — TELEPHONE ENCOUNTER
left vm for pt, informing that appt for 6/4/25 will be canceled, new appt made for 6/11/25 at 11 am, told pt to call back if appt time doesnt work for them.

## 2024-12-30 NOTE — TELEPHONE ENCOUNTER
Received return call from Patient's Wife. She wanted to move appt up because  has growths on nose that keep growing back and scabbing over.    Evelyn is researching a sooner appt with Angel [facility] and will call Patient's Wife back.

## 2024-12-30 NOTE — PROGRESS NOTES
"Daily Note     Today's date: 2024  Patient name: Primo Guy  : 1940  MRN: [id number]  Referring provider: Daniel Sanchez,*  Dx:   Encounter Diagnosis     ICD-10-CM    1. Cervicalgia  M54.2       2. Right groin pain  R10.31       3. Acute pain of right shoulder  M25.511                      Subjective: Pt reports 3-4/10 right-sided cv pain pre tx.     Objective: See treatment diary below    Assessment: Pt demonstrated     Plan: Continue per plan of care.      Precautions: A-fib, cardiomyopathy, GERD.    Dx: c/s stenosis with opening preference, R shoulder pain, R groin pain.    Manuals     Seated T2, 3, 4 P-A Gr mobs 1x20   1x30 ea 1x30 ea 1x30 ea 1x30 ea                   STM/graston B UT 5 min 5 min 8 min 6 min 6 min 6 min 6 min 8 min 8 min                 Neuro Re-Ed             Slouch over-correction     1x15 1x15 1x15 1x15 1x15    Seated c/s B Rot 1x15 ea 1x15 ea 1x15 ea 1x30 ea 1x30 1x30 1x30 1x30 1x30    Seated c/s B SB       1x10 ea 1x10 ea 1x10 ea    Posture correction             DLS standing hip flex 3# 3x10 3#  3x10 3#  3x10 4#/ 3x10 4#/ 3x10 4#/ 3x10 4#/ 3x10 4#/ 3x10 4#  3x12                                           Ther Ex             B TB ER G/ 3x10 G/  3x10 G/  3x10 G/ 3x12 G/ 3x12 G/ 3x12 G/ 3x12 G/ 3x15 G/  3x15    TB Rows Dillon/ 3x10 Dillon/ 3x10 Dillon/ 3x10 Dillon/ 3x12 Dillon/ 3x12 Dillon/ 3x12 Dillon/ 3x15 Black/ 3x15 Silver  3x12    WB Rep hip ext             Pulleys: flex 3\"/ 2x10 3\"/ 2x10 3\"/ 2x10 3\"/ 2x10 3\"/ 2x10 3\"/ 2x10 3\"/ 2x10 3\"/   2x10 3\"/ 2x10                                                        Ther Activity             STS Foam 2x10 Foam  3x10 Foam  3x10 Foam/ 3x10 Foam/ 3x10 Foam/ 2x10, no foam/ 1x10 Foam/ 1x10,   no foam/ 2x10 Foam/ 1x10,   no foam/ 2x10 Foam/ 1x10,   no foam/ 2x10                 Gait Training                                       Modalities                                          "

## 2025-01-02 ENCOUNTER — OFFICE VISIT (OUTPATIENT)
Dept: PHYSICAL THERAPY | Facility: CLINIC | Age: 85
End: 2025-01-02
Payer: MEDICARE

## 2025-01-02 DIAGNOSIS — M25.511 ACUTE PAIN OF RIGHT SHOULDER: ICD-10-CM

## 2025-01-02 DIAGNOSIS — R10.31 RIGHT GROIN PAIN: ICD-10-CM

## 2025-01-02 DIAGNOSIS — M54.2 CERVICALGIA: Primary | ICD-10-CM

## 2025-01-02 PROCEDURE — 97110 THERAPEUTIC EXERCISES: CPT

## 2025-01-02 PROCEDURE — 97112 NEUROMUSCULAR REEDUCATION: CPT

## 2025-01-02 PROCEDURE — 97140 MANUAL THERAPY 1/> REGIONS: CPT

## 2025-01-02 NOTE — PROGRESS NOTES
"Daily Note     Today's date: 2025  Patient name: Primo Guy  : 1940  MRN: [id number]  Referring provider: Daniel Sanchez,*  Dx:   Encounter Diagnosis     ICD-10-CM    1. Cervicalgia  M54.2       2. Right groin pain  R10.31       3. Acute pain of right shoulder  M25.511           Start Time: 1412  Stop Time: 1458  Total time in clinic (min): 46 minutes    Subjective: Patient reports he continues to have moderate R sub occipital pain prior to therapy. He notes otherwise things are going really well. Notes no R shoulder or R groin pain.     Objective: See treatment diary below    Assessment: Patients pain symptom improves following manual therapy. Focused on UT origin and suboccipitals. Assess response NV.    Plan: Continue per plan of care.      Precautions: A-fib, cardiomyopathy, GERD.    Dx: c/s stenosis with opening preference, R shoulder pain, R groin pain.    Manuals    Seated T2, 3, 4 P-A Gr mobs 1x20   1x30 ea 1x30 ea 1x30 ea 1x30 ea                   STM/graston B UT 5 min 5 min 8 min 6 min 6 min 6 min 6 min 8 min 8 min 8 min                Neuro Re-Ed             Slouch over-correction     1x15 1x15 1x15 1x15 1x15 1x15   Seated c/s B Rot 1x15 ea 1x15 ea 1x15 ea 1x30 ea 1x30 1x30 1x30 1x30 1x30 1x30   Seated c/s B SB       1x10 ea 1x10 ea 1x10 ea 1x10 ea   Posture correction             DLS standing hip flex 3# 3x10 3#  3x10 3#  3x10 4#/ 3x10 4#/ 3x10 4#/ 3x10 4#/ 3x10 4#/ 3x10 4#  3x12 4#  3x12                                          Ther Ex             B TB ER G/ 3x10 G/  3x10 G/  3x10 G/ 3x12 G/ 3x12 G/ 3x12 G/ 3x12 G/ 3x15 G/  3x15 Blue  3x15   TB Rows Dillon/ 3x10 Dillon/ 3x10 Dillon/ 3x10 Dillon/ 3x12 Dillon/ 3x12 Dillon/ 3x12 Dillon/ 3x15 Black/ 3x15 Silver  3x12 Silver  3x15   WB Rep hip ext             Pulleys: flex 3\"/ 2x10 3\"/ 2x10 3\"/ 2x10 3\"/ 2x10 3\"/ 2x10 3\"/ 2x10 3\"/ 2x10 3\"/   2x10 3\"/ 2x10 3\"/ 2x10                                    "                    Ther Activity             STS Foam 2x10 Foam  3x10 Foam  3x10 Foam/ 3x10 Foam/ 3x10 Foam/ 2x10, no foam/ 1x10 Foam/ 1x10,   no foam/ 2x10 Foam/ 1x10,   no foam/ 2x10 Foam/ 1x10,   no foam/ 2x10 No Foam  3x10                Gait Training                                       Modalities

## 2025-01-06 ENCOUNTER — OFFICE VISIT (OUTPATIENT)
Dept: PHYSICAL THERAPY | Facility: CLINIC | Age: 85
End: 2025-01-06
Payer: MEDICARE

## 2025-01-06 DIAGNOSIS — M25.511 ACUTE PAIN OF RIGHT SHOULDER: ICD-10-CM

## 2025-01-06 DIAGNOSIS — M54.2 CERVICALGIA: Primary | ICD-10-CM

## 2025-01-06 DIAGNOSIS — R10.31 RIGHT GROIN PAIN: ICD-10-CM

## 2025-01-06 PROCEDURE — 97112 NEUROMUSCULAR REEDUCATION: CPT

## 2025-01-06 PROCEDURE — 97140 MANUAL THERAPY 1/> REGIONS: CPT

## 2025-01-06 PROCEDURE — 97110 THERAPEUTIC EXERCISES: CPT

## 2025-01-06 NOTE — PROGRESS NOTES
"Daily Note     Today's date: 2025  Patient name: Primo Guy  : 1940  MRN: [id number]  Referring provider: Daniel Sanchez,*  Dx:   Encounter Diagnosis     ICD-10-CM    1. Cervicalgia  M54.2       2. Right groin pain  R10.31       3. Acute pain of right shoulder  M25.511           Start Time: 1415  Stop Time: 1458  Total time in clinic (min): 43 minutes    Subjective: Patient reports, \"/10\" R cervical pain prior to therapy.     Objective: See treatment diary below    Assessment: Patients pain symptom improves following manual therapy. Focused on UT origin and suboccipitals. The addition of TPR did further help symptoms and AROM. Assess response NV.    Plan: Continue per plan of care.      Precautions: A-fib, cardiomyopathy, GERD.    Dx: c/s stenosis with opening preference, R shoulder pain, R groin pain.    Manuals /2   Seated T2, 3, 4 P-A Gr mobs             TPR:  suboccipital +  cervical paraspinals 4 min            STM/graston B UT 8 min        8 min 8 min                Neuro Re-Ed             Slouch over-correction 1x15        1x15 1x15   Seated c/s B Rot 1x30        1x30 1x30   Seated c/s B SB 1x10 ea        1x10 ea 1x10 ea   Posture correction             DLS standing hip flex 4#  3x12        4#  3x12 4#  3x12                                          Ther Ex             B TB ER Blue  3x15        G/  3x15 Blue  3x15   TB Rows Silver  3x15        Silver  3x12 Silver  3x15   WB Rep hip ext             Pulleys: flex 3\"/ 2x10        3\"/ 2x10 3\"/ 2x10                                                       Ther Activity             STS No Foam  3x10        Foam/ 1x10,   no foam/ 2x10 No Foam  3x10                Gait Training                                       Modalities                                          "

## 2025-01-09 ENCOUNTER — OFFICE VISIT (OUTPATIENT)
Dept: PHYSICAL THERAPY | Facility: CLINIC | Age: 85
End: 2025-01-09
Payer: MEDICARE

## 2025-01-09 DIAGNOSIS — R10.31 RIGHT GROIN PAIN: ICD-10-CM

## 2025-01-09 DIAGNOSIS — M25.511 ACUTE PAIN OF RIGHT SHOULDER: ICD-10-CM

## 2025-01-09 DIAGNOSIS — M54.2 CERVICALGIA: Primary | ICD-10-CM

## 2025-01-09 PROCEDURE — 97140 MANUAL THERAPY 1/> REGIONS: CPT

## 2025-01-09 PROCEDURE — 97112 NEUROMUSCULAR REEDUCATION: CPT

## 2025-01-09 PROCEDURE — 97110 THERAPEUTIC EXERCISES: CPT

## 2025-01-09 NOTE — PROGRESS NOTES
"Daily Note     Today's date: 2025  Patient name: Primo Guy  : 1940  MRN: [id number]  Referring provider: Daniel Sanchez,*  Dx:   Encounter Diagnosis     ICD-10-CM    1. Cervicalgia  M54.2       2. Right groin pain  R10.31       3. Acute pain of right shoulder  M25.511                      Subjective: Patient reports 3-4/10 right sided cv pain first thing in the morning that decreases throughout the day as he loosens up.     Objective: See treatment diary below    Assessment: Patient demonstrated a minimal increase in cv rotation ROM following manuals but he continues to have significant soft tissue restriction at R cv paraspinals. Pt demonstrated moderate thoracic kyphosis and difficulty correcting this during TE program.     Plan: Continue per plan of care.      Precautions: A-fib, cardiomyopathy, GERD.    Dx: c/s stenosis with opening preference, R shoulder pain, R groin pain.    Manuals  1   Seated T2, 3, 4 P-A Gr mobs             TPR:  suboccipital +  cervical paraspinals 4 min 5 min           STM/graston B UT 8 min 8 min       8 min 8 min                Neuro Re-Ed             Slouch over-correction 1x15 1x15       1x15 1x15   Seated c/s B Rot 1x30 1x30       1x30 1x30   Seated c/s B SB 1x10 ea 1x10 ea       1x10 ea 1x10 ea   Posture correction             DLS standing hip flex 4#  3x12 4#  3x12       4#  3x12 4#  3x12                                          Ther Ex             B TB ER Blue  3x15 Blue  3x15       G/  3x15 Blue  3x15   TB Rows Silver  3x15 Silver  3x15       Silver  3x12 Silver  3x15   WB Rep hip ext             Pulleys: flex 3\"/ 2x10 3\"/ 2x10       3\"/ 2x10 3\"/ 2x10                                                       Ther Activity             STS No Foam  3x10 No Foam  3x10       Foam/ 1x10,   no foam/ 2x10 No Foam  3x10                Gait Training                                       Modalities                                          "

## 2025-01-13 ENCOUNTER — OFFICE VISIT (OUTPATIENT)
Dept: PHYSICAL THERAPY | Facility: CLINIC | Age: 85
End: 2025-01-13
Payer: MEDICARE

## 2025-01-13 DIAGNOSIS — R10.31 RIGHT GROIN PAIN: ICD-10-CM

## 2025-01-13 DIAGNOSIS — R60.0 BILATERAL EDEMA OF LOWER EXTREMITY: ICD-10-CM

## 2025-01-13 DIAGNOSIS — R60.0 EDEMA OF EXTREMITIES: ICD-10-CM

## 2025-01-13 DIAGNOSIS — I48.91 ATRIAL FIBRILLATION, UNSPECIFIED TYPE (HCC): ICD-10-CM

## 2025-01-13 DIAGNOSIS — M54.2 CERVICALGIA: Primary | ICD-10-CM

## 2025-01-13 DIAGNOSIS — M25.511 ACUTE PAIN OF RIGHT SHOULDER: ICD-10-CM

## 2025-01-13 DIAGNOSIS — I10 BENIGN ESSENTIAL HYPERTENSION: Chronic | ICD-10-CM

## 2025-01-13 DIAGNOSIS — E78.2 MIXED HYPERLIPIDEMIA: ICD-10-CM

## 2025-01-13 DIAGNOSIS — R21 SKIN RASH: ICD-10-CM

## 2025-01-13 PROCEDURE — 97140 MANUAL THERAPY 1/> REGIONS: CPT

## 2025-01-13 PROCEDURE — 97110 THERAPEUTIC EXERCISES: CPT

## 2025-01-13 PROCEDURE — 97112 NEUROMUSCULAR REEDUCATION: CPT

## 2025-01-13 RX ORDER — LOSARTAN POTASSIUM 50 MG/1
50 TABLET ORAL DAILY
Qty: 90 TABLET | Refills: 3 | Status: SHIPPED | OUTPATIENT
Start: 2025-01-13

## 2025-01-13 RX ORDER — AMLODIPINE BESYLATE 5 MG/1
5 TABLET ORAL
Qty: 90 TABLET | Refills: 3 | Status: SHIPPED | OUTPATIENT
Start: 2025-01-13

## 2025-01-13 RX ORDER — FUROSEMIDE 40 MG/1
TABLET ORAL
Qty: 90 TABLET | Refills: 3 | Status: SHIPPED | OUTPATIENT
Start: 2025-01-13

## 2025-01-13 RX ORDER — HYDROCORTISONE 25 MG/G
CREAM TOPICAL DAILY
Qty: 56 G | Refills: 3 | Status: SHIPPED | OUTPATIENT
Start: 2025-01-13

## 2025-01-13 RX ORDER — SIMVASTATIN 40 MG
40 TABLET ORAL
Qty: 90 TABLET | Refills: 3 | Status: SHIPPED | OUTPATIENT
Start: 2025-01-13

## 2025-01-13 NOTE — TELEPHONE ENCOUNTER
Called in 1 week prescription of Amlodipine 5mg to [facility] pharmacy in [place] for 1 week supply as he has 3 days left of current prescription. This is to hold patient over until [facility] prescriptions come

## 2025-01-13 NOTE — TELEPHONE ENCOUNTER
Patient needed refill of 90 day supply of medications sent to [facility] as that is who he is now using.

## 2025-01-13 NOTE — PROGRESS NOTES
"Daily Note     Today's date: 2025  Patient name: Primo Guy  : 1940  MRN: [id number]  Referring provider: Daniel Sanchez,*  Dx:   Encounter Diagnosis     ICD-10-CM    1. Cervicalgia  M54.2       2. Right groin pain  R10.31       3. Acute pain of right shoulder  M25.511           Start Time: 1400  Stop Time: 1440  Total time in clinic (min): 40 minutes    Subjective: Patient continues to have \"3-4/10\" R sided cervical pain. Notes symptoms relief for a few hours at most after therapy. No complaints of R shoulder or R groin pain.     Objective: See treatment diary below    Assessment: Patient demonstrated a minimal increase in cv rotation ROM following manuals but he continues to have significant soft tissue restriction at his R cervical paraspinals.     Plan: Continue per plan of care.      Precautions: A-fib, cardiomyopathy, GERD.    Dx: c/s stenosis with opening preference, R shoulder pain, R groin pain.    Manuals           Seated T2, 3, 4 P-A Gr mobs             TPR:  suboccipital +  cervical paraspinals 4 min 5 min 4 min          STM/graston B UT 8 min 8 min 8 min                       Neuro Re-Ed             Slouch over-correction 1x15 1x15 1x15          Seated c/s B Rot 1x30 1x30 1x30          Seated c/s B SB 1x10 ea 1x10 ea 1x10 ea          Posture correction             DLS standing hip flex 4#  3x12 4#  3x12 4#  3x15                                                 Ther Ex             B TB ER Blue  3x15 Blue  3x15 Blue  3x15          TB Rows Silver  3x15 Silver  3x15 Silver  3x15          WB Rep hip ext             Pulleys: flex 3\"/ 2x10 3\"/ 2x10 3\"/ 2x10                                                              Ther Activity             STS No Foam  3x10 No Foam  3x10 No Foam  3x10                       Gait Training                                       Modalities                                          "

## 2025-01-16 ENCOUNTER — APPOINTMENT (OUTPATIENT)
Dept: PHYSICAL THERAPY | Facility: CLINIC | Age: 85
End: 2025-01-16
Payer: MEDICARE

## 2025-01-17 ENCOUNTER — EVALUATION (OUTPATIENT)
Dept: PHYSICAL THERAPY | Facility: CLINIC | Age: 85
End: 2025-01-17
Payer: MEDICARE

## 2025-01-17 DIAGNOSIS — R10.31 RIGHT GROIN PAIN: ICD-10-CM

## 2025-01-17 DIAGNOSIS — M54.2 CERVICALGIA: Primary | ICD-10-CM

## 2025-01-17 DIAGNOSIS — M25.511 ACUTE PAIN OF RIGHT SHOULDER: ICD-10-CM

## 2025-01-17 PROCEDURE — 97112 NEUROMUSCULAR REEDUCATION: CPT | Performed by: PHYSICAL THERAPIST

## 2025-01-17 NOTE — PROGRESS NOTES
"Daily Note     Today's date: 2025  Patient name: Primo Guy  : 1940  MRN: [id number]  Referring provider: Daniel Sanchez,*  Dx:   Encounter Diagnosis     ICD-10-CM    1. Cervicalgia  M54.2       2. Right groin pain  R10.31       3. Acute pain of right shoulder  M25.511                      Subjective: Patient reports 3-4/10 R sided mid cervical pain.  Pt denies shoulder or groin pain.    Objective: See treatment diary below    Assessment: Patient demonstrated kyphotic posture and upper c/s ext with R Rot.  Emphasized importance of posture correction and improved neuromuscular control with Rot.  Attempted P-A c/s mobs in sitting with mild short-term improvement.     Plan: Continue per plan of care.      Precautions: A-fib, cardiomyopathy, GERD.    Dx: c/s stenosis with opening preference, R shoulder pain, R groin pain.    Manuals          Seated T2, 3, 4 P-A Gr mobs    2x20 ea         TPR:  suboccipital +  cervical paraspinals 4 min 5 min 4 min          STM/graston B UT 8 min 8 min 8 min 8 min                      Neuro Re-Ed             Slouch over-correction 1x15 1x15 1x15 1x15         Seated c/s B Rot 1x30 1x30 1x30 1x30         Seated c/s B SB 1x10 ea 1x10 ea 1x10 ea          Posture correction    5 min         DLS standing hip flex 4#  3x12 4#  3x12 4#  3x15                                                 Ther Ex             B TB ER Blue  3x15 Blue  3x15 Blue  3x15 Naldo/ 3x15         TB Rows Silver  3x15 Silver  3x15 Silver  3x15 Eveline/ 3x15         WB Rep hip ext             Pulleys: flex 3\"/ 2x10 3\"/ 2x10 3\"/ 2x10                                                              Ther Activity             STS No Foam  3x10 No Foam  3x10 No Foam  3x10                       Gait Training                                       Modalities                                          "

## 2025-01-21 ENCOUNTER — OFFICE VISIT (OUTPATIENT)
Dept: PHYSICAL THERAPY | Facility: CLINIC | Age: 85
End: 2025-01-21
Payer: MEDICARE

## 2025-01-21 DIAGNOSIS — R10.31 RIGHT GROIN PAIN: ICD-10-CM

## 2025-01-21 DIAGNOSIS — M54.2 CERVICALGIA: Primary | ICD-10-CM

## 2025-01-21 DIAGNOSIS — M25.511 ACUTE PAIN OF RIGHT SHOULDER: ICD-10-CM

## 2025-01-21 PROCEDURE — 97112 NEUROMUSCULAR REEDUCATION: CPT

## 2025-01-21 PROCEDURE — 97110 THERAPEUTIC EXERCISES: CPT

## 2025-01-21 NOTE — PROGRESS NOTES
"Daily Note     Today's date: 2025  Patient name: Primo Guy  : 1940  MRN: [id number]  Referring provider: Daniel Sanchez,*  Dx:   Encounter Diagnosis     ICD-10-CM    1. Cervicalgia  M54.2       2. Right groin pain  R10.31       3. Acute pain of right shoulder  M25.511           Start Time: 1530  Stop Time: 1615  Total time in clinic (min): 45 minutes    Subjective: Patient states, \"I think my neck is a little bit better since the new manual therapy Abdias did last visit\".     Objective: See treatment diary below    Assessment: Continued new mobilization noting improvements subjectively. Patient requires tactile cueing for postural correction.     Plan: Continue per plan of care.      Precautions: A-fib, cardiomyopathy, GERD.    Dx: c/s stenosis with opening preference, R shoulder pain, R groin pain.    Manuals         Seated C2, 3, 4 P-A Gr mobs    2x20 ea 2x20 ea KF        TPR:  suboccipital +  cervical paraspinals 4 min 5 min 4 min          STM/graston B UT 8 min 8 min 8 min 8 min 8 min                     Neuro Re-Ed             Slouch over-correction 1x15 1x15 1x15 1x15 1x15        Seated c/s B Rot 1x30 1x30 1x30 1x30 1x30         Seated c/s B SB 1x10 ea 1x10 ea 1x10 ea          Posture correction    5 min 5 min        DLS standing hip flex 4#  3x12 4#  3x12 4#  3x15                                                 Ther Ex             B TB ER Blue  3x15 Blue  3x15 Blue  3x15 Naldo/ 3x15 Blue  3x18        TB Rows Silver  3x15 Silver  3x15 Silver  3x15 Eveline/ 3x15 Silver  3x18        WB Rep hip ext             Pulleys: flex 3\"/ 2x10 3\"/ 2x10 3\"/ 2x10                                                              Ther Activity             STS No Foam  3x10 No Foam  3x10 No Foam  3x10                       Gait Training                                       Modalities                                          "

## 2025-01-22 ENCOUNTER — TELEPHONE (OUTPATIENT)
Age: 85
End: 2025-01-22

## 2025-01-22 NOTE — TELEPHONE ENCOUNTER
Received call from patient stating  he has a missed call from our office.    I asked patient if they left a voicemail.    Patient stated they did not.    I told patient that there is no note in his chart as to why he may have been called.    I apologized to the patient and explained to him that without a note in his chart I would not know why he was called.    Patient verbalized understanding.

## 2025-01-24 ENCOUNTER — TELEPHONE (OUTPATIENT)
Dept: CARDIOLOGY CLINIC | Facility: CLINIC | Age: 85
End: 2025-01-24

## 2025-01-24 ENCOUNTER — OFFICE VISIT (OUTPATIENT)
Dept: PHYSICAL THERAPY | Facility: CLINIC | Age: 85
End: 2025-01-24
Payer: MEDICARE

## 2025-01-24 ENCOUNTER — OFFICE VISIT (OUTPATIENT)
Dept: CARDIOLOGY CLINIC | Facility: CLINIC | Age: 85
End: 2025-01-24
Payer: MEDICARE

## 2025-01-24 VITALS
HEART RATE: 79 BPM | BODY MASS INDEX: 34.39 KG/M2 | WEIGHT: 232.2 LBS | HEIGHT: 69 IN | OXYGEN SATURATION: 97 % | SYSTOLIC BLOOD PRESSURE: 136 MMHG | DIASTOLIC BLOOD PRESSURE: 72 MMHG

## 2025-01-24 DIAGNOSIS — I10 BENIGN ESSENTIAL HYPERTENSION: Chronic | ICD-10-CM

## 2025-01-24 DIAGNOSIS — I48.21 PERMANENT ATRIAL FIBRILLATION (HCC): Primary | Chronic | ICD-10-CM

## 2025-01-24 DIAGNOSIS — I50.33 ACUTE ON CHRONIC DIASTOLIC (CONGESTIVE) HEART FAILURE (HCC): ICD-10-CM

## 2025-01-24 DIAGNOSIS — M54.2 CERVICALGIA: Primary | ICD-10-CM

## 2025-01-24 DIAGNOSIS — E78.2 MIXED HYPERLIPIDEMIA: ICD-10-CM

## 2025-01-24 DIAGNOSIS — G47.33 OBSTRUCTIVE SLEEP APNEA: Chronic | ICD-10-CM

## 2025-01-24 DIAGNOSIS — R10.31 RIGHT GROIN PAIN: ICD-10-CM

## 2025-01-24 DIAGNOSIS — M25.511 ACUTE PAIN OF RIGHT SHOULDER: ICD-10-CM

## 2025-01-24 DIAGNOSIS — I10 ESSENTIAL HYPERTENSION: ICD-10-CM

## 2025-01-24 DIAGNOSIS — K21.9 GASTROESOPHAGEAL REFLUX DISEASE: ICD-10-CM

## 2025-01-24 DIAGNOSIS — I50.32 CHRONIC HEART FAILURE WITH PRESERVED EJECTION FRACTION (HFPEF) (HCC): ICD-10-CM

## 2025-01-24 PROCEDURE — 93000 ELECTROCARDIOGRAM COMPLETE: CPT

## 2025-01-24 PROCEDURE — 97112 NEUROMUSCULAR REEDUCATION: CPT

## 2025-01-24 PROCEDURE — 97110 THERAPEUTIC EXERCISES: CPT

## 2025-01-24 PROCEDURE — 99214 OFFICE O/P EST MOD 30 MIN: CPT

## 2025-01-24 RX ORDER — ATENOLOL 50 MG/1
50 TABLET ORAL 2 TIMES DAILY
Qty: 180 TABLET | Refills: 1 | Status: SHIPPED | OUTPATIENT
Start: 2025-01-24

## 2025-01-24 NOTE — ASSESSMENT & PLAN NOTE
Wt Readings from Last 3 Encounters:   01/24/25 105 kg (232 lb 3.2 oz)   12/17/24 104 kg (228 lb 9.6 oz)   10/23/24 105 kg (232 lb)     4/7/23 echo: LVEF chronically 50% in the context of longstanding A-fib. Moderate JEMIMA. Mild MR. Mid to moderate TR  Stress Myoview 8/18/23:  without ischemia or scar  --beta-blocker: Atenolol 50 mg twice daily  --on lasix 40 mg daily  --SGLT2i:  --2 g sodium diet, 1800 cc fluid restriction. Daily weights.   Repeat echocardiogram

## 2025-01-24 NOTE — PROGRESS NOTES
"Daily Note     Today's date: 2025  Patient name: Primo Guy  : 1940  MRN: [id number]  Referring provider: Daniel Sanchez,*  Dx:   Encounter Diagnosis     ICD-10-CM    1. Cervicalgia  M54.2       2. Right groin pain  R10.31       3. Acute pain of right shoulder  M25.511           Start Time: 1500  Stop Time: 1540  Total time in clinic (min): 40 minutes    Subjective: Patient reports, \"My neck is a little bit better since last visit\". He notes, \"3/10\" pain prior to therapy.     Objective: See treatment diary below    Assessment: Continued new mobilization noting improvements subjectively. Patient requires tactile cueing for postural correction. Continue current PT POC.     Plan: Continue per plan of care.      Precautions: A-fib, cardiomyopathy, GERD.    Dx: c/s stenosis with opening preference, R shoulder pain, R groin pain.    Manuals        Seated C2, 3, 4 P-A Gr mobs    2x20 ea 2x20 ea KF 2x20 ea KF       TPR:  suboccipital +  cervical paraspinals 4 min 5 min 4 min          STM/graston B UT 8 min 8 min 8 min 8 min 8 min 8 min                    Neuro Re-Ed             Slouch over-correction 1x15 1x15 1x15 1x15 1x15 1x15       Seated c/s B Rot 1x30 1x30 1x30 1x30 1x30  1x30       Seated c/s B SB 1x10 ea 1x10 ea 1x10 ea          Posture correction    5 min 5 min 5 min       DLS standing hip flex 4#  3x12 4#  3x12 4#  3x15                                                 Ther Ex             B TB ER Blue  3x15 Blue  3x15 Blue  3x15 Naldo/ 3x15 Blue  3x18 Blue  3x18       TB Rows Silver  3x15 Silver  3x15 Silver  3x15 Eveline/ 3x15 Silver  3x18 Silver  3x18       WB Rep hip ext             Pulleys: flex 3\"/ 2x10 3\"/ 2x10 3\"/ 2x10                                                              Ther Activity             STS No Foam  3x10 No Foam  3x10 No Foam  3x10                       Gait Training                                       Modalities                             "

## 2025-01-24 NOTE — PROGRESS NOTES
[facility]  General Cardiology Note  Primo Guy  1940  [id number]      Referring Provider - No ref. provider found    Assessment & Plan  Permanent atrial fibrillation (HCC)  Rate controlled with atenolol 50 mg twice daily  On OAC with Eliquis 5 mg twice daily   [id number] Stroke Risk Points: 5   Values used to calculate this score:    Points  Metrics       1        Has Congestive Heart Failure: Yes       1        Has Hypertension: Yes       2        Age: 84       0        Has Diabetes: No       0        Had Stroke: No                 Had TIA: No                 Had Thromboembolism: No       1        Has Vascular Disease: Yes       0        Clinically Relevant Sex: Male     Benign essential hypertension  /72  HR 79  On amlodipine 5 mg nightly, atenolol 50 mg twice daily, losartan 50 mg daily  Chronic heart failure with preserved ejection fraction (HFpEF) (HCC)  Wt Readings from Last 3 Encounters:   01/24/25 105 kg (232 lb 3.2 oz)   12/17/24 104 kg (228 lb 9.6 oz)   10/23/24 105 kg (232 lb)     4/7/23 echo: LVEF chronically 50% in the context of longstanding A-fib. Moderate JEMIMA. Mild MR. Mid to moderate TR  Stress Myoview 8/18/23:  without ischemia or scar  --beta-blocker: Atenolol 50 mg twice daily  --on lasix 40 mg daily  --SGLT2i:  --2 g sodium diet, 1800 cc fluid restriction. Daily weights.   Repeat echocardiogram  Obstructive sleep apnea  Compliant with CPAP  Mixed hyperlipidemia  On simvastatin 40 mg daily  7/1/2024: LDL 86 non-.  Will defer to PCP  Acute on chronic diastolic (congestive) heart failure (HCC)  Wt Readings from Last 3 Encounters:   01/24/25 105 kg (232 lb 3.2 oz)   12/17/24 104 kg (228 lb 9.6 oz)   10/23/24 105 kg (232 lb)   See CHF    Relevant testing  TTE 4/7/2023.  EF 50%.  Wall motion normal.  Moderate JEMIMA.  Mild MR.  Mild to moderate TR   SPECT 8/18/23: Perfusion is normal.  EF 45%.    Will RTO in MAY 2025 or sooner if necessary  Patient /  Caretaker was advised and educated to call our office  immediately if  patient has any new symptoms of chest pain/shortness of breath, near-syncope, syncope, light headedness sustained palpitations  or any other cardiovascular symptoms before their scheduled follow-up appointment.  ED precautions reviewed    Interval History: 84 y.o.  male  with PMH as below is here for routine visit   Patient of Dr. Devine    Today,  he presents with his wife. He states he likes salty snacks and need to do better watching his sodium intake. He is compliant with his meds. He denies any recent sudden weight gain. Denies any bleeding on the eliquis    Patient Active Problem List    Diagnosis Date Noted    Acute on chronic diastolic (congestive) heart failure (HCC) 01/24/2025    LUÍS (obstructive sleep apnea) 12/17/2024    Cervicalgia 10/16/2024    Right groin pain 10/16/2024    Acute pain of right shoulder 10/16/2024    Chronic heart failure with preserved ejection fraction (HFpEF) (HCC) 10/07/2024    Edema of extremities 07/29/2024    Chest pain 08/17/2023    Anxiety 08/14/2017    Tinea cruris 07/11/2017    Dysphagia 01/31/2017    Diverticulosis 06/09/2014    Insomnia 08/14/2013    Rosacea 08/14/2013    Obstructive sleep apnea 08/02/2013    Atrial fibrillation (HCC) 10/25/2012    Mixed hyperlipidemia 10/25/2012    Benign essential hypertension 10/25/2012    Arthritis 10/25/2012    Benign prostatic hyperplasia 10/25/2012    Gastroesophageal reflux disease 10/25/2012    Pre-diabetes 10/25/2012     Past Medical History:   Diagnosis Date    Atrial fibrillation (HCC)     Cardiomyopathy (HCC)     LAST ASSESSED [id number]    Cataract     CHF (congestive heart failure) (HCC)     Dysphagia     GERD (gastroesophageal reflux disease)     Hyperlipidemia     Hypertension     Irregular heart beat     afib    Rosacea     Schatzki's ring     Sleep apnea     no cpap     Social History     Tobacco Use    Smoking status: Former     Types: Cigarettes     Smokeless tobacco: Never    Tobacco comments:     about 55 years ago,  unknown start date quit 1996   Vaping Use    Vaping status: Never Used   Substance Use Topics    Alcohol use: Yes     Alcohol/week: 7.0 - 14.0 standard drinks of alcohol     Types: 7 - 14 Glasses of wine per week     Comment: 1-2 glasses of wine daily    Drug use: No      Family History   Problem Relation Age of Onset    No Known Problems Mother     Heart disease Father     Coronary artery disease Father     No Known Problems Sister     No Known Problems Sister     Heart disease Brother     Heart disease Brother     Coronary artery disease Family     Hyperlipidemia Family     Hypertension Family     Other Family         SUDDEN CARDIAC DEATH      Past Surgical History:   Procedure Laterality Date    BACK SURGERY      CATARACT EXTRACTION      COLONOSCOPY N/A 1/15/2016    Procedure: COLONOSCOPY;  Surgeon: Melly Kumar MD;  Location: [facility];  Service:     ESOPHAGOGASTRODUODENOSCOPY      EYE SURGERY      B/L cataract sx (2008,2009)    AZ CIRCUMCISION AGE >28 DAYS N/A 1/29/2018    Procedure: CIRCUMCISION ADULT;  Surgeon: Charles Hannah MD;  Location: [facility];  Service: Urology    AZ ESOPHAGOGASTRODUODENOSCOPY TRANSORAL DIAGNOSTIC N/A 2/15/2017    Procedure: ESOPHAGOGASTRODUODENOSCOPY (EGD) WITH DILATION;  Surgeon: Marbin Bullard MD;  Location: [facility];  Service: Gastroenterology       Current Outpatient Medications:     amLODIPine (NORVASC) 5 mg tablet, Take 1 tablet (5 mg total) by mouth daily at bedtime, Disp: 90 tablet, Rfl: 3    apixaban (Eliquis) 5 mg, Take 1 tablet (5 mg total) by mouth 2 (two) times a day, Disp: 180 tablet, Rfl: 3    atenolol (TENORMIN) 50 mg tablet, Take 1 tablet (50 mg total) by mouth 2 (two) times a day, Disp: 180 tablet, Rfl: 3    fluticasone (FLONASE) 50 mcg/act nasal spray, Spray 2 squirts in each nostril once daily, Disp: 16 g, Rfl: 3    furosemide (LASIX) 40 mg tablet, Take 1 tablet daily, Disp: 90  "tablet, Rfl: 3    hydrocortisone 2.5 % cream, Apply topically in the morning, Disp: 56 g, Rfl: 3    losartan (COZAAR) 50 mg tablet, Take 1 tablet (50 mg total) by mouth daily, Disp: 90 tablet, Rfl: 3    Multiple Vitamin (multivitamin) tablet, Take 1 tablet by mouth daily, Disp: , Rfl:     omeprazole (PriLOSEC) 20 mg delayed release capsule, Take 1 capsule (20 mg total) by mouth daily, Disp: 90 capsule, Rfl: 3    simvastatin (ZOCOR) 40 mg tablet, Take 1 tablet (40 mg total) by mouth daily at bedtime, Disp: 90 tablet, Rfl: 3    Vitamin D, Cholecalciferol, 1000 UNITS CAPS, Take by mouth 2,000 per day, Disp: , Rfl:     tiZANidine (ZANAFLEX) 4 mg tablet, Take 1 tablet (4 mg total) by mouth every 8 (eight) hours as needed for muscle spasms (Patient not taking: Reported on 12/17/2024), Disp: 20 tablet, Rfl: 0    triamcinolone (KENALOG) 0.1 % ointment, APPLY TOPICALLY DAILY FOR 2 WEEKS (Patient not taking: Reported on 10/7/2024), Disp: 454 g, Rfl: 0    No Known Allergies    Vitals:    01/24/25 1122   BP: 136/72   BP Location: Left arm   Patient Position: Sitting   Cuff Size: Adult   Pulse: 79   SpO2: 97%   Weight: 105 kg (232 lb 3.2 oz)   Height: 5' 9\" (1.753 m)        Vitals:    01/24/25 1122   Weight: 105 kg (232 lb 3.2 oz)      Height: 5' 9\" (175.3 cm)   Body mass index is 34.29 kg/m².    Labs:   Lab Results   Component Value Date/Time    CHOLESTEROL 166 07/01/2024 07:12 AM    TRIG 186 (H) 07/01/2024 07:12 AM    TRIG 98 09/21/2016 07:40 AM    HDL 43 07/01/2024 07:12 AM    HDL 42 11/04/2015 06:58 AM    LDLCALC 86 07/01/2024 07:12 AM    LDLCALC 61 11/04/2015 06:58 AM      Lab Results   Component Value Date    SODIUM 137 10/21/2024    K 3.4 (L) 10/21/2024    CL 98 10/21/2024    CREATININE 0.85 10/21/2024    EGFR 81 10/21/2024    BUN 20 10/21/2024    CO2 29 10/21/2024    ALT 32 07/01/2024    AST 36 07/01/2024    INR 1.09 11/04/2015    GLUF 122 (H) 07/01/2024    HGBA1C 5.9 (H) 07/01/2024    WBC 5.04 01/26/2024    HGB 14.0 " 01/26/2024    HCT 40.4 01/26/2024     01/26/2024         Imaging: No results found.    ECG:  Atrial fibrillation with premature ventricular or aberrantly conducted complexes. Right axis deviation. Non specific intraventricular block. 79 bpm   Reviewed by DORINDA Larose      Review of Systems   Constitutional: Negative.   Cardiovascular: Negative.    Respiratory: Negative.     Gastrointestinal: Negative.    All other systems reviewed and are negative.    Except as noted in HPI, is otherwise reviewed in detail and a 12 point review of systems is negative.    Physical Exam  Vitals reviewed.   Constitutional:       General: He is not in acute distress.     Appearance: Normal appearance. He is not diaphoretic.   HENT:      Head: Normocephalic and atraumatic.   Eyes:      General: No scleral icterus.  Cardiovascular:      Rate and Rhythm: Normal rate. Rhythm irregularly irregular.      Pulses: Normal pulses.           Radial pulses are 2+ on the right side and 2+ on the left side.      Heart sounds: Normal heart sounds, S1 normal and S2 normal.   Pulmonary:      Effort: Pulmonary effort is normal. No tachypnea or respiratory distress.      Breath sounds: Normal breath sounds. No wheezing or rales.   Abdominal:      General: Bowel sounds are normal. There is no distension.      Palpations: Abdomen is soft.   Musculoskeletal:      Right lower leg: Edema (trace) present.      Left lower leg: Edema (trace) present.   Skin:     General: Skin is warm and dry.      Capillary Refill: Capillary refill takes less than 2 seconds.   Neurological:      General: No focal deficit present.      Mental Status: He is alert and oriented to person, place, and time.      Gait: Gait normal.   Psychiatric:         Mood and Affect: Mood normal.         Behavior: Behavior normal.          **Please Note: This note may have been constructed using a voice recognition system**     Thank you for the opportunity to participate in the care of  this patient.   DORINDA Larose

## 2025-01-24 NOTE — PATIENT INSTRUCTIONS
Repeat echocardiogram  Continue current medications  Please call with questions and concerns   
Subjective:      Patient ID: Nusrat Sterling is a 78 y.o. female. HPI  She is here today to discuss hernia repair. She was told last year about an umbilical and left inguinal hernia. She has noted increasing discomfort in the left groin. Past Medical History:   Diagnosis Date    Hyperlipidemia     Hypertension     Stroke (cerebrum) ([address] 08/01/2015    TIA (transient ischemic attack)      Past Surgical History:   Procedure Laterality Date    APPENDECTOMY      CATARACT REMOVAL      HAND SURGERY      RIGHT    TONSILLECTOMY AND ADENOIDECTOMY       Current Outpatient Medications on File Prior to Visit   Medication Sig Dispense Refill    HYDROcodone-acetaminophen (NORCO) 5-325 MG per tablet Take 0.5 tablets by mouth 2 times daily as needed for Pain for up to 30 days. 30 tablet 0    nitrofurantoin, macrocrystal-monohydrate, (MACROBID) 100 MG capsule Take 1 capsule by mouth 2 times daily for 14 days 14 capsule 0    carvedilol (COREG) 25 MG tablet TAKE 1 TABLET BY MOUTH TWO  TIMES DAILY WITH MEALS 180 tablet 1    sertraline (ZOLOFT) 100 MG tablet TAKE 1 AND 1/2 TABLETS  DAILY 135 tablet 1    doxazosin (CARDURA) 4 MG tablet TAKE 1 TABLET BY MOUTH  NIGHTLY 90 tablet 1    fluorometholone (FML) 0.1 % ophthalmic ointment Apply lightly to skin around eyes three times a day x 3 days, then twice a day x 3 days then once a day x 3 days then d/c      LOTEMAX 0.5 % OINT   0    ADVAIR DISKUS 500-50 MCG/DOSE diskus inhaler USE 1 PUFF EVERY 12 HOURS 120 each 0    meloxicam (MOBIC) 15 MG tablet Take 15 mg by mouth      celecoxib (CELEBREX) 200 MG capsule Take 1 capsule by mouth daily 30 capsule 0    EPINEPHrine (EPIPEN 2-DE) 0.3 MG/0.3ML SOAJ injection Inject 0.3 mLs into the muscle once for 1 dose Use as directed for allergic reaction.  Please give generic 0.3 mL 0    albuterol sulfate HFA (PROAIR HFA) 108 (90 Base) MCG/ACT inhaler Inhale 2 puffs into the lungs every 6 hours as needed for Wheezing 1 Inhaler 0   
report of CT abdomen and pelvis done 5/4/2018. Fat filled inguinal canals are noted. There is a fat filled [facility]. A left renal cyst was noted. Diverticulosis was noted. Assessment:      Left groin pain. CT done last year shows a fat filled left inguinal canal.   [facility] chronically incarcerated with fat. Plan:      She would like repairs done of the  and [facility]. Details of the procedures were reviewed with her. I anticipate an outpatient procedure under a general anesthetic. Use of mesh was discussed, although I doubt she will need mesh. Risks involving mesh were discussed (infection, growing into organs). The anticipated post op course was reviewed. Her questions were answered. She would benjamin to proceed with surgery next week and this will be scheduled.          Kahlil Arreguin MD
complains of pain/discomfort

## 2025-01-24 NOTE — TELEPHONE ENCOUNTER
Reason for call:   [x] Refill   [] Prior Auth  [] Other:     Office:   [x] PCP/Provider -   [] Specialty/Provider -     Medication:   - Atenolol 50mg- take 1 tablet by mouth 2 times a day  - Omeprazole 20mg- take 1 capsule by mouth daily      Pharmacy: [facility]    Does the patient have enough for 3 days?   [x] Yes   [] No - Send as HP to POD

## 2025-01-24 NOTE — ASSESSMENT & PLAN NOTE
Rate controlled with atenolol 50 mg twice daily  On OAC with Eliquis 5 mg twice daily   [id number] Stroke Risk Points: 5   Values used to calculate this score:    Points  Metrics       1        Has Congestive Heart Failure: Yes       1        Has Hypertension: Yes       2        Age: 84       0        Has Diabetes: No       0        Had Stroke: No                 Had TIA: No                 Had Thromboembolism: No       1        Has Vascular Disease: Yes       0        Clinically Relevant Sex: Male

## 2025-01-24 NOTE — ASSESSMENT & PLAN NOTE
Wt Readings from Last 3 Encounters:   01/24/25 105 kg (232 lb 3.2 oz)   12/17/24 104 kg (228 lb 9.6 oz)   10/23/24 105 kg (232 lb)   See CHF

## 2025-01-28 ENCOUNTER — OFFICE VISIT (OUTPATIENT)
Dept: PHYSICAL THERAPY | Facility: CLINIC | Age: 85
End: 2025-01-28
Payer: MEDICARE

## 2025-01-28 DIAGNOSIS — M25.511 ACUTE PAIN OF RIGHT SHOULDER: ICD-10-CM

## 2025-01-28 DIAGNOSIS — M54.2 CERVICALGIA: Primary | ICD-10-CM

## 2025-01-28 DIAGNOSIS — R10.31 RIGHT GROIN PAIN: ICD-10-CM

## 2025-01-28 PROCEDURE — 97140 MANUAL THERAPY 1/> REGIONS: CPT

## 2025-01-28 PROCEDURE — 97110 THERAPEUTIC EXERCISES: CPT

## 2025-01-28 PROCEDURE — 97112 NEUROMUSCULAR REEDUCATION: CPT

## 2025-01-28 NOTE — PROGRESS NOTES
"Daily Note     Today's date: 2025  Patient name: Primo Guy  : 1940  MRN: [id number]  Referring provider: Daniel Sanchez,*  Dx:   Encounter Diagnosis     ICD-10-CM    1. Cervicalgia  M54.2       2. Right groin pain  R10.31       3. Acute pain of right shoulder  M25.511             Start Time: 1500  Stop Time: 1540  Total time in clinic (min): 40 minutes    Subjective: Patient states his neck might be a bit better sine last visit.    Objective: See treatment diary below    Assessment: Good improvement with L rotation > R rotation following manual therapy. Patient requires tactile cueing for postural correction. Continue current PT POC.     Plan: Continue per plan of care.      Precautions: A-fib, cardiomyopathy, GERD.    Dx: c/s stenosis with opening preference, R shoulder pain, R groin pain.    Manuals        Seated C2, 3, 4 P-A Gr mobs    2x20 ea 2x20 ea KF 2x20 ea KF       TPR:  suboccipital +  cervical paraspinals 4 min 5 min 4 min          STM/graston B UT 8 min 8 min 8 min 8 min 8 min 8 min                    Neuro Re-Ed             Slouch over-correction 1x15 1x15 1x15 1x15 1x15 1x15       Seated c/s B Rot 1x30 1x30 1x30 1x30 1x30  1x30       Seated c/s B SB 1x10 ea 1x10 ea 1x10 ea          Posture correction    5 min 5 min 5 min       DLS standing hip flex 4#  3x12 4#  3x12 4#  3x15                                                 Ther Ex             B TB ER Blue  3x15 Blue  3x15 Blue  3x15 Naldo/ 3x15 Blue  3x18 Blue  3x18       TB Rows Silver  3x15 Silver  3x15 Silver  3x15 Eveline/ 3x15 Silver  3x18 Silver  3x18       WB Rep hip ext             Pulleys: flex 3\"/ 2x10 3\"/ 2x10 3\"/ 2x10                                                              Ther Activity             STS No Foam  3x10 No Foam  3x10 No Foam  3x10                       Gait Training                                       Modalities                                          "

## 2025-01-30 ENCOUNTER — OFFICE VISIT (OUTPATIENT)
Dept: PHYSICAL THERAPY | Facility: CLINIC | Age: 85
End: 2025-01-30
Payer: MEDICARE

## 2025-01-30 DIAGNOSIS — M54.2 CERVICALGIA: Primary | ICD-10-CM

## 2025-01-30 DIAGNOSIS — R10.31 RIGHT GROIN PAIN: ICD-10-CM

## 2025-01-30 DIAGNOSIS — M25.511 ACUTE PAIN OF RIGHT SHOULDER: ICD-10-CM

## 2025-01-30 PROCEDURE — 97110 THERAPEUTIC EXERCISES: CPT | Performed by: PHYSICAL THERAPIST

## 2025-01-30 PROCEDURE — 97112 NEUROMUSCULAR REEDUCATION: CPT | Performed by: PHYSICAL THERAPIST

## 2025-01-30 NOTE — PROGRESS NOTES
"Daily Note     Today's date: 2025  Patient name: Primo Guy  : 1940  MRN: [id number]  Referring provider: Daniel Sanchez,*  Dx:   Encounter Diagnosis     ICD-10-CM    1. Cervicalgia  M54.2       2. Right groin pain  R10.31       3. Acute pain of right shoulder  M25.511                        Subjective: Patient reports less pain overall.    Objective: See treatment diary below    Assessment: Pt demonstrated improved c/s ROM in all directions, pain with ER ext and R Rot.    Plan: Continue per plan of care. Decrease freq to to 1 x wk.     Precautions: A-fib, cardiomyopathy, GERD.    Dx: c/s stenosis with opening preference, R shoulder pain, R groin pain.    Manuals       Seated C2, 3, 4 P-A Gr mobs    2x20 ea 2x20 ea KF 2x20 ea KF 3x20 ea      TPR:  suboccipital +  cervical paraspinals 4 min 5 min 4 min          STM/graston B UT 8 min 8 min 8 min 8 min 8 min 8 min       Seated c/s Rot clin OP B       1x10 ea      Neuro Re-Ed             Slouch over-correction 1x15 1x15 1x15 1x15 1x15 1x15 1x15      Seated c/s B Rot 1x30 1x30 1x30 1x30 1x30  1x30 1x15      Seated c/s B SB 1x10 ea 1x10 ea 1x10 ea          Posture correction    5 min 5 min 5 min       DLS standing hip flex 4#  3x12 4#  3x12 4#  3x15                                                 Ther Ex             B TB ER Blue  3x15 Blue  3x15 Blue  3x15 Naldo/ 3x15 Blue  3x18 Blue  3x18 Naldo/ 3x18      TB Rows Silver  3x15 Silver  3x15 Silver  3x15 Eveline/ 3x15 Silver  3x18 Silver  3x18 Eveline/ 3x18      WB Rep hip ext             Pulleys: flex 3\"/ 2x10 3\"/ 2x10 3\"/ 2x10                                                              Ther Activity             STS No Foam  3x10 No Foam  3x10 No Foam  3x10                       Gait Training                                       Modalities                                          "

## 2025-01-31 ENCOUNTER — APPOINTMENT (OUTPATIENT)
Dept: LAB | Facility: CLINIC | Age: 85
End: 2025-01-31
Payer: MEDICARE

## 2025-01-31 LAB
25(OH)D3 SERPL-MCNC: 39.7 NG/ML (ref 30–100)
ALBUMIN SERPL BCG-MCNC: 4.3 G/DL (ref 3.5–5)
ALP SERPL-CCNC: 54 U/L (ref 34–104)
ALT SERPL W P-5'-P-CCNC: 30 U/L (ref 7–52)
ANION GAP SERPL CALCULATED.3IONS-SCNC: 7 MMOL/L (ref 4–13)
AST SERPL W P-5'-P-CCNC: 34 U/L (ref 13–39)
BASOPHILS # BLD AUTO: 0.02 THOUSANDS/ΜL (ref 0–0.1)
BASOPHILS NFR BLD AUTO: 0 % (ref 0–1)
BILIRUB SERPL-MCNC: 0.57 MG/DL (ref 0.2–1)
BUN SERPL-MCNC: 21 MG/DL (ref 5–25)
CALCIUM SERPL-MCNC: 9.5 MG/DL (ref 8.4–10.2)
CHLORIDE SERPL-SCNC: 101 MMOL/L (ref 96–108)
CHOLEST SERPL-MCNC: 154 MG/DL (ref ?–200)
CO2 SERPL-SCNC: 31 MMOL/L (ref 21–32)
CREAT SERPL-MCNC: 0.93 MG/DL (ref 0.6–1.3)
EOSINOPHIL # BLD AUTO: 0.06 THOUSAND/ΜL (ref 0–0.61)
EOSINOPHIL NFR BLD AUTO: 1 % (ref 0–6)
ERYTHROCYTE [DISTWIDTH] IN BLOOD BY AUTOMATED COUNT: 12.8 % (ref 11.6–15.1)
EST. AVERAGE GLUCOSE BLD GHB EST-MCNC: 123 MG/DL
GFR SERPL CREATININE-BSD FRML MDRD: 76 ML/MIN/1.73SQ M
GLUCOSE P FAST SERPL-MCNC: 120 MG/DL (ref 65–99)
HBA1C MFR BLD: 5.9 %
HCT VFR BLD AUTO: 39.2 % (ref 36.5–49.3)
HDLC SERPL-MCNC: 45 MG/DL
HGB BLD-MCNC: 13.7 G/DL (ref 12–17)
IMM GRANULOCYTES # BLD AUTO: 0.04 THOUSAND/UL (ref 0–0.2)
IMM GRANULOCYTES NFR BLD AUTO: 1 % (ref 0–2)
LDLC SERPL CALC-MCNC: 74 MG/DL (ref 0–100)
LYMPHOCYTES # BLD AUTO: 1.91 THOUSANDS/ΜL (ref 0.6–4.47)
LYMPHOCYTES NFR BLD AUTO: 37 % (ref 14–44)
MCH RBC QN AUTO: 33.3 PG (ref 26.8–34.3)
MCHC RBC AUTO-ENTMCNC: 34.9 G/DL (ref 31.4–37.4)
MCV RBC AUTO: 95 FL (ref 82–98)
MONOCYTES # BLD AUTO: 0.72 THOUSAND/ΜL (ref 0.17–1.22)
MONOCYTES NFR BLD AUTO: 14 % (ref 4–12)
NEUTROPHILS # BLD AUTO: 2.43 THOUSANDS/ΜL (ref 1.85–7.62)
NEUTS SEG NFR BLD AUTO: 47 % (ref 43–75)
NRBC BLD AUTO-RTO: 0 /100 WBCS
PLATELET # BLD AUTO: 162 THOUSANDS/UL (ref 149–390)
PMV BLD AUTO: 10.7 FL (ref 8.9–12.7)
POTASSIUM SERPL-SCNC: 4.2 MMOL/L (ref 3.5–5.3)
PROT SERPL-MCNC: 6.9 G/DL (ref 6.4–8.4)
RBC # BLD AUTO: 4.11 MILLION/UL (ref 3.88–5.62)
SODIUM SERPL-SCNC: 139 MMOL/L (ref 135–147)
TRIGL SERPL-MCNC: 173 MG/DL (ref ?–150)
TSH SERPL DL<=0.05 MIU/L-ACNC: 4.03 UIU/ML (ref 0.45–4.5)
WBC # BLD AUTO: 5.18 THOUSAND/UL (ref 4.31–10.16)

## 2025-01-31 PROCEDURE — 83036 HEMOGLOBIN GLYCOSYLATED A1C: CPT

## 2025-01-31 PROCEDURE — 80061 LIPID PANEL: CPT

## 2025-01-31 PROCEDURE — 82306 VITAMIN D 25 HYDROXY: CPT

## 2025-01-31 PROCEDURE — 85025 COMPLETE CBC W/AUTO DIFF WBC: CPT

## 2025-01-31 PROCEDURE — 80053 COMPREHEN METABOLIC PANEL: CPT

## 2025-01-31 PROCEDURE — 84443 ASSAY THYROID STIM HORMONE: CPT

## 2025-02-03 ENCOUNTER — RESULTS FOLLOW-UP (OUTPATIENT)
Dept: INTERNAL MEDICINE CLINIC | Facility: CLINIC | Age: 85
End: 2025-02-03

## 2025-02-03 ENCOUNTER — OFFICE VISIT (OUTPATIENT)
Dept: INTERNAL MEDICINE CLINIC | Facility: CLINIC | Age: 85
End: 2025-02-03
Payer: MEDICARE

## 2025-02-03 VITALS
HEART RATE: 76 BPM | OXYGEN SATURATION: 99 % | WEIGHT: 229.2 LBS | TEMPERATURE: 97.9 F | DIASTOLIC BLOOD PRESSURE: 78 MMHG | SYSTOLIC BLOOD PRESSURE: 130 MMHG | BODY MASS INDEX: 33.85 KG/M2

## 2025-02-03 DIAGNOSIS — I10 BENIGN ESSENTIAL HYPERTENSION: Chronic | ICD-10-CM

## 2025-02-03 DIAGNOSIS — G47.33 OBSTRUCTIVE SLEEP APNEA: Chronic | ICD-10-CM

## 2025-02-03 DIAGNOSIS — E78.2 MIXED HYPERLIPIDEMIA: ICD-10-CM

## 2025-02-03 DIAGNOSIS — I50.32 CHRONIC HEART FAILURE WITH PRESERVED EJECTION FRACTION (HFPEF) (HCC): ICD-10-CM

## 2025-02-03 DIAGNOSIS — Z00.00 MEDICARE ANNUAL WELLNESS VISIT, SUBSEQUENT: ICD-10-CM

## 2025-02-03 DIAGNOSIS — I48.21 PERMANENT ATRIAL FIBRILLATION (HCC): Primary | Chronic | ICD-10-CM

## 2025-02-03 PROCEDURE — G2211 COMPLEX E/M VISIT ADD ON: HCPCS | Performed by: INTERNAL MEDICINE

## 2025-02-03 PROCEDURE — 99214 OFFICE O/P EST MOD 30 MIN: CPT | Performed by: INTERNAL MEDICINE

## 2025-02-03 PROCEDURE — G0439 PPPS, SUBSEQ VISIT: HCPCS | Performed by: INTERNAL MEDICINE

## 2025-02-03 NOTE — PATIENT INSTRUCTIONS
Problem List Items Addressed This Visit          Cardiovascular and Mediastinum    Atrial fibrillation (HCC) - Primary (Chronic)    Rate controlled, continue anticoagulation         Benign essential hypertension (Chronic)    Blood pressure okay, continue meds along with healthy diet and exercise         Chronic heart failure with preserved ejection fraction (HFpEF) (HCC)    Wt Readings from Last 3 Encounters:   02/03/25 104 kg (229 lb 3.2 oz)   01/24/25 105 kg (232 lb 3.2 oz)   12/17/24 104 kg (228 lb 9.6 oz)     Continue meds along with healthy diet and exercise                  Respiratory    Obstructive sleep apnea (Chronic)    Continue CPAP            Other    Mixed hyperlipidemia    Continue simvastatin along with healthy diet and exercise         Medicare annual wellness visit, subsequent    Discussed preventative health, cancer screening, immunizations, and safety issues.  Patient had colonoscopy 2016 and then had Cologuard done November 2021, patient will follow-up if having an issue.  Patient up-to-date with all immunizations, he had the Shingrix shots, RSV, flu shot this season, Prevnar 13 and Pneumovax 23, and I recommend getting Prevnar 20 after July 17, 2025            Medicare Preventive Visit Patient Instructions  Thank you for completing your Welcome to Medicare Visit or Medicare Annual Wellness Visit today. Your next wellness visit will be due in one year (2/4/2026).  The screening/preventive services that you may require over the next 5-10 years are detailed below. Some tests may not apply to you based off risk factors and/or age. Screening tests ordered at today's visit but not completed yet may show as past due. Also, please note that scanned in results may not display below.  Preventive Screenings:  Service Recommendations Previous Testing/Comments   Colorectal Cancer Screening  Colonoscopy    Fecal Occult Blood Test (FOBT)/Fecal Immunochemical Test (FIT)  Fecal DNA/Cologuard Test  Flexible  Sigmoidoscopy Age: 45-75 years old   Colonoscopy: every 10 years (May be performed more frequently if at higher risk)  OR  FOBT/FIT: every 1 year  OR  Cologuard: every 3 years  OR  Sigmoidoscopy: every 5 years  Screening may be recommended earlier than age 45 if at higher risk for colorectal cancer. Also, an individualized decision between you and your healthcare provider will decide whether screening between the ages of 76-85 would be appropriate. Colonoscopy: 01/15/2016  FOBT/FIT: Not on file  Cologuard: Not on file  Sigmoidoscopy: Not on file          Prostate Cancer Screening Individualized decision between patient and health care provider in men between ages of 55-69   Medicare will cover every 12 months beginning on the day after your 50th birthday PSA: 2.9 ng/mL           Hepatitis C Screening Once for adults born between 1945 and 1965  More frequently in patients at high risk for Hepatitis C Hep C Antibody: Not on file        Diabetes Screening 1-2 times per year if you're at risk for diabetes or have pre-diabetes Fasting glucose: 120 mg/dL (1/31/2025)  A1C: 5.9 % (1/31/2025)      Cholesterol Screening Once every 5 years if you don't have a lipid disorder. May order more often based on risk factors. Lipid panel: 01/31/2025         Other Preventive Screenings Covered by Medicare:  Abdominal Aortic Aneurysm (AAA) Screening: covered once if your at risk. You're considered to be at risk if you have a family history of AAA or a male between the age of 65-75 who smoking at least 100 cigarettes in your lifetime.  Lung Cancer Screening: covers low dose CT scan once per year if you meet all of the following conditions: (1) Age 55-77; (2) No signs or symptoms of lung cancer; (3) Current smoker or have quit smoking within the last 15 years; (4) You have a tobacco smoking history of at least 20 pack years (packs per day x number of years you smoked); (5) You get a written order from a healthcare provider.  Glaucoma  Screening: covered annually if you're considered high risk: (1) You have diabetes OR (2) Family history of glaucoma OR (3)  aged 50 and older OR (4)  American aged 65 and older  Osteoporosis Screening: covered every 2 years if you meet one of the following conditions: (1) Have a vertebral abnormality; (2) On glucocorticoid therapy for more than 3 months; (3) Have primary hyperparathyroidism; (4) On osteoporosis medications and need to assess response to drug therapy.  HIV Screening: covered annually if you're between the age of 15-65. Also covered annually if you are younger than 15 and older than 65 with risk factors for HIV infection. For pregnant patients, it is covered up to 3 times per pregnancy.    Immunizations:  Immunization Recommendations   Influenza Vaccine Annual influenza vaccination during flu season is recommended for all persons aged >= 6 months who do not have contraindications   Pneumococcal Vaccine   * Pneumococcal conjugate vaccine = PCV13 (Prevnar 13), PCV15 (Vaxneuvance), PCV20 (Prevnar 20)  * Pneumococcal polysaccharide vaccine = PPSV23 (Pneumovax) Adults 19-65 yo with certain risk factors or if 65+ yo  If never received any pneumonia vaccine: recommend Prevnar 20 (PCV20)  Give PCV20 if previously received 1 dose of PCV13 or PPSV23   Hepatitis B Vaccine 3 dose series if at intermediate or high risk (ex: diabetes, end stage renal disease, liver disease)   Respiratory syncytial virus (RSV) Vaccine - COVERED BY MEDICARE PART D  * RSVPreF3 (Arexvy) CDC recommends that adults 60 years of age and older may receive a single dose of RSV vaccine using shared clinical decision-making (SCDM)   Tetanus (Td) Vaccine - COST NOT COVERED BY MEDICARE PART B Following completion of primary series, a booster dose should be given every 10 years to maintain immunity against tetanus. Td may also be given as tetanus wound prophylaxis.   Tdap Vaccine - COST NOT COVERED BY MEDICARE PART B  Recommended at least once for all adults. For pregnant patients, recommended with each pregnancy.   Shingles Vaccine (Shingrix) - COST NOT COVERED BY MEDICARE PART B  2 shot series recommended in those 19 years and older who have or will have weakened immune systems or those 50 years and older     Health Maintenance Due:      Topic Date Due    Colorectal Cancer Screening  Discontinued     Immunizations Due:  There are no preventive care reminders to display for this patient.  Advance Directives   What are advance directives?  Advance directives are legal documents that state your wishes and plans for medical care. These plans are made ahead of time in case you lose your ability to make decisions for yourself. Advance directives can apply to any medical decision, such as the treatments you want, and if you want to donate organs.   What are the types of advance directives?  There are many types of advance directives, and each state has rules about how to use them. You may choose a combination of any of the following:  Living will:  This is a written record of the treatment you want. You can also choose which treatments you do not want, which to limit, and which to stop at a certain time. This includes surgery, medicine, IV fluid, and tube feedings.   Durable power of  for healthcare (DPAHC):  This is a written record that states who you want to make healthcare choices for you when you are unable to make them for yourself. This person, called a proxy, is usually a family member or a friend. You may choose more than 1 proxy.  Do not resuscitate (DNR) order:  A DNR order is used in case your heart stops beating or you stop breathing. It is a request not to have certain forms of treatment, such as CPR. A DNR order may be included in other types of advance directives.  Medical directive:  This covers the care that you want if you are in a coma, near death, or unable to make decisions for yourself. You can list the  treatments you want for each condition. Treatment may include pain medicine, surgery, blood transfusions, dialysis, IV or tube feedings, and a ventilator (breathing machine).  Values history:  This document has questions about your views, beliefs, and how you feel and think about life. This information can help others choose the care that you would choose.  Why are advance directives important?  An advance directive helps you control your care. Although spoken wishes may be used, it is better to have your wishes written down. Spoken wishes can be misunderstood, or not followed. Treatments may be given even if you do not want them. An advance directive may make it easier for your family to make difficult choices about your care.   Weight Management   Why it is important to manage your weight:  Being overweight increases your risk of health conditions such as heart disease, high blood pressure, type 2 diabetes, and certain types of cancer. It can also increase your risk for osteoarthritis, sleep apnea, and other respiratory problems. Aim for a slow, steady weight loss. Even a small amount of weight loss can lower your risk of health problems.  How to lose weight safely:  A safe and healthy way to lose weight is to eat fewer calories and get regular exercise. You can lose up about 1 pound a week by decreasing the number of calories you eat by 500 calories each day.   Healthy meal plan for weight management:  A healthy meal plan includes a variety of foods, contains fewer calories, and helps you stay healthy. A healthy meal plan includes the following:  Eat whole-grain foods more often.  A healthy meal plan should contain fiber. Fiber is the part of grains, fruits, and vegetables that is not broken down by your body. Whole-grain foods are healthy and provide extra fiber in your diet. Some examples of whole-grain foods are whole-wheat breads and pastas, oatmeal, brown rice, and bulgur.  Eat a variety of vegetables every  day.  Include dark, leafy greens such as spinach, kale, lavelle greens, and mustard greens. Eat yellow and orange vegetables such as carrots, sweet potatoes, and winter squash.   Eat a variety of fruits every day.  Choose fresh or canned fruit (canned in its own juice or light syrup) instead of juice. Fruit juice has very little or no fiber.  Eat low-fat dairy foods.  Drink fat-free (skim) milk or 1% milk. Eat fat-free yogurt and low-fat cottage cheese. Try low-fat cheeses such as mozzarella and other reduced-fat cheeses.  Choose meat and other protein foods that are low in fat.  Choose beans or other legumes such as split peas or lentils. Choose fish, skinless poultry (chicken or turkey), or lean cuts of red meat (beef or pork). Before you cook meat or poultry, cut off any visible fat.   Use less fat and oil.  Try baking foods instead of frying them. Add less fat, such as margarine, sour cream, regular salad dressing and mayonnaise to foods. Eat fewer high-fat foods. Some examples of high-fat foods include french fries, doughnuts, ice cream, and cakes.  Eat fewer sweets.  Limit foods and drinks that are high in sugar. This includes candy, cookies, regular soda, and sweetened drinks.  Exercise:  Exercise at least 30 minutes per day on most days of the week. Some examples of exercise include walking, biking, dancing, and swimming. You can also fit in more physical activity by taking the stairs instead of the elevator or parking farther away from stores. Ask your healthcare provider about the best exercise plan for you.      © Copyright [employer] [facility] 2018 Information is for End User's use only and may not be sold, redistributed or otherwise used for commercial purposes. All illustrations and images included in CareNotes® are the copyrighted property of [employer] Inc[employer] or [employer]

## 2025-02-03 NOTE — PROGRESS NOTES
Name: Primo Guy      : 1940      MRN: [id number]  Encounter Provider: Arnaldo Luo MD  Encounter Date: 2/3/2025   Enc[facility]: [facility]  Medicare annual wellness visit, subsequent  Discussed preventative health, cancer screening, immunizations, and safety issues.  Patient had colonoscopy  and then had Cologuard done 2021, patient will follow-up if having an issue.  Patient up-to-date with all immunizations, he had the Shingrix shots, RSV, flu shot this season, Prevnar 13 and Pneumovax 23, and I recommend getting Prevnar 20 after 2025.  Patient has had screening for abdominal aortic aneurysm.         Chronic heart failure with preserved ejection fraction (HFpEF) (HCC)  Wt Readings from Last 3 Encounters:   25 104 kg (229 lb 3.2 oz)   25 105 kg (232 lb 3.2 oz)   24 104 kg (228 lb 9.6 oz)     Continue meds along with healthy diet and exercise             Permanent atrial fibrillation (HCC)  Rate controlled, continue anticoagulation       Benign essential hypertension  Blood pressure okay, continue meds along with healthy diet and exercise       Mixed hyperlipidemia  Continue simvastatin along with healthy diet and exercise       Obstructive sleep apnea  Continue CPAP         Depression Screening and Follow-up Plan: Patient was screened for depression during today's encounter. They screened negative with a PHQ-2 score of 0.      Preventive health issues were discussed with patient, and age appropriate screening tests were ordered as noted in patient's After Visit Summary. Personalized health advice and appropriate referrals for health education or preventive services given if needed, as noted in patient's After Visit Summary.    History of Present Illness     Patient here for follow-up and annual wellness visit       Patient Care Team:  Arnaldo Luo MD as PCP - General (Internal Medicine)  Vu Devine MD  as Cardiologist  MD Melly Patel MD as Endoscopist    Review of Systems   Constitutional:  Negative for chills, fatigue and fever.   HENT:  Negative for congestion, nosebleeds, postnasal drip, sore throat and trouble swallowing.    Eyes:  Negative for pain.   Respiratory:  Negative for cough, chest tightness, shortness of breath and wheezing.    Cardiovascular:  Negative for chest pain, palpitations and leg swelling.   Gastrointestinal:  Negative for abdominal pain, constipation, diarrhea, nausea and vomiting.   Endocrine: Negative for polydipsia and polyuria.   Genitourinary:  Negative for dysuria, flank pain and hematuria.   Musculoskeletal:  Negative for arthralgias.   Skin:  Negative for rash.   Neurological:  Negative for dizziness, tremors, light-headedness and headaches.   Hematological:  Does not bruise/bleed easily.   Psychiatric/Behavioral:  Negative for confusion and dysphoric mood. The patient is not nervous/anxious.      Medical History Reviewed by provider this encounter:  Tobacco  Allergies  Meds  Problems  Med Hx  Surg Hx  Fam Hx       Annual Wellness Visit Questionnaire   Primo is here for his Subsequent Wellness visit.     Health Risk Assessment:   Patient rates overall health as very good. Patient feels that their physical health rating is slightly better. Patient is satisfied with their life. Eyesight was rated as same. Hearing was rated as slightly better. Patient feels that their emotional and mental health rating is same. Patients states they are sometimes angry. Pain experienced in the last 7 days has been none. Patient states that he has experienced no weight loss or gain in last 6 months.     Depression Screening:   PHQ-2 Score: 0      Fall Risk Screening:   In the past year, patient has experienced: no history of falling in past year      Home Safety:  Patient does not have trouble with stairs inside or outside of their home. Patient has working smoke  alarms and has working carbon monoxide detector. Home safety hazards include: none.     Nutrition:   Current diet is Regular.     Medications:   Patient is currently taking over-the-counter supplements. OTC medications include: VITAMIN D. Patient is able to manage medications.     Activities of Daily Living (ADLs)/Instrumental Activities of Daily Living (IADLs):   Walk and transfer into and out of bed and chair?: Yes  Dress and groom yourself?: Yes    Bathe or shower yourself?: Yes    Feed yourself? Yes  Do your laundry/housekeeping?: Yes  Manage your money, pay your bills and track your expenses?: Yes  Make your own meals?: Yes    Do your own shopping?: Yes    Previous Hospitalizations:   Any hospitalizations or ED visits within the last 12 months?: No      Advance Care Planning:   Living will: No    Durable POA for healthcare: No    Advanced directive: No    Advanced directive counseling given: Yes    ACP document given: Yes      Cognitive Screening:   Provider or family/friend/caregiver concerned regarding cognition?: No    PREVENTIVE SCREENINGS      Cardiovascular Screening:    General: Screening Not Indicated, History Lipid Disorder, Risks and Benefits Discussed and Screening Current      Diabetes Screening:     General: Screening Current and Risks and Benefits Discussed      Colorectal Cancer Screening:     General: Risks and Benefits Discussed      Prostate Cancer Screening:    General: Screening Not Indicated, Risks and Benefits Discussed and Screening Current      Abdominal Aortic Aneurysm (AAA) Screening:    Risk factors include: tobacco use        General: Risks and Benefits Discussed and Screening Current      Lung Cancer Screening:     General: Screening Not Indicated and Risks and Benefits Discussed      Hepatitis C Screening:    General: Risks and Benefits Discussed and Screening Not Indicated    Screening, Brief Intervention, and Referral to Treatment (SBIRT)    Screening    Typical number of drinks  in a week: 6    Single Item Drug Screening:  How often have you used an illegal drug (including marijuana) or a prescription medication for non-medical reasons in the past year? never    Single Item Drug Screen Score: 0  Interpretation: Negative screen for possible drug use disorder    Social Drivers of Health     Financial Resource Strain: Low Risk  (1/30/2024)    Overall Financial Resource Strain (CARDIA)    • Difficulty of Paying Living Expenses: Not hard at all   Food Insecurity: No Food Insecurity (2/3/2025)    Hunger Vital Sign    • Worried About Running Out of Food in the Last Year: Never true    • Ran Out of Food in the Last Year: Never true   Transportation Needs: No Transportation Needs (2/3/2025)    PRAPARE - Transportation    • Lack of Transportation (Medical): No    • Lack of Transportation (Non-Medical): No   Housing Stability: Low Risk  (2/3/2025)    Housing Stability Vital Sign    • Unable to Pay for Housing in the Last Year: No    • Number of Times Moved in the Last Year: 1    • Homeless in the Last Year: No   Utilities: Not At Risk (2/3/2025)    [facility] Utilities    • Threatened with loss of utilities: No     No results found.    Objective   /78   Pulse 76   Temp 97.9 °F (36.6 °C) (Tympanic)   Wt 104 kg (229 lb 3.2 oz)   SpO2 99%   BMI 33.85 kg/m²     Physical Exam  Vitals reviewed.   Constitutional:       General: He is not in acute distress.     Appearance: Normal appearance. He is well-developed.   HENT:      Head: Normocephalic and atraumatic.      Right Ear: External ear normal.      Left Ear: External ear normal.      Nose: Nose normal.   Eyes:      General: No scleral icterus.     Conjunctiva/sclera: Conjunctivae normal.   Neck:      Trachea: No tracheal deviation.   Cardiovascular:      Rate and Rhythm: Normal rate. Rhythm irregular.      Heart sounds: Normal heart sounds. No murmur heard.  Pulmonary:      Effort: Pulmonary effort is normal. No respiratory distress.      Breath  sounds: Normal breath sounds. No wheezing or rales.   Abdominal:      General: Bowel sounds are normal.      Palpations: Abdomen is soft. There is no mass.      Tenderness: There is no abdominal tenderness. There is no guarding.      Hernia: There is no hernia in the left inguinal area or right inguinal area.   Genitourinary:     Testes: Normal.   Musculoskeletal:      Cervical back: Normal range of motion and neck supple.      Right lower leg: Edema (mild) present.      Left lower leg: Edema (mild) present.   Lymphadenopathy:      Cervical: No cervical adenopathy.   Skin:     Coloration: Skin is not jaundiced or pale.   Neurological:      General: No focal deficit present.      Mental Status: He is alert and oriented to person, place, and time.   Psychiatric:         Mood and Affect: Mood normal.         Behavior: Behavior normal.         Thought Content: Thought content normal.         Judgment: Judgment normal.

## 2025-02-03 NOTE — ASSESSMENT & PLAN NOTE
Discussed preventative health, cancer screening, immunizations, and safety issues.  Patient had colonoscopy 2016 and then had Cologuard done November 2021, patient will follow-up if having an issue.  Patient up-to-date with all immunizations, he had the Shingrix shots, RSV, flu shot this season, Prevnar 13 and Pneumovax 23, and I recommend getting Prevnar 20 after July 17, 2025.  Patient has had screening for abdominal aortic aneurysm.

## 2025-02-03 NOTE — ASSESSMENT & PLAN NOTE
Wt Readings from Last 3 Encounters:   02/03/25 104 kg (229 lb 3.2 oz)   01/24/25 105 kg (232 lb 3.2 oz)   12/17/24 104 kg (228 lb 9.6 oz)     Continue meds along with healthy diet and exercise

## 2025-02-04 ENCOUNTER — OFFICE VISIT (OUTPATIENT)
Dept: PHYSICAL THERAPY | Facility: CLINIC | Age: 85
End: 2025-02-04
Payer: MEDICARE

## 2025-02-04 DIAGNOSIS — M54.2 CERVICALGIA: Primary | ICD-10-CM

## 2025-02-04 DIAGNOSIS — M25.511 ACUTE PAIN OF RIGHT SHOULDER: ICD-10-CM

## 2025-02-04 PROCEDURE — 97140 MANUAL THERAPY 1/> REGIONS: CPT | Performed by: PHYSICAL THERAPIST

## 2025-02-04 PROCEDURE — 97110 THERAPEUTIC EXERCISES: CPT | Performed by: PHYSICAL THERAPIST

## 2025-02-04 PROCEDURE — 97112 NEUROMUSCULAR REEDUCATION: CPT | Performed by: PHYSICAL THERAPIST

## 2025-02-04 NOTE — PROGRESS NOTES
"Daily Note     Today's date: 2025  Patient name: Primo Guy  : 1940  MRN: [id number]  Referring provider: Daniel Sanchez,*  Dx:   Encounter Diagnosis     ICD-10-CM    1. Cervicalgia  M54.2       2. Acute pain of right shoulder  M25.511                        Subjective: Patient reports improved R sided neck pain and more good ROM.      Objective: See treatment diary below    Assessment: Pt demonstrated improved c/s ROM.      Plan: Continue per plan of care. Decrease freq to to 1 x wk.     Precautions: A-fib, cardiomyopathy, GERD.    Dx: c/s stenosis with opening preference, R shoulder pain, R groin pain.    Manuals 4     Seated C2, 3, 4 P-A Gr mobs    2x20 ea 2x20 ea KF 2x20 ea KF 3x20 ea 3x20 ea     TPR:  suboccipital +  cervical paraspinals 4 min 5 min 4 min          STM/graston B UT 8 min 8 min 8 min 8 min 8 min 8 min  5 min     Seated c/s Rot clin OP B       1x10 ea 1x10 L, 2x10 R     Neuro Re-Ed             Slouch over-correction 1x15 1x15 1x15 1x15 1x15 1x15 1x15 1x15     Seated c/s B Rot 1x30 1x30 1x30 1x30 1x30  1x30 1x15 2x15     Seated c/s B SB 1x10 ea 1x10 ea 1x10 ea          Posture correction    5 min 5 min 5 min       DLS standing hip flex 4#  3x12 4#  3x12 4#  3x15                                                 Ther Ex             B TB ER Blue  3x15 Blue  3x15 Blue  3x15 Naldo/ 3x15 Blue  3x18 Blue  3x18 Naldo/ 3x18 Naldo/ 3x18     TB Rows Silver  3x15 Silver  3x15 Silver  3x15 Eveline/ 3x15 Silver  3x18 Silver  3x18 Eveline/ 3x18 Eveline/ 3x20 Gold/     WB Rep hip ext             Pulleys: flex 3\"/ 2x10 3\"/ 2x10 3\"/ 2x10          TB Hor ABD (no UT comp)        Y/ 3x10                                            Ther Activity             STS No Foam  3x10 No Foam  3x10 No Foam  3x10                       Gait Training                                       Modalities                                          "

## 2025-02-05 ENCOUNTER — OFFICE VISIT (OUTPATIENT)
Dept: DERMATOLOGY | Facility: CLINIC | Age: 85
End: 2025-02-05
Payer: MEDICARE

## 2025-02-05 VITALS — HEIGHT: 69 IN | BODY MASS INDEX: 33.95 KG/M2 | WEIGHT: 229.2 LBS | TEMPERATURE: 97.2 F

## 2025-02-05 DIAGNOSIS — L81.4 LENTIGO: ICD-10-CM

## 2025-02-05 DIAGNOSIS — D18.01 CHERRY ANGIOMA: ICD-10-CM

## 2025-02-05 DIAGNOSIS — D22.9 MULTIPLE BENIGN MELANOCYTIC NEVI: ICD-10-CM

## 2025-02-05 DIAGNOSIS — D23.9 DERMATOFIBROMA: ICD-10-CM

## 2025-02-05 DIAGNOSIS — L57.0 KERATOSIS, ACTINIC: ICD-10-CM

## 2025-02-05 DIAGNOSIS — L82.1 SEBORRHEIC KERATOSIS: Primary | ICD-10-CM

## 2025-02-05 PROCEDURE — 99214 OFFICE O/P EST MOD 30 MIN: CPT | Performed by: STUDENT IN AN ORGANIZED HEALTH CARE EDUCATION/TRAINING PROGRAM

## 2025-02-05 PROCEDURE — 17000 DESTRUCT PREMALG LESION: CPT | Performed by: STUDENT IN AN ORGANIZED HEALTH CARE EDUCATION/TRAINING PROGRAM

## 2025-02-05 PROCEDURE — 17003 DESTRUCT PREMALG LES 2-14: CPT | Performed by: STUDENT IN AN ORGANIZED HEALTH CARE EDUCATION/TRAINING PROGRAM

## 2025-02-05 NOTE — PROGRESS NOTES
"[facility] Note     Patient Name: Primo Guy  Encounter Date: 2/5/25     Have you been cared for by a [facility] in the last 3 years and, if so, which description applies to you?    Yes.  I have been here within the last 3 years, and my medical history has NOT changed since that time.  I am MALE/not capable of bearing children.    REVIEW OF SYSTEMS:  Have you recently had or currently have any of the following? No changes in my recent health.   PAST MEDICAL HISTORY:  Have you personally ever had or currently have any of the following?  If \"YES,\" then please provide more detail. No changes in my medical history.   HISTORY OF IMMUNOSUPPRESSION: Do you have a history of any of the following:  Systemic Immunosuppression such as Diabetes, Biologic or Immunotherapy, Chemotherapy, Organ Transplantation, Bone Marrow Transplantation or Prednisone?  No     Answering \"YES\" requires the addition of the dotphrase \"IMMUNOSUPPRESSED\" as the first diagnosis of the patient's visit.   FAMILY HISTORY:  Any \"first degree relatives\" (parent, brother, sister, or child) with the following?    No changes in my family's known health.   PATIENT EXPERIENCE:    Do you want the Dermatologist to perform a COMPLETE skin exam today including a clinical examination under the \"bra and underwear\" areas?  Yes  If necessary, do we have your permission to call and leave a detailed message on your Preferred Phone number that includes your specific medical information?  Yes      No Known Allergies   Current Outpatient Medications:     amLODIPine (NORVASC) 5 mg tablet, Take 1 tablet (5 mg total) by mouth daily at bedtime, Disp: 90 tablet, Rfl: 3    apixaban (Eliquis) 5 mg, Take 1 tablet (5 mg total) by mouth 2 (two) times a day, Disp: 180 tablet, Rfl: 3    atenolol (TENORMIN) 50 mg tablet, Take 1 tablet (50 mg total) by mouth 2 (two) times a day, Disp: 180 tablet, Rfl: 1    fluticasone (FLONASE) 50 mcg/act nasal " spray, Spray 2 squirts in each nostril once daily, Disp: 16 g, Rfl: 3    furosemide (LASIX) 40 mg tablet, Take 1 tablet daily, Disp: 90 tablet, Rfl: 3    hydrocortisone 2.5 % cream, Apply topically in the morning, Disp: 56 g, Rfl: 3    losartan (COZAAR) 50 mg tablet, Take 1 tablet (50 mg total) by mouth daily, Disp: 90 tablet, Rfl: 3    Multiple Vitamin (multivitamin) tablet, Take 1 tablet by mouth daily, Disp: , Rfl:     omeprazole (PriLOSEC) 20 mg delayed release capsule, Take 1 capsule (20 mg total) by mouth daily, Disp: 90 capsule, Rfl: 1    simvastatin (ZOCOR) 40 mg tablet, Take 1 tablet (40 mg total) by mouth daily at bedtime, Disp: 90 tablet, Rfl: 3    tiZANidine (ZANAFLEX) 4 mg tablet, Take 1 tablet (4 mg total) by mouth every 8 (eight) hours as needed for muscle spasms, Disp: 20 tablet, Rfl: 0    triamcinolone (KENALOG) 0.1 % ointment, APPLY TOPICALLY DAILY FOR 2 WEEKS (Patient not taking: Reported on 10/7/2024), Disp: 454 g, Rfl: 0    Vitamin D, Cholecalciferol, 1000 UNITS CAPS, Take by mouth 2,000 per day, Disp: , Rfl:           Whom besides the patient is providing clinical information about today's encounter?   NO ADDITIONAL HISTORIAN (patient alone provided history)    Physical Exam and Assessment/Plan by Diagnosis:    Patient presents today for follow up visit full body skin check. Patient states he has had scabbing on nose area that gets hard and sometimes scabbing and flaking. He was previously seen 7/18/24 for cosmetic visit removing milium and an SK.    SEBORRHEIC KERATOSES  - Relevant exam: Scattered over the trunk/extremities are waxy brown to black plaques and papules with stuck on appearance  - Exam and clinical history consistent with seborrheic keratoses  - Counseled that these are benign growths that do not require treatment  - Counseled that removal of lesions is considered cosmetic and so would incur a fee should patient elect to move forward.   - Patient to hold on treatments for now but  will inform us should they desire additional treatments    MELANOCYTIC NEVI  -Relevant exam: Scattered over the trunk/extremities are homogenously pigmented brown macules and papules. ELM performed and without concerning findings.  - Exam and clinical history consistent with melanocytic nevi  - Educated on the ABCDE's of melanoma; handout provided  - Counseled to return to clinic prior to scheduled appointment should any of these lesions change or should any new lesions of concern arise  - Counseled on use of sun protection daily. Reviewed latest FDA sunscreen guidelines, including use of broad spectrum (UVA and UVB blocking) sunscreen or sun protective clothing with SPF 30-50 every 2-3 hours and reapplied after exposure to water; use of photoprotective clothing, including a broad brim hat and UPF rated clothing if outdoors for several hours; avoid use of tanning beds as these pose significant risk for melanoma and skin cancer.    LENTIGINES  OTHER SKIN CHANGES DUE TO CHRONIC EXPOSURE TO NONIONIZING RADIATION  - Relevant exam: Over sun exposed areas are brown macules. ELM performed and without concerning findings.  - Exam and clinical history consistent with lentigines.  - Educated that these are indicative of prior sun exposure.   - Counseled to return to clinic prior to scheduled appointment should any of these lesions change or should any new lesions of concern arise.  - Recommended use of sunscreen as above and below.  - Counseled on use of sun protection daily. Reviewed latest FDA sunscreen guidelines, including use of broad spectrum (UVA and UVB blocking) sunscreen or sun protective clothing with SPF 30-50 every 2-3 hours and reapplied after exposure to water; use of photoprotective clothing, including a broad brim hat and UPF rated clothing if outdoors for several hours; avoid use of tanning beds as these pose significant risk for melanoma and skin cancer.    CHERRY ANGIOMAS  - Relevant exam: Scattered over  the trunk/extremities are red papules  - Exam and clinical history consistent with cherry angiomas  - Educated that these are benign  - Educated that removal is considered aesthetic and would incur a fee.  - Patient does not wish to pursue removal at this time but will contact us should this change.    ACTINIC KERATOSES  - Relevant exam: On the Nose x2, Left wrist.  are gritty pink papules  - Exam and clinical history consistent with actinic keratoses  - Discussed that these lesions are considered premalignant with the potential to evolve into squamous cell carcinoma.   - Discussed that these are due to chronic sun exposure and therefore recommend use of sunscreen/sun protection to prevent further sun damage  - Discussed treatment options, including liquid nitrogen destruction, topical immunotherapy, and photodynamic therapy, including risks, benefits.    PROCEDURE:  DESTRUCTION OF PRE-MALIGNANT LESIONS    - After a thorough discussion of treatment options and risk/benefits/alternatives (including but not limited to local pain, scarring, dyspigmentation, blistering, and possible superinfection), verbal and written consent were obtained and the aforementioned lesions were treated on with cryotherapy using liquid nitrogen x 1 cycle for 5-10 seconds.    TOTAL NUMBER of 3 pre-malignant lesions were treated today on the ANATOMIC LOCATION: Nose x2, Left wrist.     The patient tolerated the procedure well, and after-care instructions were provided.     DERMATOFIBROMA    Physical Exam:  Anatomic Location Affected:  Left Knee  Morphological Description:  brown papule with dimple inside  Pertinent Positives:  Pertinent Negatives:    Additional History of Present Condition:  Found on exam    Assessment and Plan:  Based on a thorough discussion of this condition and the management approach to it (including a comprehensive discussion of the known risks, side effects and potential benefits of treatment), the patient (family)  "agrees to implement the following specific plan:  Reassured; Benign    Assessment and Plan:  A dermatofibroma is a common benign fibrous nodule that most often arises on the skin of the lower legs.  A dermatofibroma is also called a \"cutaneous fibrous histiocytoma.\"  Dermatofibromas occur at all ages and in people of every ethnicity. They are more common in women than in men.    It is not clear if dermatofibroma is a reactive process or if it is a neoplasm. The lesions are made up of proliferating fibroblasts. Histiocytes may also be involved.  They are sometimes attributed to an insect bite or ingrownhair or local trauma, but not consistently. They may be more numerous in patients with altered immunity.    Dermatofibromas most often occur on the legs and arms, but may also arise on the trunk or any site of the body.  Typical clinical features include the following:  People may have 1 or up to 15 lesions.  Size varies from 0.5-1.5 cm diameter; most lesions are 7-10 mm diameter.  They are firm nodules tethered to the skin surface and mobile over subcutaneous tissue.  The skin \"dimples\" on pinching the lesion.  Color may be pink to light brown in white skin, and dark brown to black in dark skin; some appear paler in the center.  They do not usually cause symptoms, but they are sometimes painful or itchy.  Because they are often raised lesions, they may be traumatized, for example by a razor.  Occasionally dozens may erupt within a few months, usually in the setting of immunosuppression (for example autoimmune disease, cancer or certain medications).  Dermatofibroma does not give rise to cancer. However, occasionally, it may be mistaken for dermatofibrosarcoma or desmoplastic melanoma.    A dermatofibroma is harmless and seldom causes any symptoms. Usually, only reassurance is needed. If it is nuisance or causing concern, the lesion can be removed surgically, resulting in a scar that is, by definition, usually longer " in diameter than the widest portion of the dermatofibroma.  Cryotherapy, shave biopsy and laser surgery are rarely completely successful.  Skin punch biopsy or incisional biopsy may be undertaken if there is an atypical feature such as recent enlargement, ulceration, or asymmetrical structures and colours on dermatoscopy.     Resident Dr Sams  Scribe Attestation      I,:  Gloria Galloway am acting as a scribe while in the presence of the attending physician.:       I,:  Edward Hein MD personally performed the services described in this documentation    as scribed in my presence.:              This encounter (00824 or 80177) has a MODERATE level of medical decision making (MDM) given the presence of at least 2 of the elements of MDM (below):  *MODERATE number and complexity of problems addressed: 1 or more chronic illnesses with exacerbation, progression, or side effects of treatment; OR 2 or more stable chronic illnesses; OR 1 undiagnosed new problem with uncertain prognosis; OR 1 acute illness with systemic symptoms; OR 1 acute uncomplicated injury  *MODERATE amount and/or complexity of data reviewed: this includes review of previous notes and/or lab tests, independent interpretation of tests performed by another healthcare professional, ordering tests, assessment requiring independent historian, or discussion with other healthcare professionals.  *MODERATE risk of complications and/or morbidity or mortality of patient management (for example, prescription drug management, decisions regarding minor surgery with identified patient or procedure risk factors).

## 2025-02-05 NOTE — PATIENT INSTRUCTIONS
SEBORRHEIC KERATOSES  - Relevant exam: Scattered over the trunk/extremities are waxy brown to black plaques and papules with stuck on appearance  - Exam and clinical history consistent with seborrheic keratoses  - Counseled that these are benign growths that do not require treatment  - Counseled that removal of lesions is considered cosmetic and so would incur a fee should patient elect to move forward.   - Patient to hold on treatments for now but will inform us should they desire additional treatments    MELANOCYTIC NEVI  -Relevant exam: Scattered over the trunk/extremities are homogenously pigmented brown macules and papules. ELM performed and without concerning findings.  - Exam and clinical history consistent with melanocytic nevi  - Educated on the ABCDE's of melanoma; handout provided  - Counseled to return to clinic prior to scheduled appointment should any of these lesions change or should any new lesions of concern arise  - Counseled on use of sun protection daily. Reviewed latest FDA sunscreen guidelines, including use of broad spectrum (UVA and UVB blocking) sunscreen or sun protective clothing with SPF 30-50 every 2-3 hours and reapplied after exposure to water; use of photoprotective clothing, including a broad brim hat and UPF rated clothing if outdoors for several hours; avoid use of tanning beds as these pose significant risk for melanoma and skin cancer.    LENTIGINES  OTHER SKIN CHANGES DUE TO CHRONIC EXPOSURE TO NONIONIZING RADIATION  - Relevant exam: Over sun exposed areas are brown macules. ELM performed and without concerning findings.  - Exam and clinical history consistent with lentigines.  - Educated that these are indicative of prior sun exposure.   - Counseled to return to clinic prior to scheduled appointment should any of these lesions change or should any new lesions of concern arise.  - Recommended use of sunscreen as above and below.  - Counseled on use of sun protection daily.  Reviewed latest FDA sunscreen guidelines, including use of broad spectrum (UVA and UVB blocking) sunscreen or sun protective clothing with SPF 30-50 every 2-3 hours and reapplied after exposure to water; use of photoprotective clothing, including a broad brim hat and UPF rated clothing if outdoors for several hours; avoid use of tanning beds as these pose significant risk for melanoma and skin cancer.    CHERRY ANGIOMAS  - Relevant exam: Scattered over the trunk/extremities are red papules  - Exam and clinical history consistent with cherry angiomas  - Educated that these are benign  - Educated that removal is considered aesthetic and would incur a fee.  - Patient does not wish to pursue removal at this time but will contact us should this change.    ACTINIC KERATOSES  - Relevant exam: On the Nose x2, Left wrist.  are gritty pink papules  - Exam and clinical history consistent with actinic keratoses  - Discussed that these lesions are considered premalignant with the potential to evolve into squamous cell carcinoma.   - Discussed that these are due to chronic sun exposure and therefore recommend use of sunscreen/sun protection to prevent further sun damage  - Discussed treatment options, including liquid nitrogen destruction, topical immunotherapy, and photodynamic therapy, including risks, benefits.    PROCEDURE:  DESTRUCTION OF PRE-MALIGNANT LESIONS    - After a thorough discussion of treatment options and risk/benefits/alternatives (including but not limited to local pain, scarring, dyspigmentation, blistering, and possible superinfection), verbal and written consent were obtained and the aforementioned lesions were treated on with cryotherapy using liquid nitrogen x 1 cycle for 5-10 seconds.    TOTAL NUMBER of 3 pre-malignant lesions were treated today on the ANATOMIC LOCATION: Nose x2, Left wrist.     The patient tolerated the procedure well, and after-care instructions were provided.  "    DERMATOFIBROMA    Physical Exam:  Anatomic Location Affected:  Left Knee  Morphological Description:  brown papule with dimple inside  Pertinent Positives:  Pertinent Negatives:    Additional History of Present Condition:  Found on exam    Assessment and Plan:  Based on a thorough discussion of this condition and the management approach to it (including a comprehensive discussion of the known risks, side effects and potential benefits of treatment), the patient (family) agrees to implement the following specific plan:  Reassured; Benign    Assessment and Plan:  A dermatofibroma is a common benign fibrous nodule that most often arises on the skin of the lower legs.  A dermatofibroma is also called a \"cutaneous fibrous histiocytoma.\"  Dermatofibromas occur at all ages and in people of every ethnicity. They are more common in women than in men.    It is not clear if dermatofibroma is a reactive process or if it is a neoplasm. The lesions are made up of proliferating fibroblasts. Histiocytes may also be involved.  They are sometimes attributed to an insect bite or ingrownhair or local trauma, but not consistently. They may be more numerous in patients with altered immunity.    Dermatofibromas most often occur on the legs and arms, but may also arise on the trunk or any site of the body.  Typical clinical features include the following:  People may have 1 or up to 15 lesions.  Size varies from 0.5-1.5 cm diameter; most lesions are 7-10 mm diameter.  They are firm nodules tethered to the skin surface and mobile over subcutaneous tissue.  The skin \"dimples\" on pinching the lesion.  Color may be pink to light brown in white skin, and dark brown to black in dark skin; some appear paler in the center.  They do not usually cause symptoms, but they are sometimes painful or itchy.  Because they are often raised lesions, they may be traumatized, for example by a razor.  Occasionally dozens may erupt within a few months, " usually in the setting of immunosuppression (for example autoimmune disease, cancer or certain medications).  Dermatofibroma does not give rise to cancer. However, occasionally, it may be mistaken for dermatofibrosarcoma or desmoplastic melanoma.    A dermatofibroma is harmless and seldom causes any symptoms. Usually, only reassurance is needed. If it is nuisance or causing concern, the lesion can be removed surgically, resulting in a scar that is, by definition, usually longer in diameter than the widest portion of the dermatofibroma.  Cryotherapy, shave biopsy and laser surgery are rarely completely successful.  Skin punch biopsy or incisional biopsy may be undertaken if there is an atypical feature such as recent enlargement, ulceration, or asymmetrical structures and colours on dermatoscopy.

## 2025-02-06 ENCOUNTER — OFFICE VISIT (OUTPATIENT)
Dept: PHYSICAL THERAPY | Facility: CLINIC | Age: 85
End: 2025-02-06
Payer: MEDICARE

## 2025-02-06 DIAGNOSIS — M54.2 CERVICALGIA: Primary | ICD-10-CM

## 2025-02-06 PROCEDURE — 97112 NEUROMUSCULAR REEDUCATION: CPT | Performed by: PHYSICAL THERAPIST

## 2025-02-06 PROCEDURE — 97110 THERAPEUTIC EXERCISES: CPT | Performed by: PHYSICAL THERAPIST

## 2025-02-06 PROCEDURE — 97140 MANUAL THERAPY 1/> REGIONS: CPT | Performed by: PHYSICAL THERAPIST

## 2025-02-06 NOTE — PROGRESS NOTES
"Daily Note     Today's date: 2025  Patient name: Primo Guy  : 1940  MRN: [id number]  Referring provider: Daniel Sanchez,*  Dx:   Encounter Diagnosis     ICD-10-CM    1. Cervicalgia  M54.2                        Subjective: Patient reports improved R sided neck pain and more good ROM.      Objective: See treatment diary below    Assessment: Pt demonstrated relief with P-A mobs.    Plan: Continue per plan of care. Decrease freq to to 1 x wk.     Precautions: A-fib, cardiomyopathy, GERD.    Dx: c/s stenosis with opening preference, R shoulder pain, R groin pain.    Manuals     Seated C2, 3, 4 P-A Gr mobs    2x20 ea 2x20 ea KF 2x20 ea KF 3x20 ea 3x20 ea 3x20 ea    TPR:  suboccipital +  cervical paraspinals 4 min 5 min 4 min          STM/graston B UT 8 min 8 min 8 min 8 min 8 min 8 min  5 min 5 min    Seated c/s Rot clin OP B       1x10 ea 1x10 L, 2x10 R 1x10 L, 2x10 R    Neuro Re-Ed             Slouch over-correction 1x15 1x15 1x15 1x15 1x15 1x15 1x15 1x15 3x10    Seated c/s B Rot 1x30 1x30 1x30 1x30 1x30  1x30 1x15 2x15 3x10    Seated c/s B SB 1x10 ea 1x10 ea 1x10 ea          Posture correction    5 min 5 min 5 min       DLS standing hip flex 4#  3x12 4#  3x12 4#  3x15                                                 Ther Ex             B TB ER Blue  3x15 Blue  3x15 Blue  3x15 Naldo/ 3x15 Blue  3x18 Blue  3x18 Naldo/ 3x18 Naldo/ 3x18 Naldo/ 3x20    TB Rows Silver  3x15 Silver  3x15 Silver  3x15 Eveline/ 3x15 Silver  3x18 Silver  3x18 Eveline/ 3x18 Eveline/ 3x20 Gold/ 3x15     WB Rep hip ext             Pulleys: flex 3\"/ 2x10 3\"/ 2x10 3\"/ 2x10          TB Hor ABD (no UT comp)        Y/ 3x10 Y/ 3x10                                           Ther Activity             STS No Foam  3x10 No Foam  3x10 No Foam  3x10                       Gait Training                                       Modalities                                          "

## 2025-02-13 ENCOUNTER — OFFICE VISIT (OUTPATIENT)
Dept: PHYSICAL THERAPY | Facility: CLINIC | Age: 85
End: 2025-02-13
Payer: MEDICARE

## 2025-02-13 DIAGNOSIS — M54.2 CERVICALGIA: Primary | ICD-10-CM

## 2025-02-13 PROCEDURE — 97110 THERAPEUTIC EXERCISES: CPT | Performed by: PHYSICAL THERAPIST

## 2025-02-13 PROCEDURE — 97140 MANUAL THERAPY 1/> REGIONS: CPT | Performed by: PHYSICAL THERAPIST

## 2025-02-13 PROCEDURE — 97112 NEUROMUSCULAR REEDUCATION: CPT | Performed by: PHYSICAL THERAPIST

## 2025-02-13 NOTE — PROGRESS NOTES
"Daily Note     Today's date: 2025  Patient name: Primo Guy  : 1940  MRN: [id number]  Referring provider: Daniel Sanchez,*  Dx:   Encounter Diagnosis     ICD-10-CM    1. Cervicalgia  M54.2                        Subjective: Patient reports improved R sided neck pain.    Objective: See treatment diary below    Assessment: Pt demonstrated improved c/s Rot    Plan: Maintain freq of 1 x wk.     Precautions: A-fib, cardiomyopathy, GERD.    Dx: c/s stenosis with opening preference, R shoulder pain, R groin pain.    Manuals    Seated C2, 3, 4 P-A Gr mobs    2x20 ea 2x20 ea KF 2x20 ea KF 3x20 ea 3x20 ea 3x20 ea 4x20 ea   TPR:  suboccipital +  cervical paraspinals 4 min 5 min 4 min          STM/graston B UT 8 min 8 min 8 min 8 min 8 min 8 min  5 min 5 min 5 min   Seated c/s Rot clin OP B       1x10 ea 1x10 L, 2x10 R 1x10 L, 2x10 R 1x10 B   Neuro Re-Ed             Slouch over-correction 1x15 1x15 1x15 1x15 1x15 1x15 1x15 1x15 3x10 3x10   Seated c/s B Rot 1x30 1x30 1x30 1x30 1x30  1x30 1x15 2x15 3x10 3x10   Seated c/s B SB 1x10 ea 1x10 ea 1x10 ea          Posture correction    5 min 5 min 5 min       DLS standing hip flex 4#  3x12 4#  3x12 4#  3x15                                                 Ther Ex             B TB ER Blue  3x15 Blue  3x15 Blue  3x15 Naldo/ 3x15 Blue  3x18 Blue  3x18 Naldo/ 3x18 Naldo/ 3x18 Naldo/ 3x20 Dillon/ 3x15   TB Rows Silver  3x15 Silver  3x15 Silver  3x15 Eveline/ 3x15 Silver  3x18 Silver  3x18 Eveline/ 3x18 Eveline/ 3x20 Gold/ 3x15  Gold/ 3x15   WB Rep hip ext             Pulleys: flex 3\"/ 2x10 3\"/ 2x10 3\"/ 2x10          TB Hor ABD (no UT comp)        Y/ 3x10 Y/ 3x10                                           Ther Activity             STS No Foam  3x10 No Foam  3x10 No Foam  3x10                       Gait Training                                       Modalities                                          "

## 2025-02-17 ENCOUNTER — HOSPITAL ENCOUNTER (OUTPATIENT)
Dept: NON INVASIVE DIAGNOSTICS | Facility: CLINIC | Age: 85
Discharge: HOME | End: 2025-02-17
Payer: MEDICARE

## 2025-02-17 VITALS
WEIGHT: 229.28 LBS | DIASTOLIC BLOOD PRESSURE: 78 MMHG | HEIGHT: 69 IN | BODY MASS INDEX: 33.96 KG/M2 | HEART RATE: 64 BPM | SYSTOLIC BLOOD PRESSURE: 130 MMHG

## 2025-02-17 DIAGNOSIS — I50.32 CHRONIC HEART FAILURE WITH PRESERVED EJECTION FRACTION (HFPEF) (HCC): ICD-10-CM

## 2025-02-17 DIAGNOSIS — I10 BENIGN ESSENTIAL HYPERTENSION: Chronic | ICD-10-CM

## 2025-02-17 DIAGNOSIS — I48.21 PERMANENT ATRIAL FIBRILLATION (HCC): Chronic | ICD-10-CM

## 2025-02-17 LAB
AORTIC ROOT: 3.8 CM
ASCENDING AORTA: 3.6 CM
BSA FOR ECHO PROCEDURE: 2.19 M2
FRACTIONAL SHORTENING: 26 (ref 28–44)
INTERVENTRICULAR SEPTUM IN DIASTOLE (PARASTERNAL SHORT AXIS VIEW): 1.2 CM
INTERVENTRICULAR SEPTUM: 1.2 CM (ref 0.6–1.1)
LAAS-AP2: 26.1 CM2
LAAS-AP4: 34.4 CM2
LEFT ATRIUM AREA SYSTOLE SINGLE PLANE A4C: 36.6 CM2
LEFT ATRIUM SIZE: 4.7 CM
LEFT ATRIUM VOLUME (MOD BIPLANE): 110 ML
LEFT ATRIUM VOLUME INDEX (MOD BIPLANE): 50.2 ML/M2
LEFT INTERNAL DIMENSION IN SYSTOLE: 3.4 CM (ref 2.1–4)
LEFT VENTRICLE DIASTOLIC VOLUME (MOD BIPLANE): 128 ML
LEFT VENTRICLE DIASTOLIC VOLUME INDEX (MOD BIPLANE): 58.4 ML/M2
LEFT VENTRICLE SYSTOLIC VOLUME (MOD BIPLANE): 53 ML
LEFT VENTRICLE SYSTOLIC VOLUME INDEX (MOD BIPLANE): 24.2 ML/M2
LEFT VENTRICULAR INTERNAL DIMENSION IN DIASTOLE: 4.6 CM (ref 3.5–6)
LEFT VENTRICULAR POSTERIOR WALL IN END DIASTOLE: 1.3 CM
LEFT VENTRICULAR STROKE VOLUME: 49 ML
LV EF BIPLANE MOD: 59 %
LV EF US.2D.A4C+ESTIMATED: 57 %
LVSV (TEICH): 49 ML
RA PRESSURE ESTIMATED: 5 MMHG
RIGHT ATRIUM AREA SYSTOLE A4C: 25.8 CM2
RIGHT VENTRICLE ID DIMENSION: 3.5 CM
RV PSP: 37 MMHG
SL CV LEFT ATRIUM LENGTH A2C: 6.5 CM
SL CV LV EF: 55
SL CV PED ECHO LEFT VENTRICLE DIASTOLIC VOLUME (MOD BIPLANE) 2D: 99 ML
SL CV PED ECHO LEFT VENTRICLE SYSTOLIC VOLUME (MOD BIPLANE) 2D: 49 ML
TR MAX PG: 32 MMHG
TR PEAK VELOCITY: 2.8 M/S
TRICUSPID ANNULAR PLANE SYSTOLIC EXCURSION: 1.9 CM
TRICUSPID VALVE PEAK REGURGITATION VELOCITY: 2.83 M/S

## 2025-02-17 PROCEDURE — 93306 TTE W/DOPPLER COMPLETE: CPT | Performed by: INTERNAL MEDICINE

## 2025-02-17 PROCEDURE — 93306 TTE W/DOPPLER COMPLETE: CPT

## 2025-02-20 ENCOUNTER — OFFICE VISIT (OUTPATIENT)
Dept: PHYSICAL THERAPY | Facility: CLINIC | Age: 85
End: 2025-02-20
Payer: MEDICARE

## 2025-02-20 DIAGNOSIS — M54.2 CERVICALGIA: Primary | ICD-10-CM

## 2025-02-20 DIAGNOSIS — R10.31 RIGHT GROIN PAIN: ICD-10-CM

## 2025-02-20 DIAGNOSIS — M25.511 ACUTE PAIN OF RIGHT SHOULDER: ICD-10-CM

## 2025-02-20 PROCEDURE — 97110 THERAPEUTIC EXERCISES: CPT

## 2025-02-20 PROCEDURE — 97112 NEUROMUSCULAR REEDUCATION: CPT

## 2025-02-20 PROCEDURE — 97140 MANUAL THERAPY 1/> REGIONS: CPT

## 2025-02-20 NOTE — PROGRESS NOTES
Daily Note     Today's date: 2025  Patient name: Primo Guy  : 1940  MRN: [id number]  Referring provider: Daniel Sanchez,*  Dx:   Encounter Diagnosis     ICD-10-CM    1. Cervicalgia  M54.2       2. Acute pain of right shoulder  M25.511       3. Right groin pain  R10.31                        Subjective: Patient reports no change in right-sided cv pain since last visit.     Objective: See treatment diary below    Assessment: Pt demonstrated improved cv rotation bilaterally and increased postural endurance.     Plan: Maintain freq of 1 x wk.     Precautions: A-fib, cardiomyopathy, GERD.    Dx: c/s stenosis with opening preference, R shoulder pain, R groin pain.    Manuals    Seated C2, 3, 4 P-A Gr mobs    2x20 ea 2x20 ea KF 2x20 ea KF 3x20 ea 3x20 ea 3x20 ea 4x20 ea   TPR:  suboccipital +  cervical paraspinals             STM/graston B UT 8 min   8 min 8 min 8 min  5 min 5 min 5 min   Seated c/s Rot clin OP B 1x10 B      1x10 ea 1x10 L, 2x10 R 1x10 L, 2x10 R 1x10 B   Neuro Re-Ed             Slouch over-correction 3x10   1x15 1x15 1x15 1x15 1x15 3x10 3x10   Seated c/s B Rot 3x10   1x30 1x30  1x30 1x15 2x15 3x10 3x10   Seated c/s B SB             Posture correction    5 min 5 min 5 min       DLS standing hip flex                                                    Ther Ex             B TB ER Naldo  3x15   Naldo/ 3x15 Blue  3x18 Blue  3x18 Naldo/ 3x18 Naldo/ 3x18 Naldo/ 3x20 Dillon/ 3x15   TB Rows Gold   3x20   Veeline/ 3x15 Silver  3x18 Silver  3x18 Eveline/ 3x18 Eveline/ 3x20 Gold/ 3x15  Gold/ 3x15   WB Rep hip ext             Pulleys: flex             TB Hor ABD (no UT comp)        Y/ 3x10 Y/ 3x10                                           Ther Activity             STS                          Gait Training                                       Modalities

## 2025-02-27 ENCOUNTER — OFFICE VISIT (OUTPATIENT)
Dept: PHYSICAL THERAPY | Facility: CLINIC | Age: 85
End: 2025-02-27
Payer: MEDICARE

## 2025-02-27 DIAGNOSIS — R10.31 RIGHT GROIN PAIN: ICD-10-CM

## 2025-02-27 DIAGNOSIS — M25.511 ACUTE PAIN OF RIGHT SHOULDER: ICD-10-CM

## 2025-02-27 DIAGNOSIS — M54.2 CERVICALGIA: Primary | ICD-10-CM

## 2025-02-27 PROCEDURE — 97110 THERAPEUTIC EXERCISES: CPT | Performed by: PHYSICAL THERAPIST

## 2025-02-27 PROCEDURE — 97140 MANUAL THERAPY 1/> REGIONS: CPT | Performed by: PHYSICAL THERAPIST

## 2025-02-27 PROCEDURE — 97112 NEUROMUSCULAR REEDUCATION: CPT | Performed by: PHYSICAL THERAPIST

## 2025-02-27 NOTE — PROGRESS NOTES
Daily Note     Today's date: 2025  Patient name: Primo Guy  : 1940  MRN: [id number]  Referring provider: Daniel Sanchez,*  Dx:   Encounter Diagnosis     ICD-10-CM    1. Cervicalgia  M54.2       2. Acute pain of right shoulder  M25.511       3. Right groin pain  R10.31                        Subjective: Patient reports min R sided neck pain.    Objective: See treatment diary below    Assessment: Pt demonstrated improved cv rotation bilaterally and no functional limitations.    Plan: D/c to HEP.     Precautions: A-fib, cardiomyopathy, GERD.    Dx: c/s stenosis with opening preference, R shoulder pain, R groin pain.    Manuals    Seated C2, 3, 4 P-A Gr mobs  3x20 ea  2x20 ea 2x20 ea KF 2x20 ea KF 3x20 ea 3x20 ea 3x20 ea 4x20 ea   TPR:  suboccipital +  cervical paraspinals             STM/graston B UT 8 min   8 min 8 min 8 min  5 min 5 min 5 min   Seated c/s Rot clin OP B 1x10 B 1x10 B     1x10 ea 1x10 L, 2x10 R 1x10 L, 2x10 R 1x10 B   Neuro Re-Ed             Slouch over-correction 3x10 3x10  1x15 1x15 1x15 1x15 1x15 3x10 3x10   Seated c/s B Rot 3x10 3x10  1x30 1x30  1x30 1x15 2x15 3x10 3x10   Seated c/s B SB             Posture correction    5 min 5 min 5 min       DLS standing hip flex                                                    Ther Ex             B TB ER Naldo  3x15 Naldo/ 3x15  Naldo/ 3x15 Blue  3x18 Blue  3x18 Naldo/ 3x18 Naldo/ 3x18 Naldo/ 3x20 Dillon/ 3x15   TB Rows Gold   3x20 Gold/ 3x20  Eveline/ 3x15 Silver  3x18 Silver  3x18 Eveline/ 3x18 Eveline/ 3x20 Gold/ 3x15  Gold/ 3x15   WB Rep hip ext             Pulleys: flex             TB Hor ABD (no UT comp)        Y/ 3x10 Y/ 3x10                                           Ther Activity             STS                          Gait Training                                       Modalities

## 2025-03-05 PROBLEM — Z00.00 MEDICARE ANNUAL WELLNESS VISIT, SUBSEQUENT: Status: RESOLVED | Noted: 2024-01-30 | Resolved: 2025-03-05

## 2025-05-01 ENCOUNTER — RA CDI HCC (OUTPATIENT)
Dept: OTHER | Facility: HOSPITAL | Age: 85
End: 2025-05-01

## 2025-05-07 ENCOUNTER — OFFICE VISIT (OUTPATIENT)
Dept: INTERNAL MEDICINE CLINIC | Facility: CLINIC | Age: 85
End: 2025-05-07
Payer: MEDICARE

## 2025-05-07 VITALS
SYSTOLIC BLOOD PRESSURE: 130 MMHG | TEMPERATURE: 97 F | WEIGHT: 237.6 LBS | HEART RATE: 60 BPM | BODY MASS INDEX: 35.09 KG/M2 | DIASTOLIC BLOOD PRESSURE: 78 MMHG | OXYGEN SATURATION: 97 %

## 2025-05-07 DIAGNOSIS — I10 BENIGN ESSENTIAL HYPERTENSION: Chronic | ICD-10-CM

## 2025-05-07 DIAGNOSIS — I48.21 PERMANENT ATRIAL FIBRILLATION (HCC): Chronic | ICD-10-CM

## 2025-05-07 DIAGNOSIS — E78.2 MIXED HYPERLIPIDEMIA: ICD-10-CM

## 2025-05-07 DIAGNOSIS — I50.32 CHRONIC HEART FAILURE WITH PRESERVED EJECTION FRACTION (HFPEF) (HCC): Primary | ICD-10-CM

## 2025-05-07 DIAGNOSIS — R60.0 EDEMA OF EXTREMITIES: ICD-10-CM

## 2025-05-07 DIAGNOSIS — R73.03 PRE-DIABETES: Chronic | ICD-10-CM

## 2025-05-07 PROCEDURE — 99214 OFFICE O/P EST MOD 30 MIN: CPT | Performed by: INTERNAL MEDICINE

## 2025-05-07 PROCEDURE — G2211 COMPLEX E/M VISIT ADD ON: HCPCS | Performed by: INTERNAL MEDICINE

## 2025-05-07 NOTE — PATIENT INSTRUCTIONS
Problem List Items Addressed This Visit          Cardiovascular and Mediastinum    Atrial fibrillation (HCC) (Chronic)    No heart palpitations, no bleeding problems, continue anticoagulation         Benign essential hypertension (Chronic)    Continue current meds along with healthy diet and exercise, blood pressure is good         Chronic heart failure with preserved ejection fraction (HFpEF) (HCC) - Primary    Wt Readings from Last 3 Encounters:   05/07/25 108 kg (237 lb 9.6 oz)   02/17/25 104 kg (229 lb 4.5 oz)   02/05/25 104 kg (229 lb 3.2 oz)     Continue current meds, potassium when last checked in January was normal, no respiratory distress                  Other    Pre-diabetes (Chronic)    Continue healthy diet and exercise         Mixed hyperlipidemia    Continue simvastatin along with healthy diet         Edema of extremities    Continue meds, stay active, can elevate 3 times a day for 30 minutes

## 2025-05-07 NOTE — PROGRESS NOTES
Name: Primo Guy      : 1940      MRN: [id number]  Encounter Provider: Arnaldo Luo MD  Encounter Date: 2025   Encounter department: [facility]  :  Assessment & Plan  Chronic heart failure with preserved ejection fraction (HFpEF) (HCC)  Wt Readings from Last 3 Encounters:   25 108 kg (237 lb 9.6 oz)   25 104 kg (229 lb 4.5 oz)   25 104 kg (229 lb 3.2 oz)     Continue current meds, potassium when last checked in January was normal, no respiratory distress             Benign essential hypertension  Continue current meds along with healthy diet and exercise, blood pressure is good       Permanent atrial fibrillation (HCC)  No heart palpitations, no bleeding problems, continue anticoagulation       Mixed hyperlipidemia  Continue simvastatin along with healthy diet       Edema of extremities  Continue meds, stay active, can elevate 3 times a day for 30 minutes       Pre-diabetes  Continue healthy diet and exercise              History of Present Illness   HPI  Review of Systems   Constitutional:  Negative for chills, fatigue and fever.   HENT:  Negative for congestion, nosebleeds, postnasal drip, sore throat and trouble swallowing.    Eyes:  Negative for pain.   Respiratory:  Negative for cough, chest tightness, shortness of breath and wheezing.    Cardiovascular:  Negative for chest pain, palpitations and leg swelling.   Gastrointestinal:  Negative for abdominal pain, constipation, diarrhea, nausea and vomiting.   Endocrine: Negative for polydipsia and polyuria.   Genitourinary:  Negative for dysuria, flank pain and hematuria.   Musculoskeletal:  Negative for arthralgias.   Skin:  Negative for rash.   Neurological:  Negative for dizziness, tremors, light-headedness and headaches.   Hematological:  Does not bruise/bleed easily.   Psychiatric/Behavioral:  Negative for confusion and dysphoric mood. The patient is not nervous/anxious.        Objective   BP  130/78 (BP Location: Left arm, Patient Position: Sitting, Cuff Size: Standard)   Pulse 60   Temp (!) 97 °F (36.1 °C) (Tympanic)   Wt 108 kg (237 lb 9.6 oz)   SpO2 97%   BMI 35.09 kg/m²      Physical Exam  Vitals reviewed.   Constitutional:       General: He is not in acute distress.     Appearance: Normal appearance. He is well-developed.   HENT:      Head: Normocephalic and atraumatic.      Right Ear: External ear normal.      Left Ear: External ear normal.      Nose: Nose normal.   Eyes:      General: No scleral icterus.     Conjunctiva/sclera: Conjunctivae normal.   Neck:      Trachea: No tracheal deviation.   Cardiovascular:      Rate and Rhythm: Normal rate. Rhythm irregular.      Heart sounds: Normal heart sounds. No murmur heard.  Pulmonary:      Effort: Pulmonary effort is normal. No respiratory distress.      Breath sounds: Normal breath sounds. No wheezing or rales.   Abdominal:      General: Bowel sounds are normal.      Palpations: Abdomen is soft. There is no mass.      Tenderness: There is no abdominal tenderness. There is no guarding.   Musculoskeletal:      Cervical back: Normal range of motion and neck supple.      Right lower leg: Edema (mild) present.      Left lower leg: Edema (mild) present.   Lymphadenopathy:      Cervical: No cervical adenopathy.   Skin:     Coloration: Skin is not jaundiced or pale.   Neurological:      General: No focal deficit present.      Mental Status: He is alert and oriented to person, place, and time.   Psychiatric:         Mood and Affect: Mood normal.         Behavior: Behavior normal.         Thought Content: Thought content normal.         Judgment: Judgment normal.

## 2025-05-07 NOTE — ASSESSMENT & PLAN NOTE
Wt Readings from Last 3 Encounters:   05/07/25 108 kg (237 lb 9.6 oz)   02/17/25 104 kg (229 lb 4.5 oz)   02/05/25 104 kg (229 lb 3.2 oz)     Continue current meds, potassium when last checked in January was normal, no respiratory distress

## 2025-05-23 ENCOUNTER — OFFICE VISIT (OUTPATIENT)
Dept: CARDIOLOGY CLINIC | Facility: CLINIC | Age: 85
End: 2025-05-23
Payer: MEDICARE

## 2025-05-23 ENCOUNTER — TELEPHONE (OUTPATIENT)
Dept: CARDIOLOGY CLINIC | Facility: CLINIC | Age: 85
End: 2025-05-23

## 2025-05-23 VITALS
HEART RATE: 79 BPM | SYSTOLIC BLOOD PRESSURE: 140 MMHG | OXYGEN SATURATION: 98 % | BODY MASS INDEX: 34.66 KG/M2 | HEIGHT: 69 IN | WEIGHT: 234 LBS | DIASTOLIC BLOOD PRESSURE: 70 MMHG

## 2025-05-23 DIAGNOSIS — I50.32 CHRONIC HEART FAILURE WITH PRESERVED EJECTION FRACTION (HFPEF) (HCC): ICD-10-CM

## 2025-05-23 DIAGNOSIS — I48.91 ATRIAL FIBRILLATION, UNSPECIFIED TYPE (HCC): ICD-10-CM

## 2025-05-23 DIAGNOSIS — I10 BENIGN ESSENTIAL HYPERTENSION: Chronic | ICD-10-CM

## 2025-05-23 DIAGNOSIS — E78.2 MIXED HYPERLIPIDEMIA: ICD-10-CM

## 2025-05-23 DIAGNOSIS — I48.21 PERMANENT ATRIAL FIBRILLATION (HCC): Primary | Chronic | ICD-10-CM

## 2025-05-23 DIAGNOSIS — R60.0 BILATERAL EDEMA OF LOWER EXTREMITY: ICD-10-CM

## 2025-05-23 PROCEDURE — G2211 COMPLEX E/M VISIT ADD ON: HCPCS | Performed by: INTERNAL MEDICINE

## 2025-05-23 PROCEDURE — 93000 ELECTROCARDIOGRAM COMPLETE: CPT | Performed by: INTERNAL MEDICINE

## 2025-05-23 PROCEDURE — 99214 OFFICE O/P EST MOD 30 MIN: CPT | Performed by: INTERNAL MEDICINE

## 2025-05-23 RX ORDER — TORSEMIDE 10 MG/1
10 TABLET ORAL DAILY
Qty: 90 TABLET | Refills: 3 | Status: SHIPPED | OUTPATIENT
Start: 2025-05-23

## 2025-05-23 RX ORDER — LOSARTAN POTASSIUM 100 MG/1
100 TABLET ORAL DAILY
Qty: 90 TABLET | Refills: 3 | Status: SHIPPED | OUTPATIENT
Start: 2025-05-23 | End: 2025-05-23

## 2025-05-23 RX ORDER — TORSEMIDE 10 MG/1
10 TABLET ORAL DAILY
Qty: 90 TABLET | Refills: 3 | Status: SHIPPED | OUTPATIENT
Start: 2025-05-23 | End: 2025-05-23 | Stop reason: SDUPTHER

## 2025-05-23 RX ORDER — LOSARTAN POTASSIUM 100 MG/1
100 TABLET ORAL DAILY
Qty: 90 TABLET | Refills: 3 | Status: SHIPPED | OUTPATIENT
Start: 2025-05-23 | End: 2025-05-23 | Stop reason: SDUPTHER

## 2025-05-23 RX ORDER — LOSARTAN POTASSIUM 100 MG/1
100 TABLET ORAL DAILY
Qty: 90 TABLET | Refills: 3 | Status: SHIPPED | OUTPATIENT
Start: 2025-05-23

## 2025-05-23 NOTE — TELEPHONE ENCOUNTER
E-Prescribing Status: Transmission to pharmacy failed (5/23/2025  2:38 PM EDT)     Transmission to pharmacy error occurred. Medication resent to pharmacy. Receipt confirmed by pharmacy.

## 2025-05-23 NOTE — PROGRESS NOTES
Follow-up - Cardiology   Primo Guy 84 y.o. male MRN: [id number]          Impression:    Atrial fibrillation  Permanent, asymptomatic, well rate controlled and continues on Eliquis.    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  Basic metabolic panel in 2 weeks      HPI:   Primo Guy is a 84 y.o. year old male with hypertension, hyperlipidemia, permanent atrial fibrillation, chronic anticoagulation, history of alcohol abuse, GERD, comes in for a follow-up visit.    He still admits to a high sodium diet  Blood pressure is high normal, he is mildly edematous  He has no respiratory symptoms and he still goes to the gym most days of the week  Cholesterol is well-controlled except for mildly elevated triglycerides  A-fib is well rate controlled and continues on Eliquis without bleeding      Review of Systems   Constitutional:  Negative for appetite change, diaphoresis, fatigue and fever.   Respiratory:  Negative for chest tightness, shortness of breath and wheezing.    Cardiovascular:  Positive for leg swelling. Negative for chest pain and palpitations.   Gastrointestinal:  Negative for abdominal pain and blood in stool.   Musculoskeletal:  Negative for arthralgias and joint swelling.    Skin:  Negative for rash.   Neurological:  Negative for dizziness, syncope and light-headedness.         Past Medical History:   Diagnosis Date   • Atrial fibrillation (HCC)    • Cardiomyopathy (HCC)     LAST ASSESSED [id number]   • Cataract    • CHF (congestive heart failure) (HCC)    • Dysphagia    • GERD (gastroesophageal reflux disease)    • Hyperlipidemia    • Hypertension    • Irregular heart beat     afib   • Rosacea    • Schatzki's ring    • Sleep apnea     no cpap     Social History     Substance and Sexual Activity   Alcohol Use Yes   • Alcohol/week: 7.0 - 14.0 standard drinks of alcohol   • Types: 7 - 14 Glasses of wine per week    Comment: 1-2 glasses of wine daily     Social History     Substance and Sexual Activity   Drug Use No     Social History     Tobacco Use   Smoking Status Former   • Types: Cigarettes   Smokeless Tobacco Never   Tobacco Comments    about 55 years ago,  unknown start date quit 1996       Allergies:  No Known Allergies      Medications:     Current Outpatient Medications:   •  apixaban (Eliquis) 5 mg, Take 1 tablet (5 mg total) by mouth 2 (two) times a day, Disp: 180 tablet, Rfl: 3  •  atenolol (TENORMIN) 50 mg tablet, Take 1 tablet (50 mg total) by mouth 2 (two) times a day, Disp: 180 tablet, Rfl: 1  •  hydrocortisone 2.5 % cream, Apply topically in the morning, Disp: 56 g, Rfl: 3  •  losartan (COZAAR) 100 MG tablet, Take 1 tablet (100 mg total) by mouth daily, Disp: 90 tablet, Rfl: 3  •  Multiple Vitamin (multivitamin) tablet, Take 1 tablet by mouth in the morning., Disp: , Rfl:   •  omeprazole (PriLOSEC) 20 mg delayed release capsule, Take 1 capsule (20 mg total) by mouth daily, Disp: 90 capsule, Rfl: 1  •  simvastatin (ZOCOR) 40 mg tablet, Take 1 tablet (40 mg total) by mouth daily at bedtime, Disp: 90 tablet, Rfl: 3  •  torsemide (DEMADEX) 10 mg tablet, Take 1 tablet (10 mg total) by mouth daily, Disp: 90 tablet, Rfl: 3  •  Vitamin D, Cholecalciferol, 1000 UNITS CAPS, Take  "by mouth 2,000 per day, Disp: , Rfl:   •  fluticasone (FLONASE) 50 mcg/act nasal spray, Spray 2 squirts in each nostril once daily (Patient not taking: Reported on 5/23/2025), Disp: 16 g, Rfl: 3      Vitals:    05/23/25 1355   BP: 140/70   Pulse: 79   SpO2: 98%     Weight (last 2 days)     Date/Time Weight    05/23/25 1355 106 (234)        Physical Exam  Constitutional:       General: He is not in acute distress.     Appearance: He is not diaphoretic.   HENT:      Head: Normocephalic and atraumatic.     Eyes:      General: No scleral icterus.     Conjunctiva/sclera: Conjunctivae normal.     Neck:      Vascular: No JVD.     Cardiovascular:      Rate and Rhythm: Normal rate. Rhythm irregular.      Heart sounds: Normal heart sounds. No murmur heard.  Pulmonary:      Effort: Pulmonary effort is normal.      Breath sounds: Normal breath sounds. No wheezing or rales.     Musculoskeletal:      Cervical back: Normal range of motion.      Right lower leg: Edema present.      Left lower leg: Edema present.     Skin:     General: Skin is warm and dry.     Neurological:      Mental Status: He is alert and oriented to person, place, and time.           Laboratory Studies:  CMP:        Invalid input(s): \"ALBUMIN\"  NT-proBNP: No results found for: \"NTBNP\"   Coags:    Lipid Profile:   Lab Results   Component Value Date    CHOL 136 11/04/2015     Lab Results   Component Value Date    HDL 45 01/31/2025     Lab Results   Component Value Date    LDLCALC 74 01/31/2025     Lab Results   Component Value Date    TRIG 173 (H) 01/31/2025       Cardiac testing:   EKG reviewed personally:    Results for orders placed or performed in visit on 05/23/25   POCT ECG    Impression    A-fib, nonspecific IVCD       Echocardiogram  2018-EF 50%  4/23-LVEF 50%, systolic function low normal, atria moderately dilated, mild MR, mild to moderate TR-personally reviewed    Stress test  8/23-Lexiscan stress Myoview-LVEF 45%, no ischemia or scar, personally " "reviewed      Vu Devine MD    Portions of the record may have been created with voice recognition software.  Occasional wrong word or \"sound a like\" substitutions may have occurred due to the inherent limitations of voice recognition software.  Read the chart carefully and recognize, using context, where substitutions have occurred.  "

## 2025-05-27 ENCOUNTER — TELEPHONE (OUTPATIENT)
Age: 85
End: 2025-05-27

## 2025-05-27 NOTE — TELEPHONE ENCOUNTER
Caller: Graciela Guy    Doctor and/or Office: Dr. Smith/Yajaira    #: [phone]    Escalation: Appointment Patients wife would like to speak to someone in [place] for directions and any landmarks to look for in [place]. Thank you

## 2025-05-30 ENCOUNTER — OFFICE VISIT (OUTPATIENT)
Dept: INTERNAL MEDICINE CLINIC | Facility: CLINIC | Age: 85
End: 2025-05-30
Payer: MEDICARE

## 2025-05-30 ENCOUNTER — TELEPHONE (OUTPATIENT)
Age: 85
End: 2025-05-30

## 2025-05-30 VITALS
TEMPERATURE: 97.1 F | HEIGHT: 69 IN | DIASTOLIC BLOOD PRESSURE: 72 MMHG | BODY MASS INDEX: 34.66 KG/M2 | WEIGHT: 234 LBS | OXYGEN SATURATION: 97 % | SYSTOLIC BLOOD PRESSURE: 122 MMHG | HEART RATE: 67 BPM

## 2025-05-30 DIAGNOSIS — L84 CALLUS OF FOOT: Primary | ICD-10-CM

## 2025-05-30 PROCEDURE — 99213 OFFICE O/P EST LOW 20 MIN: CPT | Performed by: INTERNAL MEDICINE

## 2025-05-30 PROCEDURE — G2211 COMPLEX E/M VISIT ADD ON: HCPCS | Performed by: INTERNAL MEDICINE

## 2025-05-30 RX ORDER — CEPHALEXIN 500 MG/1
500 CAPSULE ORAL 3 TIMES DAILY
Qty: 21 CAPSULE | Refills: 0 | Status: SHIPPED | OUTPATIENT
Start: 2025-05-30 | End: 2025-06-06

## 2025-05-30 NOTE — TELEPHONE ENCOUNTER
"Patients wife calls in.    Patient developed, a \"callus,\" under his left pinky toe.  The other day, the patient had his wife, \"file\" the callous down.    Today, the site is red, painful and swollen.  Denies fever, drainage or streaking.    Appointment made for today.  Agreeable to see Dr. Reyes.                      "

## 2025-05-30 NOTE — PROGRESS NOTES
"Name: Primo Guy      : 1940      MRN: [id number]  Encounter Provider: Lea Reyes, MD  Encounter Date: 2025   Encounter department: [facility]  :  Assessment & Plan  Callus of foot  Quarter sized callus that is very tender to touch in the left foot  Fears that it is infected but on exam, there is no redness or discharge  Will try an antibiotic for possible infection  Instructed to avoid filing the callus or using any chemical solutions to treat it  He can continue soaking his foot in Epsom salt.  He can use petroleum jelly and use a cushioned Band-Aid to cover it  Follow-up with podiatry as scheduled  Orders:  •  cephalexin (KEFLEX) 500 mg capsule; Take 1 capsule (500 mg total) by mouth 3 (three) times a day for 7 days           History of Present Illness   Here with his wife  He has had a large callus on the left foot for a long time.  He has been treating this on his own  by filing it down and just started using a chemical solution.  Yesterday, started having severe pain over the callus.  It is very tender to touch.  There is no redness around it and there is no drainage.  He called the podiatrist but he cannot be seen until 6/10  He has been soaking it in Epsom salt.  It is painful when he walks      Review of Systems   Constitutional:  Negative for chills and fever.       Objective   /72 (BP Location: Left arm, Patient Position: Sitting, Cuff Size: Large)   Pulse 67   Temp (!) 97.1 °F (36.2 °C) (Tympanic)   Ht 5' 9\" (1.753 m)   Wt 106 kg (234 lb)   SpO2 97%   BMI 34.56 kg/m²      Physical Exam  Constitutional:       Appearance: He is not ill-appearing.     Cardiovascular:      Pulses:           Dorsalis pedis pulses are 2+ on the left side.   Pulmonary:      Effort: No respiratory distress.     Skin:     Comments: Quarter sized callus on the left foot with out surrounding erythema or drainage         "

## 2025-06-02 NOTE — TELEPHONE ENCOUNTER
Caller:  Graciela    Doctor: Dr. Montes De Oca    Reason for call: wife called to add nail cutting to the appointment on 6/10. She just would like to know if this okay.  Please advise  Thank you    Call back#: [phone]

## 2025-06-03 NOTE — TELEPHONE ENCOUNTER
Called patient's wife back to advise that nail care can be done during his 6/10 appointment, the original appointment was scheduled for callus care, which ties into overall nail care/ trimming.

## 2025-06-06 ENCOUNTER — APPOINTMENT (OUTPATIENT)
Dept: LAB | Facility: CLINIC | Age: 85
End: 2025-06-06
Attending: INTERNAL MEDICINE
Payer: MEDICARE

## 2025-06-06 DIAGNOSIS — R60.0 BILATERAL EDEMA OF LOWER EXTREMITY: ICD-10-CM

## 2025-06-06 LAB
ANION GAP SERPL CALCULATED.3IONS-SCNC: 9 MMOL/L (ref 4–13)
BUN SERPL-MCNC: 27 MG/DL (ref 5–25)
CALCIUM SERPL-MCNC: 9.5 MG/DL (ref 8.4–10.2)
CHLORIDE SERPL-SCNC: 104 MMOL/L (ref 96–108)
CO2 SERPL-SCNC: 25 MMOL/L (ref 21–32)
CREAT SERPL-MCNC: 0.93 MG/DL (ref 0.6–1.3)
GFR SERPL CREATININE-BSD FRML MDRD: 76 ML/MIN/1.73SQ M
GLUCOSE P FAST SERPL-MCNC: 102 MG/DL (ref 65–99)
POTASSIUM SERPL-SCNC: 4.1 MMOL/L (ref 3.5–5.3)
SODIUM SERPL-SCNC: 138 MMOL/L (ref 135–147)

## 2025-06-06 PROCEDURE — 80048 BASIC METABOLIC PNL TOTAL CA: CPT

## 2025-06-06 PROCEDURE — 36415 COLL VENOUS BLD VENIPUNCTURE: CPT

## 2025-06-07 DIAGNOSIS — I10 ESSENTIAL HYPERTENSION: ICD-10-CM

## 2025-06-07 DIAGNOSIS — K21.9 GASTROESOPHAGEAL REFLUX DISEASE: ICD-10-CM

## 2025-06-07 RX ORDER — ATENOLOL 50 MG/1
50 TABLET ORAL 2 TIMES DAILY
Qty: 180 TABLET | Refills: 3 | Status: SHIPPED | OUTPATIENT
Start: 2025-06-07

## 2025-06-07 RX ORDER — OMEPRAZOLE 20 MG/1
20 CAPSULE, DELAYED RELEASE ORAL DAILY
Qty: 90 CAPSULE | Refills: 3 | Status: SHIPPED | OUTPATIENT
Start: 2025-06-07

## 2025-06-26 ENCOUNTER — TELEPHONE (OUTPATIENT)
Dept: CARDIOLOGY CLINIC | Facility: CLINIC | Age: 85
End: 2025-06-26

## 2025-07-01 ENCOUNTER — OFFICE VISIT (OUTPATIENT)
Dept: PODIATRY | Facility: CLINIC | Age: 85
End: 2025-07-01
Payer: MEDICARE

## 2025-07-01 VITALS — HEIGHT: 69 IN | WEIGHT: 230 LBS | BODY MASS INDEX: 34.07 KG/M2 | RESPIRATION RATE: 18 BRPM

## 2025-07-01 DIAGNOSIS — M77.42 METATARSALGIA OF LEFT FOOT: ICD-10-CM

## 2025-07-01 DIAGNOSIS — S90.822A BLISTER OF LEFT FOOT, INITIAL ENCOUNTER: Primary | ICD-10-CM

## 2025-07-01 DIAGNOSIS — M77.41 METATARSALGIA OF RIGHT FOOT: ICD-10-CM

## 2025-07-01 PROCEDURE — 99202 OFFICE O/P NEW SF 15 MIN: CPT | Performed by: PODIATRIST

## 2025-07-01 NOTE — PROGRESS NOTES
"Name: Primo Guy      : 1940      MRN: [id number]  Encounter Provider: Oliver Valladares DPM  Encounter Date: 2025   Encounter department: [facility] [place]    Trimmed removed from blister and left foot so that does not get caught in socks.  No evidence of infection or ulcer today.  Patient told to consider orthotics that would offload each fifth metatarsal head.  We will reassess in 10 weeks.  Patient understands that due to excellent circulation repeat care is not a covered service.  :  Assessment & Plan  Blister of left foot, initial encounter         Metatarsalgia of right foot         Metatarsalgia of left foot             History of Present Illness   HPI  Primo Guy is a 84 y.o. male who presents for assessment of his left foot.  Approximately 2 months ago, patient had a very painful callus on the bottom of the left foot.  He saw his medical doctor who placed him on an antibiotic as there was concern of infection.  The patient notes that he soaked his left foot in Epsom salts with frequency and it appears as if the blister burst a few weeks back.  He still has discomfort but it is much improved.  This blister is at the fifth metatarsal head of the left foot.  Patient also relates discomfort at the fifth metatarsal head right foot but it is not callused or blistered.      Review of Systems   Cardiovascular:         Atrial fibrillation   Gastrointestinal:         GERD   Psychiatric/Behavioral: Negative.                Objective   Resp 18   Ht 5' 9\" (1.753 m)   Wt 104 kg (230 lb)   BMI 33.97 kg/m²      Physical Exam  Constitutional:       Appearance: He is obese.     Cardiovascular:      Pulses: Normal pulses.     Musculoskeletal:         General: Tenderness present.      Comments: Tenderness with palpation right fifth metatarsal head.     Skin:     Findings: Lesion present.      Comments: Resolving blister fifth metatarsal head left foot with dried roof of blister " noted.  No ulceration or evidence of infection.     Neurological:      General: No focal deficit present.      Mental Status: He is oriented to person, place, and time.

## 2025-08-04 ENCOUNTER — TELEPHONE (OUTPATIENT)
Age: 85
End: 2025-08-04

## 2025-08-11 ENCOUNTER — OFFICE VISIT (OUTPATIENT)
Dept: INTERNAL MEDICINE CLINIC | Facility: CLINIC | Age: 85
End: 2025-08-11
Payer: MEDICARE

## (undated) DEVICE — LUBRICANT SURGILUBE TUBE 4 OZ  FLIP TOP

## (undated) DEVICE — STRETCH BANDAGE: Brand: CURITY

## (undated) DEVICE — STRL UNIVERSAL MINOR GENERAL: Brand: CARDINAL HEALTH

## (undated) DEVICE — REM POLYHESIVE ADULT PATIENT RETURN ELECTRODE: Brand: VALLEYLAB

## (undated) DEVICE — SPONGE STICK WITH PVP-I: Brand: KENDALL

## (undated) DEVICE — ICE PACK EYE

## (undated) DEVICE — GLOVE SRG BIOGEL ECLIPSE 8

## (undated) DEVICE — PLUMEPEN PRO 10FT

## (undated) DEVICE — GAUZE SPONGES,16 PLY: Brand: CURITY

## (undated) DEVICE — PETROLATUM GAUZE STRIP: Brand: VASELINE

## (undated) DEVICE — SUT VICRYL 4-0 RB-1 27 IN J214H